# Patient Record
Sex: MALE | Race: WHITE | Employment: OTHER | URBAN - NONMETROPOLITAN AREA
[De-identification: names, ages, dates, MRNs, and addresses within clinical notes are randomized per-mention and may not be internally consistent; named-entity substitution may affect disease eponyms.]

---

## 2017-01-30 DIAGNOSIS — E78.2 MIXED HYPERLIPIDEMIA: ICD-10-CM

## 2017-01-30 RX ORDER — ATORVASTATIN CALCIUM 10 MG/1
10 TABLET, FILM COATED ORAL DAILY
Qty: 90 TABLET | Refills: 3 | Status: SHIPPED | OUTPATIENT
Start: 2017-01-30 | End: 2017-11-27 | Stop reason: SDUPTHER

## 2017-03-20 ENCOUNTER — OFFICE VISIT (OUTPATIENT)
Dept: FAMILY MEDICINE CLINIC | Age: 72
End: 2017-03-20

## 2017-03-20 VITALS
HEART RATE: 84 BPM | BODY MASS INDEX: 36.96 KG/M2 | OXYGEN SATURATION: 96 % | SYSTOLIC BLOOD PRESSURE: 122 MMHG | RESPIRATION RATE: 16 BRPM | DIASTOLIC BLOOD PRESSURE: 74 MMHG | WEIGHT: 236 LBS

## 2017-03-20 DIAGNOSIS — E66.9 OBESITY (BMI 30-39.9): ICD-10-CM

## 2017-03-20 DIAGNOSIS — N52.9 VASCULOGENIC ERECTILE DYSFUNCTION, UNSPECIFIED VASCULOGENIC ERECTILE DYSFUNCTION TYPE: ICD-10-CM

## 2017-03-20 DIAGNOSIS — J30.2 SEASONAL ALLERGIC RHINITIS, UNSPECIFIED ALLERGIC RHINITIS TRIGGER: ICD-10-CM

## 2017-03-20 DIAGNOSIS — J41.0 SIMPLE CHRONIC BRONCHITIS (HCC): ICD-10-CM

## 2017-03-20 DIAGNOSIS — G25.81 RESTLESS LEG SYNDROME: ICD-10-CM

## 2017-03-20 DIAGNOSIS — I70.0 AORTIC ATHEROSCLEROSIS (HCC): ICD-10-CM

## 2017-03-20 DIAGNOSIS — I10 ESSENTIAL HYPERTENSION: Chronic | ICD-10-CM

## 2017-03-20 DIAGNOSIS — F32.5 DEPRESSION, MAJOR, IN REMISSION (HCC): ICD-10-CM

## 2017-03-20 DIAGNOSIS — R21 RASH: ICD-10-CM

## 2017-03-20 DIAGNOSIS — F33.41 RECURRENT MAJOR DEPRESSIVE DISORDER, IN PARTIAL REMISSION (HCC): ICD-10-CM

## 2017-03-20 DIAGNOSIS — R10.13 DYSPEPSIA: ICD-10-CM

## 2017-03-20 PROCEDURE — 99214 OFFICE O/P EST MOD 30 MIN: CPT | Performed by: FAMILY MEDICINE

## 2017-03-20 PROCEDURE — 3288F FALL RISK ASSESSMENT DOCD: CPT | Performed by: FAMILY MEDICINE

## 2017-03-20 RX ORDER — AMLODIPINE BESYLATE AND BENAZEPRIL HYDROCHLORIDE 5; 10 MG/1; MG/1
1 CAPSULE ORAL DAILY
Qty: 90 CAPSULE | Refills: 3 | Status: SHIPPED | OUTPATIENT
Start: 2017-03-20 | End: 2017-11-27 | Stop reason: SDUPTHER

## 2017-03-20 RX ORDER — ESCITALOPRAM OXALATE 20 MG/1
20 TABLET ORAL DAILY
Qty: 90 TABLET | Refills: 3 | Status: SHIPPED | OUTPATIENT
Start: 2017-03-20 | End: 2017-11-27 | Stop reason: SDUPTHER

## 2017-03-20 RX ORDER — METOPROLOL SUCCINATE 25 MG/1
25 TABLET, EXTENDED RELEASE ORAL DAILY
Qty: 90 TABLET | Refills: 3 | Status: SHIPPED | OUTPATIENT
Start: 2017-03-20 | End: 2017-08-14 | Stop reason: SDUPTHER

## 2017-03-20 RX ORDER — MONTELUKAST SODIUM 10 MG/1
10 TABLET ORAL NIGHTLY
Qty: 90 TABLET | Refills: 3 | Status: SHIPPED | OUTPATIENT
Start: 2017-03-20 | End: 2017-11-27 | Stop reason: SDUPTHER

## 2017-03-20 RX ORDER — BUDESONIDE AND FORMOTEROL FUMARATE DIHYDRATE 160; 4.5 UG/1; UG/1
2 AEROSOL RESPIRATORY (INHALATION) 2 TIMES DAILY
Qty: 3 INHALER | Refills: 3 | Status: SHIPPED | OUTPATIENT
Start: 2017-03-20 | End: 2017-11-27 | Stop reason: SDUPTHER

## 2017-03-20 RX ORDER — SILDENAFIL 100 MG/1
100 TABLET, FILM COATED ORAL PRN
Qty: 6 TABLET | Refills: 3 | Status: SHIPPED | OUTPATIENT
Start: 2017-03-20

## 2017-03-20 RX ORDER — ALBUTEROL SULFATE 90 UG/1
2 AEROSOL, METERED RESPIRATORY (INHALATION) EVERY 6 HOURS PRN
Qty: 1 INHALER | Refills: 5 | Status: SHIPPED | OUTPATIENT
Start: 2017-03-20 | End: 2017-11-09 | Stop reason: ALTCHOICE

## 2017-03-20 RX ORDER — CLONAZEPAM 0.5 MG/1
0.5 TABLET ORAL PRN
Qty: 90 TABLET | Refills: 0 | Status: SHIPPED | OUTPATIENT
Start: 2017-03-20 | End: 2017-09-18 | Stop reason: SDUPTHER

## 2017-03-20 RX ORDER — OMEPRAZOLE 40 MG/1
40 CAPSULE, DELAYED RELEASE ORAL DAILY
Qty: 90 CAPSULE | Refills: 3 | Status: SHIPPED | OUTPATIENT
Start: 2017-03-20 | End: 2017-11-27 | Stop reason: SDUPTHER

## 2017-03-20 ASSESSMENT — ENCOUNTER SYMPTOMS
DIARRHEA: 0
BLOOD IN STOOL: 0
SORE THROAT: 0
BACK PAIN: 0
SHORTNESS OF BREATH: 0
ABDOMINAL PAIN: 0
VOMITING: 0
WHEEZING: 0
COUGH: 0
RHINORRHEA: 0
CONSTIPATION: 0
NAUSEA: 0
CHEST TIGHTNESS: 0
SINUS PRESSURE: 0

## 2017-04-07 ENCOUNTER — CARE COORDINATOR VISIT (OUTPATIENT)
Dept: CASE MANAGEMENT | Age: 72
End: 2017-04-07

## 2017-04-10 ENCOUNTER — TELEPHONE (OUTPATIENT)
Dept: PHARMACY | Facility: CLINIC | Age: 72
End: 2017-04-10

## 2017-04-10 ENCOUNTER — CARE COORDINATION (OUTPATIENT)
Dept: CASE MANAGEMENT | Age: 72
End: 2017-04-10

## 2017-04-10 DIAGNOSIS — J44.1 CHRONIC OBSTRUCTIVE PULMONARY DISEASE WITH ACUTE EXACERBATION (HCC): Primary | ICD-10-CM

## 2017-04-11 ENCOUNTER — OFFICE VISIT (OUTPATIENT)
Dept: FAMILY MEDICINE CLINIC | Age: 72
End: 2017-04-11

## 2017-04-11 VITALS
DIASTOLIC BLOOD PRESSURE: 74 MMHG | SYSTOLIC BLOOD PRESSURE: 122 MMHG | HEART RATE: 76 BPM | WEIGHT: 226 LBS | BODY MASS INDEX: 35.4 KG/M2 | RESPIRATION RATE: 15 BRPM | OXYGEN SATURATION: 96 %

## 2017-04-11 DIAGNOSIS — J10.1 INFLUENZA A: ICD-10-CM

## 2017-04-11 DIAGNOSIS — I10 ESSENTIAL HYPERTENSION: Chronic | ICD-10-CM

## 2017-04-11 DIAGNOSIS — J44.1 CHRONIC OBSTRUCTIVE PULMONARY DISEASE WITH ACUTE EXACERBATION (HCC): ICD-10-CM

## 2017-04-21 ENCOUNTER — CARE COORDINATION (OUTPATIENT)
Dept: CASE MANAGEMENT | Age: 72
End: 2017-04-21

## 2017-05-06 PROCEDURE — 99496 TRANSJ CARE MGMT HIGH F2F 7D: CPT | Performed by: FAMILY MEDICINE

## 2017-08-14 DIAGNOSIS — I10 ESSENTIAL HYPERTENSION: Chronic | ICD-10-CM

## 2017-08-14 RX ORDER — METOPROLOL SUCCINATE 25 MG/1
25 TABLET, EXTENDED RELEASE ORAL DAILY
Qty: 90 TABLET | Refills: 3 | Status: SHIPPED | OUTPATIENT
Start: 2017-08-14 | End: 2017-11-27 | Stop reason: SDUPTHER

## 2017-09-18 ENCOUNTER — OFFICE VISIT (OUTPATIENT)
Dept: FAMILY MEDICINE CLINIC | Age: 72
End: 2017-09-18

## 2017-09-18 VITALS
RESPIRATION RATE: 16 BRPM | SYSTOLIC BLOOD PRESSURE: 136 MMHG | DIASTOLIC BLOOD PRESSURE: 84 MMHG | BODY MASS INDEX: 36.81 KG/M2 | HEART RATE: 98 BPM | WEIGHT: 235 LBS

## 2017-09-18 DIAGNOSIS — Z13.6 ENCOUNTER FOR ABDOMINAL AORTIC ANEURYSM (AAA) SCREENING: ICD-10-CM

## 2017-09-18 DIAGNOSIS — Z23 NEED FOR INFLUENZA VACCINATION: ICD-10-CM

## 2017-09-18 DIAGNOSIS — I10 ESSENTIAL HYPERTENSION: Chronic | ICD-10-CM

## 2017-09-18 DIAGNOSIS — E66.9 OBESITY (BMI 30-39.9): ICD-10-CM

## 2017-09-18 DIAGNOSIS — G25.81 RESTLESS LEG SYNDROME: ICD-10-CM

## 2017-09-18 DIAGNOSIS — H55.00: ICD-10-CM

## 2017-09-18 DIAGNOSIS — J44.1 CHRONIC OBSTRUCTIVE PULMONARY DISEASE WITH ACUTE EXACERBATION (HCC): Primary | ICD-10-CM

## 2017-09-18 DIAGNOSIS — F33.41 RECURRENT MAJOR DEPRESSIVE DISORDER, IN PARTIAL REMISSION (HCC): ICD-10-CM

## 2017-09-18 DIAGNOSIS — I70.0 AORTIC ATHEROSCLEROSIS (HCC): ICD-10-CM

## 2017-09-18 DIAGNOSIS — R29.818 NEUROLOGIC ABNORMALITY: ICD-10-CM

## 2017-09-18 DIAGNOSIS — F32.5 DEPRESSION, MAJOR, IN REMISSION (HCC): ICD-10-CM

## 2017-09-18 DIAGNOSIS — Z72.0 TOBACCO USE: ICD-10-CM

## 2017-09-18 PROCEDURE — G0008 ADMIN INFLUENZA VIRUS VAC: HCPCS | Performed by: FAMILY MEDICINE

## 2017-09-18 PROCEDURE — 90688 IIV4 VACCINE SPLT 0.5 ML IM: CPT | Performed by: FAMILY MEDICINE

## 2017-09-18 PROCEDURE — 99214 OFFICE O/P EST MOD 30 MIN: CPT | Performed by: FAMILY MEDICINE

## 2017-09-18 PROCEDURE — G8510 SCR DEP NEG, NO PLAN REQD: HCPCS | Performed by: FAMILY MEDICINE

## 2017-09-18 RX ORDER — CLONAZEPAM 0.5 MG/1
0.5 TABLET ORAL PRN
Qty: 90 TABLET | Refills: 0 | Status: SHIPPED | OUTPATIENT
Start: 2017-09-18 | End: 2018-03-15 | Stop reason: SDUPTHER

## 2017-09-18 ASSESSMENT — ENCOUNTER SYMPTOMS
ABDOMINAL PAIN: 0
CONSTIPATION: 0
CHEST TIGHTNESS: 0
WHEEZING: 0
RHINORRHEA: 0
BACK PAIN: 0
DIARRHEA: 0
VOMITING: 0
SORE THROAT: 0
BLOOD IN STOOL: 0
SINUS PRESSURE: 0
NAUSEA: 0
SHORTNESS OF BREATH: 0
COUGH: 0

## 2017-09-18 ASSESSMENT — PATIENT HEALTH QUESTIONNAIRE - PHQ9
1. LITTLE INTEREST OR PLEASURE IN DOING THINGS: 1
SUM OF ALL RESPONSES TO PHQ QUESTIONS 1-9: 2
SUM OF ALL RESPONSES TO PHQ9 QUESTIONS 1 & 2: 2
2. FEELING DOWN, DEPRESSED OR HOPELESS: 1

## 2017-10-16 ENCOUNTER — HOSPITAL ENCOUNTER (OUTPATIENT)
Dept: PULMONOLOGY | Age: 72
Discharge: HOME | End: 2017-10-16
Attending: FAMILY MEDICINE | Admitting: FAMILY MEDICINE

## 2017-10-16 DIAGNOSIS — H55.00 NYSTAGMUS: ICD-10-CM

## 2017-10-16 LAB
DLCO %PRED: NORMAL
DLCO/VA %PRED: NORMAL
DLCO: NORMAL ML/MMHG SEC
FEF 25-75% PRE PRED: NORMAL
FEF 25-75%-PRE: NORMAL
FEV1 %PRED-PRE: NORMAL
FEV1/FVC PRED: NORMAL
FEV1/FVC: NORMAL %
FEV1: NORMAL LITERS
FEV3 PRE: NORMAL
FVC %PRED-PRE: NORMAL
FVC: NORMAL LITERS
MEP: NORMAL
MIP: NORMAL
PEF %PRED-PRE: NORMAL
PEF-PRE: NORMAL L/SEC
TLC %PRED: NORMAL
TLC: NORMAL LITERS

## 2017-10-18 DIAGNOSIS — J44.1 CHRONIC OBSTRUCTIVE PULMONARY DISEASE WITH ACUTE EXACERBATION (HCC): ICD-10-CM

## 2017-10-19 ENCOUNTER — HOSPITAL ENCOUNTER (OUTPATIENT)
Dept: ULTRASOUND IMAGING | Age: 72
Discharge: HOME | End: 2017-10-19
Attending: FAMILY MEDICINE | Admitting: FAMILY MEDICINE

## 2017-10-19 DIAGNOSIS — H55.00 NYSTAGMUS: ICD-10-CM

## 2017-10-19 DIAGNOSIS — Z13.6 ENCOUNTER FOR ABDOMINAL AORTIC ANEURYSM (AAA) SCREENING: ICD-10-CM

## 2017-10-19 DIAGNOSIS — R29.818 NEUROLOGIC ABNORMALITY: ICD-10-CM

## 2017-10-25 ENCOUNTER — TELEPHONE (OUTPATIENT)
Dept: FAMILY MEDICINE CLINIC | Age: 72
End: 2017-10-25

## 2017-11-09 ENCOUNTER — OFFICE VISIT (OUTPATIENT)
Dept: CARDIOLOGY CLINIC | Age: 72
End: 2017-11-09

## 2017-11-09 VITALS
WEIGHT: 236.4 LBS | SYSTOLIC BLOOD PRESSURE: 110 MMHG | DIASTOLIC BLOOD PRESSURE: 80 MMHG | HEART RATE: 68 BPM | HEIGHT: 67 IN | BODY MASS INDEX: 37.1 KG/M2

## 2017-11-09 DIAGNOSIS — E78.2 MIXED HYPERLIPIDEMIA: Primary | ICD-10-CM

## 2017-11-09 PROCEDURE — 99213 OFFICE O/P EST LOW 20 MIN: CPT | Performed by: INTERNAL MEDICINE

## 2017-11-09 NOTE — PROGRESS NOTES
CARDIOLOGY NOTE      11/9/2017    RE: Mel Bower  ([birth year])                               TO:  Dr. Aster Taylor MD      Thank you for involving me in taking care of your  patient Mel Bower, who is a  81y.o. year old      male with past medical  history of  HTN, hyperlipidimea  is  seen today Patient  during this  visit c/o seasonal SOB recurrent, associated with mild wheezing, no CP , but gets lightheaded. .  ? Had a TIA. Vitals:    11/09/17 0814   BP: 110/80   Pulse: 68       Current Outpatient Prescriptions   Medication Sig Dispense Refill    tiotropium (SPIRIVA RESPIMAT) 2.5 MCG/ACT AERS inhaler Inhale 2 puffs into the lungs daily 4 g 5    clonazePAM (KLONOPIN) 0.5 MG tablet Take 1 tablet by mouth as needed for Anxiety 90 tablet 0    metoprolol succinate (TOPROL XL) 25 MG extended release tablet Take 1 tablet by mouth daily 90 tablet 3    UNABLE TO FIND Compounded betaval cream 15g/velvachol 135g 1 Can 5    sildenafil (VIAGRA) 100 MG tablet Take 1 tablet by mouth as needed for Erectile Dysfunction 6 tablet 3    escitalopram (LEXAPRO) 20 MG tablet Take 1 tablet by mouth daily 90 tablet 3    omeprazole (PRILOSEC) 40 MG delayed release capsule Take 1 capsule by mouth daily 90 capsule 3    amLODIPine-benazepril (LOTREL) 5-10 MG per capsule Take 1 capsule by mouth daily 90 capsule 3    montelukast (SINGULAIR) 10 MG tablet Take 1 tablet by mouth nightly 90 tablet 3    budesonide-formoterol (SYMBICORT) 160-4.5 MCG/ACT AERO Inhale 2 puffs into the lungs 2 times daily 3 Inhaler 3    atorvastatin (LIPITOR) 10 MG tablet Take 1 tablet by mouth daily 90 tablet 3    aspirin 81 MG EC tablet Take 81 mg by mouth daily.  Cyanocobalamin (B-12) 500 MCG TABS Take 1 tablet by mouth daily       B Complex Vitamins (B COMPLEX 1 PO) Take 1 tablet by mouth daily.  folic acid (FOLVITE) 724 MCG tablet Take 400 mcg by mouth daily.          No current facility-administered medications for this visit. Allergies: Review of patient's allergies indicates no known allergies. Past Medical History:   Diagnosis Date    Allergic rhinitis     Asthma     CAD (coronary artery disease)     mild LAD    COPD (chronic obstructive pulmonary disease) (HCC)     Depression     Diverticulosis of colon     Family history of early CAD     Father- MI-  age 46; a grandparent- age 46 of MI    Family history of valvular heart disease     Mother- Mitral valve disease    GERD (gastroesophageal reflux disease)     H/O 24 hour EKG monitoring 2001-Predominant rhythm is sinus rhythm. No signif ectopy noted.  H/O cardiac catheterization 12/3/2008, 2005, 2003, 2001    H/O cardiovascular stress test 2002, 2001-Normal perfusion except for diaphragmatic artifact or possibility of inferior wall MI. EF 59%. Normal LVSF. Dr Deb Rice; 2001-Normal perfusion. EF 58%;    H/O echocardiogram 2002, 2001-EF 60%. Normal LVSF. Mild MR. Mild TR-Dr Deb Rice;    Heart imaging 2003-[facility] demonstrates normal augmentation of EF from 47 to 67%-Dr Aldridge;    History of diverticulitis of colon     HX OTHER MEDICAL 13    14 DAY EVENT: APC's, short runs of sinus tachycardia.      Hyperlipidemia     Hypertension     Mitral valve regurgitation 2002, 2001    mild    Palpitations     Pneumonia     Restless leg syndrome     Tricuspid valve regurgitation 2001    mild     Past Surgical History:   Procedure Laterality Date    BONE RESECTION, RIB      thoracic    COLONOSCOPY  10/14/14    polyp, divertics,    DIAGNOSTIC CARDIAC CATH LAB PROCEDURE  13    EF 55%, Mild LAD Disease    ENDOSCOPY, COLON, DIAGNOSTIC  10/14/14    normal    NERVE SURGERY      ulnar nerver transfer    ROTATOR CUFF REPAIR  , 2006-right rotator cuff; -Left Rotator cuff    ROTATOR CUFF REPAIR Left 2015    Dr. Adal Tate SHOULDER ARTHROSCOPY Right 12/2010     Family History   Problem Relation Age of Onset    Heart Disease Mother      Mitral valve disease    Heart Attack Mother 47    Coronary Art Dis Father      MI    Heart Disease Father     Heart Attack Father 46    Heart Attack Brother 50    Heart Attack Paternal Grandfather 48     Social History   Substance Use Topics    Smoking status: Former Smoker     Years: 20.00     Quit date: 6/28/1993    Smokeless tobacco: Current User      Comment: Reviewed 11/10/16    Alcohol use Yes      Comment: 1 beer daily          Review of systems:  HEENT: Neg  Card: SOBGI;Neg  : Neg  Neuro: Neg  Psych: Neg  Derm: Neg  MS; Neg  All: Documented  Constitutional: Neg    Objective:      Physical Exam:  /80   Pulse 68   Ht 5' 7\" (1.702 m)   Wt 236 lb 6.4 oz (107.2 kg)   BMI 37.03 kg/m²   Wt Readings from Last 3 Encounters:   11/09/17 236 lb 6.4 oz (107.2 kg)   09/18/17 235 lb (106.6 kg)   04/11/17 226 lb (102.5 kg)     Body mass index is 37.03 kg/m². GENERAL - Alert, oriented, pleasant, in no apparent distress. Head unremarkable  Eyes  Not injected conjunctiva  ENT  normal mucosa  Neck - Supple. No jugular venous distention noted. No carotid bruits. Cardiovascular  Normal S1 and S2 without obvious murmur or gallop. Extremities - No cyanosis, clubbing, or significant edema. Pulmonary  No respiratory distress. No wheezes or rales. Pulses: Bilateral radial and pedal pulses normal  Abdomen  no tenderness  Musculoskeletal  normal strength  Neurologic    There are  no gross focal neurologic abnormalities.   Skin-  No rash  Affect; normal mood    DATA:  No results found for: CKTOTAL, CKMB, CKMBINDEX, TROPONINI  BNP:  No results found for: BNP  PT/INR:  No results found for: PTINR  Lab Results   Component Value Date    LABA1C 5.5 09/21/2016     Lab Results   Component Value Date    CHOL 152 03/23/2015    TRIG 88 03/23/2015    HDL 56 03/23/2015    LDLCALC 78 03/23/2015 Lab Results   Component Value Date    ALT 20 04/05/2017    AST 25 04/05/2017     TSH:  No results found for: TSH      QUALITY MEASURES:  CAD:  No   CHOL LOWERING:  No- if No Why  ANTIPLATELET:  No - if No why  BETA BLOCKER    no  IF  NO WHY  SMOKING HISTORY no COUNSELLED no  ATRIAL FIBRILLATIONno ANTICOAG: no    Assessment/ Plan:     Patient seen , interviewed and examined        -  Hypertension: Patients blood pressure is normal. Patient is advised about low sodium diet. Present medical regimen will not be changed. -  LIPID MANAGEMENT:  Available lipid  lab data reviewed  and patient was given dietary advice. NCEP- ATP III guidelines reviewed with patient.     -   Changes  in medicines made: No          - SOB sec to COPD ?        - check lipids

## 2017-11-27 DIAGNOSIS — I10 ESSENTIAL HYPERTENSION: Chronic | ICD-10-CM

## 2017-11-27 DIAGNOSIS — F33.41 RECURRENT MAJOR DEPRESSIVE DISORDER, IN PARTIAL REMISSION (HCC): ICD-10-CM

## 2017-11-27 DIAGNOSIS — J41.0 SIMPLE CHRONIC BRONCHITIS (HCC): ICD-10-CM

## 2017-11-27 DIAGNOSIS — R10.13 DYSPEPSIA: ICD-10-CM

## 2017-11-27 DIAGNOSIS — E78.2 MIXED HYPERLIPIDEMIA: ICD-10-CM

## 2017-11-27 RX ORDER — ATORVASTATIN CALCIUM 10 MG/1
10 TABLET, FILM COATED ORAL DAILY
Qty: 90 TABLET | Refills: 3 | Status: SHIPPED | OUTPATIENT
Start: 2017-11-27 | End: 2018-10-26 | Stop reason: SDUPTHER

## 2017-11-27 RX ORDER — BUDESONIDE AND FORMOTEROL FUMARATE DIHYDRATE 160; 4.5 UG/1; UG/1
2 AEROSOL RESPIRATORY (INHALATION) 2 TIMES DAILY
Qty: 3 INHALER | Refills: 3 | Status: SHIPPED | OUTPATIENT
Start: 2017-11-27 | End: 2018-04-19

## 2017-11-27 RX ORDER — OMEPRAZOLE 40 MG/1
40 CAPSULE, DELAYED RELEASE ORAL DAILY
Qty: 90 CAPSULE | Refills: 3 | Status: SHIPPED | OUTPATIENT
Start: 2017-11-27 | End: 2018-11-29 | Stop reason: SDUPTHER

## 2017-11-27 RX ORDER — AMLODIPINE BESYLATE AND BENAZEPRIL HYDROCHLORIDE 5; 10 MG/1; MG/1
1 CAPSULE ORAL DAILY
Qty: 90 CAPSULE | Refills: 3 | Status: ON HOLD | OUTPATIENT
Start: 2017-11-27 | End: 2018-04-23 | Stop reason: HOSPADM

## 2017-11-27 RX ORDER — METOPROLOL SUCCINATE 25 MG/1
25 TABLET, EXTENDED RELEASE ORAL DAILY
Qty: 90 TABLET | Refills: 3 | Status: ON HOLD | OUTPATIENT
Start: 2017-11-27 | End: 2018-04-23

## 2017-11-27 RX ORDER — ESCITALOPRAM OXALATE 20 MG/1
20 TABLET ORAL DAILY
Qty: 90 TABLET | Refills: 3 | Status: SHIPPED | OUTPATIENT
Start: 2017-11-27 | End: 2018-10-26 | Stop reason: SDUPTHER

## 2017-11-27 RX ORDER — MONTELUKAST SODIUM 10 MG/1
10 TABLET ORAL NIGHTLY
Qty: 90 TABLET | Refills: 3 | Status: SHIPPED | OUTPATIENT
Start: 2017-11-27 | End: 2018-10-26 | Stop reason: SDUPTHER

## 2018-01-22 ENCOUNTER — HOSPITAL ENCOUNTER (OUTPATIENT)
Dept: GENERAL RADIOLOGY | Age: 73
Discharge: HOME | End: 2018-01-22
Attending: FAMILY MEDICINE | Admitting: FAMILY MEDICINE

## 2018-01-22 ENCOUNTER — OFFICE VISIT (OUTPATIENT)
Dept: FAMILY MEDICINE CLINIC | Age: 73
End: 2018-01-22

## 2018-01-22 VITALS
DIASTOLIC BLOOD PRESSURE: 76 MMHG | WEIGHT: 235 LBS | HEART RATE: 76 BPM | RESPIRATION RATE: 15 BRPM | BODY MASS INDEX: 36.81 KG/M2 | SYSTOLIC BLOOD PRESSURE: 116 MMHG | OXYGEN SATURATION: 93 %

## 2018-01-22 DIAGNOSIS — E66.9 OBESITY (BMI 30-39.9): ICD-10-CM

## 2018-01-22 DIAGNOSIS — I10 ESSENTIAL HYPERTENSION: Chronic | ICD-10-CM

## 2018-01-22 DIAGNOSIS — J44.1 CHRONIC OBSTRUCTIVE PULMONARY DISEASE WITH ACUTE EXACERBATION (HCC): ICD-10-CM

## 2018-01-22 DIAGNOSIS — J44.1 COPD EXACERBATION (HCC): Primary | ICD-10-CM

## 2018-01-22 DIAGNOSIS — F32.5 DEPRESSION, MAJOR, IN REMISSION (HCC): ICD-10-CM

## 2018-01-22 DIAGNOSIS — I70.0 AORTIC ATHEROSCLEROSIS (HCC): ICD-10-CM

## 2018-01-22 DIAGNOSIS — J44.1 COPD EXACERBATION (HCC): ICD-10-CM

## 2018-01-22 DIAGNOSIS — Z72.0 TOBACCO USE: ICD-10-CM

## 2018-01-22 PROCEDURE — G8417 CALC BMI ABV UP PARAM F/U: HCPCS | Performed by: FAMILY MEDICINE

## 2018-01-22 PROCEDURE — 4040F PNEUMOC VAC/ADMIN/RCVD: CPT | Performed by: FAMILY MEDICINE

## 2018-01-22 PROCEDURE — 4004F PT TOBACCO SCREEN RCVD TLK: CPT | Performed by: FAMILY MEDICINE

## 2018-01-22 PROCEDURE — G8598 ASA/ANTIPLAT THER USED: HCPCS | Performed by: FAMILY MEDICINE

## 2018-01-22 PROCEDURE — 1123F ACP DISCUSS/DSCN MKR DOCD: CPT | Performed by: FAMILY MEDICINE

## 2018-01-22 PROCEDURE — G8427 DOCREV CUR MEDS BY ELIG CLIN: HCPCS | Performed by: FAMILY MEDICINE

## 2018-01-22 PROCEDURE — 3017F COLORECTAL CA SCREEN DOC REV: CPT | Performed by: FAMILY MEDICINE

## 2018-01-22 PROCEDURE — 99214 OFFICE O/P EST MOD 30 MIN: CPT | Performed by: FAMILY MEDICINE

## 2018-01-22 PROCEDURE — 3023F SPIROM DOC REV: CPT | Performed by: FAMILY MEDICINE

## 2018-01-22 PROCEDURE — G8926 SPIRO NO PERF OR DOC: HCPCS | Performed by: FAMILY MEDICINE

## 2018-01-22 PROCEDURE — G8484 FLU IMMUNIZE NO ADMIN: HCPCS | Performed by: FAMILY MEDICINE

## 2018-01-22 RX ORDER — PREDNISONE 20 MG/1
TABLET ORAL
Qty: 30 TABLET | Refills: 0 | Status: SHIPPED | OUTPATIENT
Start: 2018-01-22 | End: 2018-02-16 | Stop reason: ALTCHOICE

## 2018-01-22 RX ORDER — AZITHROMYCIN 250 MG/1
TABLET, FILM COATED ORAL
Qty: 1 PACKET | Refills: 0 | Status: SHIPPED | OUTPATIENT
Start: 2018-01-22 | End: 2018-02-16 | Stop reason: ALTCHOICE

## 2018-01-28 PROBLEM — J44.1 COPD EXACERBATION (HCC): Status: ACTIVE | Noted: 2018-01-28

## 2018-01-28 ASSESSMENT — ENCOUNTER SYMPTOMS
COUGH: 1
CONSTIPATION: 0
SHORTNESS OF BREATH: 1
DIARRHEA: 0
CHEST TIGHTNESS: 0
ABDOMINAL PAIN: 0
RHINORRHEA: 0
WHEEZING: 0
BACK PAIN: 0
NAUSEA: 0
VOMITING: 0
BLOOD IN STOOL: 0
SORE THROAT: 0
SINUS PRESSURE: 0

## 2018-01-28 NOTE — ASSESSMENT & PLAN NOTE
Repeated failure when stops medications.     Doing well on lexapro, doesn't feel he can decrease meds

## 2018-01-28 NOTE — ASSESSMENT & PLAN NOTE
Has seen Dr Malik Her, thrown out of draft years ago for lungs (1967) Mother asthmatic. Current exacerbation.   Will refer to Dr Malik Her for reevaluation

## 2018-02-16 ENCOUNTER — INITIAL CONSULT (OUTPATIENT)
Dept: PULMONOLOGY | Age: 73
End: 2018-02-16

## 2018-02-16 ENCOUNTER — HOSPITAL ENCOUNTER (OUTPATIENT)
Dept: GENERAL RADIOLOGY | Age: 73
Discharge: HOME | End: 2018-02-16
Attending: INTERNAL MEDICINE | Admitting: INTERNAL MEDICINE

## 2018-02-16 VITALS
DIASTOLIC BLOOD PRESSURE: 72 MMHG | HEART RATE: 66 BPM | OXYGEN SATURATION: 95 % | RESPIRATION RATE: 20 BRPM | SYSTOLIC BLOOD PRESSURE: 110 MMHG | HEIGHT: 67 IN | WEIGHT: 236 LBS | BODY MASS INDEX: 37.04 KG/M2

## 2018-02-16 DIAGNOSIS — J42 CHRONIC BRONCHITIS, UNSPECIFIED CHRONIC BRONCHITIS TYPE (HCC): ICD-10-CM

## 2018-02-16 DIAGNOSIS — R06.02 SHORTNESS OF BREATH: ICD-10-CM

## 2018-02-16 DIAGNOSIS — Z72.0 TOBACCO USE: ICD-10-CM

## 2018-02-16 DIAGNOSIS — I49.9 IRREGULAR HEART RATE: ICD-10-CM

## 2018-02-16 DIAGNOSIS — J42 CHRONIC BRONCHITIS, UNSPECIFIED CHRONIC BRONCHITIS TYPE (HCC): Primary | ICD-10-CM

## 2018-02-16 LAB
DLCO %PRED: NORMAL
DLCO PRE: NORMAL
EKG ATRIAL RATE: 0 BPM
EKG DIAGNOSIS: NORMAL
EKG Q-T INTERVAL: 0 MS
EKG QRS DURATION: 0 MS
EKG QTC CALCULATION (BAZETT): 0 MS
EKG R AXIS: 0 DEGREES
EKG T AXIS: 0 DEGREES
EKG VENTRICULAR RATE: 0 BPM
FEF 25-75%-POST: 0.66
FEF 25-75%-PRE: 0.71
FEV1-POST: 1.65
FEV1-PRE: 1.67
FEV1/FVC-POST: 54.6
FEV1/FVC-PRE: 57.4
FVC-POST: 3.02
FVC-PRE: 2.91
MEP: NORMAL
MIP: NORMAL
TLC %PRED: NORMAL
TLC PRE: NORMAL

## 2018-02-16 PROCEDURE — 4040F PNEUMOC VAC/ADMIN/RCVD: CPT | Performed by: INTERNAL MEDICINE

## 2018-02-16 PROCEDURE — G8484 FLU IMMUNIZE NO ADMIN: HCPCS | Performed by: INTERNAL MEDICINE

## 2018-02-16 PROCEDURE — 3023F SPIROM DOC REV: CPT | Performed by: INTERNAL MEDICINE

## 2018-02-16 PROCEDURE — 1123F ACP DISCUSS/DSCN MKR DOCD: CPT | Performed by: INTERNAL MEDICINE

## 2018-02-16 PROCEDURE — G8417 CALC BMI ABV UP PARAM F/U: HCPCS | Performed by: INTERNAL MEDICINE

## 2018-02-16 PROCEDURE — 99215 OFFICE O/P EST HI 40 MIN: CPT | Performed by: INTERNAL MEDICINE

## 2018-02-16 PROCEDURE — G8926 SPIRO NO PERF OR DOC: HCPCS | Performed by: INTERNAL MEDICINE

## 2018-02-16 PROCEDURE — G8598 ASA/ANTIPLAT THER USED: HCPCS | Performed by: INTERNAL MEDICINE

## 2018-02-16 PROCEDURE — 3017F COLORECTAL CA SCREEN DOC REV: CPT | Performed by: INTERNAL MEDICINE

## 2018-02-16 PROCEDURE — 4004F PT TOBACCO SCREEN RCVD TLK: CPT | Performed by: INTERNAL MEDICINE

## 2018-02-16 PROCEDURE — 93000 ELECTROCARDIOGRAM COMPLETE: CPT | Performed by: INTERNAL MEDICINE

## 2018-02-16 PROCEDURE — G8427 DOCREV CUR MEDS BY ELIG CLIN: HCPCS | Performed by: INTERNAL MEDICINE

## 2018-02-16 ASSESSMENT — PULMONARY FUNCTION TESTS
FEV1/FVC_POST: 54.6
FEV1_POST: 1.65
FEV1/FVC_PRE: 57.4
FEV1_PRE: 1.67
FVC_PRE: 2.91
FVC_POST: 3.02

## 2018-02-16 NOTE — PROGRESS NOTES
Subjective:   CHIEF COMPLAINT / HPI: Vance Pedroza is a 63-year-old male who I had the privilege of seeing almost 35 years ago and diagnosed with COPD/asthma at that time. I recommended he quit smoking as soon as possible and apparently did quit smoking over 30 years ago and is used chewing tobacco ever since. He did smoke up to 2 packs a day and did so for almost 25 years. I do not have records on his pulmonary function tests but recently had a PFT in 2017 which demonstrated a severe obstructive defect. Over the past several months he has been started on Symbicort, Spiriva and has an albuterol rescue inhaler  About 5 weeks ago he states he had a an episode of bronchitis or possibly walking pneumonia. At that time he was treated with oral antibiotics and oral steroids. He infrequently gets episodes of bronchitis. He does complain of shortness of breath with exertion. He denies chest pain or chest discomfort. He has had multiple cardiac catheterizations in the past and has not required stent placement. He does have a very strong family history of heart disease with early cardiac death in multiple family members. He did have an EKG on 17 which showed sinus tachycardia but he does not have a history of atrial fibrillation. He is not aware of a family history of lung cancer. Although his history mentions he be has GERD he is not complaining of any GI symptoms at this time. He also denies a history of heavy snoring, witnessed apnea or excessive daytime sleepiness but does have a history of restless leg syndrome.          Past Medical History:  Past Medical History:   Diagnosis Date    Allergic rhinitis     Asthma     CAD (coronary artery disease)     mild LAD    COPD (chronic obstructive pulmonary disease) (HCC)     Depression     Diverticulosis of colon     Family history of early CAD     Father- MI-  age 46; a grandparent- age 46 of MI    Family history of valvular heart disease Mother- Mitral valve disease    GERD (gastroesophageal reflux disease)     H/O 24 hour EKG monitoring 6/18/2001 6/18/2001-Predominant rhythm is sinus rhythm. No signif ectopy noted.  H/O cardiac catheterization 12/3/2008, 8/30/2005, 5/30/2003, 8/1/2001    H/O cardiovascular stress test 2/8/2002, 6/18/2001 2/8/2002-Normal perfusion except for diaphragmatic artifact or possibility of inferior wall MI. EF 59%. Normal LVSF. Dr Brandy Brewster; 6/18/2001-Normal perfusion. EF 58%;    H/O echocardiogram 1/28/2002, 6/18/2001 1/28/2002-EF 60%. Normal LVSF. Mild MR. Mild TR-Dr Brandy Brewster;    Heart imaging 9/30/2003 9/30/2003-[facility] demonstrates normal augmentation of EF from 47 to 67%-Dr Aldridge;    History of diverticulitis of colon     HX OTHER MEDICAL 7/30/13    14 DAY EVENT: APC's, short runs of sinus tachycardia.  Hyperlipidemia     Hypertension     Irregular heart rate 2/16/2018    Mitral valve regurgitation 1/28/2002, 6/2001    mild    Palpitations     Pneumonia     Restless leg syndrome     Shortness of breath 2/16/2018    Tricuspid valve regurgitation 6/2001    mild       Current Medications:    No current facility-administered medications for this visit.      No Known Allergies    Social History:    Social History     Social History    Marital status:      Spouse name: N/A    Number of children: N/A    Years of education: N/A     Social History Main Topics    Smoking status: Former Smoker     Packs/day: 1.00     Years: 20.00     Types: Cigarettes     Quit date: 6/28/1993    Smokeless tobacco: Current User      Comment: Reviewed 11/10/16    Alcohol use Yes      Comment: 1 beer daily    Drug use: No    Sexual activity: Yes     Partners: Male      Comment:      Other Topics Concern    None     Social History Narrative    None       Family History:    Family History   Problem Relation Age of Onset    Heart Disease Mother      Mitral valve disease    Heart Attack Mother 47    Coronary Art Dis Father      MI    Heart Disease Father     Heart Attack Father 46    Heart Attack Brother 50    Heart Attack Paternal Grandfather 48         REVIEW OF SYSTEMS:    CONSTITUTIONAL:  negative for fevers, chills, diaphoresis,  appetite change, night sweats and unexpected weight change. HEENT:  negative for hearing loss,  sinus pressure, nasal congestion, epistaxis   RESPIRATORY:  See HPI  CARDIOVASCULAR:  Negative for chest pain, palpitations, exertional chest pressure/discomfort, edema, syncope  GASTROINTESTINAL: negative for nausea, vomiting, diarrhea, constipation, blood in stool and abdominal pain  GENITOURINARY:  negative for frequency, dysuria and hematuria  HEMATOLOGIC/LYMPHATIC:  negative for easy bruising, bleeding and lymphadenopathy  ALLERGIC/IMMUNOLOGIC:  negative for recurrent infections, angioedema, anaphylaxis and drug reaction  MUSCULOSKELETAL:  negative for  pain, joint swelling, decreased range of motion and muscle weakness    Objective:   PHYSICAL EXAM:      VITALS:    Vitals:    02/16/18 0853   BP: 110/72   Pulse: 66   Resp: 20   SpO2: 95%   Weight: 236 lb (107 kg)   Height: 5' 7\" (1.702 m)         CONSTITUTIONAL:  awake, alert, cooperative, no apparent distress, and appears stated age, Mildly overweight  NECK:  Supple and nontender,  trachea midline, no adenopathy, thyroid nl, no JVD, no wheezing or stridor over neck, Mild increased neck girth  CHEST: Chest expansion equal and symmetrical, no intercostal retraction, no increased work of breathing  LUNGS: Breath sounds are diminished throughout all areas but I hear no wheezing or rhonchi and there is no pleural rubs   CARDIOVASCULAR: Irregular S1 and S2 , no murmurs or gallops ,no pericardial rubs  ABDOMEN:  normal bowel sounds, non-distended and no masses palpated, and no tenderness to palpation. No hepatospleenomegaly  LYMPHADENOPATHY:  no axillary or supraclavicular adenopathy.  No cervical adnenopathy  RIGHT AND LEFT LOWER slightly irregular I would like to get a baseline EKG. I also obtain a nighttime oxygen saturation study. I will continue to follow him  Return in about 3 months (around 5/16/2018) for Recheck for COPD, Recheck for Shortness of Breath. This dictation was performed with a verbal recognition program and it was checked for errors. It is possible that there are still dictated errors within this office note. Any errors should be brought immediately to my attention for correction. All efforts were made to ensure that this office note is accurate.

## 2018-02-20 ENCOUNTER — HOSPITAL ENCOUNTER (OUTPATIENT)
Dept: GENERAL RADIOLOGY | Age: 73
Discharge: HOME | End: 2018-02-20
Attending: INTERNAL MEDICINE | Admitting: INTERNAL MEDICINE

## 2018-02-20 LAB
ALBUMIN SERPL-MCNC: 3.9 GM/DL (ref 3.4–5)
ALP BLD-CCNC: 86 IU/L (ref 40–129)
ALT SERPL-CCNC: 52 U/L (ref 10–40)
AST SERPL-CCNC: 41 IU/L (ref 15–37)
BILIRUB SERPL-MCNC: 0.6 MG/DL (ref 0–1)
BILIRUBIN DIRECT: 0.2 MG/DL (ref 0–0.3)
BILIRUBIN, INDIRECT: 0.4 MG/DL (ref 0–0.7)
CHOLESTEROL: 132 MG/DL
HDLC SERPL-MCNC: 54 MG/DL
LDL CHOLESTEROL CALCULATED: 68 MG/DL
TOTAL PROTEIN: 6.1 GM/DL (ref 6.4–8.2)
TRIGL SERPL-MCNC: 50 MG/DL

## 2018-02-22 ENCOUNTER — NURSE ONLY (OUTPATIENT)
Dept: CARDIOLOGY CLINIC | Age: 73
End: 2018-02-22

## 2018-02-22 ENCOUNTER — TELEPHONE (OUTPATIENT)
Dept: CARDIOLOGY CLINIC | Age: 73
End: 2018-02-22

## 2018-02-22 ENCOUNTER — OFFICE VISIT (OUTPATIENT)
Dept: PULMONOLOGY | Age: 73
End: 2018-02-22

## 2018-02-22 VITALS
BODY MASS INDEX: 36.95 KG/M2 | RESPIRATION RATE: 20 BRPM | WEIGHT: 235.89 LBS | SYSTOLIC BLOOD PRESSURE: 136 MMHG | DIASTOLIC BLOOD PRESSURE: 78 MMHG | OXYGEN SATURATION: 87 % | HEART RATE: 88 BPM

## 2018-02-22 DIAGNOSIS — R06.02 SHORTNESS OF BREATH: Primary | ICD-10-CM

## 2018-02-22 DIAGNOSIS — R09.02 HYPOXEMIA: ICD-10-CM

## 2018-02-22 DIAGNOSIS — I49.9 IRREGULAR HEART RATE: Primary | ICD-10-CM

## 2018-02-22 DIAGNOSIS — J42 CHRONIC BRONCHITIS, UNSPECIFIED CHRONIC BRONCHITIS TYPE (HCC): ICD-10-CM

## 2018-02-22 PROCEDURE — G8926 SPIRO NO PERF OR DOC: HCPCS | Performed by: INTERNAL MEDICINE

## 2018-02-22 PROCEDURE — 1123F ACP DISCUSS/DSCN MKR DOCD: CPT | Performed by: INTERNAL MEDICINE

## 2018-02-22 PROCEDURE — G8427 DOCREV CUR MEDS BY ELIG CLIN: HCPCS | Performed by: INTERNAL MEDICINE

## 2018-02-22 PROCEDURE — 4040F PNEUMOC VAC/ADMIN/RCVD: CPT | Performed by: INTERNAL MEDICINE

## 2018-02-22 PROCEDURE — G8417 CALC BMI ABV UP PARAM F/U: HCPCS | Performed by: INTERNAL MEDICINE

## 2018-02-22 PROCEDURE — 99213 OFFICE O/P EST LOW 20 MIN: CPT | Performed by: INTERNAL MEDICINE

## 2018-02-22 PROCEDURE — 3017F COLORECTAL CA SCREEN DOC REV: CPT | Performed by: INTERNAL MEDICINE

## 2018-02-22 PROCEDURE — 4004F PT TOBACCO SCREEN RCVD TLK: CPT | Performed by: INTERNAL MEDICINE

## 2018-02-22 PROCEDURE — G8484 FLU IMMUNIZE NO ADMIN: HCPCS | Performed by: INTERNAL MEDICINE

## 2018-02-22 PROCEDURE — G8598 ASA/ANTIPLAT THER USED: HCPCS | Performed by: INTERNAL MEDICINE

## 2018-02-22 PROCEDURE — 3023F SPIROM DOC REV: CPT | Performed by: INTERNAL MEDICINE

## 2018-02-22 RX ORDER — ALBUTEROL SULFATE 90 UG/1
2 AEROSOL, METERED RESPIRATORY (INHALATION) EVERY 6 HOURS PRN
COMMUNITY
End: 2018-05-10 | Stop reason: SDUPTHER

## 2018-03-01 ENCOUNTER — TELEPHONE (OUTPATIENT)
Dept: CARDIOLOGY CLINIC | Age: 73
End: 2018-03-01

## 2018-03-02 ENCOUNTER — NURSE ONLY (OUTPATIENT)
Dept: CARDIOLOGY CLINIC | Age: 73
End: 2018-03-02

## 2018-03-02 DIAGNOSIS — R00.2 PALPITATION: Primary | ICD-10-CM

## 2018-03-02 DIAGNOSIS — I49.9 IRREGULAR HEART RATE: ICD-10-CM

## 2018-03-02 PROCEDURE — 93225 XTRNL ECG REC<48 HRS REC: CPT | Performed by: INTERNAL MEDICINE

## 2018-03-08 ENCOUNTER — OFFICE VISIT (OUTPATIENT)
Dept: FAMILY MEDICINE CLINIC | Age: 73
End: 2018-03-08

## 2018-03-08 VITALS
DIASTOLIC BLOOD PRESSURE: 80 MMHG | OXYGEN SATURATION: 86 % | RESPIRATION RATE: 18 BRPM | SYSTOLIC BLOOD PRESSURE: 122 MMHG | BODY MASS INDEX: 36.65 KG/M2 | HEART RATE: 116 BPM | WEIGHT: 234 LBS

## 2018-03-08 DIAGNOSIS — R04.2 HEMOPTYSIS: ICD-10-CM

## 2018-03-08 DIAGNOSIS — J44.1 COPD EXACERBATION (HCC): ICD-10-CM

## 2018-03-08 DIAGNOSIS — J42 CHRONIC BRONCHITIS, UNSPECIFIED CHRONIC BRONCHITIS TYPE (HCC): ICD-10-CM

## 2018-03-08 DIAGNOSIS — R06.02 SHORTNESS OF BREATH: ICD-10-CM

## 2018-03-08 DIAGNOSIS — R06.02 SHORTNESS OF BREATH: Primary | ICD-10-CM

## 2018-03-08 LAB
A/G RATIO: 1.6 (ref 1.1–2.2)
ALBUMIN SERPL-MCNC: 3.8 G/DL (ref 3.4–5)
ALP BLD-CCNC: 68 U/L (ref 40–129)
ALT SERPL-CCNC: 26 U/L (ref 10–40)
ANION GAP SERPL CALCULATED.3IONS-SCNC: 17 MMOL/L (ref 3–16)
AST SERPL-CCNC: 20 U/L (ref 15–37)
BILIRUB SERPL-MCNC: 0.6 MG/DL (ref 0–1)
BUN BLDV-MCNC: 15 MG/DL (ref 7–20)
C-REACTIVE PROTEIN: 26.5 MG/L (ref 0–5.1)
CALCIUM SERPL-MCNC: 8.3 MG/DL (ref 8.3–10.6)
CHLORIDE BLD-SCNC: 102 MMOL/L (ref 99–110)
CO2: 26 MMOL/L (ref 21–32)
CREAT SERPL-MCNC: 0.8 MG/DL (ref 0.8–1.3)
D DIMER: 759 NG/ML DDU (ref 0–229)
GFR AFRICAN AMERICAN: >60
GFR NON-AFRICAN AMERICAN: >60
GLOBULIN: 2.4 G/DL
GLUCOSE BLD-MCNC: 116 MG/DL (ref 70–99)
POTASSIUM SERPL-SCNC: 4.1 MMOL/L (ref 3.5–5.1)
PRO-BNP: 1679 PG/ML (ref 0–124)
SODIUM BLD-SCNC: 145 MMOL/L (ref 136–145)
TOTAL PROTEIN: 6.2 G/DL (ref 6.4–8.2)

## 2018-03-08 PROCEDURE — 4004F PT TOBACCO SCREEN RCVD TLK: CPT | Performed by: FAMILY MEDICINE

## 2018-03-08 PROCEDURE — 99214 OFFICE O/P EST MOD 30 MIN: CPT | Performed by: FAMILY MEDICINE

## 2018-03-08 PROCEDURE — 3023F SPIROM DOC REV: CPT | Performed by: FAMILY MEDICINE

## 2018-03-08 PROCEDURE — G8482 FLU IMMUNIZE ORDER/ADMIN: HCPCS | Performed by: FAMILY MEDICINE

## 2018-03-08 PROCEDURE — 1123F ACP DISCUSS/DSCN MKR DOCD: CPT | Performed by: FAMILY MEDICINE

## 2018-03-08 PROCEDURE — 4040F PNEUMOC VAC/ADMIN/RCVD: CPT | Performed by: FAMILY MEDICINE

## 2018-03-08 PROCEDURE — G8427 DOCREV CUR MEDS BY ELIG CLIN: HCPCS | Performed by: FAMILY MEDICINE

## 2018-03-08 PROCEDURE — G8417 CALC BMI ABV UP PARAM F/U: HCPCS | Performed by: FAMILY MEDICINE

## 2018-03-08 PROCEDURE — G8598 ASA/ANTIPLAT THER USED: HCPCS | Performed by: FAMILY MEDICINE

## 2018-03-08 PROCEDURE — G8926 SPIRO NO PERF OR DOC: HCPCS | Performed by: FAMILY MEDICINE

## 2018-03-08 PROCEDURE — 3017F COLORECTAL CA SCREEN DOC REV: CPT | Performed by: FAMILY MEDICINE

## 2018-03-08 RX ORDER — LEVOFLOXACIN 500 MG/1
500 TABLET, FILM COATED ORAL DAILY
Qty: 10 TABLET | Refills: 0 | Status: SHIPPED | OUTPATIENT
Start: 2018-03-08 | End: 2018-03-08 | Stop reason: SDUPTHER

## 2018-03-08 RX ORDER — PREDNISONE 20 MG/1
TABLET ORAL
Qty: 30 TABLET | Refills: 0 | Status: SHIPPED | OUTPATIENT
Start: 2018-03-08 | End: 2018-04-19 | Stop reason: ALTCHOICE

## 2018-03-08 RX ORDER — LEVOFLOXACIN 500 MG/1
500 TABLET, FILM COATED ORAL DAILY
Qty: 10 TABLET | Refills: 0 | Status: SHIPPED | OUTPATIENT
Start: 2018-03-08 | End: 2018-03-18

## 2018-03-08 RX ORDER — PREDNISONE 20 MG/1
TABLET ORAL
Qty: 30 TABLET | Refills: 0 | Status: SHIPPED | OUTPATIENT
Start: 2018-03-08 | End: 2018-03-08 | Stop reason: SDUPTHER

## 2018-03-08 ASSESSMENT — ENCOUNTER SYMPTOMS
CHEST TIGHTNESS: 0
BACK PAIN: 0
CONSTIPATION: 0
BLOOD IN STOOL: 0
RHINORRHEA: 0
SORE THROAT: 0
SHORTNESS OF BREATH: 1
ABDOMINAL PAIN: 0
DIARRHEA: 0
SINUS PRESSURE: 0
WHEEZING: 0
VOMITING: 0
NAUSEA: 0
COUGH: 1

## 2018-03-08 NOTE — ASSESSMENT & PLAN NOTE
Patient saw Dr. Evaristo Campoverde who started him on oxygen 1 month ago at that time he had a pneumonia that was improving. Since then his continued to worsen despite current medications. He is now on oxygen and needs just ambulate within the house.   He is confined to the house because he is unable leave the house without oxygen he needs portable oxygen

## 2018-03-08 NOTE — PROGRESS NOTES
SUBJECTIVE:    HPI:   COPD (chronic obstructive pulmonary disease) ([address]  Patient saw Dr. Mena Osorio who started him on oxygen 1 month ago at that time he had a pneumonia that was improving. Since then his continued to worsen despite current medications. He is now on oxygen and needs just ambulate within the house. He is confined to the house because he is unable leave the house without oxygen he needs portable oxygen    COPD exacerbation ([address]  Patient thinks something wrong he is now coughing up blood he denies swollen legs were source of pulmonary emboli. I believe he needs a CT scan with contrast to rule out a PE. His dyspnea is out of proportion to his abnormalities on exam.    Shortness of breath  Severe worsening shortness of breath patient is high risk. He is now coughing up blood he started an antibiotic for possible occult pneumonia he'll be get a CT angiogram to rule out pulmonary emboli and he's been referred to cardiology for Cardiologic evaluation      CHIEF COMPLAINT:    Chief Complaint   Patient presents with    Breathing Problem     pt is on O2 around the clock, pt seeing Dr Susy Lebron not have a follow up until May    Cough     concerns for pna, for 1-2 weeks he's been coughing up red mucus    Other     pt has been issues with his heart and wearing a heart monitor; has been told he has an irregular heart rate; pt is see a specialist       REVIEW OF SYSTEMS:    Review of Systems   Constitutional: Negative for activity change, fatigue and fever. HENT: Negative for congestion, ear pain, rhinorrhea, sinus pressure and sore throat. Eyes: Negative for visual disturbance. Respiratory: Positive for cough and shortness of breath. Negative for chest tightness and wheezing. Cardiovascular: Negative for chest pain, palpitations and leg swelling. Gastrointestinal: Negative for abdominal pain, blood in stool, constipation, diarrhea, nausea and vomiting.    Genitourinary: Negative for dysuria,

## 2018-03-09 LAB
ATYPICAL LYMPHOCYTE RELATIVE PERCENT: 1 % (ref 0–6)
BANDED NEUTROPHILS RELATIVE PERCENT: 8 % (ref 0–7)
BASOPHILS ABSOLUTE: 0.1 K/UL (ref 0–0.2)
BASOPHILS RELATIVE PERCENT: 1 %
EOSINOPHILS ABSOLUTE: 0.2 K/UL (ref 0–0.6)
EOSINOPHILS RELATIVE PERCENT: 2 %
HCT VFR BLD CALC: 36.7 % (ref 40.5–52.5)
HEMOGLOBIN: 12.6 G/DL (ref 13.5–17.5)
LYMPHOCYTES ABSOLUTE: 1.1 K/UL (ref 1–5.1)
LYMPHOCYTES RELATIVE PERCENT: 10 %
MCH RBC QN AUTO: 30.4 PG (ref 26–34)
MCHC RBC AUTO-ENTMCNC: 34.2 G/DL (ref 31–36)
MCV RBC AUTO: 88.7 FL (ref 80–100)
MONOCYTES ABSOLUTE: 1.2 K/UL (ref 0–1.3)
MONOCYTES RELATIVE PERCENT: 12 %
NEUTROPHILS ABSOLUTE: 7.2 K/UL (ref 1.7–7.7)
NEUTROPHILS RELATIVE PERCENT: 66 %
PDW BLD-RTO: 14 % (ref 12.4–15.4)
PLATELET # BLD: 260 K/UL (ref 135–450)
PLATELET SLIDE REVIEW: ABNORMAL
PMV BLD AUTO: 9.5 FL (ref 5–10.5)
RBC # BLD: 4.14 M/UL (ref 4.2–5.9)
RBC # BLD: NORMAL 10*6/UL
WBC # BLD: 9.7 K/UL (ref 4–11)

## 2018-03-12 ENCOUNTER — TELEPHONE (OUTPATIENT)
Dept: CARDIOLOGY CLINIC | Age: 73
End: 2018-03-12

## 2018-03-12 DIAGNOSIS — I10 ESSENTIAL HYPERTENSION: Chronic | ICD-10-CM

## 2018-03-12 DIAGNOSIS — R79.89 ELEVATED BRAIN NATRIURETIC PEPTIDE (BNP) LEVEL: Primary | ICD-10-CM

## 2018-03-12 DIAGNOSIS — J42 CHRONIC BRONCHITIS, UNSPECIFIED CHRONIC BRONCHITIS TYPE (HCC): ICD-10-CM

## 2018-03-12 DIAGNOSIS — J44.1 COPD EXACERBATION (HCC): ICD-10-CM

## 2018-03-12 DIAGNOSIS — I49.9 IRREGULAR HEART RATE: ICD-10-CM

## 2018-03-12 DIAGNOSIS — R06.02 SHORTNESS OF BREATH: ICD-10-CM

## 2018-03-12 PROCEDURE — 93227 XTRNL ECG REC<48 HR R&I: CPT | Performed by: INTERNAL MEDICINE

## 2018-03-14 ENCOUNTER — HOSPITAL ENCOUNTER (OUTPATIENT)
Dept: CT IMAGING | Age: 73
Discharge: HOME | End: 2018-03-14
Attending: FAMILY MEDICINE | Admitting: FAMILY MEDICINE

## 2018-03-14 DIAGNOSIS — R06.02 SHORTNESS OF BREATH: ICD-10-CM

## 2018-03-14 DIAGNOSIS — R04.2 HEMOPTYSIS: ICD-10-CM

## 2018-03-15 ENCOUNTER — TELEPHONE (OUTPATIENT)
Dept: CARDIOLOGY CLINIC | Age: 73
End: 2018-03-15

## 2018-03-15 ENCOUNTER — OFFICE VISIT (OUTPATIENT)
Dept: FAMILY MEDICINE CLINIC | Age: 73
End: 2018-03-15

## 2018-03-15 VITALS
HEART RATE: 79 BPM | DIASTOLIC BLOOD PRESSURE: 74 MMHG | BODY MASS INDEX: 36.9 KG/M2 | OXYGEN SATURATION: 97 % | WEIGHT: 235.6 LBS | SYSTOLIC BLOOD PRESSURE: 116 MMHG | RESPIRATION RATE: 16 BRPM

## 2018-03-15 DIAGNOSIS — J18.1 LOBAR PNEUMONIA (HCC): Primary | ICD-10-CM

## 2018-03-15 DIAGNOSIS — J96.11 CHRONIC RESPIRATORY FAILURE WITH HYPOXIA (HCC): ICD-10-CM

## 2018-03-15 DIAGNOSIS — G25.81 RESTLESS LEG SYNDROME: ICD-10-CM

## 2018-03-15 DIAGNOSIS — F33.41 RECURRENT MAJOR DEPRESSIVE DISORDER, IN PARTIAL REMISSION (HCC): ICD-10-CM

## 2018-03-15 PROCEDURE — G8417 CALC BMI ABV UP PARAM F/U: HCPCS | Performed by: FAMILY MEDICINE

## 2018-03-15 PROCEDURE — 4004F PT TOBACCO SCREEN RCVD TLK: CPT | Performed by: FAMILY MEDICINE

## 2018-03-15 PROCEDURE — 99214 OFFICE O/P EST MOD 30 MIN: CPT | Performed by: FAMILY MEDICINE

## 2018-03-15 PROCEDURE — G8598 ASA/ANTIPLAT THER USED: HCPCS | Performed by: FAMILY MEDICINE

## 2018-03-15 PROCEDURE — G8427 DOCREV CUR MEDS BY ELIG CLIN: HCPCS | Performed by: FAMILY MEDICINE

## 2018-03-15 PROCEDURE — 1123F ACP DISCUSS/DSCN MKR DOCD: CPT | Performed by: FAMILY MEDICINE

## 2018-03-15 PROCEDURE — G8482 FLU IMMUNIZE ORDER/ADMIN: HCPCS | Performed by: FAMILY MEDICINE

## 2018-03-15 PROCEDURE — 4040F PNEUMOC VAC/ADMIN/RCVD: CPT | Performed by: FAMILY MEDICINE

## 2018-03-15 PROCEDURE — 3017F COLORECTAL CA SCREEN DOC REV: CPT | Performed by: FAMILY MEDICINE

## 2018-03-15 RX ORDER — CLONAZEPAM 0.5 MG/1
0.5 TABLET ORAL PRN
Qty: 90 TABLET | Refills: 0 | Status: SHIPPED | OUTPATIENT
Start: 2018-03-15 | End: 2018-10-17 | Stop reason: SDUPTHER

## 2018-03-15 ASSESSMENT — ENCOUNTER SYMPTOMS
ABDOMINAL PAIN: 0
RHINORRHEA: 0
DIARRHEA: 0
VOMITING: 0
COUGH: 1
NAUSEA: 0
BLOOD IN STOOL: 0
SORE THROAT: 0
BACK PAIN: 0
SINUS PRESSURE: 0
CONSTIPATION: 0
SHORTNESS OF BREATH: 1
WHEEZING: 0
CHEST TIGHTNESS: 0

## 2018-03-15 NOTE — ASSESSMENT & PLAN NOTE
Repeated failure when stops medications. Doing well on lexapro, doesn't feel he can decrease meds.   Patient using Klonopin at at bedtime intermittently but less than one daily

## 2018-03-15 NOTE — ASSESSMENT & PLAN NOTE
Using Klonopin for restless leg syndrome when necessary    Controlled Substances Monitoring: The Prescription Monitoring Report for this patient was reviewed today. Shaquille Oneill MD)    No signs of potential drug abuse or diversion identified. Shaquille Oneill MD)              Existing medication contract.  Shaquille Oneill MD)

## 2018-03-15 NOTE — PROGRESS NOTES
SUBJECTIVE:    HPI:   Lobar pneumonia ([address]  Persistent dyspnea. CT scan shows no evidence of PE but does have lobar pneumonia. Patient concerned that this is due to \"the big C \"but this appears to be in more than one location this is more likely infectious as a primary cause. Patient is currently on Levaquin and has follow-up appointment with pulmonology. I will see him again in one week to document further improvement. Recurrent major depressive disorder, in partial remission ([address]  Repeated failure when stops medications. Doing well on lexapro, doesn't feel he can decrease meds. Patient using Klonopin at at bedtime intermittently but less than one daily    Restless leg syndrome  Using Klonopin for restless leg syndrome when necessary    Controlled Substances Monitoring: The Prescription Monitoring Report for this patient was reviewed today. Jen Cedillo MD)    No signs of potential drug abuse or diversion identified. Jen Cedillo MD)              Existing medication contract. Jen Cedillo MD)        Chronic respiratory failure with hypoxia ([address]  o2 sats in 80s when walks needs portable      CHIEF COMPLAINT:    Chief Complaint   Patient presents with    Other     1 week follow up. Pt states not doing much better coughing with red coming up and hurts when he coughs. Pt states not sleeping.  Discuss Medications       REVIEW OF SYSTEMS:    Review of Systems   Constitutional: Negative for activity change, fatigue and fever. HENT: Negative for congestion, ear pain, rhinorrhea, sinus pressure and sore throat. Eyes: Negative for visual disturbance. Respiratory: Positive for cough and shortness of breath. Negative for chest tightness and wheezing. Cardiovascular: Negative for chest pain, palpitations and leg swelling. Gastrointestinal: Negative for abdominal pain, blood in stool, constipation, diarrhea, nausea and vomiting.    Genitourinary: Negative for dysuria, flank pain, hematuria and urgency. Musculoskeletal: Positive for arthralgias. Negative for back pain and joint swelling. Skin: Positive for rash. Neurological: Negative for weakness, numbness and headaches. Psychiatric/Behavioral: Negative. OBJECTIVE  PHYSICAL EXAM:    /74   Pulse 79   Resp 16   Wt 235 lb 9.6 oz (106.9 kg)   SpO2 97%   BMI 36.90 kg/m²       Physical Exam   Constitutional: He is oriented to person, place, and time. He appears well-developed and well-nourished. HENT:   Head: Normocephalic and atraumatic. Right Ear: External ear normal.   Left Ear: External ear normal.   Nose: Nose normal.   Mouth/Throat: Oropharynx is clear and moist.   Eyes: Conjunctivae and EOM are normal. Pupils are equal, round, and reactive to light. Neck: Normal range of motion. Neck supple. No JVD present. No tracheal deviation present. No thyromegaly present. Cardiovascular: Normal rate, regular rhythm, normal heart sounds and intact distal pulses. Pulmonary/Chest: Effort normal. He has no wheezes. He has no rales. No consolidation but diminished bases w   Abdominal: Soft. Bowel sounds are normal. He exhibits no mass. Musculoskeletal: Normal range of motion. He exhibits tenderness. He exhibits no edema. L shoulder pain with ROM weak abduction, improved   Lymphadenopathy:     He has no cervical adenopathy. Neurological: He is alert and oriented to person, place, and time. He has normal reflexes. He displays normal reflexes. No cranial nerve deficit. He exhibits normal muscle tone. Coordination normal.   No nystagmus. Gait is normal motor and sensory symmetrical bilaterally   Skin: Skin is warm and dry. No rash noted. Psychiatric: He has a normal mood and affect. His behavior is normal. Judgment and thought content normal.       ASSESSMENT:    1. Lobar pneumonia ([address]    2. Recurrent major depressive disorder, in partial remission ([address]    3. Restless leg syndrome    4.  Chronic

## 2018-03-15 NOTE — TELEPHONE ENCOUNTER
Electronically submitted completed Eliquis prior auth. form via pt's Epic chart to pt's noted drug coverage insurance, [employer]. See pt's Med-list for completed prior auth.

## 2018-03-16 ENCOUNTER — OFFICE VISIT (OUTPATIENT)
Dept: CARDIOLOGY CLINIC | Age: 73
End: 2018-03-16

## 2018-03-16 ENCOUNTER — TELEPHONE (OUTPATIENT)
Dept: FAMILY MEDICINE CLINIC | Age: 73
End: 2018-03-16

## 2018-03-16 VITALS
HEIGHT: 67 IN | WEIGHT: 233 LBS | BODY MASS INDEX: 36.57 KG/M2 | SYSTOLIC BLOOD PRESSURE: 108 MMHG | HEART RATE: 62 BPM | DIASTOLIC BLOOD PRESSURE: 72 MMHG

## 2018-03-16 DIAGNOSIS — I10 ESSENTIAL HYPERTENSION: ICD-10-CM

## 2018-03-16 DIAGNOSIS — I49.9 IRREGULAR HEART RATE: Primary | ICD-10-CM

## 2018-03-16 DIAGNOSIS — R06.02 SOB (SHORTNESS OF BREATH): ICD-10-CM

## 2018-03-16 PROCEDURE — 4040F PNEUMOC VAC/ADMIN/RCVD: CPT | Performed by: INTERNAL MEDICINE

## 2018-03-16 PROCEDURE — G8598 ASA/ANTIPLAT THER USED: HCPCS | Performed by: INTERNAL MEDICINE

## 2018-03-16 PROCEDURE — G8428 CUR MEDS NOT DOCUMENT: HCPCS | Performed by: INTERNAL MEDICINE

## 2018-03-16 PROCEDURE — 1123F ACP DISCUSS/DSCN MKR DOCD: CPT | Performed by: INTERNAL MEDICINE

## 2018-03-16 PROCEDURE — 3017F COLORECTAL CA SCREEN DOC REV: CPT | Performed by: INTERNAL MEDICINE

## 2018-03-16 PROCEDURE — G8482 FLU IMMUNIZE ORDER/ADMIN: HCPCS | Performed by: INTERNAL MEDICINE

## 2018-03-16 PROCEDURE — G8417 CALC BMI ABV UP PARAM F/U: HCPCS | Performed by: INTERNAL MEDICINE

## 2018-03-16 PROCEDURE — 99214 OFFICE O/P EST MOD 30 MIN: CPT | Performed by: INTERNAL MEDICINE

## 2018-03-16 PROCEDURE — 4004F PT TOBACCO SCREEN RCVD TLK: CPT | Performed by: INTERNAL MEDICINE

## 2018-03-16 NOTE — TELEPHONE ENCOUNTER
Patient called in wanting to let PCP know that he still has not heard from anyone about his portable O2. I called Nimco Pittman at [facility] and spoke with her about the situation. She stated that since patient is already receiving O2 from [facility] she needs him to sign a release from either [facility] or [facility] for her to get his records so they can get everything started; to make sure insurance will cover. Per Nimco Pittman she spoke with patient on 03/14/18 and told him this; patient stated he would stop by their office to sign; patient never showed up. She stated that she needs this so she can transfer everything from [facility] to [facility] in order for insurance to pay for it and so patient can start on his portable O2. I called patient back; explained to him that he just needs to sign the release and then we do the rest of the work. He stated he will go today to sign so they can get the process started. He wanted me to tell Dr Dipti Shultz and all staff thank you again for helping him! Dr Shant Betancourt stated in the chart note on 03/08/18 when we qualified patient for portable O2 there was no dictation stating that once patient put O2 back on after it dropped with walking on room air for 86% that it came back up to 95%; will you please addend that chart note so we can fax back to [facility] to make sure insurance covers everything. Thank you.

## 2018-03-21 ENCOUNTER — HOSPITAL ENCOUNTER (OUTPATIENT)
Dept: CARDIOLOGY | Age: 73
Discharge: HOME | End: 2018-03-21
Attending: FAMILY MEDICINE | Admitting: FAMILY MEDICINE

## 2018-03-21 LAB
LV EF: 33 %
LVEF MODALITY: NORMAL

## 2018-03-22 ENCOUNTER — OFFICE VISIT (OUTPATIENT)
Dept: FAMILY MEDICINE CLINIC | Age: 73
End: 2018-03-22

## 2018-03-22 VITALS
RESPIRATION RATE: 16 BRPM | SYSTOLIC BLOOD PRESSURE: 110 MMHG | HEART RATE: 62 BPM | WEIGHT: 236.2 LBS | BODY MASS INDEX: 36.99 KG/M2 | DIASTOLIC BLOOD PRESSURE: 76 MMHG

## 2018-03-22 DIAGNOSIS — F33.41 RECURRENT MAJOR DEPRESSIVE DISORDER, IN PARTIAL REMISSION (HCC): ICD-10-CM

## 2018-03-22 DIAGNOSIS — J18.1 LOBAR PNEUMONIA (HCC): Primary | ICD-10-CM

## 2018-03-22 DIAGNOSIS — J44.1 COPD EXACERBATION (HCC): ICD-10-CM

## 2018-03-22 DIAGNOSIS — I10 ESSENTIAL HYPERTENSION: Chronic | ICD-10-CM

## 2018-03-22 DIAGNOSIS — J96.11 CHRONIC RESPIRATORY FAILURE WITH HYPOXIA (HCC): ICD-10-CM

## 2018-03-22 DIAGNOSIS — I48.20 CHRONIC ATRIAL FIBRILLATION (HCC): ICD-10-CM

## 2018-03-22 PROCEDURE — G8417 CALC BMI ABV UP PARAM F/U: HCPCS | Performed by: FAMILY MEDICINE

## 2018-03-22 PROCEDURE — 3017F COLORECTAL CA SCREEN DOC REV: CPT | Performed by: FAMILY MEDICINE

## 2018-03-22 PROCEDURE — 4004F PT TOBACCO SCREEN RCVD TLK: CPT | Performed by: FAMILY MEDICINE

## 2018-03-22 PROCEDURE — G8598 ASA/ANTIPLAT THER USED: HCPCS | Performed by: FAMILY MEDICINE

## 2018-03-22 PROCEDURE — 4040F PNEUMOC VAC/ADMIN/RCVD: CPT | Performed by: FAMILY MEDICINE

## 2018-03-22 PROCEDURE — G8926 SPIRO NO PERF OR DOC: HCPCS | Performed by: FAMILY MEDICINE

## 2018-03-22 PROCEDURE — G8427 DOCREV CUR MEDS BY ELIG CLIN: HCPCS | Performed by: FAMILY MEDICINE

## 2018-03-22 PROCEDURE — 99214 OFFICE O/P EST MOD 30 MIN: CPT | Performed by: FAMILY MEDICINE

## 2018-03-22 PROCEDURE — 1123F ACP DISCUSS/DSCN MKR DOCD: CPT | Performed by: FAMILY MEDICINE

## 2018-03-22 PROCEDURE — 3023F SPIROM DOC REV: CPT | Performed by: FAMILY MEDICINE

## 2018-03-22 PROCEDURE — G8482 FLU IMMUNIZE ORDER/ADMIN: HCPCS | Performed by: FAMILY MEDICINE

## 2018-03-22 ASSESSMENT — ENCOUNTER SYMPTOMS
SHORTNESS OF BREATH: 1
SORE THROAT: 0
WHEEZING: 0
CHEST TIGHTNESS: 0
COUGH: 0
DIARRHEA: 0
RHINORRHEA: 0
ABDOMINAL PAIN: 0
SINUS PRESSURE: 0
NAUSEA: 0
BACK PAIN: 0
VOMITING: 0
CONSTIPATION: 0
BLOOD IN STOOL: 0

## 2018-03-22 NOTE — ASSESSMENT & PLAN NOTE
/76 (Site: Left Arm, Position: Sitting, Cuff Size: Large Adult)   Pulse 62   Resp 16   Wt 236 lb 3.2 oz (107.1 kg)   BMI 36.99 kg/m²   Blood pressure is well-controlled on metoprolol XL 25,  Lotrel 510 and also on Lipitor 10 mg daily

## 2018-03-22 NOTE — PROGRESS NOTES
for visual disturbance. Respiratory: Positive for shortness of breath. Negative for cough, chest tightness and wheezing. Cardiovascular: Negative for chest pain, palpitations and leg swelling. Gastrointestinal: Negative for abdominal pain, blood in stool, constipation, diarrhea, nausea and vomiting. Genitourinary: Negative for dysuria, flank pain, hematuria and urgency. Musculoskeletal: Positive for arthralgias. Negative for back pain and joint swelling. Skin: Positive for rash. Neurological: Negative for weakness, numbness and headaches. Psychiatric/Behavioral: Negative. OBJECTIVE  PHYSICAL EXAM:    /76 (Site: Left Arm, Position: Sitting, Cuff Size: Large Adult)   Pulse 62   Resp 16   Wt 236 lb 3.2 oz (107.1 kg)   BMI 36.99 kg/m²       Physical Exam   Constitutional: He is oriented to person, place, and time. He appears well-developed and well-nourished. HENT:   Head: Normocephalic and atraumatic. Right Ear: External ear normal.   Left Ear: External ear normal.   Nose: Nose normal.   Mouth/Throat: Oropharynx is clear and moist.   Eyes: Conjunctivae and EOM are normal. Pupils are equal, round, and reactive to light. Neck: Normal range of motion. Neck supple. No JVD present. No tracheal deviation present. No thyromegaly present. Cardiovascular: Normal rate, normal heart sounds and intact distal pulses. Rate controlled   Pulmonary/Chest: Effort normal. He has no wheezes. He has no rales. No consolidation but diminished bases w   Abdominal: Soft. Bowel sounds are normal. He exhibits no mass. Musculoskeletal: Normal range of motion. He exhibits tenderness. He exhibits no edema. L shoulder pain with ROM weak abduction, improved   Lymphadenopathy:     He has no cervical adenopathy. Neurological: He is alert and oriented to person, place, and time. He has normal reflexes. He displays normal reflexes. No cranial nerve deficit. He exhibits normal muscle tone.  Coordination on CT scan patient not currently coughing up sputum patient not currently febrile this is likely a resolving problem. He will need repeat imaging the demonstrate improvement and stability.     Recurrent major depressive disorder, in partial remission ([facility] [address]  Patient's depression is doing well on Lexapro 20 and with his improving dyspnea overall his mood has improved considerably over the last several weeks

## 2018-03-22 NOTE — ASSESSMENT & PLAN NOTE
Chronic atrial fibrillation aggravating patient's dyspnea and new onset heat for oxygen. He is scheduled for [facility] and cardioversion.   He does have x-ray evidence of resolving pneumonia but no evidence of active pneumonia at this time

## 2018-03-28 ENCOUNTER — PROCEDURE VISIT (OUTPATIENT)
Dept: CARDIOLOGY CLINIC | Age: 73
End: 2018-03-28

## 2018-03-28 DIAGNOSIS — I49.9 IRREGULAR HEART RATE: ICD-10-CM

## 2018-03-28 DIAGNOSIS — I10 ESSENTIAL HYPERTENSION: ICD-10-CM

## 2018-03-28 DIAGNOSIS — R06.02 SOB (SHORTNESS OF BREATH): ICD-10-CM

## 2018-03-28 LAB
LV EF: 33 %
LVEF MODALITY: NORMAL

## 2018-03-28 PROCEDURE — 78452 HT MUSCLE IMAGE SPECT MULT: CPT | Performed by: INTERNAL MEDICINE

## 2018-03-28 PROCEDURE — 93015 CV STRESS TEST SUPVJ I&R: CPT | Performed by: INTERNAL MEDICINE

## 2018-03-28 PROCEDURE — A9500 TC99M SESTAMIBI: HCPCS | Performed by: INTERNAL MEDICINE

## 2018-03-29 ENCOUNTER — TELEPHONE (OUTPATIENT)
Dept: CARDIOLOGY CLINIC | Age: 73
End: 2018-03-29

## 2018-03-29 ENCOUNTER — OFFICE VISIT (OUTPATIENT)
Dept: FAMILY MEDICINE CLINIC | Age: 73
End: 2018-03-29

## 2018-03-29 VITALS
RESPIRATION RATE: 20 BRPM | DIASTOLIC BLOOD PRESSURE: 70 MMHG | HEART RATE: 58 BPM | BODY MASS INDEX: 37.74 KG/M2 | SYSTOLIC BLOOD PRESSURE: 108 MMHG | WEIGHT: 241 LBS | OXYGEN SATURATION: 94 %

## 2018-03-29 DIAGNOSIS — J96.11 CHRONIC RESPIRATORY FAILURE WITH HYPOXIA (HCC): ICD-10-CM

## 2018-03-29 DIAGNOSIS — R09.02 HYPOXEMIA: ICD-10-CM

## 2018-03-29 DIAGNOSIS — R60.1 GENERALIZED EDEMA: ICD-10-CM

## 2018-03-29 DIAGNOSIS — J18.1 LOBAR PNEUMONIA (HCC): ICD-10-CM

## 2018-03-29 DIAGNOSIS — I48.20 CHRONIC ATRIAL FIBRILLATION (HCC): ICD-10-CM

## 2018-03-29 DIAGNOSIS — I50.22 CHRONIC SYSTOLIC CONGESTIVE HEART FAILURE (HCC): ICD-10-CM

## 2018-03-29 DIAGNOSIS — I27.20 PULMONARY HTN (HCC): ICD-10-CM

## 2018-03-29 DIAGNOSIS — J42 CHRONIC BRONCHITIS, UNSPECIFIED CHRONIC BRONCHITIS TYPE (HCC): Primary | ICD-10-CM

## 2018-03-29 PROBLEM — J44.1 COPD EXACERBATION (HCC): Status: RESOLVED | Noted: 2018-01-28 | Resolved: 2018-03-29

## 2018-03-29 PROBLEM — R06.02 SOB (SHORTNESS OF BREATH): Status: RESOLVED | Noted: 2018-02-16 | Resolved: 2018-03-29

## 2018-03-29 PROCEDURE — G8417 CALC BMI ABV UP PARAM F/U: HCPCS | Performed by: FAMILY MEDICINE

## 2018-03-29 PROCEDURE — G8926 SPIRO NO PERF OR DOC: HCPCS | Performed by: FAMILY MEDICINE

## 2018-03-29 PROCEDURE — G8482 FLU IMMUNIZE ORDER/ADMIN: HCPCS | Performed by: FAMILY MEDICINE

## 2018-03-29 PROCEDURE — G8598 ASA/ANTIPLAT THER USED: HCPCS | Performed by: FAMILY MEDICINE

## 2018-03-29 PROCEDURE — 4004F PT TOBACCO SCREEN RCVD TLK: CPT | Performed by: FAMILY MEDICINE

## 2018-03-29 PROCEDURE — G8427 DOCREV CUR MEDS BY ELIG CLIN: HCPCS | Performed by: FAMILY MEDICINE

## 2018-03-29 PROCEDURE — 99214 OFFICE O/P EST MOD 30 MIN: CPT | Performed by: FAMILY MEDICINE

## 2018-03-29 PROCEDURE — 1123F ACP DISCUSS/DSCN MKR DOCD: CPT | Performed by: FAMILY MEDICINE

## 2018-03-29 PROCEDURE — 3023F SPIROM DOC REV: CPT | Performed by: FAMILY MEDICINE

## 2018-03-29 PROCEDURE — 3017F COLORECTAL CA SCREEN DOC REV: CPT | Performed by: FAMILY MEDICINE

## 2018-03-29 PROCEDURE — 4040F PNEUMOC VAC/ADMIN/RCVD: CPT | Performed by: FAMILY MEDICINE

## 2018-03-29 RX ORDER — POTASSIUM CHLORIDE 750 MG/1
10 CAPSULE, EXTENDED RELEASE ORAL 2 TIMES DAILY
Qty: 60 CAPSULE | Refills: 3 | Status: ON HOLD | OUTPATIENT
Start: 2018-03-29 | End: 2018-04-23 | Stop reason: HOSPADM

## 2018-03-29 RX ORDER — FUROSEMIDE 40 MG/1
40 TABLET ORAL DAILY
Qty: 30 TABLET | Refills: 5 | Status: ON HOLD | OUTPATIENT
Start: 2018-03-29 | End: 2018-04-23 | Stop reason: HOSPADM

## 2018-03-29 ASSESSMENT — ENCOUNTER SYMPTOMS
WHEEZING: 0
BACK PAIN: 0
SHORTNESS OF BREATH: 1
CHEST TIGHTNESS: 0
NAUSEA: 0
ABDOMINAL PAIN: 0
SINUS PRESSURE: 0
VOMITING: 0
COUGH: 0
SORE THROAT: 0
CONSTIPATION: 0
BLOOD IN STOOL: 0
DIARRHEA: 0
RHINORRHEA: 0

## 2018-03-29 NOTE — ASSESSMENT & PLAN NOTE
Noted pulmonary hypertension. Patient being treated with oxygen with some improvement in his symptomatology but is expected still not ideal I'm hoping that cardioversion will improve cardiac function and improve patient's pulmonary hypertension.

## 2018-03-29 NOTE — PROGRESS NOTES
Lymphadenopathy:     He has no cervical adenopathy. Neurological: He is alert and oriented to person, place, and time. He has normal reflexes. He displays normal reflexes. No cranial nerve deficit. He exhibits normal muscle tone. Coordination normal.   No nystagmus. Gait is normal motor and sensory symmetrical bilaterally   Skin: Skin is warm and dry. No rash noted. Psychiatric: He has a normal mood and affect. His behavior is normal. Judgment and thought content normal.       ASSESSMENT:    1. Chronic bronchitis, unspecified chronic bronchitis type ([address]    2. Pulmonary HTN    3. Chronic respiratory failure with hypoxia (HCC)    4. Lobar pneumonia ([address]    5. Hypoxemia    6. Chronic systolic congestive heart failure ([address]    7. Chronic atrial fibrillation (HCC)    8. Generalized edema        PLAN:    Yue Nuñez was seen today for leg swelling. Diagnoses and all orders for this visit:    Chronic bronchitis, unspecified chronic bronchitis type ([address]    Pulmonary HTN    Chronic respiratory failure with hypoxia (HCC)    Lobar pneumonia (HCC)    Hypoxemia    Chronic systolic congestive heart failure (HCC)  -     furosemide (LASIX) 40 MG tablet; Take 1 tablet by mouth daily  -     potassium chloride (MICRO-K) 10 MEQ extended release capsule; Take 1 capsule by mouth 2 times daily    Chronic atrial fibrillation (HCC)    Generalized edema  -     furosemide (LASIX) 40 MG tablet; Take 1 tablet by mouth daily  -     potassium chloride (MICRO-K) 10 MEQ extended release capsule; Take 1 capsule by mouth 2 times daily         Chronic atrial fibrillation [facility])  Patient atrial fibrillation on Xarelto. Continued significant dyspnea and planned cardioversion soon. The patient and I discussed this at length. If cardioversion is successful I expect him to have improvement in his symptomatology    COPD (chronic obstructive pulmonary disease) (HCC)  COPD complicated by CHF and a recent pneumonia.   Patient will be continued on his

## 2018-04-04 ENCOUNTER — OFFICE VISIT (OUTPATIENT)
Dept: CARDIOLOGY CLINIC | Age: 73
End: 2018-04-04

## 2018-04-04 VITALS
BODY MASS INDEX: 36.22 KG/M2 | SYSTOLIC BLOOD PRESSURE: 138 MMHG | HEART RATE: 88 BPM | WEIGHT: 230.8 LBS | DIASTOLIC BLOOD PRESSURE: 86 MMHG | HEIGHT: 67 IN

## 2018-04-04 DIAGNOSIS — I49.9 IRREGULAR HEART RATE: ICD-10-CM

## 2018-04-04 DIAGNOSIS — I10 ESSENTIAL HYPERTENSION: Primary | ICD-10-CM

## 2018-04-04 PROCEDURE — G8428 CUR MEDS NOT DOCUMENT: HCPCS | Performed by: INTERNAL MEDICINE

## 2018-04-04 PROCEDURE — 4040F PNEUMOC VAC/ADMIN/RCVD: CPT | Performed by: INTERNAL MEDICINE

## 2018-04-04 PROCEDURE — 4004F PT TOBACCO SCREEN RCVD TLK: CPT | Performed by: INTERNAL MEDICINE

## 2018-04-04 PROCEDURE — G8417 CALC BMI ABV UP PARAM F/U: HCPCS | Performed by: INTERNAL MEDICINE

## 2018-04-04 PROCEDURE — 3017F COLORECTAL CA SCREEN DOC REV: CPT | Performed by: INTERNAL MEDICINE

## 2018-04-04 PROCEDURE — G8598 ASA/ANTIPLAT THER USED: HCPCS | Performed by: INTERNAL MEDICINE

## 2018-04-04 PROCEDURE — 1123F ACP DISCUSS/DSCN MKR DOCD: CPT | Performed by: INTERNAL MEDICINE

## 2018-04-04 PROCEDURE — 99214 OFFICE O/P EST MOD 30 MIN: CPT | Performed by: INTERNAL MEDICINE

## 2018-04-05 ENCOUNTER — OFFICE VISIT (OUTPATIENT)
Dept: FAMILY MEDICINE CLINIC | Age: 73
End: 2018-04-05

## 2018-04-05 VITALS
BODY MASS INDEX: 36.34 KG/M2 | RESPIRATION RATE: 20 BRPM | OXYGEN SATURATION: 95 % | SYSTOLIC BLOOD PRESSURE: 106 MMHG | HEART RATE: 58 BPM | WEIGHT: 232 LBS | DIASTOLIC BLOOD PRESSURE: 72 MMHG

## 2018-04-05 DIAGNOSIS — I48.20 CHRONIC ATRIAL FIBRILLATION (HCC): Primary | ICD-10-CM

## 2018-04-05 DIAGNOSIS — J42 CHRONIC BRONCHITIS, UNSPECIFIED CHRONIC BRONCHITIS TYPE (HCC): ICD-10-CM

## 2018-04-05 DIAGNOSIS — J96.11 CHRONIC RESPIRATORY FAILURE WITH HYPOXIA (HCC): ICD-10-CM

## 2018-04-05 DIAGNOSIS — I27.20 PULMONARY HTN (HCC): ICD-10-CM

## 2018-04-05 DIAGNOSIS — I10 ESSENTIAL HYPERTENSION: Chronic | ICD-10-CM

## 2018-04-05 PROCEDURE — 1123F ACP DISCUSS/DSCN MKR DOCD: CPT | Performed by: FAMILY MEDICINE

## 2018-04-05 PROCEDURE — 4004F PT TOBACCO SCREEN RCVD TLK: CPT | Performed by: FAMILY MEDICINE

## 2018-04-05 PROCEDURE — G8926 SPIRO NO PERF OR DOC: HCPCS | Performed by: FAMILY MEDICINE

## 2018-04-05 PROCEDURE — 3023F SPIROM DOC REV: CPT | Performed by: FAMILY MEDICINE

## 2018-04-05 PROCEDURE — 3017F COLORECTAL CA SCREEN DOC REV: CPT | Performed by: FAMILY MEDICINE

## 2018-04-05 PROCEDURE — G8427 DOCREV CUR MEDS BY ELIG CLIN: HCPCS | Performed by: FAMILY MEDICINE

## 2018-04-05 PROCEDURE — G8598 ASA/ANTIPLAT THER USED: HCPCS | Performed by: FAMILY MEDICINE

## 2018-04-05 PROCEDURE — G8417 CALC BMI ABV UP PARAM F/U: HCPCS | Performed by: FAMILY MEDICINE

## 2018-04-05 PROCEDURE — 4040F PNEUMOC VAC/ADMIN/RCVD: CPT | Performed by: FAMILY MEDICINE

## 2018-04-05 PROCEDURE — 99213 OFFICE O/P EST LOW 20 MIN: CPT | Performed by: FAMILY MEDICINE

## 2018-04-05 ASSESSMENT — ENCOUNTER SYMPTOMS
ABDOMINAL PAIN: 0
CONSTIPATION: 0
SHORTNESS OF BREATH: 1
BLOOD IN STOOL: 0
SINUS PRESSURE: 0
VOMITING: 0
CHEST TIGHTNESS: 0
COUGH: 0
DIARRHEA: 0
NAUSEA: 0
WHEEZING: 0
RHINORRHEA: 0
BACK PAIN: 0
SORE THROAT: 0

## 2018-04-06 ENCOUNTER — HOSPITAL ENCOUNTER (OUTPATIENT)
Dept: CARDIOLOGY | Age: 73
Discharge: HOME | End: 2018-04-06
Attending: INTERNAL MEDICINE | Admitting: INTERNAL MEDICINE

## 2018-04-06 VITALS
OXYGEN SATURATION: 96 % | SYSTOLIC BLOOD PRESSURE: 107 MMHG | HEART RATE: 92 BPM | RESPIRATION RATE: 22 BRPM | DIASTOLIC BLOOD PRESSURE: 82 MMHG

## 2018-04-06 LAB
ANION GAP SERPL CALCULATED.3IONS-SCNC: 10 MMOL/L (ref 4–16)
BUN BLDV-MCNC: 16 MG/DL (ref 6–23)
CALCIUM SERPL-MCNC: 8.3 MG/DL (ref 8.3–10.6)
CHLORIDE BLD-SCNC: 97 MMOL/L (ref 99–110)
CO2: 30 MMOL/L (ref 21–32)
CREAT SERPL-MCNC: 1 MG/DL (ref 0.9–1.3)
EKG ATRIAL RATE: 174 BPM
EKG ATRIAL RATE: 99 BPM
EKG DIAGNOSIS: NORMAL
EKG DIAGNOSIS: NORMAL
EKG P AXIS: 37 DEGREES
EKG P-R INTERVAL: 170 MS
EKG Q-T INTERVAL: 242 MS
EKG Q-T INTERVAL: 370 MS
EKG QRS DURATION: 76 MS
EKG QRS DURATION: 82 MS
EKG QTC CALCULATION (BAZETT): 393 MS
EKG QTC CALCULATION (BAZETT): 474 MS
EKG R AXIS: 15 DEGREES
EKG R AXIS: 27 DEGREES
EKG T AXIS: 16 DEGREES
EKG T AXIS: 9 DEGREES
EKG VENTRICULAR RATE: 159 BPM
EKG VENTRICULAR RATE: 99 BPM
GFR AFRICAN AMERICAN: >60 ML/MIN/1.73M2
GFR NON-AFRICAN AMERICAN: >60 ML/MIN/1.73M2
GLUCOSE BLD-MCNC: 120 MG/DL (ref 70–99)
INR BLD: 1.72 INDEX
LV EF: 40 %
LVEF MODALITY: NORMAL
POTASSIUM SERPL-SCNC: 3.2 MMOL/L (ref 3.5–5.1)
PROTHROMBIN TIME: 19.5 SECONDS (ref 9.12–12.5)
SODIUM BLD-SCNC: 137 MMOL/L (ref 135–145)

## 2018-04-06 PROCEDURE — 92960 CARDIOVERSION ELECTRIC EXT: CPT | Performed by: INTERNAL MEDICINE

## 2018-04-06 PROCEDURE — 93325 DOPPLER ECHO COLOR FLOW MAPG: CPT | Performed by: INTERNAL MEDICINE

## 2018-04-06 PROCEDURE — 93312 ECHO TRANSESOPHAGEAL: CPT | Performed by: INTERNAL MEDICINE

## 2018-04-06 RX ORDER — POTASSIUM CHLORIDE 20 MEQ/1
40 TABLET, EXTENDED RELEASE ORAL ONCE
Status: COMPLETED | OUTPATIENT
Start: 2018-04-06 | End: 2018-04-06

## 2018-04-06 RX ADMIN — POTASSIUM CHLORIDE 40 MEQ: 20 TABLET, EXTENDED RELEASE ORAL at 09:05

## 2018-04-06 ASSESSMENT — ENCOUNTER SYMPTOMS: SHORTNESS OF BREATH: 1

## 2018-04-06 ASSESSMENT — LIFESTYLE VARIABLES: SMOKING_STATUS: 1

## 2018-04-12 ENCOUNTER — NURSE ONLY (OUTPATIENT)
Dept: CARDIOLOGY CLINIC | Age: 73
End: 2018-04-12

## 2018-04-12 VITALS
SYSTOLIC BLOOD PRESSURE: 130 MMHG | HEIGHT: 67 IN | WEIGHT: 227 LBS | BODY MASS INDEX: 35.63 KG/M2 | DIASTOLIC BLOOD PRESSURE: 80 MMHG | HEART RATE: 84 BPM

## 2018-04-12 DIAGNOSIS — I48.91 ATRIAL FIBRILLATION WITH RAPID VENTRICULAR RESPONSE (HCC): Primary | ICD-10-CM

## 2018-04-12 PROCEDURE — 93225 XTRNL ECG REC<48 HRS REC: CPT | Performed by: INTERNAL MEDICINE

## 2018-04-12 PROCEDURE — 93000 ELECTROCARDIOGRAM COMPLETE: CPT | Performed by: INTERNAL MEDICINE

## 2018-04-19 ENCOUNTER — OFFICE VISIT (OUTPATIENT)
Dept: FAMILY MEDICINE CLINIC | Age: 73
End: 2018-04-19

## 2018-04-19 VITALS
SYSTOLIC BLOOD PRESSURE: 108 MMHG | DIASTOLIC BLOOD PRESSURE: 70 MMHG | OXYGEN SATURATION: 97 % | TEMPERATURE: 99.1 F | HEART RATE: 68 BPM | WEIGHT: 225 LBS | RESPIRATION RATE: 22 BRPM | BODY MASS INDEX: 35.24 KG/M2

## 2018-04-19 DIAGNOSIS — J96.11 CHRONIC RESPIRATORY FAILURE WITH HYPOXIA (HCC): ICD-10-CM

## 2018-04-19 DIAGNOSIS — I48.91 ATRIAL FIBRILLATION WITH RAPID VENTRICULAR RESPONSE (HCC): Primary | ICD-10-CM

## 2018-04-19 DIAGNOSIS — I48.20 CHRONIC ATRIAL FIBRILLATION (HCC): ICD-10-CM

## 2018-04-19 DIAGNOSIS — R06.02 SHORTNESS OF BREATH: ICD-10-CM

## 2018-04-19 DIAGNOSIS — J44.1 COPD EXACERBATION (HCC): ICD-10-CM

## 2018-04-19 PROBLEM — J18.1 LOBAR PNEUMONIA (HCC): Status: RESOLVED | Noted: 2018-03-15 | Resolved: 2018-04-19

## 2018-04-19 PROCEDURE — G8926 SPIRO NO PERF OR DOC: HCPCS | Performed by: FAMILY MEDICINE

## 2018-04-19 PROCEDURE — 99214 OFFICE O/P EST MOD 30 MIN: CPT | Performed by: FAMILY MEDICINE

## 2018-04-19 PROCEDURE — 3017F COLORECTAL CA SCREEN DOC REV: CPT | Performed by: FAMILY MEDICINE

## 2018-04-19 PROCEDURE — 4040F PNEUMOC VAC/ADMIN/RCVD: CPT | Performed by: FAMILY MEDICINE

## 2018-04-19 PROCEDURE — 93000 ELECTROCARDIOGRAM COMPLETE: CPT | Performed by: FAMILY MEDICINE

## 2018-04-19 PROCEDURE — 1123F ACP DISCUSS/DSCN MKR DOCD: CPT | Performed by: FAMILY MEDICINE

## 2018-04-19 PROCEDURE — G8427 DOCREV CUR MEDS BY ELIG CLIN: HCPCS | Performed by: FAMILY MEDICINE

## 2018-04-19 PROCEDURE — G8598 ASA/ANTIPLAT THER USED: HCPCS | Performed by: FAMILY MEDICINE

## 2018-04-19 PROCEDURE — 4004F PT TOBACCO SCREEN RCVD TLK: CPT | Performed by: FAMILY MEDICINE

## 2018-04-19 PROCEDURE — 3023F SPIROM DOC REV: CPT | Performed by: FAMILY MEDICINE

## 2018-04-19 PROCEDURE — G8417 CALC BMI ABV UP PARAM F/U: HCPCS | Performed by: FAMILY MEDICINE

## 2018-04-19 RX ORDER — PREDNISONE 10 MG/1
TABLET ORAL
Qty: 30 TABLET | Refills: 0 | Status: ON HOLD | OUTPATIENT
Start: 2018-04-19 | End: 2018-04-23

## 2018-04-19 RX ORDER — DOXYCYCLINE HYCLATE 100 MG
100 TABLET ORAL 2 TIMES DAILY
Qty: 20 TABLET | Refills: 0 | Status: ON HOLD | OUTPATIENT
Start: 2018-04-19 | End: 2018-04-23

## 2018-04-19 ASSESSMENT — ENCOUNTER SYMPTOMS
ABDOMINAL PAIN: 0
DIARRHEA: 0
SORE THROAT: 0
RHINORRHEA: 0
NAUSEA: 0
SINUS PRESSURE: 0
BACK PAIN: 0
BLOOD IN STOOL: 0
WHEEZING: 0
CONSTIPATION: 0
SHORTNESS OF BREATH: 1
VOMITING: 0
COUGH: 0
CHEST TIGHTNESS: 0

## 2018-04-23 ENCOUNTER — TELEPHONE (OUTPATIENT)
Dept: CARDIOLOGY CLINIC | Age: 73
End: 2018-04-23

## 2018-04-23 PROCEDURE — 93227 XTRNL ECG REC<48 HR R&I: CPT | Performed by: INTERNAL MEDICINE

## 2018-04-28 ENCOUNTER — OFFICE VISIT (OUTPATIENT)
Dept: CARDIOLOGY CLINIC | Age: 73
End: 2018-04-28

## 2018-04-28 VITALS — HEART RATE: 52 BPM | DIASTOLIC BLOOD PRESSURE: 70 MMHG | HEIGHT: 68 IN | SYSTOLIC BLOOD PRESSURE: 126 MMHG

## 2018-04-28 DIAGNOSIS — E66.09 CLASS 1 OBESITY DUE TO EXCESS CALORIES WITH SERIOUS COMORBIDITY AND BODY MASS INDEX (BMI) OF 34.0 TO 34.9 IN ADULT: ICD-10-CM

## 2018-04-28 DIAGNOSIS — I51.9 SEVERE LEFT VENTRICULAR SYSTOLIC DYSFUNCTION: ICD-10-CM

## 2018-04-28 DIAGNOSIS — I48.91 ATRIAL FIBRILLATION WITH RAPID VENTRICULAR RESPONSE (HCC): ICD-10-CM

## 2018-04-28 DIAGNOSIS — I48.0 PAF (PAROXYSMAL ATRIAL FIBRILLATION) (HCC): Primary | ICD-10-CM

## 2018-04-28 PROCEDURE — 1111F DSCHRG MED/CURRENT MED MERGE: CPT | Performed by: INTERNAL MEDICINE

## 2018-04-28 PROCEDURE — G8417 CALC BMI ABV UP PARAM F/U: HCPCS | Performed by: INTERNAL MEDICINE

## 2018-04-28 PROCEDURE — G8427 DOCREV CUR MEDS BY ELIG CLIN: HCPCS | Performed by: INTERNAL MEDICINE

## 2018-04-28 PROCEDURE — 1123F ACP DISCUSS/DSCN MKR DOCD: CPT | Performed by: INTERNAL MEDICINE

## 2018-04-28 PROCEDURE — 99354 PR PROLONGED SVC OUTPATIENT SETTING 1ST HOUR: CPT | Performed by: INTERNAL MEDICINE

## 2018-04-28 PROCEDURE — 3017F COLORECTAL CA SCREEN DOC REV: CPT | Performed by: INTERNAL MEDICINE

## 2018-04-28 PROCEDURE — 4004F PT TOBACCO SCREEN RCVD TLK: CPT | Performed by: INTERNAL MEDICINE

## 2018-04-28 PROCEDURE — G8598 ASA/ANTIPLAT THER USED: HCPCS | Performed by: INTERNAL MEDICINE

## 2018-04-28 PROCEDURE — 93000 ELECTROCARDIOGRAM COMPLETE: CPT | Performed by: INTERNAL MEDICINE

## 2018-04-28 PROCEDURE — 99213 OFFICE O/P EST LOW 20 MIN: CPT | Performed by: INTERNAL MEDICINE

## 2018-04-28 PROCEDURE — 4040F PNEUMOC VAC/ADMIN/RCVD: CPT | Performed by: INTERNAL MEDICINE

## 2018-04-28 RX ORDER — LOSARTAN POTASSIUM 25 MG/1
25 TABLET ORAL DAILY
Qty: 30 TABLET | Refills: 3 | Status: SHIPPED | OUTPATIENT
Start: 2018-04-28 | End: 2018-05-03 | Stop reason: ALTCHOICE

## 2018-05-03 ENCOUNTER — OFFICE VISIT (OUTPATIENT)
Dept: FAMILY MEDICINE CLINIC | Age: 73
End: 2018-05-03

## 2018-05-03 VITALS
SYSTOLIC BLOOD PRESSURE: 100 MMHG | OXYGEN SATURATION: 95 % | HEART RATE: 91 BPM | DIASTOLIC BLOOD PRESSURE: 66 MMHG | RESPIRATION RATE: 20 BRPM | BODY MASS INDEX: 34.26 KG/M2 | WEIGHT: 222 LBS

## 2018-05-03 DIAGNOSIS — I10 ESSENTIAL HYPERTENSION: Chronic | ICD-10-CM

## 2018-05-03 DIAGNOSIS — R06.02 SHORTNESS OF BREATH: ICD-10-CM

## 2018-05-03 DIAGNOSIS — F32.5 DEPRESSION, MAJOR, IN REMISSION (HCC): ICD-10-CM

## 2018-05-03 DIAGNOSIS — I95.1 ORTHOSTATIC HYPOTENSION: ICD-10-CM

## 2018-05-03 DIAGNOSIS — I48.20 CHRONIC ATRIAL FIBRILLATION (HCC): ICD-10-CM

## 2018-05-03 DIAGNOSIS — J42 CHRONIC BRONCHITIS, UNSPECIFIED CHRONIC BRONCHITIS TYPE (HCC): ICD-10-CM

## 2018-05-03 DIAGNOSIS — I50.22 CHRONIC SYSTOLIC CONGESTIVE HEART FAILURE (HCC): ICD-10-CM

## 2018-05-03 DIAGNOSIS — R79.89 ELEVATED BRAIN NATRIURETIC PEPTIDE (BNP) LEVEL: ICD-10-CM

## 2018-05-03 DIAGNOSIS — I48.0 PAROXYSMAL ATRIAL FIBRILLATION (HCC): Primary | ICD-10-CM

## 2018-05-03 DIAGNOSIS — J96.11 CHRONIC RESPIRATORY FAILURE WITH HYPOXIA (HCC): ICD-10-CM

## 2018-05-03 DIAGNOSIS — I27.20 PULMONARY HTN (HCC): ICD-10-CM

## 2018-05-03 PROBLEM — I48.91 A-FIB (HCC): Status: RESOLVED | Noted: 2018-04-19 | Resolved: 2018-05-03

## 2018-05-03 PROBLEM — J44.1 COPD EXACERBATION (HCC): Status: RESOLVED | Noted: 2018-01-28 | Resolved: 2018-05-03

## 2018-05-03 PROBLEM — I49.9 IRREGULAR HEART RATE: Status: RESOLVED | Noted: 2018-02-16 | Resolved: 2018-05-03

## 2018-05-03 PROCEDURE — G8427 DOCREV CUR MEDS BY ELIG CLIN: HCPCS | Performed by: FAMILY MEDICINE

## 2018-05-03 PROCEDURE — 3023F SPIROM DOC REV: CPT | Performed by: FAMILY MEDICINE

## 2018-05-03 PROCEDURE — 4040F PNEUMOC VAC/ADMIN/RCVD: CPT | Performed by: FAMILY MEDICINE

## 2018-05-03 PROCEDURE — 4004F PT TOBACCO SCREEN RCVD TLK: CPT | Performed by: FAMILY MEDICINE

## 2018-05-03 PROCEDURE — 99214 OFFICE O/P EST MOD 30 MIN: CPT | Performed by: FAMILY MEDICINE

## 2018-05-03 PROCEDURE — 1123F ACP DISCUSS/DSCN MKR DOCD: CPT | Performed by: FAMILY MEDICINE

## 2018-05-03 PROCEDURE — G8598 ASA/ANTIPLAT THER USED: HCPCS | Performed by: FAMILY MEDICINE

## 2018-05-03 PROCEDURE — G8417 CALC BMI ABV UP PARAM F/U: HCPCS | Performed by: FAMILY MEDICINE

## 2018-05-03 PROCEDURE — G8926 SPIRO NO PERF OR DOC: HCPCS | Performed by: FAMILY MEDICINE

## 2018-05-03 PROCEDURE — 3017F COLORECTAL CA SCREEN DOC REV: CPT | Performed by: FAMILY MEDICINE

## 2018-05-03 PROCEDURE — 93000 ELECTROCARDIOGRAM COMPLETE: CPT | Performed by: FAMILY MEDICINE

## 2018-05-03 PROCEDURE — 1111F DSCHRG MED/CURRENT MED MERGE: CPT | Performed by: FAMILY MEDICINE

## 2018-05-03 ASSESSMENT — ENCOUNTER SYMPTOMS
WHEEZING: 0
SPUTUM PRODUCTION: 0
COUGH: 0
HEMOPTYSIS: 0
SHORTNESS OF BREATH: 1
ORTHOPNEA: 0
GASTROINTESTINAL NEGATIVE: 1
EYES NEGATIVE: 1

## 2018-05-07 ASSESSMENT — ENCOUNTER SYMPTOMS
DIARRHEA: 0
CONSTIPATION: 0
COUGH: 0
WHEEZING: 0
EYE PAIN: 0
BACK PAIN: 0
NAUSEA: 0
ABDOMINAL PAIN: 0
BLOOD IN STOOL: 0
PHOTOPHOBIA: 0
COLOR CHANGE: 0
CHEST TIGHTNESS: 0
VOMITING: 0
SHORTNESS OF BREATH: 1

## 2018-05-10 ENCOUNTER — OFFICE VISIT (OUTPATIENT)
Dept: FAMILY MEDICINE CLINIC | Age: 73
End: 2018-05-10

## 2018-05-10 VITALS
RESPIRATION RATE: 18 BRPM | WEIGHT: 221.8 LBS | HEART RATE: 86 BPM | DIASTOLIC BLOOD PRESSURE: 88 MMHG | BODY MASS INDEX: 34.23 KG/M2 | OXYGEN SATURATION: 94 % | SYSTOLIC BLOOD PRESSURE: 120 MMHG

## 2018-05-10 DIAGNOSIS — I50.22 CHRONIC SYSTOLIC CONGESTIVE HEART FAILURE (HCC): ICD-10-CM

## 2018-05-10 DIAGNOSIS — J42 CHRONIC BRONCHITIS, UNSPECIFIED CHRONIC BRONCHITIS TYPE (HCC): ICD-10-CM

## 2018-05-10 DIAGNOSIS — I10 ESSENTIAL HYPERTENSION: Chronic | ICD-10-CM

## 2018-05-10 DIAGNOSIS — I48.20 CHRONIC ATRIAL FIBRILLATION (HCC): ICD-10-CM

## 2018-05-10 DIAGNOSIS — R42 DIZZY SPELLS: ICD-10-CM

## 2018-05-10 DIAGNOSIS — G47.09 OTHER INSOMNIA: ICD-10-CM

## 2018-05-10 DIAGNOSIS — G44.89 OTHER HEADACHE SYNDROME: ICD-10-CM

## 2018-05-10 DIAGNOSIS — J96.11 CHRONIC RESPIRATORY FAILURE WITH HYPOXIA (HCC): ICD-10-CM

## 2018-05-10 PROCEDURE — 3017F COLORECTAL CA SCREEN DOC REV: CPT | Performed by: FAMILY MEDICINE

## 2018-05-10 PROCEDURE — 99214 OFFICE O/P EST MOD 30 MIN: CPT | Performed by: FAMILY MEDICINE

## 2018-05-10 PROCEDURE — 4040F PNEUMOC VAC/ADMIN/RCVD: CPT | Performed by: FAMILY MEDICINE

## 2018-05-10 PROCEDURE — 1111F DSCHRG MED/CURRENT MED MERGE: CPT | Performed by: FAMILY MEDICINE

## 2018-05-10 PROCEDURE — 4004F PT TOBACCO SCREEN RCVD TLK: CPT | Performed by: FAMILY MEDICINE

## 2018-05-10 PROCEDURE — 3023F SPIROM DOC REV: CPT | Performed by: FAMILY MEDICINE

## 2018-05-10 PROCEDURE — G8926 SPIRO NO PERF OR DOC: HCPCS | Performed by: FAMILY MEDICINE

## 2018-05-10 PROCEDURE — 1123F ACP DISCUSS/DSCN MKR DOCD: CPT | Performed by: FAMILY MEDICINE

## 2018-05-10 PROCEDURE — G8598 ASA/ANTIPLAT THER USED: HCPCS | Performed by: FAMILY MEDICINE

## 2018-05-10 PROCEDURE — G8417 CALC BMI ABV UP PARAM F/U: HCPCS | Performed by: FAMILY MEDICINE

## 2018-05-10 PROCEDURE — G8427 DOCREV CUR MEDS BY ELIG CLIN: HCPCS | Performed by: FAMILY MEDICINE

## 2018-05-10 RX ORDER — ALBUTEROL SULFATE 90 UG/1
2 AEROSOL, METERED RESPIRATORY (INHALATION) EVERY 6 HOURS PRN
Qty: 1 INHALER | Refills: 5 | Status: SHIPPED | OUTPATIENT
Start: 2018-05-10 | End: 2020-06-05 | Stop reason: ALTCHOICE

## 2018-05-10 ASSESSMENT — ENCOUNTER SYMPTOMS
GASTROINTESTINAL NEGATIVE: 1
EYES NEGATIVE: 1
HEMOPTYSIS: 0
ORTHOPNEA: 0
SHORTNESS OF BREATH: 1
SPUTUM PRODUCTION: 0
WHEEZING: 0
COUGH: 0

## 2018-05-11 ENCOUNTER — NURSE ONLY (OUTPATIENT)
Dept: CARDIOLOGY CLINIC | Age: 73
End: 2018-05-11

## 2018-05-11 VITALS
HEIGHT: 68 IN | WEIGHT: 220.8 LBS | SYSTOLIC BLOOD PRESSURE: 138 MMHG | DIASTOLIC BLOOD PRESSURE: 94 MMHG | BODY MASS INDEX: 33.46 KG/M2 | HEART RATE: 88 BPM

## 2018-05-11 DIAGNOSIS — I48.0 PAF (PAROXYSMAL ATRIAL FIBRILLATION) (HCC): ICD-10-CM

## 2018-05-11 DIAGNOSIS — R55 NEAR SYNCOPE: Primary | ICD-10-CM

## 2018-05-11 PROCEDURE — 99214 OFFICE O/P EST MOD 30 MIN: CPT | Performed by: INTERNAL MEDICINE

## 2018-05-11 PROCEDURE — 93000 ELECTROCARDIOGRAM COMPLETE: CPT | Performed by: INTERNAL MEDICINE

## 2018-05-15 ENCOUNTER — TELEPHONE (OUTPATIENT)
Dept: CARDIOLOGY CLINIC | Age: 73
End: 2018-05-15

## 2018-05-16 ENCOUNTER — HOSPITAL ENCOUNTER (OUTPATIENT)
Dept: GENERAL RADIOLOGY | Age: 73
Discharge: HOME | End: 2018-05-16
Attending: INTERNAL MEDICINE | Admitting: INTERNAL MEDICINE

## 2018-05-16 LAB
ANION GAP SERPL CALCULATED.3IONS-SCNC: 12 MMOL/L (ref 4–16)
APTT: 37 SECONDS (ref 21.2–33)
BASOPHILS ABSOLUTE: 0 K/CU MM
BASOPHILS RELATIVE PERCENT: 0.6 % (ref 0–1)
BUN BLDV-MCNC: 14 MG/DL (ref 6–23)
CALCIUM SERPL-MCNC: 8.7 MG/DL (ref 8.3–10.6)
CHLORIDE BLD-SCNC: 105 MMOL/L (ref 99–110)
CO2: 27 MMOL/L (ref 21–32)
CREAT SERPL-MCNC: 1 MG/DL (ref 0.9–1.3)
DIFFERENTIAL TYPE: ABNORMAL
EOSINOPHILS ABSOLUTE: 0.4 K/CU MM
EOSINOPHILS RELATIVE PERCENT: 6.6 % (ref 0–3)
GFR AFRICAN AMERICAN: >60 ML/MIN/1.73M2
GFR NON-AFRICAN AMERICAN: >60 ML/MIN/1.73M2
GLUCOSE BLD-MCNC: 122 MG/DL (ref 70–99)
HCT VFR BLD CALC: 37.6 % (ref 42–52)
HEMOGLOBIN: 12.3 GM/DL (ref 13.5–18)
IMMATURE NEUTROPHIL %: 0.5 % (ref 0–0.43)
INR BLD: 1.09 INDEX
LYMPHOCYTES ABSOLUTE: 1.2 K/CU MM
LYMPHOCYTES RELATIVE PERCENT: 17.7 % (ref 24–44)
MAGNESIUM: 2.1 MG/DL (ref 1.8–2.4)
MCH RBC QN AUTO: 29.4 PG (ref 27–31)
MCHC RBC AUTO-ENTMCNC: 32.7 % (ref 32–36)
MCV RBC AUTO: 90 FL (ref 78–100)
MONOCYTES ABSOLUTE: 0.6 K/CU MM
MONOCYTES RELATIVE PERCENT: 9.5 % (ref 0–4)
NUCLEATED RBC %: 0 %
PDW BLD-RTO: 14.6 % (ref 11.7–14.9)
PHOSPHORUS: 3.2 MG/DL (ref 2.5–4.9)
PLATELET # BLD: 229 K/CU MM (ref 140–440)
PMV BLD AUTO: 9.5 FL (ref 7.5–11.1)
POTASSIUM SERPL-SCNC: 3.5 MMOL/L (ref 3.5–5.1)
PROTHROMBIN TIME: 12.4 SECONDS (ref 9.12–12.5)
RBC # BLD: 4.18 M/CU MM (ref 4.6–6.2)
SEGMENTED NEUTROPHILS ABSOLUTE COUNT: 4.2 K/CU MM
SEGMENTED NEUTROPHILS RELATIVE PERCENT: 65.1 % (ref 36–66)
SODIUM BLD-SCNC: 144 MMOL/L (ref 135–145)
TOTAL IMMATURE NEUTOROPHIL: 0.03 K/CU MM
TOTAL NUCLEATED RBC: 0 K/CU MM
WBC # BLD: 6.5 K/CU MM (ref 4–10.5)

## 2018-05-17 PROBLEM — I48.0 PAF (PAROXYSMAL ATRIAL FIBRILLATION) (HCC): Status: ACTIVE | Noted: 2018-04-19

## 2018-05-25 ENCOUNTER — NURSE ONLY (OUTPATIENT)
Dept: CARDIOLOGY CLINIC | Age: 73
End: 2018-05-25

## 2018-05-25 ENCOUNTER — TELEPHONE (OUTPATIENT)
Dept: CARDIOLOGY CLINIC | Age: 73
End: 2018-05-25

## 2018-05-25 VITALS — TEMPERATURE: 99.2 F

## 2018-05-25 DIAGNOSIS — Z95.818 STATUS POST PLACEMENT OF IMPLANTABLE LOOP RECORDER: Primary | ICD-10-CM

## 2018-05-25 PROCEDURE — 99024 POSTOP FOLLOW-UP VISIT: CPT | Performed by: INTERNAL MEDICINE

## 2018-06-20 ENCOUNTER — OFFICE VISIT (OUTPATIENT)
Dept: CARDIOLOGY CLINIC | Age: 73
End: 2018-06-20

## 2018-06-20 VITALS
HEIGHT: 68 IN | HEART RATE: 72 BPM | BODY MASS INDEX: 34.4 KG/M2 | DIASTOLIC BLOOD PRESSURE: 76 MMHG | SYSTOLIC BLOOD PRESSURE: 120 MMHG | WEIGHT: 227 LBS

## 2018-06-20 DIAGNOSIS — I48.0 PAF (PAROXYSMAL ATRIAL FIBRILLATION) (HCC): Primary | ICD-10-CM

## 2018-06-20 PROCEDURE — 99212 OFFICE O/P EST SF 10 MIN: CPT | Performed by: INTERNAL MEDICINE

## 2018-06-20 PROCEDURE — 3017F COLORECTAL CA SCREEN DOC REV: CPT | Performed by: INTERNAL MEDICINE

## 2018-06-20 PROCEDURE — 1123F ACP DISCUSS/DSCN MKR DOCD: CPT | Performed by: INTERNAL MEDICINE

## 2018-06-20 PROCEDURE — G8417 CALC BMI ABV UP PARAM F/U: HCPCS | Performed by: INTERNAL MEDICINE

## 2018-06-20 PROCEDURE — 4040F PNEUMOC VAC/ADMIN/RCVD: CPT | Performed by: INTERNAL MEDICINE

## 2018-06-20 PROCEDURE — G8598 ASA/ANTIPLAT THER USED: HCPCS | Performed by: INTERNAL MEDICINE

## 2018-06-20 PROCEDURE — 93000 ELECTROCARDIOGRAM COMPLETE: CPT | Performed by: INTERNAL MEDICINE

## 2018-06-20 PROCEDURE — 4004F PT TOBACCO SCREEN RCVD TLK: CPT | Performed by: INTERNAL MEDICINE

## 2018-06-20 PROCEDURE — G8427 DOCREV CUR MEDS BY ELIG CLIN: HCPCS | Performed by: INTERNAL MEDICINE

## 2018-06-20 RX ORDER — DOFETILIDE 0.12 MG/1
125 CAPSULE ORAL EVERY 12 HOURS SCHEDULED
Qty: 180 CAPSULE | Refills: 1 | Status: SHIPPED | OUTPATIENT
Start: 2018-06-20 | End: 2018-09-21 | Stop reason: SDUPTHER

## 2018-06-20 RX ORDER — CLARITHROMYCIN 500 MG/1
500 TABLET, COATED ORAL 2 TIMES DAILY
COMMUNITY
End: 2018-06-20 | Stop reason: ALTCHOICE

## 2018-06-20 RX ORDER — DOXYCYCLINE HYCLATE 100 MG/1
100 CAPSULE ORAL 2 TIMES DAILY
COMMUNITY
End: 2018-10-17 | Stop reason: SDUPTHER

## 2018-06-20 NOTE — PROGRESS NOTES
MG TABS tablet Take 1 tablet by mouth 2 times daily 180 tablet 3    albuterol sulfate HFA (VENTOLIN HFA) 108 (90 Base) MCG/ACT inhaler Inhale 2 puffs into the lungs every 6 hours as needed for Wheezing 1 Inhaler 5    metoprolol succinate (TOPROL XL) 100 MG extended release tablet Take 1 tablet by mouth daily 30 tablet 3    escitalopram (LEXAPRO) 20 MG tablet Take 1 tablet by mouth daily 90 tablet 3    omeprazole (PRILOSEC) 40 MG delayed release capsule Take 1 capsule by mouth daily 90 capsule 3    montelukast (SINGULAIR) 10 MG tablet Take 1 tablet by mouth nightly 90 tablet 3    atorvastatin (LIPITOR) 10 MG tablet Take 1 tablet by mouth daily 90 tablet 3    tiotropium (SPIRIVA RESPIMAT) 2.5 MCG/ACT AERS inhaler Inhale 2 puffs into the lungs daily 4 g 5    sildenafil (VIAGRA) 100 MG tablet Take 1 tablet by mouth as needed for Erectile Dysfunction 6 tablet 3    aspirin 81 MG EC tablet Take 81 mg by mouth daily.  Cyanocobalamin (B-12) 500 MCG TABS Take 1 tablet by mouth daily       B Complex Vitamins (B COMPLEX 1 PO) Take 1 tablet by mouth daily.  folic acid (FOLVITE) 410 MCG tablet Take 400 mcg by mouth daily.  clonazePAM (KLONOPIN) 0.5 MG tablet Take 1 tablet by mouth as needed for Anxiety for up to 90 days. 90 tablet 0     No current facility-administered medications on file prior to visit. Review of Systems:   Review of Systems   Constitutional: Positive for fatigue. Negative for activity change, chills and fever. HENT: Negative for congestion, ear pain and tinnitus. Eyes: Negative for photophobia, pain and visual disturbance. Respiratory: Positive for shortness of breath. Negative for cough, chest tightness and wheezing. Cardiovascular: Negative for chest pain, palpitations and leg swelling. Gastrointestinal: Negative for abdominal pain, blood in stool, constipation, diarrhea, nausea and vomiting. Endocrine: Negative for cold intolerance and heat intolerance. TRIG 50 02/20/2018    HDL 54 02/20/2018    LDLCALC 68 02/20/2018     PT/INR:   Lab Results   Component Value Date    INR 1.09 05/16/2018        BMP:    Lab Results   Component Value Date     05/16/2018    K 3.5 05/16/2018     05/16/2018    CO2 27 05/16/2018    BUN 14 05/16/2018     CMP:   Lab Results   Component Value Date    AST 18 04/19/2018    PROT 5.7 (L) 04/19/2018    BILITOT 0.7 04/19/2018    ALKPHOS 55 04/19/2018     TSH:  No results found for: TSH          IMPRESSION / RECOMMENDATIONS:       tikosyn therapy monitoring    1. Persistent atrial fibrillation sp cardioversion and now PAF  2. Moderate to severe left ventricular systolic dysfunction  3. HTN  4. HLD  5. oBESOITY  6. Sleep apnea  7. Recent pneumonia    Tikosyn - no PAF episodes noted  Patient was started on clarithromycin and doxycycline - after blood work  Patient has no infection  Discussed about interaction with clarithromycin and tikosyn  Patient will call the pulmonologist    Patient does report sleep issues at night and unable to sleep  Change metoprolol to AM and see if that helps  If does not then he needs to talk to Dr.Bowers MARTINEZ in 6 months or early with symptoms  Will follow on loop  No arrhythmia on loop    EKG - normal sinus rhythm and QTc is with in normal limits    Repeat echo may be needed if shortness of breath worsens. Patient LVEF appeared improved from the one in march. Thanks again for allowing me to participate in care of this patient. Please call me if you have any questions. With best regards.       Marjan Knox MD

## 2018-07-02 PROCEDURE — 93298 REM INTERROG DEV EVAL SCRMS: CPT | Performed by: INTERNAL MEDICINE

## 2018-07-02 PROCEDURE — 93299 PR REM INTERROG ICPMS/SCRMS <30 D TECH REVIEW: CPT | Performed by: INTERNAL MEDICINE

## 2018-07-12 ENCOUNTER — PROCEDURE VISIT (OUTPATIENT)
Dept: CARDIOLOGY CLINIC | Age: 73
End: 2018-07-12

## 2018-07-12 DIAGNOSIS — Z45.09 ENCOUNTER FOR ELECTRONIC ANALYSIS OF REVEAL EVENT RECORDER: ICD-10-CM

## 2018-07-12 DIAGNOSIS — I48.91 ATRIAL FIBRILLATION, UNSPECIFIED TYPE (HCC): Primary | ICD-10-CM

## 2018-08-17 ENCOUNTER — PROCEDURE VISIT (OUTPATIENT)
Dept: CARDIOLOGY CLINIC | Age: 73
End: 2018-08-17

## 2018-08-17 DIAGNOSIS — I48.91 ATRIAL FIBRILLATION, UNSPECIFIED TYPE (HCC): Primary | ICD-10-CM

## 2018-08-17 DIAGNOSIS — Z45.09 ENCOUNTER FOR ELECTRONIC ANALYSIS OF REVEAL EVENT RECORDER: ICD-10-CM

## 2018-08-17 PROCEDURE — 93298 REM INTERROG DEV EVAL SCRMS: CPT | Performed by: INTERNAL MEDICINE

## 2018-08-17 PROCEDURE — 93299 PR REM INTERROG ICPMS/SCRMS <30 D TECH REVIEW: CPT | Performed by: INTERNAL MEDICINE

## 2018-08-28 ENCOUNTER — OFFICE VISIT (OUTPATIENT)
Dept: FAMILY MEDICINE CLINIC | Age: 73
End: 2018-08-28

## 2018-08-28 VITALS
RESPIRATION RATE: 16 BRPM | DIASTOLIC BLOOD PRESSURE: 70 MMHG | HEART RATE: 72 BPM | SYSTOLIC BLOOD PRESSURE: 110 MMHG | WEIGHT: 233.38 LBS | BODY MASS INDEX: 36.01 KG/M2 | OXYGEN SATURATION: 95 %

## 2018-08-28 DIAGNOSIS — J44.1 COPD EXACERBATION (HCC): Primary | ICD-10-CM

## 2018-08-28 PROCEDURE — 1123F ACP DISCUSS/DSCN MKR DOCD: CPT | Performed by: FAMILY MEDICINE

## 2018-08-28 PROCEDURE — G8417 CALC BMI ABV UP PARAM F/U: HCPCS | Performed by: FAMILY MEDICINE

## 2018-08-28 PROCEDURE — 3023F SPIROM DOC REV: CPT | Performed by: FAMILY MEDICINE

## 2018-08-28 PROCEDURE — G8598 ASA/ANTIPLAT THER USED: HCPCS | Performed by: FAMILY MEDICINE

## 2018-08-28 PROCEDURE — 4004F PT TOBACCO SCREEN RCVD TLK: CPT | Performed by: FAMILY MEDICINE

## 2018-08-28 PROCEDURE — G8926 SPIRO NO PERF OR DOC: HCPCS | Performed by: FAMILY MEDICINE

## 2018-08-28 PROCEDURE — G8427 DOCREV CUR MEDS BY ELIG CLIN: HCPCS | Performed by: FAMILY MEDICINE

## 2018-08-28 PROCEDURE — 99213 OFFICE O/P EST LOW 20 MIN: CPT | Performed by: FAMILY MEDICINE

## 2018-08-28 PROCEDURE — 1101F PT FALLS ASSESS-DOCD LE1/YR: CPT | Performed by: FAMILY MEDICINE

## 2018-08-28 PROCEDURE — 3017F COLORECTAL CA SCREEN DOC REV: CPT | Performed by: FAMILY MEDICINE

## 2018-08-28 PROCEDURE — 4040F PNEUMOC VAC/ADMIN/RCVD: CPT | Performed by: FAMILY MEDICINE

## 2018-08-28 RX ORDER — METHYLPREDNISOLONE 4 MG/1
TABLET ORAL
Qty: 1 KIT | Refills: 0 | Status: SHIPPED | OUTPATIENT
Start: 2018-08-28 | End: 2018-09-03

## 2018-08-28 RX ORDER — CEPHALEXIN 500 MG/1
500 CAPSULE ORAL 4 TIMES DAILY
Qty: 28 CAPSULE | Refills: 0 | Status: SHIPPED | OUTPATIENT
Start: 2018-08-28 | End: 2018-10-17 | Stop reason: SDUPTHER

## 2018-08-28 NOTE — PROGRESS NOTES
needed. Review of Systems   Constitutional: Positive for malaise/fatigue. Negative for chills, diaphoresis, fever and weight loss. Eyes: Negative. Respiratory: Positive for cough, sputum production and shortness of breath. Negative for hemoptysis and wheezing. Cardiovascular: Negative. Negative for chest pain, palpitations, orthopnea, claudication, leg swelling and PND. Recent atrial fibrillation   Gastrointestinal: Negative. Genitourinary: Negative. Musculoskeletal:        Knee ankle and joint pain but no significant problems at this time   Neurological: Positive for dizziness and weakness. Negative for tingling, tremors, sensory change, speech change, focal weakness, seizures, loss of consciousness and headaches. Endo/Heme/Allergies: Negative for environmental allergies and polydipsia. Does not bruise/bleed easily. Psychiatric/Behavioral: Positive for depression. Negative for hallucinations, memory loss, substance abuse and suicidal ideas. The patient is not nervous/anxious and does not have insomnia. Depression much better     OBJECTIVE:  /70 (Site: Left Arm, Position: Sitting, Cuff Size: Large Adult)   Pulse 72   Resp 16   Wt 233 lb 6 oz (105.9 kg)   SpO2 95%   BMI 36.01 kg/m²     Physical Exam   Constitutional: He is oriented to person, place, and time. He appears well-developed and well-nourished. HENT:   Head: Normocephalic and atraumatic. Right Ear: External ear normal.   Left Ear: External ear normal.   Nose: Nose normal.   Mouth/Throat: Oropharynx is clear and moist.   Eyes: Pupils are equal, round, and reactive to light. Conjunctivae and EOM are normal.   Neck: Normal range of motion. Neck supple. Cardiovascular: Normal rate, regular rhythm, normal heart sounds and intact distal pulses. Pulmonary/Chest: Effort normal. He has wheezes. Diminished bilat base   Abdominal: Soft. Bowel sounds are normal.   Musculoskeletal: Normal range of motion.

## 2018-09-04 ASSESSMENT — ENCOUNTER SYMPTOMS
SPUTUM PRODUCTION: 1
HEMOPTYSIS: 0
COUGH: 1
WHEEZING: 0
EYES NEGATIVE: 1
GASTROINTESTINAL NEGATIVE: 1
SHORTNESS OF BREATH: 1
ORTHOPNEA: 0

## 2018-09-26 PROBLEM — J10.1 INFLUENZA A: Status: RESOLVED | Noted: 2017-04-11 | Resolved: 2018-09-26

## 2018-09-28 RX ORDER — DOFETILIDE 0.12 MG/1
125 CAPSULE ORAL EVERY 12 HOURS SCHEDULED
Qty: 180 CAPSULE | Refills: 1 | Status: SHIPPED | OUTPATIENT
Start: 2018-09-28 | End: 2019-01-11 | Stop reason: SDUPTHER

## 2018-10-03 ENCOUNTER — PROCEDURE VISIT (OUTPATIENT)
Dept: CARDIOLOGY CLINIC | Age: 73
End: 2018-10-03
Payer: MEDICARE

## 2018-10-03 DIAGNOSIS — Z45.09 ENCOUNTER FOR ELECTRONIC ANALYSIS OF REVEAL EVENT RECORDER: ICD-10-CM

## 2018-10-03 DIAGNOSIS — I48.91 ATRIAL FIBRILLATION, UNSPECIFIED TYPE (HCC): Primary | ICD-10-CM

## 2018-10-10 PROCEDURE — 93299 PR REM INTERROG ICPMS/SCRMS <30 D TECH REVIEW: CPT | Performed by: INTERNAL MEDICINE

## 2018-10-10 PROCEDURE — 93298 REM INTERROG DEV EVAL SCRMS: CPT | Performed by: INTERNAL MEDICINE

## 2018-10-17 DIAGNOSIS — R21 RASH: ICD-10-CM

## 2018-10-17 DIAGNOSIS — G25.81 RESTLESS LEG SYNDROME: ICD-10-CM

## 2018-10-17 DIAGNOSIS — F33.41 RECURRENT MAJOR DEPRESSIVE DISORDER, IN PARTIAL REMISSION (HCC): ICD-10-CM

## 2018-10-17 DIAGNOSIS — J44.1 COPD EXACERBATION (HCC): ICD-10-CM

## 2018-10-17 RX ORDER — CLONAZEPAM 0.5 MG/1
0.5 TABLET ORAL PRN
Qty: 90 TABLET | Refills: 0 | Status: SHIPPED | OUTPATIENT
Start: 2018-10-17 | End: 2019-06-28 | Stop reason: SDUPTHER

## 2018-10-17 RX ORDER — DOXYCYCLINE HYCLATE 100 MG/1
100 CAPSULE ORAL 2 TIMES DAILY
Qty: 20 CAPSULE | Refills: 0 | Status: SHIPPED | OUTPATIENT
Start: 2018-10-17 | End: 2020-01-14 | Stop reason: ALTCHOICE

## 2018-10-17 RX ORDER — CEPHALEXIN 500 MG/1
500 CAPSULE ORAL 4 TIMES DAILY
Qty: 28 CAPSULE | Refills: 0 | Status: SHIPPED | OUTPATIENT
Start: 2018-10-17 | End: 2019-07-12

## 2018-10-26 DIAGNOSIS — G25.81 RESTLESS LEG SYNDROME: ICD-10-CM

## 2018-10-26 DIAGNOSIS — R21 RASH: ICD-10-CM

## 2018-10-26 DIAGNOSIS — E78.2 MIXED HYPERLIPIDEMIA: ICD-10-CM

## 2018-10-26 DIAGNOSIS — I10 ESSENTIAL HYPERTENSION: Chronic | ICD-10-CM

## 2018-10-26 DIAGNOSIS — F33.41 RECURRENT MAJOR DEPRESSIVE DISORDER, IN PARTIAL REMISSION (HCC): ICD-10-CM

## 2018-10-26 RX ORDER — MONTELUKAST SODIUM 10 MG/1
10 TABLET ORAL NIGHTLY
Qty: 90 TABLET | Refills: 3 | Status: SHIPPED | OUTPATIENT
Start: 2018-10-26 | End: 2019-12-16 | Stop reason: SDUPTHER

## 2018-10-26 RX ORDER — ATORVASTATIN CALCIUM 10 MG/1
10 TABLET, FILM COATED ORAL DAILY
Qty: 90 TABLET | Refills: 3 | Status: SHIPPED | OUTPATIENT
Start: 2018-10-26 | End: 2019-11-29 | Stop reason: SDUPTHER

## 2018-10-26 RX ORDER — ESCITALOPRAM OXALATE 20 MG/1
20 TABLET ORAL DAILY
Qty: 90 TABLET | Refills: 3 | Status: SHIPPED | OUTPATIENT
Start: 2018-10-26 | End: 2018-11-29 | Stop reason: SDUPTHER

## 2018-10-26 RX ORDER — METOPROLOL SUCCINATE 100 MG/1
100 TABLET, EXTENDED RELEASE ORAL DAILY
Qty: 30 TABLET | Refills: 3 | Status: SHIPPED | OUTPATIENT
Start: 2018-10-26 | End: 2019-06-04 | Stop reason: SDUPTHER

## 2018-10-29 ENCOUNTER — TELEPHONE (OUTPATIENT)
Dept: FAMILY MEDICINE CLINIC | Age: 73
End: 2018-10-29

## 2018-10-29 NOTE — TELEPHONE ENCOUNTER
Pt had left vm on ma line stating he has a clinical question. I cld him back and left him a vm to return my call.

## 2018-11-09 ENCOUNTER — TELEPHONE (OUTPATIENT)
Dept: CARDIOLOGY CLINIC | Age: 73
End: 2018-11-09

## 2018-11-09 ENCOUNTER — OFFICE VISIT (OUTPATIENT)
Dept: CARDIOLOGY CLINIC | Age: 73
End: 2018-11-09
Payer: MEDICARE

## 2018-11-09 VITALS
SYSTOLIC BLOOD PRESSURE: 130 MMHG | HEART RATE: 63 BPM | BODY MASS INDEX: 37.48 KG/M2 | DIASTOLIC BLOOD PRESSURE: 86 MMHG | WEIGHT: 238.8 LBS | HEIGHT: 67 IN

## 2018-11-09 DIAGNOSIS — I48.0 PAF (PAROXYSMAL ATRIAL FIBRILLATION) (HCC): Primary | ICD-10-CM

## 2018-11-09 PROCEDURE — 4040F PNEUMOC VAC/ADMIN/RCVD: CPT | Performed by: INTERNAL MEDICINE

## 2018-11-09 PROCEDURE — 99214 OFFICE O/P EST MOD 30 MIN: CPT | Performed by: INTERNAL MEDICINE

## 2018-11-09 PROCEDURE — G8427 DOCREV CUR MEDS BY ELIG CLIN: HCPCS | Performed by: INTERNAL MEDICINE

## 2018-11-09 PROCEDURE — 93000 ELECTROCARDIOGRAM COMPLETE: CPT | Performed by: INTERNAL MEDICINE

## 2018-11-09 PROCEDURE — G8484 FLU IMMUNIZE NO ADMIN: HCPCS | Performed by: INTERNAL MEDICINE

## 2018-11-09 PROCEDURE — G8417 CALC BMI ABV UP PARAM F/U: HCPCS | Performed by: INTERNAL MEDICINE

## 2018-11-09 PROCEDURE — G8598 ASA/ANTIPLAT THER USED: HCPCS | Performed by: INTERNAL MEDICINE

## 2018-11-09 PROCEDURE — 1123F ACP DISCUSS/DSCN MKR DOCD: CPT | Performed by: INTERNAL MEDICINE

## 2018-11-09 PROCEDURE — 4004F PT TOBACCO SCREEN RCVD TLK: CPT | Performed by: INTERNAL MEDICINE

## 2018-11-09 PROCEDURE — 1101F PT FALLS ASSESS-DOCD LE1/YR: CPT | Performed by: INTERNAL MEDICINE

## 2018-11-09 PROCEDURE — 3017F COLORECTAL CA SCREEN DOC REV: CPT | Performed by: INTERNAL MEDICINE

## 2018-11-09 NOTE — PROGRESS NOTES
CARDIOLOGY NOTE      11/9/2018    RE: Mane Encarnacion  ([id number])                               TO:  Dr. Ann Matos MD            Brian Magallanes is a 72 y.o. male who was seen today for management of  hth                                    HPI:   Patient is here for    - Hypertension,is  well controlled, pt is  compliant with medicines  - Hyperlipidimea, lipids are in acceptable range. Pt  is  compliant with medicines  - Atrial fibrillation, pt is  compliant with meds.  Patient does not have symptoms from atrial fibrillation                The patient does not have cardiac complaints    Mane Encarnacion has the following history recorded in care path:  Patient Active Problem List    Diagnosis Date Noted    PAF (paroxysmal atrial fibrillation) ([address] 04/19/2018     Priority: Low    Pulmonary HTN ([address] 03/29/2018     Priority: Low    Chronic systolic congestive heart failure ([address] 03/29/2018     Priority: Low    Chronic atrial fibrillation ([address] 03/22/2018     Priority: Low    Recurrent major depressive disorder, in partial remission ([address] 03/15/2018     Priority: Low    Chronic respiratory failure with hypoxia ([address] 03/15/2018     Priority: Low    Chronic bronchitis ([address] 03/12/2018     Priority: Low    Elevated brain natriuretic peptide (BNP) level 03/12/2018     Priority: Low    Hypoxemia 02/22/2018     Priority: Low    Shortness of breath 02/16/2018     Priority: Low    Nystagmus 09/18/2017     Priority: Low    Aortic atherosclerosis ([address] 11/30/2015     Priority: Low    Encounter for medication review 11/30/2015     Priority: Low    History of repair of left rotator cuff 08/17/2015     Priority: Low    Diverticulosis 07/21/2014     Priority: Low    COPD (chronic obstructive pulmonary disease) ([address] 05/05/2014     Priority: Low    Tobacco use 11/20/2013     Priority: Low    Eczema 08/20/2013     Priority: Low    Rash 12/04/2012     Priority: Low    Depression, major, in

## 2018-11-09 NOTE — PATIENT INSTRUCTIONS
Please remember to bring all medication bottles or a medication list with you to your appointment. If you have any questions, please call our office at [phone].

## 2018-11-29 DIAGNOSIS — F33.41 RECURRENT MAJOR DEPRESSIVE DISORDER, IN PARTIAL REMISSION (HCC): ICD-10-CM

## 2018-11-29 DIAGNOSIS — R10.13 DYSPEPSIA: ICD-10-CM

## 2018-11-30 ENCOUNTER — PROCEDURE VISIT (OUTPATIENT)
Dept: CARDIOLOGY CLINIC | Age: 73
End: 2018-11-30
Payer: MEDICARE

## 2018-11-30 DIAGNOSIS — I48.91 ATRIAL FIBRILLATION, UNSPECIFIED TYPE (HCC): Primary | ICD-10-CM

## 2018-11-30 DIAGNOSIS — Z45.09 ENCOUNTER FOR ELECTRONIC ANALYSIS OF REVEAL EVENT RECORDER: ICD-10-CM

## 2018-11-30 PROCEDURE — 93299 PR REM INTERROG ICPMS/SCRMS <30 D TECH REVIEW: CPT | Performed by: INTERNAL MEDICINE

## 2018-11-30 PROCEDURE — 93298 REM INTERROG DEV EVAL SCRMS: CPT | Performed by: INTERNAL MEDICINE

## 2018-11-30 RX ORDER — OMEPRAZOLE 40 MG/1
40 CAPSULE, DELAYED RELEASE ORAL DAILY
Qty: 90 CAPSULE | Refills: 0 | Status: SHIPPED | OUTPATIENT
Start: 2018-11-30 | End: 2019-02-18 | Stop reason: SDUPTHER

## 2018-11-30 RX ORDER — ESCITALOPRAM OXALATE 20 MG/1
20 TABLET ORAL DAILY
Qty: 90 TABLET | Refills: 0 | Status: SHIPPED | OUTPATIENT
Start: 2018-11-30 | End: 2019-06-04 | Stop reason: SDUPTHER

## 2019-01-08 DIAGNOSIS — I10 ESSENTIAL HYPERTENSION: Chronic | ICD-10-CM

## 2019-01-08 RX ORDER — AMLODIPINE BESYLATE AND BENAZEPRIL HYDROCHLORIDE 5; 10 MG/1; MG/1
1 CAPSULE ORAL DAILY
Qty: 90 CAPSULE | Refills: 3 | Status: SHIPPED | OUTPATIENT
Start: 2019-01-08 | End: 2020-01-13

## 2019-01-11 ENCOUNTER — OFFICE VISIT (OUTPATIENT)
Dept: CARDIOLOGY CLINIC | Age: 74
End: 2019-01-11
Payer: MEDICARE

## 2019-01-11 VITALS
DIASTOLIC BLOOD PRESSURE: 70 MMHG | OXYGEN SATURATION: 96 % | WEIGHT: 238.2 LBS | BODY MASS INDEX: 37.39 KG/M2 | HEIGHT: 67 IN | SYSTOLIC BLOOD PRESSURE: 110 MMHG | HEART RATE: 72 BPM

## 2019-01-11 DIAGNOSIS — I48.0 PAF (PAROXYSMAL ATRIAL FIBRILLATION) (HCC): Primary | ICD-10-CM

## 2019-01-11 PROCEDURE — 99213 OFFICE O/P EST LOW 20 MIN: CPT | Performed by: INTERNAL MEDICINE

## 2019-01-11 PROCEDURE — 1101F PT FALLS ASSESS-DOCD LE1/YR: CPT | Performed by: INTERNAL MEDICINE

## 2019-01-11 PROCEDURE — 4040F PNEUMOC VAC/ADMIN/RCVD: CPT | Performed by: INTERNAL MEDICINE

## 2019-01-11 PROCEDURE — G8598 ASA/ANTIPLAT THER USED: HCPCS | Performed by: INTERNAL MEDICINE

## 2019-01-11 PROCEDURE — 3017F COLORECTAL CA SCREEN DOC REV: CPT | Performed by: INTERNAL MEDICINE

## 2019-01-11 PROCEDURE — 1123F ACP DISCUSS/DSCN MKR DOCD: CPT | Performed by: INTERNAL MEDICINE

## 2019-01-11 PROCEDURE — 93000 ELECTROCARDIOGRAM COMPLETE: CPT | Performed by: INTERNAL MEDICINE

## 2019-01-11 PROCEDURE — 4004F PT TOBACCO SCREEN RCVD TLK: CPT | Performed by: INTERNAL MEDICINE

## 2019-01-11 PROCEDURE — G8427 DOCREV CUR MEDS BY ELIG CLIN: HCPCS | Performed by: INTERNAL MEDICINE

## 2019-01-11 PROCEDURE — G8417 CALC BMI ABV UP PARAM F/U: HCPCS | Performed by: INTERNAL MEDICINE

## 2019-01-11 PROCEDURE — G8484 FLU IMMUNIZE NO ADMIN: HCPCS | Performed by: INTERNAL MEDICINE

## 2019-01-11 RX ORDER — DOFETILIDE 0.12 MG/1
125 CAPSULE ORAL EVERY 12 HOURS SCHEDULED
Qty: 180 CAPSULE | Refills: 1 | Status: SHIPPED | OUTPATIENT
Start: 2019-01-11 | End: 2019-06-25 | Stop reason: SDUPTHER

## 2019-01-25 ENCOUNTER — PROCEDURE VISIT (OUTPATIENT)
Dept: CARDIOLOGY CLINIC | Age: 74
End: 2019-01-25
Payer: MEDICARE

## 2019-01-25 DIAGNOSIS — Z45.09 ENCOUNTER FOR ELECTRONIC ANALYSIS OF REVEAL EVENT RECORDER: ICD-10-CM

## 2019-01-25 DIAGNOSIS — I48.91 ATRIAL FIBRILLATION, UNSPECIFIED TYPE (HCC): Primary | ICD-10-CM

## 2019-01-25 PROCEDURE — 93299 PR REM INTERROG ICPMS/SCRMS <30 D TECH REVIEW: CPT | Performed by: INTERNAL MEDICINE

## 2019-01-25 PROCEDURE — 93298 REM INTERROG DEV EVAL SCRMS: CPT | Performed by: INTERNAL MEDICINE

## 2019-02-14 ENCOUNTER — PROCEDURE VISIT (OUTPATIENT)
Dept: CARDIOLOGY CLINIC | Age: 74
End: 2019-02-14

## 2019-02-14 DIAGNOSIS — I48.91 ATRIAL FIBRILLATION, UNSPECIFIED TYPE (HCC): Primary | ICD-10-CM

## 2019-02-14 DIAGNOSIS — Z45.09 ENCOUNTER FOR ELECTRONIC ANALYSIS OF REVEAL EVENT RECORDER: ICD-10-CM

## 2019-02-18 DIAGNOSIS — R10.13 DYSPEPSIA: ICD-10-CM

## 2019-02-18 RX ORDER — OMEPRAZOLE 40 MG/1
40 CAPSULE, DELAYED RELEASE ORAL DAILY
Qty: 90 CAPSULE | Refills: 0 | Status: SHIPPED | OUTPATIENT
Start: 2019-02-18 | End: 2019-06-04 | Stop reason: SDUPTHER

## 2019-04-05 ENCOUNTER — PROCEDURE VISIT (OUTPATIENT)
Dept: CARDIOLOGY CLINIC | Age: 74
End: 2019-04-05
Payer: MEDICARE

## 2019-04-05 DIAGNOSIS — Z45.09 ENCOUNTER FOR ELECTRONIC ANALYSIS OF REVEAL EVENT RECORDER: ICD-10-CM

## 2019-04-05 DIAGNOSIS — I48.91 ATRIAL FIBRILLATION, UNSPECIFIED TYPE (HCC): Primary | ICD-10-CM

## 2019-04-05 PROCEDURE — 93298 REM INTERROG DEV EVAL SCRMS: CPT | Performed by: INTERNAL MEDICINE

## 2019-04-05 PROCEDURE — 93299 PR REM INTERROG ICPMS/SCRMS <30 D TECH REVIEW: CPT | Performed by: INTERNAL MEDICINE

## 2019-05-17 ENCOUNTER — PROCEDURE VISIT (OUTPATIENT)
Dept: CARDIOLOGY CLINIC | Age: 74
End: 2019-05-17
Payer: MEDICARE

## 2019-05-17 DIAGNOSIS — I48.91 ATRIAL FIBRILLATION, UNSPECIFIED TYPE (HCC): Primary | ICD-10-CM

## 2019-05-17 DIAGNOSIS — Z45.09 ENCOUNTER FOR ELECTRONIC ANALYSIS OF REVEAL EVENT RECORDER: ICD-10-CM

## 2019-05-17 PROCEDURE — 93299 PR REM INTERROG ICPMS/SCRMS <30 D TECH REVIEW: CPT | Performed by: INTERNAL MEDICINE

## 2019-05-17 PROCEDURE — 93298 REM INTERROG DEV EVAL SCRMS: CPT | Performed by: INTERNAL MEDICINE

## 2019-06-03 DIAGNOSIS — I10 ESSENTIAL HYPERTENSION: Chronic | ICD-10-CM

## 2019-06-03 DIAGNOSIS — R10.13 DYSPEPSIA: ICD-10-CM

## 2019-06-03 DIAGNOSIS — F33.41 RECURRENT MAJOR DEPRESSIVE DISORDER, IN PARTIAL REMISSION (HCC): ICD-10-CM

## 2019-06-04 RX ORDER — OMEPRAZOLE 40 MG/1
40 CAPSULE, DELAYED RELEASE ORAL DAILY
Qty: 90 CAPSULE | Refills: 0 | Status: SHIPPED | OUTPATIENT
Start: 2019-06-04 | End: 2019-08-19 | Stop reason: SDUPTHER

## 2019-06-04 RX ORDER — METOPROLOL SUCCINATE 100 MG/1
100 TABLET, EXTENDED RELEASE ORAL DAILY
Qty: 30 TABLET | Refills: 0 | Status: SHIPPED | OUTPATIENT
Start: 2019-06-04 | End: 2019-06-12 | Stop reason: SDUPTHER

## 2019-06-04 RX ORDER — ESCITALOPRAM OXALATE 20 MG/1
20 TABLET ORAL DAILY
Qty: 90 TABLET | Refills: 0 | Status: SHIPPED | OUTPATIENT
Start: 2019-06-04 | End: 2019-06-12 | Stop reason: SDUPTHER

## 2019-06-10 DIAGNOSIS — F33.41 RECURRENT MAJOR DEPRESSIVE DISORDER, IN PARTIAL REMISSION (HCC): ICD-10-CM

## 2019-06-10 DIAGNOSIS — I10 ESSENTIAL HYPERTENSION: Chronic | ICD-10-CM

## 2019-06-12 RX ORDER — METOPROLOL SUCCINATE 100 MG/1
100 TABLET, EXTENDED RELEASE ORAL DAILY
Qty: 90 TABLET | Refills: 3 | Status: SHIPPED | OUTPATIENT
Start: 2019-06-12 | End: 2020-01-14 | Stop reason: SDUPTHER

## 2019-06-12 RX ORDER — ESCITALOPRAM OXALATE 20 MG/1
20 TABLET ORAL DAILY
Qty: 90 TABLET | Refills: 3 | Status: SHIPPED | OUTPATIENT
Start: 2019-06-12 | End: 2019-08-19 | Stop reason: SDUPTHER

## 2019-06-25 DIAGNOSIS — I48.91 ATRIAL FIBRILLATION, UNSPECIFIED TYPE (HCC): Primary | ICD-10-CM

## 2019-06-25 RX ORDER — DOFETILIDE 0.12 MG/1
CAPSULE ORAL
Qty: 180 CAPSULE | Refills: 1 | Status: SHIPPED | OUTPATIENT
Start: 2019-06-25 | End: 2020-01-14 | Stop reason: SDUPTHER

## 2019-06-28 ENCOUNTER — PROCEDURE VISIT (OUTPATIENT)
Dept: CARDIOLOGY CLINIC | Age: 74
End: 2019-06-28
Payer: MEDICARE

## 2019-06-28 ENCOUNTER — OFFICE VISIT (OUTPATIENT)
Dept: FAMILY MEDICINE CLINIC | Age: 74
End: 2019-06-28
Payer: MEDICARE

## 2019-06-28 VITALS
DIASTOLIC BLOOD PRESSURE: 60 MMHG | BODY MASS INDEX: 31.41 KG/M2 | HEIGHT: 67 IN | RESPIRATION RATE: 16 BRPM | HEART RATE: 52 BPM | SYSTOLIC BLOOD PRESSURE: 104 MMHG | WEIGHT: 200.13 LBS

## 2019-06-28 DIAGNOSIS — F33.41 RECURRENT MAJOR DEPRESSIVE DISORDER, IN PARTIAL REMISSION (HCC): ICD-10-CM

## 2019-06-28 DIAGNOSIS — R19.7 DIARRHEA, UNSPECIFIED TYPE: Primary | ICD-10-CM

## 2019-06-28 DIAGNOSIS — I48.91 ATRIAL FIBRILLATION, UNSPECIFIED TYPE (HCC): Primary | ICD-10-CM

## 2019-06-28 DIAGNOSIS — G25.81 RESTLESS LEG SYNDROME: ICD-10-CM

## 2019-06-28 DIAGNOSIS — Z45.09 ENCOUNTER FOR ELECTRONIC ANALYSIS OF REVEAL EVENT RECORDER: ICD-10-CM

## 2019-06-28 PROCEDURE — 93299 PR REM INTERROG ICPMS/SCRMS <30 D TECH REVIEW: CPT | Performed by: INTERNAL MEDICINE

## 2019-06-28 PROCEDURE — G8417 CALC BMI ABV UP PARAM F/U: HCPCS | Performed by: FAMILY MEDICINE

## 2019-06-28 PROCEDURE — G8598 ASA/ANTIPLAT THER USED: HCPCS | Performed by: FAMILY MEDICINE

## 2019-06-28 PROCEDURE — 99214 OFFICE O/P EST MOD 30 MIN: CPT | Performed by: FAMILY MEDICINE

## 2019-06-28 PROCEDURE — 1123F ACP DISCUSS/DSCN MKR DOCD: CPT | Performed by: FAMILY MEDICINE

## 2019-06-28 PROCEDURE — 93298 REM INTERROG DEV EVAL SCRMS: CPT | Performed by: INTERNAL MEDICINE

## 2019-06-28 PROCEDURE — G8427 DOCREV CUR MEDS BY ELIG CLIN: HCPCS | Performed by: FAMILY MEDICINE

## 2019-06-28 PROCEDURE — 4040F PNEUMOC VAC/ADMIN/RCVD: CPT | Performed by: FAMILY MEDICINE

## 2019-06-28 PROCEDURE — 4004F PT TOBACCO SCREEN RCVD TLK: CPT | Performed by: FAMILY MEDICINE

## 2019-06-28 PROCEDURE — 3017F COLORECTAL CA SCREEN DOC REV: CPT | Performed by: FAMILY MEDICINE

## 2019-06-28 RX ORDER — DIPHENOXYLATE HYDROCHLORIDE AND ATROPINE SULFATE 2.5; .025 MG/1; MG/1
1 TABLET ORAL 4 TIMES DAILY PRN
Qty: 30 TABLET | Refills: 0 | Status: SHIPPED | OUTPATIENT
Start: 2019-06-28 | End: 2019-07-08

## 2019-06-28 RX ORDER — CLONAZEPAM 0.5 MG/1
0.5 TABLET ORAL PRN
Qty: 90 TABLET | Refills: 0 | Status: SHIPPED | OUTPATIENT
Start: 2019-06-28 | End: 2020-01-20 | Stop reason: SDUPTHER

## 2019-06-28 ASSESSMENT — PATIENT HEALTH QUESTIONNAIRE - PHQ9
2. FEELING DOWN, DEPRESSED OR HOPELESS: 0
SUM OF ALL RESPONSES TO PHQ9 QUESTIONS 1 & 2: 1
SUM OF ALL RESPONSES TO PHQ QUESTIONS 1-9: 1
1. LITTLE INTEREST OR PLEASURE IN DOING THINGS: 1
SUM OF ALL RESPONSES TO PHQ QUESTIONS 1-9: 1

## 2019-06-28 NOTE — PROGRESS NOTES
Erika Meyer MD   apixaban (ELIQUIS) 5 MG TABS tablet Take 1 tablet by mouth 2 times daily Yes Jerry Silveira MD   dofetilide (TIKOSYN) 125 MCG capsule TAKE 1 CAPSULE BY MOUTH TWICE DAILY Yes Miguel Greene MD   metoprolol succinate (TOPROL XL) 100 MG extended release tablet Take 1 tablet by mouth daily Yes Ankush Mosquera MD   escitalopram (LEXAPRO) 20 MG tablet Take 1 tablet by mouth daily Yes Ankush Mosquera MD   omeprazole (PRILOSEC) 40 MG delayed release capsule Take 1 capsule by mouth daily Yes Camilla Isbell APRN - CNP   amLODIPine-benazepril (LOTREL) 5-10 MG per capsule Take 1 capsule by mouth daily Yes Ankush Mosquera MD   montelukast (SINGULAIR) 10 MG tablet Take 1 tablet by mouth nightly Yes Ankush Mosquera MD   atorvastatin (LIPITOR) 10 MG tablet Take 1 tablet by mouth daily Yes Ankush Mosquera MD   UNABLE TO FIND Compounded betaval cream 15g/velvachol 135g Yes Ankush Mosquera MD   cephALEXin (KEFLEX) 500 MG capsule Take 1 capsule by mouth 4 times daily Yes Ankush Mosquera MD   doxycycline hyclate (VIBRAMYCIN) 100 MG capsule Take 1 capsule by mouth 2 times daily Yes Ankush Mosquera MD   albuterol sulfate HFA (VENTOLIN HFA) 108 (90 Base) MCG/ACT inhaler Inhale 2 puffs into the lungs every 6 hours as needed for Wheezing Yes Ankush Mosqurea MD   tiotropium (SPIRIVA RESPIMAT) 2.5 MCG/ACT AERS inhaler Inhale 2 puffs into the lungs daily Yes Ankush Mosquera MD   sildenafil (VIAGRA) 100 MG tablet Take 1 tablet by mouth as needed for Erectile Dysfunction Yes Ankush Mosquera MD   aspirin 81 MG EC tablet Take 81 mg by mouth daily. Yes Historical Provider, MD   Cyanocobalamin (B-12) 500 MCG TABS Take 1 tablet by mouth daily  Yes Historical Provider, MD   B Complex Vitamins (B COMPLEX 1 PO) Take 1 tablet by mouth daily. Yes Historical Provider, MD   folic acid (FOLVITE) 418 MCG tablet Take 400 mcg by mouth daily.    Yes Historical Provider, MD        Social History     Tobacco Use    Smoking status: Former Smoker     Packs/day: 1.00     Years: 20.00     Pack years: 20.00     Types: Cigarettes     Last attempt to quit: 1993     Years since quittin.0    Smokeless tobacco: Current User     Types: Chew    Tobacco comment: Reviewed 11/10/16   Substance Use Topics    Alcohol use: No     Comment: drinking \"1 cup decaff coffee\", also drinks decaff tea \"drink it all day long\"        Vitals:    19 1558   BP: 104/60   Site: Left Upper Arm   Position: Sitting   Pulse: 52   Resp: 16   Weight: 200 lb 2 oz (90.8 kg)   Height: 5' 7\" (1.702 m)     Estimated body mass index is 31.34 kg/m² as calculated from the following:    Height as of this encounter: 5' 7\" (1.702 m). Weight as of this encounter: 200 lb 2 oz (90.8 kg). Physical Exam   Constitutional: He is oriented to person, place, and time. He appears well-developed and well-nourished. HENT:   Head: Normocephalic and atraumatic. Right Ear: External ear normal.   Left Ear: External ear normal.   Nose: Nose normal.   Mouth/Throat: Oropharynx is clear and moist.   Eyes: Pupils are equal, round, and reactive to light. Conjunctivae and EOM are normal.   Neck: Normal range of motion. Neck supple. Cardiovascular: Normal rate, regular rhythm, normal heart sounds and intact distal pulses. Pulmonary/Chest: Effort normal. He has wheezes. Diminished bilat base   Abdominal: Soft. Bowel sounds are normal. He exhibits no distension and no mass. There is no tenderness. There is no rebound and no guarding. No hernia. Musculoskeletal: Normal range of motion. Neurological: He is alert and oriented to person, place, and time. Skin: Skin is warm and dry. Psychiatric: He has a normal mood and affect. His behavior is normal. Judgment and thought content normal.   smiles       ASSESSMENT/PLAN:  1. Diarrhea, unspecified type  Likely infectious diarrhea of self-limited nature. No evidence of infectious component.   Patient having difficulty working due to the severe diarrhea and will be placed on Lomotil. He was advised that if he begins to develop fever abdominal pain he is to stop the Lomotil and be reevaluated right away. Advised of risk of infection with slowed motility versus the benefit of fewer and delayed urgency and bowel movements  - diphenoxylate-atropine (DIPHENATOL) 2.5-0.025 MG per tablet; Take 1 tablet by mouth 4 times daily as needed for Diarrhea for up to 10 days. Dispense: 30 tablet; Refill: 0    2. Recurrent major depressive disorder, in partial remission (HCC)    - clonazePAM (KLONOPIN) 0.5 MG tablet; Take 1 tablet by mouth as needed for Anxiety for up to 90 days. Dispense: 90 tablet; Refill: 0    3. Restless leg syndrome  Restless leg much better on Klonopin patient overall doing much better  - clonazePAM (KLONOPIN) 0.5 MG tablet; Take 1 tablet by mouth as needed for Anxiety for up to 90 days. Dispense: 90 tablet; Refill: 0      Return if symptoms worsen or fail to improve. An electronic signature was used to authenticate this note.     --Marisela Curtis MD on 6/30/2019 at 8:37 PM

## 2019-06-30 ASSESSMENT — ENCOUNTER SYMPTOMS
COUGH: 0
WHEEZING: 0
BACK PAIN: 0
RECTAL PAIN: 0
ABDOMINAL DISTENTION: 0
BLOOD IN STOOL: 0
ANAL BLEEDING: 0
VOMITING: 0
ABDOMINAL PAIN: 0
CONSTIPATION: 0
NAUSEA: 0
RHINORRHEA: 0
SINUS PRESSURE: 0
CHEST TIGHTNESS: 0
SORE THROAT: 0
DIARRHEA: 1
SHORTNESS OF BREATH: 1

## 2019-07-12 ENCOUNTER — OFFICE VISIT (OUTPATIENT)
Dept: CARDIOLOGY CLINIC | Age: 74
End: 2019-07-12
Payer: MEDICARE

## 2019-07-12 VITALS
HEART RATE: 53 BPM | HEIGHT: 67 IN | RESPIRATION RATE: 20 BRPM | BODY MASS INDEX: 31.86 KG/M2 | SYSTOLIC BLOOD PRESSURE: 110 MMHG | DIASTOLIC BLOOD PRESSURE: 60 MMHG | WEIGHT: 203 LBS | TEMPERATURE: 98.1 F | OXYGEN SATURATION: 94 %

## 2019-07-12 DIAGNOSIS — I48.0 PAF (PAROXYSMAL ATRIAL FIBRILLATION) (HCC): Primary | ICD-10-CM

## 2019-07-12 PROCEDURE — G8598 ASA/ANTIPLAT THER USED: HCPCS | Performed by: INTERNAL MEDICINE

## 2019-07-12 PROCEDURE — G8417 CALC BMI ABV UP PARAM F/U: HCPCS | Performed by: INTERNAL MEDICINE

## 2019-07-12 PROCEDURE — 4004F PT TOBACCO SCREEN RCVD TLK: CPT | Performed by: INTERNAL MEDICINE

## 2019-07-12 PROCEDURE — 99212 OFFICE O/P EST SF 10 MIN: CPT | Performed by: INTERNAL MEDICINE

## 2019-07-12 PROCEDURE — 1123F ACP DISCUSS/DSCN MKR DOCD: CPT | Performed by: INTERNAL MEDICINE

## 2019-07-12 PROCEDURE — 4040F PNEUMOC VAC/ADMIN/RCVD: CPT | Performed by: INTERNAL MEDICINE

## 2019-07-12 PROCEDURE — G8427 DOCREV CUR MEDS BY ELIG CLIN: HCPCS | Performed by: INTERNAL MEDICINE

## 2019-07-12 PROCEDURE — 3017F COLORECTAL CA SCREEN DOC REV: CPT | Performed by: INTERNAL MEDICINE

## 2019-07-12 PROCEDURE — 93000 ELECTROCARDIOGRAM COMPLETE: CPT | Performed by: INTERNAL MEDICINE

## 2019-07-12 NOTE — PROGRESS NOTES
and he is only collecting money from customers. Patient did not bring med list but states no changes. He has seen a new pulmonoligist in [place] and was recently started on Clarithromycin and doxycycline, not sure why but did have pneumonia in Feb. Patient denies any fever. He said his blood work was done and then it was decided to be placed on antibiotics           Previous visit:    Patient is a L[age] Eriksson old male with hx COPD, Afib with recent admission in hospital. Patient here for education of atrial fibrillation and management  He was placed on tikosyn in the hospital. Patient denies chest pain, shortness of breath. Denies fever or chills. Feeling better since discharge from hospital        Past medical history:   Past Medical History:   Diagnosis Date    Allergic rhinitis     Asthma     Atrial fibrillation ([facility] [address]     CAD (coronary artery disease)     mild LAD    COPD (chronic obstructive pulmonary disease) ([facility] [address]     Depression     Diverticulosis of colon     Family history of early CAD     Father- MI-  age 46; a grandparent- age 46 of MI    Family history of valvular heart disease     Mother- Mitral valve disease    GERD (gastroesophageal reflux disease)     H/O 24 hour EKG monitoring 2001-Predominant rhythm is sinus rhythm. No signif ectopy noted.  H/O echocardiogram 2002, 2001-EF 60%. Normal LVSF. Mild MR. Mild TR-Dr Karen Cook;    History of diverticulitis of colon     History of Holter monitoring 2018    48 hr holter - SR with PAF    Hx of cardiac cath 2013    Mild LAD disease noted.  Hx of cardiovascular stress test 2018    EF 33% Pt in a fib with RVR. No infarct or ischemia. Decreased uptake inferiorly due to diaphragmatic artifact.  HX OTHER MEDICAL 13    14 DAY EVENT: APC's, short runs of sinus tachycardia.      Hyperlipidemia     Hypertension     Hypoxemia 2018    Irregular heart rate 2018    Component Value Date     05/16/2018    K 3.5 05/16/2018     05/16/2018    CO2 27 05/16/2018    BUN 14 05/16/2018     CMP:   Lab Results   Component Value Date    AST 18 04/19/2018    PROT 5.7 (L) 04/19/2018    BILITOT 0.7 04/19/2018    ALKPHOS 55 04/19/2018     TSH:  No results found for: TSH      EKG -sinus  Bradycardia, qtc with in limits    IMPRESSION / RECOMMENDATIONS:       tikosyn therapy monitoring    1. Persistent atrial fibrillation sp cardioversion and now PAF  2. Moderate to severe left ventricular systolic dysfunction  3. HTN  4. HLD  5. oBESOITY  6. Sleep apnea  7. Recent pneumonia    Patient reports he is doing fine  He went to Lung doctor in [place] and he made him upset because he tried to send him to other doctor for ablation  He told him that tikosyn was not good  Patient reports he has no problems with medication and had no atrial fibrillation  Patient did not like his approach    I discussed with him about tikosyn again and discussed the interaction with medication and QTc issues   Side effect proile is much lower compated to other antiarrhythmic  Patient tolerating well and no complaints from it  No PAF noted    So no need for ablation at this time unless there is recurrence of atrial fibrillation on tikosyn      Thanks again for allowing me to participate in care of this patient. Please call me if you have any questions. With best regards.       Jessie Ramon MD

## 2019-08-14 ENCOUNTER — PROCEDURE VISIT (OUTPATIENT)
Dept: CARDIOLOGY CLINIC | Age: 74
End: 2019-08-14
Payer: MEDICARE

## 2019-08-14 DIAGNOSIS — Z45.09 ENCOUNTER FOR ELECTRONIC ANALYSIS OF REVEAL EVENT RECORDER: ICD-10-CM

## 2019-08-14 DIAGNOSIS — I48.91 ATRIAL FIBRILLATION, UNSPECIFIED TYPE (HCC): Primary | ICD-10-CM

## 2019-08-14 PROCEDURE — 93299 PR REM INTERROG ICPMS/SCRMS <30 D TECH REVIEW: CPT | Performed by: INTERNAL MEDICINE

## 2019-08-14 PROCEDURE — 93298 REM INTERROG DEV EVAL SCRMS: CPT | Performed by: INTERNAL MEDICINE

## 2019-08-19 DIAGNOSIS — R10.13 DYSPEPSIA: ICD-10-CM

## 2019-08-19 DIAGNOSIS — F33.41 RECURRENT MAJOR DEPRESSIVE DISORDER, IN PARTIAL REMISSION (HCC): ICD-10-CM

## 2019-08-19 RX ORDER — ESCITALOPRAM OXALATE 20 MG/1
20 TABLET ORAL DAILY
Qty: 90 TABLET | Refills: 3 | Status: SHIPPED | OUTPATIENT
Start: 2019-08-19 | End: 2020-01-14 | Stop reason: SDUPTHER

## 2019-08-19 RX ORDER — OMEPRAZOLE 40 MG/1
40 CAPSULE, DELAYED RELEASE ORAL DAILY
Qty: 90 CAPSULE | Refills: 0 | Status: SHIPPED | OUTPATIENT
Start: 2019-08-19 | End: 2019-09-10 | Stop reason: SDUPTHER

## 2019-09-10 DIAGNOSIS — R10.13 DYSPEPSIA: ICD-10-CM

## 2019-09-10 RX ORDER — OMEPRAZOLE 40 MG/1
40 CAPSULE, DELAYED RELEASE ORAL DAILY
Qty: 90 CAPSULE | Refills: 1 | Status: SHIPPED | OUTPATIENT
Start: 2019-09-10 | End: 2020-01-14 | Stop reason: SDUPTHER

## 2019-09-27 ENCOUNTER — PROCEDURE VISIT (OUTPATIENT)
Dept: CARDIOLOGY CLINIC | Age: 74
End: 2019-09-27
Payer: MEDICARE

## 2019-09-27 DIAGNOSIS — I48.91 ATRIAL FIBRILLATION, UNSPECIFIED TYPE (HCC): Primary | ICD-10-CM

## 2019-09-27 DIAGNOSIS — Z45.09 ENCOUNTER FOR ELECTRONIC ANALYSIS OF REVEAL EVENT RECORDER: ICD-10-CM

## 2019-09-27 PROCEDURE — 93298 REM INTERROG DEV EVAL SCRMS: CPT | Performed by: INTERNAL MEDICINE

## 2019-09-27 PROCEDURE — 93299 PR REM INTERROG ICPMS/SCRMS <30 D TECH REVIEW: CPT | Performed by: INTERNAL MEDICINE

## 2019-11-08 ENCOUNTER — OFFICE VISIT (OUTPATIENT)
Dept: CARDIOLOGY CLINIC | Age: 74
End: 2019-11-08
Payer: MEDICARE

## 2019-11-08 VITALS
HEIGHT: 67 IN | SYSTOLIC BLOOD PRESSURE: 130 MMHG | DIASTOLIC BLOOD PRESSURE: 82 MMHG | WEIGHT: 206 LBS | HEART RATE: 72 BPM | BODY MASS INDEX: 32.33 KG/M2

## 2019-11-08 DIAGNOSIS — I48.91 ATRIAL FIBRILLATION, UNSPECIFIED TYPE (HCC): Primary | ICD-10-CM

## 2019-11-08 DIAGNOSIS — R06.02 SOB (SHORTNESS OF BREATH): ICD-10-CM

## 2019-11-08 PROCEDURE — G8484 FLU IMMUNIZE NO ADMIN: HCPCS | Performed by: INTERNAL MEDICINE

## 2019-11-08 PROCEDURE — 4004F PT TOBACCO SCREEN RCVD TLK: CPT | Performed by: INTERNAL MEDICINE

## 2019-11-08 PROCEDURE — G8598 ASA/ANTIPLAT THER USED: HCPCS | Performed by: INTERNAL MEDICINE

## 2019-11-08 PROCEDURE — 99214 OFFICE O/P EST MOD 30 MIN: CPT | Performed by: INTERNAL MEDICINE

## 2019-11-08 PROCEDURE — 3017F COLORECTAL CA SCREEN DOC REV: CPT | Performed by: INTERNAL MEDICINE

## 2019-11-08 PROCEDURE — G8427 DOCREV CUR MEDS BY ELIG CLIN: HCPCS | Performed by: INTERNAL MEDICINE

## 2019-11-08 PROCEDURE — 4040F PNEUMOC VAC/ADMIN/RCVD: CPT | Performed by: INTERNAL MEDICINE

## 2019-11-08 PROCEDURE — 1123F ACP DISCUSS/DSCN MKR DOCD: CPT | Performed by: INTERNAL MEDICINE

## 2019-11-08 PROCEDURE — G8417 CALC BMI ABV UP PARAM F/U: HCPCS | Performed by: INTERNAL MEDICINE

## 2019-11-15 ENCOUNTER — PROCEDURE VISIT (OUTPATIENT)
Dept: CARDIOLOGY CLINIC | Age: 74
End: 2019-11-15
Payer: MEDICARE

## 2019-11-15 DIAGNOSIS — I48.91 ATRIAL FIBRILLATION, UNSPECIFIED TYPE (HCC): Primary | ICD-10-CM

## 2019-11-15 DIAGNOSIS — Z45.09 ENCOUNTER FOR ELECTRONIC ANALYSIS OF REVEAL EVENT RECORDER: ICD-10-CM

## 2019-11-15 PROCEDURE — 93299 PR REM INTERROG ICPMS/SCRMS <30 D TECH REVIEW: CPT | Performed by: INTERNAL MEDICINE

## 2019-11-15 PROCEDURE — 93298 REM INTERROG DEV EVAL SCRMS: CPT | Performed by: INTERNAL MEDICINE

## 2019-11-29 DIAGNOSIS — E78.2 MIXED HYPERLIPIDEMIA: ICD-10-CM

## 2019-12-02 RX ORDER — ATORVASTATIN CALCIUM 10 MG/1
TABLET, FILM COATED ORAL
Qty: 90 TABLET | Refills: 3 | Status: SHIPPED | OUTPATIENT
Start: 2019-12-02 | End: 2020-01-14 | Stop reason: SDUPTHER

## 2019-12-05 ENCOUNTER — PROCEDURE VISIT (OUTPATIENT)
Dept: CARDIOLOGY CLINIC | Age: 74
End: 2019-12-05
Payer: MEDICARE

## 2019-12-05 ENCOUNTER — TELEPHONE (OUTPATIENT)
Dept: CARDIOLOGY CLINIC | Age: 74
End: 2019-12-05

## 2019-12-05 DIAGNOSIS — R06.02 SOB (SHORTNESS OF BREATH): ICD-10-CM

## 2019-12-05 DIAGNOSIS — I48.91 ATRIAL FIBRILLATION, UNSPECIFIED TYPE (HCC): ICD-10-CM

## 2019-12-05 LAB
LV EF: 58 %
LVEF MODALITY: NORMAL

## 2019-12-05 PROCEDURE — 93306 TTE W/DOPPLER COMPLETE: CPT | Performed by: INTERNAL MEDICINE

## 2019-12-10 DIAGNOSIS — J44.1 COPD EXACERBATION (HCC): ICD-10-CM

## 2019-12-10 RX ORDER — CEPHALEXIN 500 MG/1
500 CAPSULE ORAL 4 TIMES DAILY
Qty: 28 CAPSULE | Refills: 0 | Status: CANCELLED | OUTPATIENT
Start: 2019-12-10

## 2019-12-11 RX ORDER — CEPHALEXIN 500 MG/1
500 CAPSULE ORAL 4 TIMES DAILY
Qty: 28 CAPSULE | Refills: 0 | Status: SHIPPED | OUTPATIENT
Start: 2019-12-11 | End: 2020-01-14 | Stop reason: ALTCHOICE

## 2019-12-16 RX ORDER — MONTELUKAST SODIUM 10 MG/1
10 TABLET ORAL NIGHTLY
Qty: 90 TABLET | Refills: 3 | Status: SHIPPED | OUTPATIENT
Start: 2019-12-16 | End: 2020-01-14 | Stop reason: SDUPTHER

## 2020-01-13 RX ORDER — AMLODIPINE BESYLATE AND BENAZEPRIL HYDROCHLORIDE 5; 10 MG/1; MG/1
CAPSULE ORAL
Qty: 90 CAPSULE | Refills: 3 | Status: SHIPPED | OUTPATIENT
Start: 2020-01-13 | End: 2020-01-14 | Stop reason: SDUPTHER

## 2020-01-14 RX ORDER — ATORVASTATIN CALCIUM 10 MG/1
10 TABLET, FILM COATED ORAL DAILY
Qty: 90 TABLET | Refills: 3 | Status: SHIPPED | OUTPATIENT
Start: 2020-01-14 | End: 2021-04-22 | Stop reason: SDUPTHER

## 2020-01-14 RX ORDER — OMEPRAZOLE 40 MG/1
40 CAPSULE, DELAYED RELEASE ORAL DAILY
Qty: 90 CAPSULE | Refills: 1 | Status: SHIPPED | OUTPATIENT
Start: 2020-01-14 | End: 2020-02-12

## 2020-01-14 RX ORDER — AMLODIPINE BESYLATE AND BENAZEPRIL HYDROCHLORIDE 5; 10 MG/1; MG/1
1 CAPSULE ORAL DAILY
Qty: 90 CAPSULE | Refills: 3 | Status: SHIPPED | OUTPATIENT
Start: 2020-01-14 | End: 2021-01-14 | Stop reason: SDUPTHER

## 2020-01-14 RX ORDER — ESCITALOPRAM OXALATE 20 MG/1
20 TABLET ORAL DAILY
Qty: 90 TABLET | Refills: 3 | Status: SHIPPED | OUTPATIENT
Start: 2020-01-14 | End: 2020-05-28 | Stop reason: SDUPTHER

## 2020-01-14 RX ORDER — MONTELUKAST SODIUM 10 MG/1
10 TABLET ORAL NIGHTLY
Qty: 90 TABLET | Refills: 3 | Status: SHIPPED | OUTPATIENT
Start: 2020-01-14 | End: 2020-12-14 | Stop reason: SDUPTHER

## 2020-01-14 RX ORDER — METOPROLOL SUCCINATE 100 MG/1
100 TABLET, EXTENDED RELEASE ORAL DAILY
Qty: 90 TABLET | Refills: 3 | Status: SHIPPED | OUTPATIENT
Start: 2020-01-14 | End: 2020-05-28 | Stop reason: SDUPTHER

## 2020-01-14 RX ORDER — DOFETILIDE 0.12 MG/1
125 CAPSULE ORAL 2 TIMES DAILY
Qty: 180 CAPSULE | Refills: 3 | Status: SHIPPED | OUTPATIENT
Start: 2020-01-14 | End: 2020-01-17 | Stop reason: SDUPTHER

## 2020-01-14 NOTE — PROGRESS NOTES
Smoker     Packs/day: 1.00     Years: 20.00     Pack years: 20.00     Types: Cigarettes     Last attempt to quit: 1993     Years since quittin.5    Smokeless tobacco: Current User     Types: Chew    Tobacco comment: Reviewed 11/10/16   Substance Use Topics    Alcohol use: No     Comment: drinking \"1 cup decaff coffee\", also drinks decaff tea \"drink it all day long\"        There were no vitals filed for this visit. Estimated body mass index is 32.26 kg/m² as calculated from the following:    Height as of 19: 5' 7\" (1.702 m). Weight as of 19: 206 lb (93.4 kg). Physical Exam    ASSESSMENT/PLAN:  Erroneous note    Visit for refill only    No follow-ups on file. An electronic signature was used to authenticate this note.     --Bud Velazquez MD on 2020 at 12:02 PM

## 2020-01-17 ENCOUNTER — OFFICE VISIT (OUTPATIENT)
Dept: CARDIOLOGY CLINIC | Age: 75
End: 2020-01-17
Payer: MEDICARE

## 2020-01-17 VITALS
WEIGHT: 212.6 LBS | OXYGEN SATURATION: 98 % | SYSTOLIC BLOOD PRESSURE: 122 MMHG | BODY MASS INDEX: 33.37 KG/M2 | DIASTOLIC BLOOD PRESSURE: 84 MMHG | HEIGHT: 67 IN | HEART RATE: 56 BPM

## 2020-01-17 PROCEDURE — 93000 ELECTROCARDIOGRAM COMPLETE: CPT | Performed by: INTERNAL MEDICINE

## 2020-01-17 PROCEDURE — G8417 CALC BMI ABV UP PARAM F/U: HCPCS | Performed by: INTERNAL MEDICINE

## 2020-01-17 PROCEDURE — G8427 DOCREV CUR MEDS BY ELIG CLIN: HCPCS | Performed by: INTERNAL MEDICINE

## 2020-01-17 PROCEDURE — 3017F COLORECTAL CA SCREEN DOC REV: CPT | Performed by: INTERNAL MEDICINE

## 2020-01-17 PROCEDURE — 1123F ACP DISCUSS/DSCN MKR DOCD: CPT | Performed by: INTERNAL MEDICINE

## 2020-01-17 PROCEDURE — G8484 FLU IMMUNIZE NO ADMIN: HCPCS | Performed by: INTERNAL MEDICINE

## 2020-01-17 PROCEDURE — 4040F PNEUMOC VAC/ADMIN/RCVD: CPT | Performed by: INTERNAL MEDICINE

## 2020-01-17 PROCEDURE — 4004F PT TOBACCO SCREEN RCVD TLK: CPT | Performed by: INTERNAL MEDICINE

## 2020-01-17 PROCEDURE — 99212 OFFICE O/P EST SF 10 MIN: CPT | Performed by: INTERNAL MEDICINE

## 2020-01-17 RX ORDER — DOFETILIDE 0.12 MG/1
125 CAPSULE ORAL 2 TIMES DAILY
Qty: 180 CAPSULE | Refills: 3 | Status: SHIPPED | OUTPATIENT
Start: 2020-01-17 | End: 2020-04-01 | Stop reason: SDUPTHER
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
## 2020-01-20 RX ORDER — CLONAZEPAM 0.5 MG/1
0.5 TABLET ORAL PRN
Qty: 90 TABLET | Refills: 0 | Status: SHIPPED | OUTPATIENT
Start: 2020-01-20 | End: 2020-06-05 | Stop reason: SDUPTHER

## 2020-02-07 ENCOUNTER — TELEPHONE (OUTPATIENT)
Dept: CARDIOLOGY CLINIC | Age: 75
End: 2020-02-07

## 2020-02-07 RX ORDER — DOFETILIDE 0.12 MG/1
125 CAPSULE ORAL 2 TIMES DAILY
Qty: 28 CAPSULE | Refills: 0 | Status: SHIPPED
Start: 2020-02-07 | End: 2020-04-02 | Stop reason: SDUPTHER

## 2020-02-07 NOTE — TELEPHONE ENCOUNTER
Pt needs Tikosyn called into [facility] pharmacy until he gets his mail order prescription.

## 2020-02-13 ENCOUNTER — PROCEDURE VISIT (OUTPATIENT)
Dept: CARDIOLOGY CLINIC | Age: 75
End: 2020-02-13
Payer: MEDICARE

## 2020-02-13 PROCEDURE — 93298 REM INTERROG DEV EVAL SCRMS: CPT | Performed by: INTERNAL MEDICINE

## 2020-02-15 ENCOUNTER — HOSPITAL ENCOUNTER (EMERGENCY)
Age: 75
Discharge: HOME | End: 2020-02-15
Payer: MEDICARE

## 2020-02-15 ENCOUNTER — APPOINTMENT (OUTPATIENT)
Dept: GENERAL RADIOLOGY | Age: 75
End: 2020-02-15
Payer: MEDICARE

## 2020-02-15 VITALS
HEART RATE: 62 BPM | DIASTOLIC BLOOD PRESSURE: 79 MMHG | OXYGEN SATURATION: 92 % | RESPIRATION RATE: 16 BRPM | SYSTOLIC BLOOD PRESSURE: 112 MMHG | TEMPERATURE: 97.8 F

## 2020-02-15 PROCEDURE — 99283 EMERGENCY DEPT VISIT LOW MDM: CPT

## 2020-02-15 PROCEDURE — 73560 X-RAY EXAM OF KNEE 1 OR 2: CPT

## 2020-02-15 PROCEDURE — 6370000000 HC RX 637 (ALT 250 FOR IP): Performed by: PHYSICIAN ASSISTANT

## 2020-02-15 RX ORDER — NAPROXEN 375 MG/1
375 TABLET ORAL 2 TIMES DAILY PRN
Qty: 20 TABLET | Refills: 0 | Status: ON HOLD | OUTPATIENT
Start: 2020-02-15 | End: 2022-01-01 | Stop reason: HOSPADM

## 2020-02-15 RX ORDER — HYDROCODONE BITARTRATE AND ACETAMINOPHEN 5; 325 MG/1; MG/1
1 TABLET ORAL EVERY 6 HOURS PRN
Qty: 6 TABLET | Refills: 0 | Status: SHIPPED | OUTPATIENT
Start: 2020-02-15 | End: 2020-02-18

## 2020-02-15 RX ORDER — HYDROCODONE BITARTRATE AND ACETAMINOPHEN 5; 325 MG/1; MG/1
1 TABLET ORAL ONCE
Status: COMPLETED | OUTPATIENT
Start: 2020-02-15 | End: 2020-02-15

## 2020-02-15 RX ADMIN — HYDROCODONE BITARTRATE AND ACETAMINOPHEN 1 TABLET: 5; 325 TABLET ORAL at 11:43

## 2020-02-15 ASSESSMENT — PAIN SCALES - GENERAL: PAINLEVEL_OUTOF10: 9

## 2020-02-15 NOTE — ED NOTES
Bed: ED-03  Expected date:   Expected time:   Means of arrival:   Comments:  Knee pain     Haresh Singh RN  02/15/20 [id number]

## 2020-02-15 NOTE — ED PROVIDER NOTES
Emergency Department immediately if new or worsening symptoms occur. We have discussed the symptoms which are most concerning (e.g., changing or worsening pain, numbness, weakness, fever, new rash, discoloration, new or worsening swelling) that necessitate immediate return. I have independently evaluated this patient. Final Impression  1. Acute pain of right knee        Blood pressure 112/79, pulse 62, temperature 97.8 °F (36.6 °C), resp. rate 16, SpO2 92 %. Disposition:  Discharge to home in stable condition. Patient was given scripts for the following medications. I counseled patient how to take these medications. New Prescriptions    HYDROCODONE-ACETAMINOPHEN (NORCO) 5-325 MG PER TABLET    Take 1 tablet by mouth every 6 hours as needed for Pain for up to 3 days. MISC. DEVICES ([facility]) MISC    1 Device by Does not apply route as needed (for ambulation)    NAPROXEN (NAPROSYN) 375 MG TABLET    Take 1 tablet by mouth 2 times daily as needed for Pain       [unfilled]    This chart was generated using the [address] [place] [address] [address] [employer] system. I created this record but it may contain dictation errors given the limitations of this technology.        Avtar Cloud PA-C  02/15/20 5650

## 2020-03-18 NOTE — TELEPHONE ENCOUNTER
Pt left  asking for refills of Omeprazole & Escitalopram sent to [facility].      Pt ph# [phone]
(0) swallows foods and liquids w/o difficulty

## 2020-04-02 RX ORDER — DOFETILIDE 0.12 MG/1
125 CAPSULE ORAL 2 TIMES DAILY
Qty: 180 CAPSULE | Refills: 3 | Status: SHIPPED | OUTPATIENT
Start: 2020-04-02 | End: 2020-07-24 | Stop reason: SDUPTHER

## 2020-05-27 DIAGNOSIS — F33.41 RECURRENT MAJOR DEPRESSIVE DISORDER, IN PARTIAL REMISSION (HCC): ICD-10-CM

## 2020-05-27 DIAGNOSIS — I10 ESSENTIAL HYPERTENSION: Chronic | ICD-10-CM

## 2020-05-27 DIAGNOSIS — J44.1 COPD EXACERBATION (HCC): ICD-10-CM

## 2020-05-28 RX ORDER — METOPROLOL SUCCINATE 100 MG/1
100 TABLET, EXTENDED RELEASE ORAL DAILY
Qty: 90 TABLET | Refills: 0 | Status: SHIPPED | OUTPATIENT
Start: 2020-05-28 | End: 2020-10-26 | Stop reason: SDUPTHER

## 2020-05-28 RX ORDER — CEPHALEXIN 500 MG/1
500 CAPSULE ORAL 4 TIMES DAILY
Qty: 28 CAPSULE | Refills: 0 | OUTPATIENT
Start: 2020-05-28

## 2020-05-28 RX ORDER — ESCITALOPRAM OXALATE 20 MG/1
20 TABLET ORAL DAILY
Qty: 90 TABLET | Refills: 0 | Status: SHIPPED | OUTPATIENT
Start: 2020-05-28 | End: 2020-09-04 | Stop reason: SDUPTHER

## 2020-05-28 NOTE — TELEPHONE ENCOUNTER
Left detailed message on secure vm stating Rx's refilled but would need appt to establish care with new provider.

## 2020-06-05 ENCOUNTER — OFFICE VISIT (OUTPATIENT)
Dept: FAMILY MEDICINE CLINIC | Age: 75
End: 2020-06-05
Payer: MEDICARE

## 2020-06-05 VITALS
OXYGEN SATURATION: 95 % | HEART RATE: 52 BPM | TEMPERATURE: 97 F | DIASTOLIC BLOOD PRESSURE: 80 MMHG | BODY MASS INDEX: 33.2 KG/M2 | SYSTOLIC BLOOD PRESSURE: 112 MMHG | WEIGHT: 212 LBS | RESPIRATION RATE: 14 BRPM

## 2020-06-05 PROCEDURE — G8417 CALC BMI ABV UP PARAM F/U: HCPCS | Performed by: NURSE PRACTITIONER

## 2020-06-05 PROCEDURE — 4004F PT TOBACCO SCREEN RCVD TLK: CPT | Performed by: NURSE PRACTITIONER

## 2020-06-05 PROCEDURE — 99213 OFFICE O/P EST LOW 20 MIN: CPT | Performed by: NURSE PRACTITIONER

## 2020-06-05 PROCEDURE — 3017F COLORECTAL CA SCREEN DOC REV: CPT | Performed by: NURSE PRACTITIONER

## 2020-06-05 PROCEDURE — G8427 DOCREV CUR MEDS BY ELIG CLIN: HCPCS | Performed by: NURSE PRACTITIONER

## 2020-06-05 PROCEDURE — 4040F PNEUMOC VAC/ADMIN/RCVD: CPT | Performed by: NURSE PRACTITIONER

## 2020-06-05 PROCEDURE — 1123F ACP DISCUSS/DSCN MKR DOCD: CPT | Performed by: NURSE PRACTITIONER

## 2020-06-05 RX ORDER — BUDESONIDE AND FORMOTEROL FUMARATE DIHYDRATE 160; 4.5 UG/1; UG/1
2 AEROSOL RESPIRATORY (INHALATION) 2 TIMES DAILY
COMMUNITY
End: 2020-09-04 | Stop reason: SDUPTHER

## 2020-06-05 RX ORDER — CLONAZEPAM 0.5 MG/1
0.5 TABLET ORAL PRN
Qty: 90 TABLET | Refills: 0 | Status: SHIPPED | OUTPATIENT
Start: 2020-06-05 | End: 2021-01-28 | Stop reason: SDUPTHER

## 2020-06-05 ASSESSMENT — PATIENT HEALTH QUESTIONNAIRE - PHQ9
2. FEELING DOWN, DEPRESSED OR HOPELESS: 0
SUM OF ALL RESPONSES TO PHQ9 QUESTIONS 1 & 2: 0
SUM OF ALL RESPONSES TO PHQ QUESTIONS 1-9: 0
SUM OF ALL RESPONSES TO PHQ QUESTIONS 1-9: 0
1. LITTLE INTEREST OR PLEASURE IN DOING THINGS: 0

## 2020-06-05 ASSESSMENT — ENCOUNTER SYMPTOMS
EYES NEGATIVE: 1
RESPIRATORY NEGATIVE: 1
GASTROINTESTINAL NEGATIVE: 1

## 2020-06-23 ENCOUNTER — PROCEDURE VISIT (OUTPATIENT)
Dept: CARDIOLOGY CLINIC | Age: 75
End: 2020-06-23
Payer: MEDICARE

## 2020-06-23 PROCEDURE — 93297 REM INTERROG DEV EVAL ICPMS: CPT | Performed by: INTERNAL MEDICINE

## 2020-06-23 PROCEDURE — G2066 INTER DEVC REMOTE 30D: HCPCS | Performed by: INTERNAL MEDICINE

## 2020-07-24 ENCOUNTER — VIRTUAL VISIT (OUTPATIENT)
Dept: CARDIOLOGY CLINIC | Age: 75
End: 2020-07-24
Payer: MEDICARE

## 2020-07-24 PROCEDURE — 99212 OFFICE O/P EST SF 10 MIN: CPT | Performed by: INTERNAL MEDICINE

## 2020-07-24 RX ORDER — DOFETILIDE 0.12 MG/1
125 CAPSULE ORAL 2 TIMES DAILY
Qty: 180 CAPSULE | Refills: 3 | Status: SHIPPED | OUTPATIENT
Start: 2020-07-24 | End: 2020-07-24 | Stop reason: SDUPTHER

## 2020-07-24 RX ORDER — DOFETILIDE 0.12 MG/1
125 CAPSULE ORAL 2 TIMES DAILY
Qty: 180 CAPSULE | Refills: 3 | Status: SHIPPED | OUTPATIENT
Start: 2020-07-24 | End: 2021-04-29 | Stop reason: SDUPTHER

## 2020-07-24 NOTE — PROGRESS NOTES
Electrophysiology FU Note        Due to the current efforts to prevent transmission of COVID-19 and also the need to preserve PPE for other caregivers, a face-to-face encounter with the patient was not performed. We performed telephone visit which was initiated by the patient. Physical examination was not performed as this is telephone encounter      Nidia Newman is a 70 y.o. male evaluated via telephone on 7/24/2020. Consent:  He and/or health care decision maker is aware that that he may receive a bill for this telephone service, depending on his insurance coverage, and has provided verbal consent to proceed: Yes      Chief complaint : 6 month follow up      Primary care physician: Jason Woods MD      History of Present Illness: This visit (7/24/2020)      Chief Complaint   Patient presents with    6 Month Follow-Up     Patient being seen for 6 month, he denies any chest pain,sob,dizziness, palpitations, and edema. Patient reports that he overall feels better and has not have an episode of dizziness or past syncope on July 2 that he remembers but he was off Tikosyn for couple of days in between as he ran out. Overall he is doing good and is taking precautions in this COVID time      Previous visit:(1/17/2019)      Chief Complaint   Patient presents with    Atrial Fibrillation     Dr Olga Benavides patient here for 6 month follow up. Patient states Thony Gonzales has felt so good - it is scary that some thing will go wrong \" . Denies any symptoms. Patient denies alcohol. Patient reports drinking about 4 cups coffee through out daily. Will also drink decaff iced tea. Patient reports that he is sending back the oxygen also as he does not need it any more per his lung doctor. No shortness of breath.  Fatigue but much better than before           Previous visit: (7/12/2019)      Chief Complaint   Patient presents with    Follow-up     Patient here today for 6 months it was decided to be placed on antibiotics           Previous visit:    Patient is a Karthikeyan Roers old male with hx COPD, Afib with recent admission in hospital. Patient here for education of atrial fibrillation and management  He was placed on tikosyn in the hospital. Patient denies chest pain, shortness of breath. Denies fever or chills. Feeling better since discharge from hospital        Past medical history:   Past Medical History:   Diagnosis Date    Allergic rhinitis     Asthma     [facility]     CAD (coronary artery disease)     mild LAD    COPD [facility]chronic obstructive [facility] disease) [facility]     Depression     Diverticulosis of colon     Family history of early CAD     Father- MI-  age 46; a grandparent- age 46 of MI    Family history of valvular heart disease     Mother- Mitral valve disease    GERD (gastroesophageal reflux disease)     H/O 24 hour EKG monitoring 2001-Predominant rhythm is sinus rhythm. No signif ectopy noted.  H/O echocardiogram 2002, 2001-EF 60%. Normal LVSF. Mild MR. Mild TR-Dr Trevin Ferguson;    H/O echocardiogram 2019    EF 71-92%, Grade I diastolic dysfunction, sclerotic but non stenotic aortic valve, Mild AR, Mild-Mod TR, Normal pulmonary artery pressure, no pericardial effusion     History of diverticulitis of colon     History of Holter monitoring 2018    48 hr holter - SR with PAF    Hx of cardiac cath 2013    Mild LAD disease noted.  Hx of cardiovascular stress test 2018    EF 33% Pt in a fib with RVR. No infarct or ischemia. Decreased uptake inferiorly due to diaphragmatic artifact.  HX OTHER MEDICAL 13    14 DAY EVENT: APC's, short runs of sinus tachycardia.      Hyperlipidemia     Hypertension     Hypoxemia 2018    Irregular heart rate 2018    Palpitations     Pneumonia     Restless leg syndrome     Shortness of breath 2018    Tricuspid valve regurgitation 6/2001    mild       Surgical history :   Past Surgical History:   Procedure Laterality Date    BONE RESECTION, RIB      thoracic    COLONOSCOPY  10/14/14    polyp, divertics,    DIAGNOSTIC CARDIAC CATH LAB PROCEDURE  7/8/13    EF 55%, Mild LAD Disease    ENDOSCOPY, COLON, DIAGNOSTIC  10/14/14    normal    NERVE SURGERY      ulnar nerver transfer   [id number]  2007, 2006 2006-right rotator cuff; 2007-Left Rotator cuff    ROTATOR CUFF REPAIR Left 11/18/2015    Dr. Fonseca  ARTHROSCOPY Right 12/2010       Family history:   Family History   Problem Relation Age of Onset    Heart Disease Mother         Mitral valve disease    Heart Attack Mother 47    Coronary Art Dis Father         MI    Heart Disease Father     Heart Attack Father 46    Heart Attack Brother 50    Heart Attack Paternal Grandfather 48       Social history :  reports that he quit smoking about 27 years ago. His smoking use included cigarettes. He has a 20.00 pack-year smoking history. His smokeless tobacco use includes chew. He reports that he does not drink alcohol or use drugs. No Known Allergies    Current Outpatient Medications on File Prior to Visit   Medication Sig Dispense Refill    budesonide-formoterol (SYMBICORT) 160-4.5 MCG/ACT AERO Inhale 2 puffs into the lungs 2 times daily      clonazePAM (KLONOPIN) 0.5 MG tablet Take 1 tablet by mouth as needed for Anxiety for up to 90 days. 90 tablet 0    escitalopram (LEXAPRO) 20 MG tablet Take 1 tablet by mouth daily 90 tablet 0    metoprolol succinate (TOPROL XL) 100 MG extended release tablet Take 1 tablet by mouth daily 90 tablet 0    apixaban (ELIQUIS) 5 MG TABS tablet Take 1 tablet by mouth 2 times daily 180 tablet 3    Misc. Devices ([facility]) MISC 1 Device by Does not apply route as needed (for ambulation) 1 each 0    omeprazole (PRILOSEC) 40 MG delayed release capsule Take 1 capsule by mouth daily Do not crush or break.  90 capsule 3    Hematological: Does not bruise/bleed easily. Psychiatric/Behavioral: Negative for agitation, behavioral problems and confusion. Physical Examination:      Patient-Reported Vitals 7/24/2020   Patient-Reported Systolic 319   Patient-Reported Diastolic 80   Patient-Reported Pulse 50      Physical examination was not performed as this is patient initiated telephone visit. Previous clinic examination as mentioned below      There were no vitals taken for this visit. Wt Readings from Last 3 Encounters:   06/05/20 212 lb (96.2 kg)   01/17/20 212 lb 9.6 oz (96.4 kg)   11/08/19 206 lb (93.4 kg)     There is no height or weight on file to calculate BMI. Physical Exam   Constitutional: He is oriented to person, place, and time and well-developed, well-nourished, and in no distress. HENT:   Head: Normocephalic and atraumatic. Eyes: Conjunctivae and EOM are normal. Pupils are equal, round, and reactive to light. Right eye exhibits no discharge. Neck: Normal range of motion. No JVD present. No thyromegaly present. Cardiovascular: Normal rate, regular rhythm and normal heart sounds. Exam reveals no friction rub. No murmur heard. Pulmonary/Chest: Effort normal and breath sounds normal. No stridor. No respiratory distress. He has no wheezes. Abdominal: Soft. Bowel sounds are normal. He exhibits no distension. There is no tenderness. Musculoskeletal: Normal range of motion. He exhibits no edema or tenderness. Neurological: He is alert and oriented to person, place, and time. No cranial nerve deficit. Skin: Skin is warm and dry. No rash noted. No erythema.    Psychiatric: Mood and affect normal.       CBC:   Lab Results   Component Value Date    WBC 6.5 05/16/2018    HGB 12.3 05/16/2018    HCT 37.6 05/16/2018     05/16/2018     Lipids:   Lab Results   Component Value Date    CHOL 132 02/20/2018    TRIG 50 02/20/2018    HDL 54 02/20/2018    LDLCALC 68 02/20/2018     PT/INR:   Lab Results   Component Value Date    INR 1.09 05/16/2018        BMP:    Lab Results   Component Value Date     05/16/2018    K 3.5 05/16/2018     05/16/2018    CO2 27 05/16/2018    BUN 14 05/16/2018     CMP:   Lab Results   Component Value Date    AST 18 04/19/2018    PROT 5.7 (L) 04/19/2018    BILITOT 0.7 04/19/2018    ALKPHOS 55 04/19/2018     TSH:  No results found for: TSH      EKG -sinus  Bradycardia, qtc with in limits    IMPRESSION / RECOMMENDATIONS:       tikosyn therapy monitoring  Sinus pause on loop    1. Persistent atrial fibrillation sp cardioversion and now PAF  2. Moderate to severe left ventricular systolic dysfunction IMPROVED WITH atrial fib control  3. HTN  4. HLD  5. OBESITY BMI 33  6. Sleep apnea  7. COPD      There is one episode of pause of up to 3 seconds -noted on July 2 at 8:27 PM but patient denies any symptoms. Overall he feels good and denies much symptoms  Continue Tikosyn  Continue to monitor on loop recorder  Discussed with the patient's warning symptoms and signs of pauses and significant bradycardia and patient voiced understanding    Follow-up in 6 months or earlier with symptoms or any significant arrhythmia issues on the loop recorder      Documentation:  I communicated with the patient and/or health care decision maker about atrial fibrillation and pause. Details of this discussion including any medical advice provided: Atrial fibrillation and pause      I affirm this is a Patient Initiated Episode with a Patient who has not had a related appointment within my department in the past 7 days or scheduled within the next 24 hours. Patient identification was verified at the start of the visit: Yes    Total Time: minutes: 5-10 minutes    Note: not billable if this call serves to triage the patient into an appointment for the relevant concern      Thanks again for allowing me to participate in care of this patient. Please call me if you have any questions.     With best regards.       Neo Chen MD

## 2020-08-05 PROCEDURE — 93298 REM INTERROG DEV EVAL SCRMS: CPT | Performed by: INTERNAL MEDICINE

## 2020-08-05 PROCEDURE — 93297 REM INTERROG DEV EVAL ICPMS: CPT | Performed by: INTERNAL MEDICINE

## 2020-08-20 ENCOUNTER — PROCEDURE VISIT (OUTPATIENT)
Dept: CARDIOLOGY CLINIC | Age: 75
End: 2020-08-20
Payer: MEDICARE

## 2020-09-04 ENCOUNTER — OFFICE VISIT (OUTPATIENT)
Dept: FAMILY MEDICINE CLINIC | Age: 75
End: 2020-09-04
Payer: MEDICARE

## 2020-09-04 VITALS
BODY MASS INDEX: 33.05 KG/M2 | SYSTOLIC BLOOD PRESSURE: 120 MMHG | TEMPERATURE: 97.5 F | DIASTOLIC BLOOD PRESSURE: 74 MMHG | OXYGEN SATURATION: 97 % | RESPIRATION RATE: 16 BRPM | HEART RATE: 59 BPM | WEIGHT: 211 LBS

## 2020-09-04 PROCEDURE — 1123F ACP DISCUSS/DSCN MKR DOCD: CPT | Performed by: NURSE PRACTITIONER

## 2020-09-04 PROCEDURE — 4040F PNEUMOC VAC/ADMIN/RCVD: CPT | Performed by: NURSE PRACTITIONER

## 2020-09-04 PROCEDURE — 3023F SPIROM DOC REV: CPT | Performed by: NURSE PRACTITIONER

## 2020-09-04 PROCEDURE — G8417 CALC BMI ABV UP PARAM F/U: HCPCS | Performed by: NURSE PRACTITIONER

## 2020-09-04 PROCEDURE — G8926 SPIRO NO PERF OR DOC: HCPCS | Performed by: NURSE PRACTITIONER

## 2020-09-04 PROCEDURE — 99213 OFFICE O/P EST LOW 20 MIN: CPT | Performed by: NURSE PRACTITIONER

## 2020-09-04 PROCEDURE — G8427 DOCREV CUR MEDS BY ELIG CLIN: HCPCS | Performed by: NURSE PRACTITIONER

## 2020-09-04 PROCEDURE — 3017F COLORECTAL CA SCREEN DOC REV: CPT | Performed by: NURSE PRACTITIONER

## 2020-09-04 PROCEDURE — 4004F PT TOBACCO SCREEN RCVD TLK: CPT | Performed by: NURSE PRACTITIONER

## 2020-09-04 RX ORDER — ALBUTEROL SULFATE 90 UG/1
2 AEROSOL, METERED RESPIRATORY (INHALATION) 4 TIMES DAILY PRN
Qty: 3 INHALER | Refills: 1 | Status: SHIPPED | OUTPATIENT
Start: 2020-09-04 | End: 2022-01-14 | Stop reason: SDUPTHER

## 2020-09-04 RX ORDER — BUDESONIDE AND FORMOTEROL FUMARATE DIHYDRATE 160; 4.5 UG/1; UG/1
2 AEROSOL RESPIRATORY (INHALATION) 2 TIMES DAILY
Qty: 1 INHALER | Refills: 5 | Status: SHIPPED | OUTPATIENT
Start: 2020-09-04 | End: 2022-01-14 | Stop reason: SDUPTHER

## 2020-09-04 RX ORDER — ESCITALOPRAM OXALATE 20 MG/1
20 TABLET ORAL DAILY
Qty: 90 TABLET | Refills: 1 | Status: SHIPPED | OUTPATIENT
Start: 2020-09-04 | End: 2021-01-18

## 2020-09-04 ASSESSMENT — ENCOUNTER SYMPTOMS
RESPIRATORY NEGATIVE: 1
GASTROINTESTINAL NEGATIVE: 1
EYES NEGATIVE: 1

## 2020-09-04 ASSESSMENT — PATIENT HEALTH QUESTIONNAIRE - PHQ9
SUM OF ALL RESPONSES TO PHQ QUESTIONS 1-9: 0
SUM OF ALL RESPONSES TO PHQ QUESTIONS 1-9: 0
2. FEELING DOWN, DEPRESSED OR HOPELESS: 0
1. LITTLE INTEREST OR PLEASURE IN DOING THINGS: 0
SUM OF ALL RESPONSES TO PHQ9 QUESTIONS 1 & 2: 0

## 2020-09-04 NOTE — PROGRESS NOTES
Subjective:      Chief Complaint   Patient presents with    3 Month Follow-Up     patient asked about flu vaccine-instructions given to the patient-patient having no issues at this time       HPI:  Jovany Rodriguez is a 70 y.o. male who presents today for 3 month follow up. He needs refills of Lexapro and Symbicort. Depression  Has MDD and symptoms are well controlled with Lexapro. Denies AVH/SIB/SI/HI. COPD  Current treatment includes short-acting beta agonist inhaler, combined beta agonist/steroid inhaler. Residual symptoms: none. He denies increased dyspnea, decreased exercise tolerance, cough, wheezing, chest tightness, chest pain, weight loss. He does not have a rescue inhaler. Past Medical History:   Diagnosis Date    Allergic rhinitis     Asthma     Atrial fibrillation (HCC)     CAD (coronary artery disease)     mild LAD    [facility]) [facility]     Depression     Diverticulosis of colon     Family history of early CAD     Father- MI-  age 46; a grandparent- age 46 of MI    Family history of valvular heart disease     Mother- Mitral valve disease    GERD (gastroesophageal reflux disease)     H/O 24 hour EKG monitoring 2001-Predominant rhythm is sinus rhythm. No signif ectopy noted.  H/O echocardiogram 2002, 2001-EF 60%. Normal LVSF. Mild MR. Mild TR-Dr Pancho Dobbins;    H/O echocardiogram 2019    EF 88-94%, Grade I diastolic dysfunction, sclerotic but non stenotic aortic valve, Mild AR, Mild-Mod TR, Normal pulmonary artery pressure, no pericardial effusion     History of diverticulitis of colon     History of Holter monitoring 2018    48 hr holter - SR with PAF    Hx of cardiac cath 2013    Mild LAD disease noted.  Hx of cardiovascular stress test 2018    EF 33% Pt in a fib with RVR. No infarct or ischemia. Decreased uptake inferiorly due to diaphragmatic artifact.      HX OTHER MEDICAL 13    14 DAY EVENT: APC's, short runs of sinus tachycardia.  Hyperlipidemia     Hypertension     Hypoxemia 2018    Irregular heart rate 2018    Palpitations     Pneumonia     Restless leg syndrome     Shortness of breath 2018    Tricuspid valve regurgitation 2001    mild        Social History     Tobacco Use    Smoking status: Former Smoker     Packs/day: 1.00     Years: 20.00     Pack years: 20.00     Types: Cigarettes     Last attempt to quit: 1993     Years since quittin.2    Smokeless tobacco: Current User     Types: Chew    Tobacco comment: Reviewed    Substance Use Topics    Alcohol use: No     Comment: drinking \"1 cup decaff coffee\", also drinks decaff tea \"drink it all day long\"        Review of Systems   Constitutional: Negative. Eyes: Negative. Respiratory: Negative. Cardiovascular: Negative. Gastrointestinal: Negative. Endocrine: Negative. Musculoskeletal: Negative. Skin: Negative. Neurological: Negative. Hx of restless leg     Psychiatric/Behavioral: Negative. Objective:      /74 (Site: Left Upper Arm, Position: Sitting, Cuff Size: Large Adult)   Pulse 59   Temp 97.5 °F (36.4 °C)   Resp 16   Wt 211 lb (95.7 kg)   SpO2 97%   BMI 33.05 kg/m²      Physical Exam  Vitals signs reviewed. Constitutional:       General: He is not in acute distress. Appearance: Normal appearance. He is obese. HENT:      Head: Normocephalic and atraumatic. Right Ear: External ear normal.      Left Ear: External ear normal.      Mouth/Throat:      Mouth: Mucous membranes are moist.      Pharynx: Oropharynx is clear. Eyes:      Extraocular Movements: Extraocular movements intact. Conjunctiva/sclera: Conjunctivae normal.      Pupils: Pupils are equal, round, and reactive to light. Neck:      Musculoskeletal: Normal range of motion and neck supple. Vascular: No carotid bruit.    Cardiovascular: Rate and Rhythm: Regular rhythm. Bradycardia present. Heart sounds: Normal heart sounds. Pulmonary:      Effort: Pulmonary effort is normal.      Breath sounds: Normal breath sounds. Abdominal:      General: Bowel sounds are normal. There is no distension. Palpations: Abdomen is soft. Tenderness: There is no abdominal tenderness. Musculoskeletal: Normal range of motion. Right lower leg: No edema. Left lower leg: No edema. Skin:     General: Skin is warm and dry. Capillary Refill: Capillary refill takes less than 2 seconds. Neurological:      General: No focal deficit present. Mental Status: He is alert and oriented to person, place, and time. Psychiatric:         Mood and Affect: Mood normal.         Behavior: Behavior normal.            Assessment / Plan:      1. Recurrent major depressive disorder, in partial remission (HCC)  Stable  - escitalopram (LEXAPRO) 20 MG tablet; Take 1 tablet by mouth daily  Dispense: 90 tablet; Refill: 1    2. Chronic obstructive pulmonary disease, unspecified COPD type ([address]  Will refill Symicort. - albuterol sulfate HFA (VENTOLIN HFA) 108 (90 Base) MCG/ACT inhaler; Inhale 2 puffs into the lungs 4 times daily as needed for Wheezing or Shortness of Breath  Dispense: 3 Inhaler;  Refill: 1          JANI Gilman - CNP

## 2020-09-19 PROCEDURE — 93298 REM INTERROG DEV EVAL SCRMS: CPT | Performed by: INTERNAL MEDICINE

## 2020-09-25 ENCOUNTER — PROCEDURE VISIT (OUTPATIENT)
Dept: CARDIOLOGY CLINIC | Age: 75
End: 2020-09-25
Payer: MEDICARE

## 2020-10-12 RX ORDER — OMEPRAZOLE 40 MG/1
40 CAPSULE, DELAYED RELEASE ORAL DAILY
Qty: 90 CAPSULE | Refills: 3 | Status: SHIPPED | OUTPATIENT
Start: 2020-10-12 | End: 2021-03-01 | Stop reason: SDUPTHER

## 2020-10-17 ENCOUNTER — APPOINTMENT (OUTPATIENT)
Dept: CT IMAGING | Age: 75
End: 2020-10-17
Payer: MEDICARE

## 2020-10-17 ENCOUNTER — HOSPITAL ENCOUNTER (EMERGENCY)
Age: 75
Discharge: HOME | End: 2020-10-17
Attending: EMERGENCY MEDICINE
Payer: MEDICARE

## 2020-10-17 VITALS
SYSTOLIC BLOOD PRESSURE: 120 MMHG | RESPIRATION RATE: 17 BRPM | TEMPERATURE: 98.6 F | HEART RATE: 64 BPM | DIASTOLIC BLOOD PRESSURE: 80 MMHG | OXYGEN SATURATION: 99 %

## 2020-10-17 LAB
ALBUMIN SERPL-MCNC: 3.6 GM/DL (ref 3.4–5)
ALP BLD-CCNC: 86 IU/L (ref 40–129)
ALT SERPL-CCNC: 11 U/L (ref 10–40)
ANION GAP SERPL CALCULATED.3IONS-SCNC: 10 MMOL/L (ref 4–16)
APTT: 31.9 SECONDS (ref 25.1–37.1)
AST SERPL-CCNC: 18 IU/L (ref 15–37)
BACTERIA: NEGATIVE /HPF
BASOPHILS ABSOLUTE: 0 K/CU MM
BASOPHILS RELATIVE PERCENT: 0.3 % (ref 0–1)
BILIRUB SERPL-MCNC: 0.4 MG/DL (ref 0–1)
BILIRUBIN URINE: NEGATIVE MG/DL
BLOOD, URINE: NEGATIVE
BUN BLDV-MCNC: 15 MG/DL (ref 6–23)
CALCIUM SERPL-MCNC: 8.4 MG/DL (ref 8.3–10.6)
CHLORIDE BLD-SCNC: 102 MMOL/L (ref 99–110)
CLARITY: CLEAR
CO2: 24 MMOL/L (ref 21–32)
COLOR: YELLOW
CREAT SERPL-MCNC: 1.1 MG/DL (ref 0.9–1.3)
DIFFERENTIAL TYPE: ABNORMAL
EOSINOPHILS ABSOLUTE: 0.2 K/CU MM
EOSINOPHILS RELATIVE PERCENT: 2.4 % (ref 0–3)
GFR AFRICAN AMERICAN: >60 ML/MIN/1.73M2
GFR NON-AFRICAN AMERICAN: >60 ML/MIN/1.73M2
GLUCOSE BLD-MCNC: 112 MG/DL (ref 70–99)
GLUCOSE BLD-MCNC: 125 MG/DL (ref 70–99)
GLUCOSE, URINE: NEGATIVE MG/DL
HCT VFR BLD CALC: 42.5 % (ref 42–52)
HEMOGLOBIN: 13.7 GM/DL (ref 13.5–18)
IMMATURE NEUTROPHIL %: 0.3 % (ref 0–0.43)
INR BLD: 1.34 INDEX
KETONES, URINE: NEGATIVE MG/DL
LACTATE: 1 MMOL/L (ref 0.4–2)
LEUKOCYTE ESTERASE, URINE: NEGATIVE
LYMPHOCYTES ABSOLUTE: 1.2 K/CU MM
LYMPHOCYTES RELATIVE PERCENT: 17.7 % (ref 24–44)
MCH RBC QN AUTO: 30.4 PG (ref 27–31)
MCHC RBC AUTO-ENTMCNC: 32.2 % (ref 32–36)
MCV RBC AUTO: 94.4 FL (ref 78–100)
MONOCYTES ABSOLUTE: 0.7 K/CU MM
MONOCYTES RELATIVE PERCENT: 10.6 % (ref 0–4)
NITRITE URINE, QUANTITATIVE: NEGATIVE
NUCLEATED RBC %: 0 %
PDW BLD-RTO: 12.5 % (ref 11.7–14.9)
PH, URINE: 6 (ref 5–8)
PLATELET # BLD: 167 K/CU MM (ref 140–440)
PMV BLD AUTO: 10.3 FL (ref 7.5–11.1)
POTASSIUM SERPL-SCNC: 3.9 MMOL/L (ref 3.5–5.1)
PRO-BNP: 134.7 PG/ML
PROTEIN UA: NEGATIVE MG/DL
PROTHROMBIN TIME: 16.3 SECONDS (ref 11.7–14.5)
RBC # BLD: 4.5 M/CU MM (ref 4.6–6.2)
RBC URINE: NORMAL /HPF (ref 0–3)
SEGMENTED NEUTROPHILS ABSOLUTE COUNT: 4.7 K/CU MM
SEGMENTED NEUTROPHILS RELATIVE PERCENT: 68.7 % (ref 36–66)
SODIUM BLD-SCNC: 136 MMOL/L (ref 135–145)
SPECIFIC GRAVITY UA: 1.02 (ref 1–1.03)
TOTAL IMMATURE NEUTOROPHIL: 0.02 K/CU MM
TOTAL NUCLEATED RBC: 0 K/CU MM
TOTAL PROTEIN: 6.3 GM/DL (ref 6.4–8.2)
TRICHOMONAS: NORMAL /HPF
TROPONIN T: <0.01 NG/ML
UROBILINOGEN, URINE: NORMAL MG/DL (ref 0.2–1)
WBC # BLD: 6.8 K/CU MM (ref 4–10.5)
WBC UA: <1 /HPF (ref 0–2)

## 2020-10-17 PROCEDURE — 83605 ASSAY OF LACTIC ACID: CPT

## 2020-10-17 PROCEDURE — 93010 ELECTROCARDIOGRAM REPORT: CPT | Performed by: INTERNAL MEDICINE

## 2020-10-17 PROCEDURE — 85610 PROTHROMBIN TIME: CPT

## 2020-10-17 PROCEDURE — 80053 COMPREHEN METABOLIC PANEL: CPT

## 2020-10-17 PROCEDURE — 6370000000 HC RX 637 (ALT 250 FOR IP): Performed by: EMERGENCY MEDICINE

## 2020-10-17 PROCEDURE — 74177 CT ABD & PELVIS W/CONTRAST: CPT

## 2020-10-17 PROCEDURE — 82962 GLUCOSE BLOOD TEST: CPT

## 2020-10-17 PROCEDURE — 93005 ELECTROCARDIOGRAM TRACING: CPT | Performed by: EMERGENCY MEDICINE

## 2020-10-17 PROCEDURE — 81001 URINALYSIS AUTO W/SCOPE: CPT

## 2020-10-17 PROCEDURE — 84484 ASSAY OF TROPONIN QUANT: CPT

## 2020-10-17 PROCEDURE — 85730 THROMBOPLASTIN TIME PARTIAL: CPT

## 2020-10-17 PROCEDURE — 71275 CT ANGIOGRAPHY CHEST: CPT

## 2020-10-17 PROCEDURE — 6360000004 HC RX CONTRAST MEDICATION: Performed by: EMERGENCY MEDICINE

## 2020-10-17 PROCEDURE — 83880 ASSAY OF NATRIURETIC PEPTIDE: CPT

## 2020-10-17 PROCEDURE — 85025 COMPLETE CBC W/AUTO DIFF WBC: CPT

## 2020-10-17 PROCEDURE — 99285 EMERGENCY DEPT VISIT HI MDM: CPT

## 2020-10-17 PROCEDURE — 2580000003 HC RX 258: Performed by: EMERGENCY MEDICINE

## 2020-10-17 RX ORDER — ONDANSETRON 4 MG/1
4 TABLET, FILM COATED ORAL EVERY 8 HOURS PRN
Qty: 8 TABLET | Refills: 0 | Status: ON HOLD | OUTPATIENT
Start: 2020-10-17 | End: 2022-01-01 | Stop reason: HOSPADM

## 2020-10-17 RX ORDER — 0.9 % SODIUM CHLORIDE 0.9 %
1000 INTRAVENOUS SOLUTION INTRAVENOUS ONCE
Status: COMPLETED | OUTPATIENT
Start: 2020-10-17 | End: 2020-10-17

## 2020-10-17 RX ORDER — AMOXICILLIN AND CLAVULANATE POTASSIUM 875; 125 MG/1; MG/1
1 TABLET, FILM COATED ORAL 2 TIMES DAILY
Qty: 20 TABLET | Refills: 0 | Status: SHIPPED | OUTPATIENT
Start: 2020-10-17 | End: 2020-10-27

## 2020-10-17 RX ORDER — AMOXICILLIN AND CLAVULANATE POTASSIUM 875; 125 MG/1; MG/1
1 TABLET, FILM COATED ORAL ONCE
Status: COMPLETED | OUTPATIENT
Start: 2020-10-17 | End: 2020-10-17

## 2020-10-17 RX ADMIN — SODIUM CHLORIDE 1000 ML: 9 INJECTION, SOLUTION INTRAVENOUS at 14:45

## 2020-10-17 RX ADMIN — IOPAMIDOL 90 ML: 755 INJECTION, SOLUTION INTRAVENOUS at 15:40

## 2020-10-17 RX ADMIN — AMOXICILLIN AND CLAVULANATE POTASSIUM 1 TABLET: 875; 125 TABLET, FILM COATED ORAL at 18:51
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
## 2020-10-17 NOTE — ED PROVIDER NOTES
EMERGENCY DEPARTMENT ENCOUNTER    Patient: Luann Butler  MRN: [id number]  : 1945  Date of Evaluation: 10/17/2020  ED Provider:  Myranda Walker    CHIEF COMPLAINT  Chief Complaint   Patient presents with    Hypotension    Dizziness    Extremity Weakness     pt states he back \"weak all over\" and then became \"all sweaty\"    Shortness of Breath       HPI  Luann Butler is a 70 y.o. male who presents with lightheadedness, generalized weakness, shortness of breath, diaphoresis and left lower abdominal pain of onset about an hour prior to arrival to the emergency department. Patient states that he suddenly began to feel short of breath and sweaty. He states he was seated when this began was not exerting himself. He also began to feel lightheaded at this time as if he was going to pass out. Since this all began he is also had 2 out of 10 crampy left lower abdominal pain which does not radiate. He was also incontinent of stool. Denies urinary incontinence. Patient symptoms were moderate in severity and the shortness of breath and lightheadedness and sweating have improved and are now mild. The abdominal pain is still 2 out of 10 and crampy and intermittent. He is unaware of any triggering or modifying factors for the symptoms. Denies any other associated symptoms or complaints or concerns.     REVIEW OF SYSTEMS    I have reviewed the nursing notes    Constitutional: negative for fever, chills, weight change  Neurological: negative for HA, numbness, focal weakness  Ophthalmic: negative for vision change  ENT: negative for congestion, runny nose, sore throat  Cardiovascular: negative for chest pain, palpitations  Respiratory: negative for cough, wheezing  GI: negative for nausea, vomiting, constipation, hematemesis, hematochezia, melena   : negative for dysuria, hematuria, testicular pain or swelling  Musculoskeletal: negative for myalgias, decreased ROM, joint swelling  Dermatological: negative for rash, pruritis  Endocrine: negative for temperature intolerance, diaphoresis  Hematological: negative for anemia, bleeding      PAST MEDICAL HISTORY  Past Medical History:   Diagnosis Date    Allergic rhinitis     Asthma     Atrial fibrillation ([facility] [address]     CAD (coronary artery disease)     mild LAD    COPD (chronic obstructive pulmonary disease) ([facility] [address]     Depression     Diverticulosis of colon     Family history of early CAD     Father- MI-  age [facility]; a grandparent- age 46 of MI    Family history of valvular heart disease     Mother- Mitral valve disease    GERD (gastroesophageal reflux disease)     H/O 24 hour EKG monitoring 2001-Predominant rhythm is sinus rhythm. No signif ectopy noted.  H/O echocardiogram 2002, 2001-EF 60%. Normal LVSF. Mild MR. Mild TR-Dr Reji Celeste;    H/O echocardiogram 2019    EF 18-60%, Grade I diastolic dysfunction, sclerotic but non stenotic aortic valve, Mild AR, Mild-Mod TR, Normal pulmonary artery pressure, no pericardial effusion     History of diverticulitis of colon     History of Holter monitoring 2018    48 hr holter - SR with PAF    Hx of cardiac cath 2013    Mild LAD disease noted.  Hx of cardiovascular stress test 2018    EF 33% Pt in a fib with RVR. No infarct or ischemia. Decreased uptake inferiorly due to diaphragmatic artifact.  HX OTHER MEDICAL 13    14 DAY EVENT: APC's, short runs of sinus tachycardia.      Hyperlipidemia     Hypertension     Hypoxemia 2018    Irregular heart rate 2018    Palpitations     Pneumonia     Restless leg syndrome     Shortness of breath 2018    Tricuspid valve regurgitation 2001    mild       CURRENT MEDICATIONS  [unfilled]    ALLERGIES  No Known Allergies    SURGICAL HISTORY  Past Surgical History:   Procedure Laterality Date    BONE RESECTION, RIB      thoracic    COLONOSCOPY  10/14/14    polyp, divertics,    DIAGNOSTIC partner violence     Fear of current or ex partner: None     Emotionally abused: None     Physically abused: None     Forced sexual activity: None   Other Topics Concern    None   Social History Narrative    None         **Past medical, family and social histories reviewed and verified by me**      PHYSICAL EXAM  VITAL SIGNS:   ED Triage Vitals [10/17/20 1418]   Enc Vitals Group      BP 90/66      Pulse 52      Resp 17      Temp       Temp Source Oral      SpO2 97 %      Weight       Height       Head Circumference       Peak Flow       Pain Score       Pain Loc       Pain Edu? Excl. in [address]? Vitals during ED course were reviewed and are as charted. Constitutional: Minimal distress, Non-toxic appearance    Eyes:  Conjunctiva normal, No discharge    HENT: Normocephalic, Atraumatic, Bilateral external ears normal, posterior oropharynx is nonerythematous and without exudate, uvula is midline, oropharynx moist    Neck: Supple, no stridor, no grossly visible or palpable masses    Cardiovascular: Regular rate and rhythm, No murmurs, No rubs, No gallops    Pulmonary/Chest:  Normal breath sounds, No respiratory distress or accessory muscle use, No wheezing, crackles or rhonchi. Abdomen: Mild left lower quadrant abdominal tenderness to palpation without peritoneal signs, otherwise abdomen is soft, nondistended and nonrigid, No tenderness or peritoneal signs, No masses, normal bowel sounds    Back:  No midline point tenderness, No paraspinous muscle tenderness.   No CVA tenderness    Extremities:  No gross deformities, no edema, no tenderness    Neurologic:  Normal motor function, Normal sensory function, No focal deficits    Skin:  Warm, Dry, No erythema, No rash, No cyanosis, No mottling    Lymphatic:  No inguinal lymphadenopathy      EKG Interpretation    Interpreted by emergency department physician    Rhythm: sinus bradycardia  Rate: 50-60  Axis: normal  Ectopy: premature ventricular contractions (infrequent)  Conduction: normal  ST Segments: normal  T Waves: non specific changes  Q Waves: none    Clinical Impression: Sinus bradycardia with low voltage QRS and nonspecific T wave abnormalities with PVC      RADIOLOGY/PROCEDURES/LABS/MEDICATIONS ADMINISTERED:    I have reviewed and interpreted all of the currently available lab results from this visit (if applicable):  Results for orders placed or performed during the hospital encounter of 10/17/20   CBC Auto Differential   Result Value Ref Range    WBC 6.8 4.0 - 10.5 K/CU MM    RBC 4.50 (L) 4.6 - 6.2 M/CU MM    Hemoglobin 13.7 13.5 - 18.0 GM/DL    Hematocrit 42.5 42 - 52 %    MCV 94.4 78 - 100 FL    MCH 30.4 27 - 31 PG    MCHC 32.2 32.0 - 36.0 %    RDW 12.5 11.7 - 14.9 %    Platelets 647 760 - 819 K/CU MM    MPV 10.3 7.5 - 11.1 FL    Differential Type AUTOMATED DIFFERENTIAL     Segs Relative 68.7 (H) 36 - 66 %    Lymphocytes % 17.7 (L) 24 - 44 %    Monocytes % 10.6 (H) 0 - 4 %    Eosinophils % 2.4 0 - 3 %    Basophils % 0.3 0 - 1 %    Segs Absolute 4.7 K/CU MM    Lymphocytes Absolute 1.2 K/CU MM    Monocytes Absolute 0.7 K/CU MM    Eosinophils Absolute 0.2 K/CU MM    Basophils Absolute 0.0 K/CU MM    Nucleated RBC % 0.0 %    Total Nucleated RBC 0.0 K/CU MM    Total Immature Neutrophil 0.02 K/CU MM    Immature Neutrophil % 0.3 0 - 0.43 %   Comprehensive Metabolic Panel w/ Reflex to MG   Result Value Ref Range    Sodium 136 135 - 145 MMOL/L    Potassium 3.9 3.5 - 5.1 MMOL/L    Chloride 102 99 - 110 mMol/L    CO2 24 21 - 32 MMOL/L    BUN 15 6 - 23 MG/DL    CREATININE 1.1 0.9 - 1.3 MG/DL    Glucose 112 (H) 70 - 99 MG/DL    Calcium 8.4 8.3 - 10.6 MG/DL    Alb 3.6 3.4 - 5.0 GM/DL    Total Protein 6.3 (L) 6.4 - 8.2 GM/DL    Total Bilirubin 0.4 0.0 - 1.0 MG/DL    ALT 11 10 - 40 U/L    AST 18 15 - 37 IU/L    Alkaline Phosphatase 86 40 - 129 IU/L    GFR Non-African American >60 >60 mL/min/1.73m2    GFR African American >60 >60 mL/min/1.73m2    Anion Gap 10 4 - 16 Troponin   Result Value Ref Range    Troponin T <0.010 <0.01 NG/ML   Protime-INR   Result Value Ref Range    Protime 16.3 (H) 11.7 - 14.5 SECONDS    INR 1.34 INDEX   APTT   Result Value Ref Range    aPTT 31.9 25.1 - 37.1 SECONDS   Lactic Acid, Plasma   Result Value Ref Range    Lactate 1.0 0.4 - 2.0 mMOL/L   Brain Natriuretic Peptide   Result Value Ref Range    Pro-.7 <300 PG/ML   POCT Glucose   Result Value Ref Range    POC Glucose 125 (H) 70 - 99 MG/DL   EKG 12 Lead   Result Value Ref Range    Ventricular Rate 52 BPM    Atrial Rate 52 BPM    P-R Interval 174 ms    QRS Duration 80 ms    Q-T Interval 490 ms    QTc Calculation (Bazett) 455 ms    P Axis -9 degrees    R Axis -7 degrees    T Axis -35 degrees    Diagnosis       Sinus bradycardia  Low voltage QRS  T wave abnormality, consider inferior ischemia  Abnormal ECG  When compared with ECG of 23-APR-2018 12:18,  Vent.  rate has decreased BY  26 BPM  T wave inversion more evident in Inferior leads  Nonspecific T wave abnormality, improved in Anterolateral leads            ABNORMAL LABS:  Labs Reviewed   CBC WITH AUTO DIFFERENTIAL - Abnormal; Notable for the following components:       Result Value    RBC 4.50 (*)     Segs Relative 68.7 (*)     Lymphocytes % 17.7 (*)     Monocytes % 10.6 (*)     All other components within normal limits   COMPREHENSIVE METABOLIC PANEL W/ REFLEX TO MG FOR LOW K - Abnormal; Notable for the following components:    Glucose 112 (*)     Total Protein 6.3 (*)     All other components within normal limits   PROTIME-INR - Abnormal; Notable for the following components:    Protime 16.3 (*)     All other components within normal limits   POCT GLUCOSE - Abnormal; Notable for the following components:    POC Glucose 125 (*)     All other components within normal limits   TROPONIN   APTT   LACTIC ACID, PLASMA   BRAIN NATRIURETIC PEPTIDE   URINE RT REFLEX TO CULTURE         IMAGING STUDIES ORDERED:  CTA PULMONARY W CONTRAST  CT ABDOMEN PELVIS W IV CONTRAST    I have personally viewed the imaging studies. The radiologist interpretation is:   CT ABDOMEN PELVIS W IV CONTRAST Additional Contrast? None   Final Result   1. Rectosigmoid colon wall thickening, inflammation with dependent luminal   fluid, as discussed above, most suggestive of acute colitis. Differential   diagnostic considerations include infectious etiology versus inflammatory   bowel disease. Recommend clinical correlation. Cannot exclude neoplastic   etiology. 2. Descending and sigmoid colon diverticulosis without evidence of   diverticulitis. 3. Otherwise, unremarkable contrast enhanced CT abdomen and pelvis   examination. CTA PULMONARY W CONTRAST   Final Result   1. Note: Study limited by patient breathing motion related artifact. 2. No definitive evidence of acute pulmonary embolism. 3. Moderate centrilobular emphysema. MEDICATIONS ADMINISTERED:  Medications   amoxicillin-clavulanate (AUGMENTIN) 875-125 MG per tablet 1 tablet (has no administration in time range)   0.9 % sodium chloride bolus (1,000 mLs Intravenous New Bag 10/17/20 1445)   0.9 % sodium chloride bolus (0 mLs Intravenous Stopped 10/17/20 1545)   iopamidol (ISOVUE-370) 76 % injection 75 mL (90 mLs Intravenous Given 10/17/20 1540)         COURSE & MEDICAL DECISION MAKING  Last vitals: /73   Pulse 55   Temp 98.6 °F (37 °C) (Oral)   Resp 14   SpO2 99%     Patient presented with left lower quadrant abdominal pain with lightheadedness and hypotension shortness of breath. He is found to have evidence of rectosigmoid colitis. No evidence for abscess. No clinical evidence for sepsis.     Regards to the abdominal pain, differential diagnoses considered included, but were not limited to, acute appendicitis, acute cholecystitis/cholangitis, acute hepatitis, acute pancreatitis, acute coronary syndrome, acute intra-abdominal catastrophe, acute vascular emergency, bowel obstruction, perforated bowel, incarcerated or strangulated hernia, colitis, diverticulitis, ischemic bowel, cystitis/UTI/pyelonephritis, kidney stone and acute testicular torsion/pathology. Patient was given the above medications with improvement in symptoms. On repeat examination the abdomen was non-surgical without peritoneal signs. Patient states \"I feel better and I am ready to go home. \"  The patient was able to tolerate oral intake. Patient was advised of the changing nature of intra-abdominal pathology and specific signs and symptoms to watch which may signal serious infectious, metabolic or surgical conditions for which immediate return to the ED would be necessary for further diagnosis and treatment. He was noted to have some hypertension upon presentation which I suspect was actually secondary to vasovagal response as he was also noted to have a heart rate in the 50s. Heart rate did later improve into the 60s and blood pressure improved as well into the 805C systolic. Suspect vasovagal response was also the cause of his sweating and shortness of breath and lightheadedness. The symptoms did resolve. He did not have any loss consciousness over. There is no clinical evidence for an acute cardiac process and no evidence for acute MI. Additional workup and treatment in the ED as documented above. Patient reassured and will be discharged home. I have explained to the patient in appropriate terminology our work-up in the ED and their diagnosis. I have also given anticipatory guidance and expectant management of their condition as an outpatient as per my custom. The patient was given clear discharge and follow-up instructions including return to the ER immediately for worsening concerns. The patient has been advised to follow-up with their primary care physician and/or referred physician in the next two to three days or sooner if worsening and to return to the ER immediately as above with any concerns.  I provided the patient counseling with regard to my customary list of strict return precautions as well as return precautions specific to the cause for today's emergency department visit. The patient will return under these provided conditions, but should also return for new concerns or further worsening. Pt and/or family understand and agree with plan. Clinical Impression:  1. Colitis    2. Hypotension, unspecified hypotension type    3. Dyspnea, unspecified type        Disposition referral (if applicable):  Gloria Bosworth, JANI - CNP  [address]  [phone]    Schedule an appointment as soon as possible for a visit       [facility] Emergency Department  [place] [facility] [phone]  [phone]    If symptoms worsen      Disposition medications (if applicable):  New Prescriptions    AMOXICILLIN-CLAVULANATE (AUGMENTIN) 875-125 MG PER TABLET    Take 1 tablet by mouth 2 times daily for 10 days    ONDANSETRON (ZOFRAN) 4 MG TABLET    Take 1 tablet by mouth every 8 hours as needed for Nausea or Vomiting       ED Provider Disposition Time  DISPOSITION          Electronically signed by: Chai Heaton M.D., 10/17/2020  6:00 PM    This dictation was created with voice recognition software. While attempts have been made to review the dictation as it is transcribed, on occasion the spoken word can be misinterpreted by the technology leading to omissions or inappropriate words, phrases or sentences.         Reg Holly MD  10/17/20 1800       Reg Holly MD  10/17/20 1800

## 2020-10-17 NOTE — ED NOTES
Discharge instructions reviewed with pt. All questions answered at this time. Pt verbalized understanding.       Stevenson Lou RN  10/17/20 8602

## 2020-10-20 LAB
EKG ATRIAL RATE: 52 BPM
EKG DIAGNOSIS: NORMAL
EKG P AXIS: -9 DEGREES
EKG P-R INTERVAL: 174 MS
EKG Q-T INTERVAL: 490 MS
EKG QRS DURATION: 80 MS
EKG QTC CALCULATION (BAZETT): 455 MS
EKG R AXIS: -7 DEGREES
EKG T AXIS: -35 DEGREES
EKG VENTRICULAR RATE: 52 BPM

## 2020-10-26 RX ORDER — METOPROLOL SUCCINATE 100 MG/1
100 TABLET, EXTENDED RELEASE ORAL DAILY
Qty: 90 TABLET | Refills: 1 | Status: SHIPPED | OUTPATIENT
Start: 2020-10-26 | End: 2021-01-08 | Stop reason: SDUPTHER

## 2020-11-03 PROCEDURE — 93298 REM INTERROG DEV EVAL SCRMS: CPT | Performed by: INTERNAL MEDICINE

## 2020-11-06 NOTE — PROGRESS NOTES
Testing obtained by client as ordered by PCP.
Testing obtained by client as ordered by PCP.
Mouth: Mucous membranes are moist.      Pharynx: Oropharynx is clear. Eyes:      Extraocular Movements: Extraocular movements intact. Conjunctiva/sclera: Conjunctivae normal.      Pupils: Pupils are equal, round, and reactive to light. Neck:      Musculoskeletal: Normal range of motion and neck supple. Vascular: No carotid bruit. Cardiovascular:      Rate and Rhythm: Regular rhythm. Bradycardia present. Heart sounds: Normal heart sounds. Pulmonary:      Effort: Pulmonary effort is normal.      Breath sounds: Normal breath sounds. Abdominal:      General: Bowel sounds are normal. There is no distension. Palpations: Abdomen is soft. Tenderness: There is no abdominal tenderness. Musculoskeletal: Normal range of motion. Right lower leg: No edema. Left lower leg: No edema. Skin:     General: Skin is warm and dry. Capillary Refill: Capillary refill takes less than 2 seconds. Neurological:      General: No focal deficit present. Mental Status: He is alert and oriented to person, place, and time. Psychiatric:         Mood and Affect: Mood normal.         Behavior: Behavior normal.            Assessment / Plan:      1. Restless leg syndrome  A discussion regarding medication was held with the patient. Discussed the dangerous nature of narcotic/benzo medicines, including the risk of respiratory depression, death and addiction. OARRS reviewed,  - clonazePAM (KLONOPIN) 0.5 MG tablet; Take 1 tablet by mouth as needed for Anxiety for up to 90 days. Dispense: 90 tablet; Refill: 0    2. Recurrent major depressive disorder, in partial remission (HCC)  Stable. Continue Lexapro. Call if any concerns before your follow up appointment. If you have suicidal thoughts call our office. If you have suicidal thoughts with a plan, go to emergency room immediately. 3. Essential hypertension  Monitor blood pressures. Goal is 130/80 or less.   Bring your blood pressure

## 2020-11-13 ENCOUNTER — OFFICE VISIT (OUTPATIENT)
Dept: CARDIOLOGY CLINIC | Age: 75
End: 2020-11-13
Payer: MEDICARE

## 2020-11-13 VITALS
HEIGHT: 67 IN | DIASTOLIC BLOOD PRESSURE: 72 MMHG | HEART RATE: 72 BPM | BODY MASS INDEX: 34.21 KG/M2 | WEIGHT: 218 LBS | SYSTOLIC BLOOD PRESSURE: 130 MMHG

## 2020-11-13 PROCEDURE — G8428 CUR MEDS NOT DOCUMENT: HCPCS | Performed by: NURSE PRACTITIONER

## 2020-11-13 PROCEDURE — 99214 OFFICE O/P EST MOD 30 MIN: CPT | Performed by: NURSE PRACTITIONER

## 2020-11-13 PROCEDURE — G8484 FLU IMMUNIZE NO ADMIN: HCPCS | Performed by: NURSE PRACTITIONER

## 2020-11-13 PROCEDURE — 3017F COLORECTAL CA SCREEN DOC REV: CPT | Performed by: NURSE PRACTITIONER

## 2020-11-13 PROCEDURE — 4040F PNEUMOC VAC/ADMIN/RCVD: CPT | Performed by: NURSE PRACTITIONER

## 2020-11-13 PROCEDURE — G8417 CALC BMI ABV UP PARAM F/U: HCPCS | Performed by: NURSE PRACTITIONER

## 2020-11-13 PROCEDURE — 1123F ACP DISCUSS/DSCN MKR DOCD: CPT | Performed by: NURSE PRACTITIONER

## 2020-11-13 PROCEDURE — 4004F PT TOBACCO SCREEN RCVD TLK: CPT | Performed by: NURSE PRACTITIONER

## 2020-11-13 ASSESSMENT — ENCOUNTER SYMPTOMS
VOMITING: 0
SHORTNESS OF BREATH: 0
CONSTIPATION: 0
EYE ITCHING: 0
CHEST TIGHTNESS: 0
SINUS PRESSURE: 0
DIARRHEA: 0
BLOOD IN STOOL: 0
SINUS PAIN: 0
BACK PAIN: 0
EYE REDNESS: 0
COLOR CHANGE: 0
ABDOMINAL DISTENTION: 0
ABDOMINAL PAIN: 0
NAUSEA: 0

## 2020-11-13 NOTE — PROGRESS NOTES
CARDIOLOGY  NOTE      11/13/2020    RE: Janina Moran  ([id number])                               TO:  Dr. Sravan Miller, APRN - CNP  The primary cardiologist is Dr Moris Diamond    CC:  Denies the following  Chest Pain  Palpitations  Shortness of Breath  Edema  Dizziness  Syncope        HPI: Thank you for involving me in taking care of your patient Janina Moran, who is a  70y.o. year old male with a history of hypertension, hyperlipidemia, atrial fibrillation. He is seen today for a follow up on hyperlipidemia and hypertension. He during this visit  denies chest pain, palpitations, shortness of breath, edema, dizziness or syncope. He recently reports that he was seen in the emergency room for abdominal pain. He was diagnosed with colitis. He received antibiotics and is feeling well. Vitals:    11/13/20 0927   BP: 130/72   Pulse: 72       Current Outpatient Medications   Medication Sig Dispense Refill    metoprolol succinate (TOPROL XL) 100 MG extended release tablet Take 1 tablet by mouth daily 90 tablet 1    ondansetron (ZOFRAN) 4 MG tablet Take 1 tablet by mouth every 8 hours as needed for Nausea or Vomiting 8 tablet 0    omeprazole (PRILOSEC) 40 MG delayed release capsule Take 1 capsule by mouth daily Do not crush or break. 90 capsule 3    budesonide-formoterol (SYMBICORT) 160-4.5 MCG/ACT AERO Inhale 2 puffs into the lungs 2 times daily 1 Inhaler 5    escitalopram (LEXAPRO) 20 MG tablet Take 1 tablet by mouth daily 90 tablet 1    albuterol sulfate HFA (VENTOLIN HFA) 108 (90 Base) MCG/ACT inhaler Inhale 2 puffs into the lungs 4 times daily as needed for Wheezing or Shortness of Breath 3 Inhaler 1    dofetilide (TIKOSYN) 125 MCG capsule Take 1 capsule by mouth 2 times daily 180 capsule 3    clonazePAM (KLONOPIN) 0.5 MG tablet Take 1 tablet by mouth as needed for Anxiety for up to 90 days.  90 tablet 0    apixaban (ELIQUIS) 5 MG TABS tablet Take 1 tablet by mouth 2 times daily 180 tablet 3    naproxen (NAPROSYN) 375 MG tablet Take 1 tablet by mouth 2 times daily as needed for Pain 20 tablet 0    Misc. Devices ([employer] CANE) MISC 1 Device by Does not apply route as needed (for ambulation) 1 each 0    amLODIPine-benazepril (LOTREL) 5-10 MG per capsule Take 1 capsule by mouth daily 90 capsule 3    montelukast (SINGULAIR) 10 MG tablet Take 1 tablet by mouth nightly 90 tablet 3    atorvastatin (LIPITOR) 10 MG tablet Take 1 tablet by mouth daily 90 tablet 3    UNABLE TO FIND Compounded betaval cream 15g/velvachol 135g 1 Can 5    sildenafil (VIAGRA) 100 MG tablet Take 1 tablet by mouth as needed for Erectile Dysfunction 6 tablet 3    aspirin 81 MG EC tablet Take 81 mg by mouth daily.  Cyanocobalamin (B-12) 500 MCG TABS Take 1 tablet by mouth daily       B Complex Vitamins (B COMPLEX 1 PO) Take 1 tablet by mouth daily.  folic acid (FOLVITE) 487 MCG tablet Take 400 mcg by mouth daily. No current facility-administered medications for this visit. Allergies: Patient has no known allergies. Past Medical History:   Diagnosis Date    Allergic rhinitis     Asthma     Atrial fibrillation (HCC)     CAD (coronary artery disease)     mild LAD    COPD (chronic obstructive pulmonary disease) ([facility] [address]     Depression     Diverticulosis of colon     Family history of early CAD     Father- MI-  age 46; a grandparent- age 46 of MI    Family history of valvular heart disease     Mother- Mitral valve disease    GERD (gastroesophageal reflux disease)     H/O 24 hour EKG monitoring 2001-Predominant rhythm is sinus rhythm. No signif ectopy noted.  H/O echocardiogram 2002, 2001-EF 60%. Normal LVSF. Mild MR.  Mild TR-Dr Lavelle Duran;    H/O echocardiogram 2019    EF 17-87%, Grade I diastolic dysfunction, sclerotic but non stenotic aortic valve, Mild AR, Mild-Mod TR, Normal pulmonary artery pressure, no pericardial effusion     History of diverticulitis of colon     History of Holter monitoring 2018    48 hr holter - SR with PAF    Hx of cardiac cath 2013    Mild LAD disease noted.  Hx of cardiovascular stress test 2018    EF 33% Pt in a fib with RVR. No infarct or ischemia. Decreased uptake inferiorly due to diaphragmatic artifact.  HX OTHER MEDICAL 13    14 DAY EVENT: APC's, short runs of sinus tachycardia.      Hyperlipidemia     Hypertension     Hypoxemia 2018    Irregular heart rate 2018    Palpitations     Pneumonia     Restless leg syndrome     Shortness of breath 2018    Tricuspid valve regurgitation 2001    mild     Past Surgical History:   Procedure Laterality Date    BONE RESECTION, RIB      thoracic    COLONOSCOPY  10/14/14    polyp, divertics,    DIAGNOSTIC CARDIAC CATH LAB PROCEDURE  13    EF 55%, Mild LAD Disease    ENDOSCOPY, COLON, DIAGNOSTIC  10/14/14    normal    NERVE SURGERY      ulnar nerver transfer   Colleen 2004  , 2006-right rotator cuff; -Left Rotator cuff    ROTATOR CUFF REPAIR Left 2015    Dr. Galindo Limb ARTHROSCOPY Right 2010     Family History   Problem Relation Age of Onset    Heart Disease Mother         Mitral valve disease    Heart Attack Mother 47    Coronary Art Dis Father         MI    Heart Disease Father     Heart Attack Father 46    Heart Attack Brother 50    Heart Attack Paternal Grandfather 48     Social History     Tobacco Use    Smoking status: Former Smoker     Packs/day: 1.00     Years: 20.00     Pack years: 20.00     Types: Cigarettes     Last attempt to quit: 1993     Years since quittin.3    Smokeless tobacco: Current User     Types: Chew    Tobacco comment: Reviewed    Substance Use Topics    Alcohol use: No     Comment: drinking \"1 cup decaff coffee\", also drinks decaff tea \"drink it all day long\"        Review of Systems Constitutional: Negative for activity change, appetite change and fatigue. HENT: Negative for congestion, sinus pressure and sinus pain. Eyes: Negative for redness and itching. Respiratory: Negative for chest tightness and shortness of breath. Cardiovascular: Negative for chest pain, palpitations and leg swelling. Gastrointestinal: Negative for abdominal distention, abdominal pain, blood in stool, constipation, diarrhea, nausea and vomiting. Endocrine: Negative for cold intolerance and heat intolerance. Genitourinary: Negative for difficulty urinating and dysuria. Musculoskeletal: Negative for arthralgias, back pain and gait problem. Skin: Negative for color change, pallor, rash and wound. Neurological: Negative for dizziness, syncope and light-headedness. Psychiatric/Behavioral: Negative for agitation and confusion. The patient is not nervous/anxious. Objective:      Physical Exam:  /72   Pulse 72   Ht 5' 7\" (1.702 m)   Wt 218 lb (98.9 kg)   BMI 34.14 kg/m²   Wt Readings from Last 3 Encounters:   11/13/20 218 lb (98.9 kg)   09/04/20 211 lb (95.7 kg)   06/05/20 212 lb (96.2 kg)     Body mass index is 34.14 kg/m². Physical Exam  Constitutional:       Appearance: Normal appearance. He is not ill-appearing or diaphoretic. HENT:      Head: Normocephalic and atraumatic. Right Ear: External ear normal.      Left Ear: External ear normal.      Nose: No congestion or rhinorrhea. Mouth/Throat:      Mouth: Mucous membranes are moist.      Pharynx: Oropharynx is clear. No oropharyngeal exudate or posterior oropharyngeal erythema. Eyes:      General:         Right eye: No discharge. Left eye: No discharge. Conjunctiva/sclera: Conjunctivae normal.      Pupils: Pupils are equal, round, and reactive to light. Neck:      Musculoskeletal: Neck supple. No muscular tenderness. Vascular: No carotid bruit.    Cardiovascular:      Rate and Rhythm: Normal rate and regular rhythm. Pulses: Normal pulses. Heart sounds: Normal heart sounds. No murmur. No friction rub. No gallop. Pulmonary:      Effort: Pulmonary effort is normal.      Breath sounds: Normal breath sounds. No stridor. No wheezing or rhonchi. Abdominal:      General: Abdomen is flat. Bowel sounds are normal.      Palpations: Abdomen is soft. Tenderness: There is no abdominal tenderness. There is no guarding. Musculoskeletal:      Right lower leg: No edema. Left lower leg: No edema. Skin:     General: Skin is warm and dry. Capillary Refill: Capillary refill takes less than 2 seconds. Neurological:      Mental Status: He is alert and oriented to person, place, and time. Psychiatric:         Mood and Affect: Mood normal.         Thought Content: Thought content normal.         Judgment: Judgment normal.         DATA:      Lab Results   Component Value Date    LABA1C 5.5 09/21/2016     Lab Results   Component Value Date    CHOL 132 02/20/2018    TRIG 50 02/20/2018    HDL 54 02/20/2018    LDLCALC 68 02/20/2018     Lab Results   Component Value Date    ALT 11 10/17/2020    AST 18 10/17/2020     TSH:  No results found for: TSH      Assessment/ Plan:    Patient seen, interviewed and examined. Testing was reviewed. HTN    Vitals:    11/13/20 0927   BP: 130/72   Pulse: 72     Controlled  To cont same medications: Toprol- mg daily Lipitor 10 mg daily  advised low salt diet   Last echo 12/5/2019 showed EF of 55 to 60% % with grade 1 diastolic dysfunction mild aortic regurgitation mild to moderate tricuspid regurgitation.     Hyperlipidemia  Patient to have follow-up with PCP  Would like to have lipid panel at that time  Patient is on statin    PAF  On Tikosyn and Eliquis  Managed per Dr. Doneta Boas    Obesity BMI 34.14  Recommend regular exercise and dietary modifications     To follow-up with Dr. Gracie Plata in 1 year  Patient is encouraged to exercise even a brisk walk for 30 minutes at least 3 to 4 times a week. Lifestyle and risk factor modificatons discussed. Various goals are discussed and questions answered. Continue current medications. Appropriate prescriptions are addressed. Questions answered and patient verbalizes understanding. Call for any problems, questions, or concerns.

## 2020-11-25 ENCOUNTER — PROCEDURE VISIT (OUTPATIENT)
Dept: CARDIOLOGY CLINIC | Age: 75
End: 2020-11-25
Payer: MEDICARE

## 2020-12-04 ENCOUNTER — TELEPHONE (OUTPATIENT)
Dept: FAMILY MEDICINE CLINIC | Age: 75
End: 2020-12-04

## 2020-12-04 NOTE — TELEPHONE ENCOUNTER
Called client to schedule his AWV with message left on secure voicemail asking to call the [facility] office to schedule.

## 2020-12-14 RX ORDER — MONTELUKAST SODIUM 10 MG/1
10 TABLET ORAL NIGHTLY
Qty: 90 TABLET | Refills: 3 | Status: SHIPPED | OUTPATIENT
Start: 2020-12-14 | End: 2020-12-18 | Stop reason: SDUPTHER

## 2020-12-18 ENCOUNTER — OFFICE VISIT (OUTPATIENT)
Dept: FAMILY MEDICINE CLINIC | Age: 75
End: 2020-12-18
Payer: MEDICARE

## 2020-12-18 VITALS
HEART RATE: 59 BPM | RESPIRATION RATE: 16 BRPM | OXYGEN SATURATION: 98 % | DIASTOLIC BLOOD PRESSURE: 66 MMHG | WEIGHT: 219 LBS | SYSTOLIC BLOOD PRESSURE: 110 MMHG | HEIGHT: 67 IN | TEMPERATURE: 97.9 F | BODY MASS INDEX: 34.37 KG/M2

## 2020-12-18 PROCEDURE — 4040F PNEUMOC VAC/ADMIN/RCVD: CPT | Performed by: NURSE PRACTITIONER

## 2020-12-18 PROCEDURE — G2066 INTER DEVC REMOTE 30D: HCPCS | Performed by: INTERNAL MEDICINE

## 2020-12-18 PROCEDURE — 3017F COLORECTAL CA SCREEN DOC REV: CPT | Performed by: NURSE PRACTITIONER

## 2020-12-18 PROCEDURE — G8484 FLU IMMUNIZE NO ADMIN: HCPCS | Performed by: NURSE PRACTITIONER

## 2020-12-18 PROCEDURE — 1123F ACP DISCUSS/DSCN MKR DOCD: CPT | Performed by: NURSE PRACTITIONER

## 2020-12-18 PROCEDURE — G0438 PPPS, INITIAL VISIT: HCPCS | Performed by: NURSE PRACTITIONER

## 2020-12-18 PROCEDURE — 93298 REM INTERROG DEV EVAL SCRMS: CPT | Performed by: INTERNAL MEDICINE

## 2020-12-18 RX ORDER — DOXEPIN HYDROCHLORIDE 10 MG/1
10 CAPSULE ORAL NIGHTLY
Qty: 30 CAPSULE | Refills: 0 | Status: SHIPPED | OUTPATIENT
Start: 2020-12-18 | End: 2021-01-08

## 2020-12-18 RX ORDER — MONTELUKAST SODIUM 10 MG/1
10 TABLET ORAL NIGHTLY
Qty: 90 TABLET | Refills: 3 | Status: SHIPPED | OUTPATIENT
Start: 2020-12-18 | End: 2022-01-14 | Stop reason: SDUPTHER

## 2020-12-18 ASSESSMENT — PATIENT HEALTH QUESTIONNAIRE - PHQ9
1. LITTLE INTEREST OR PLEASURE IN DOING THINGS: 1
SUM OF ALL RESPONSES TO PHQ QUESTIONS 1-9: 1
SUM OF ALL RESPONSES TO PHQ9 QUESTIONS 1 & 2: 1
SUM OF ALL RESPONSES TO PHQ QUESTIONS 1-9: 1
2. FEELING DOWN, DEPRESSED OR HOPELESS: 0
SUM OF ALL RESPONSES TO PHQ QUESTIONS 1-9: 1

## 2020-12-18 ASSESSMENT — LIFESTYLE VARIABLES: HOW OFTEN DO YOU HAVE A DRINK CONTAINING ALCOHOL: 0

## 2020-12-18 NOTE — PROGRESS NOTES
budesonide-formoterol (SYMBICORT) 160-4.5 MCG/ACT AERO Inhale 2 puffs into the lungs 2 times daily  JANI Bright CNP   escitalopram (LEXAPRO) 20 MG tablet Take 1 tablet by mouth daily  JANI Bright CNP   albuterol sulfate HFA (VENTOLIN HFA) 108 (90 Base) MCG/ACT inhaler Inhale 2 puffs into the lungs 4 times daily as needed for Wheezing or Shortness of Breath  JANI Bright CNP   dofetilide (TIKOSYN) 125 MCG capsule Take 1 capsule by mouth 2 times daily  Kari Downs MD   clonazePAM (KLONOPIN) 0.5 MG tablet Take 1 tablet by mouth as needed for Anxiety for up to 90 days. JANI Alvarez CNP   naproxen (NAPROSYN) 375 MG tablet Take 1 tablet by mouth 2 times daily as needed for Pain  Bari Trujillo PA-C   Misc. Devices ([employer] CAN[employer]) MISC 1 Device by Does not apply route as needed (for ambulation)  Bari Trujillo PA-C   amLODIPine-benazepril (LOTREL) 5-10 MG per capsule Take 1 capsule by mouth daily  Aditya Boss MD   atorvastatin (LIPITOR) 10 MG tablet Take 1 tablet by mouth daily  Aditya Boss MD   UNABLE TO FIND Compounded betaval cream 15g/velvachol 135g  Aditya Boss MD   sildenafil (VIAGRA) 100 MG tablet Take 1 tablet by mouth as needed for Erectile Dysfunction  Aditya Boss MD   aspirin 81 MG EC tablet Take 81 mg by mouth daily. Historical Provider, MD   Cyanocobalamin (B-12) 500 MCG TABS Take 1 tablet by mouth daily   Historical Provider, MD   B Complex Vitamins (B COMPLEX 1 PO) Take 1 tablet by mouth daily. Historical Provider, MD   folic acid (FOLVITE) 561 MCG tablet Take 400 mcg by mouth daily.     Historical Provider, MD         Past Medical History:   Diagnosis Date    Allergic rhinitis     Asthma     Atrial fibrillation ([facility] [address]     CAD (coronary artery disease)     mild LAD    COPD (chronic obstructive pulmonary disease) (HCC)     Depression     Diverticulosis of colon     Family history of early CAD Father- MI-  age 46; a grandparent- age 46 of MI    Family history of valvular heart disease     Mother- Mitral valve disease    GERD (gastroesophageal reflux disease)     H/O 24 hour EKG monitoring 2001-Predominant rhythm is sinus rhythm. No signif ectopy noted.  H/O echocardiogram 2002, 2001-EF 60%. Normal LVSF. Mild MR. Mild TR-Dr Selena Hodgson;    H/O echocardiogram 2019    EF 82-37%, Grade I diastolic dysfunction, sclerotic but non stenotic aortic valve, Mild AR, Mild-Mod TR, Normal pulmonary artery pressure, no pericardial effusion     History of diverticulitis of colon     History of Holter monitoring 2018    48 hr holter - SR with PAF    Hx of cardiac cath 2013    Mild LAD disease noted.  Hx of cardiovascular stress test 2018    EF 33% Pt in a fib with RVR. No infarct or ischemia. Decreased uptake inferiorly due to diaphragmatic artifact.  HX OTHER MEDICAL 13    14 DAY EVENT: APC's, short runs of sinus tachycardia.      Hyperlipidemia     Hypertension     Hypoxemia 2018    Irregular heart rate 2018    Palpitations     Pneumonia     Restless leg syndrome     Shortness of breath 2018    Tricuspid valve regurgitation 2001    mild       Past Surgical History:   Procedure Laterality Date    BONE RESECTION, RIB      thoracic    COLONOSCOPY  10/14/14    polyp, divertics,    DIAGNOSTIC CARDIAC CATH LAB PROCEDURE  13    EF 55%, Mild LAD Disease    ENDOSCOPY, COLON, DIAGNOSTIC  10/14/14    normal    NERVE SURGERY      [facility] nerver [facility]   [facility]  , 2006-right rotator cuff; -Left Rotator cuff    ROTATOR CUFF REPAIR Left 2015    Dr. Isbell Prophet ARTHROSCOPY Right 2010         Family History   Problem Relation Age of Onset    Heart Disease Mother         Mitral valve disease    Heart Attack Mother 47    Coronary Art Dis Father         MI  Heart Disease Father     Heart Attack Father 46    Heart Attack Brother 50    Heart Attack Paternal Grandfather 48       CareTeam (Including outside providers/suppliers regularly involved in providing care):   Patient Care Team:  JANI Alvarez CNP as PCP - General (Nurse Practitioner)  JANI Alvarez CNP as PCP - [facility] Barber[facility] Provider  Gumaro Verma MD as Consulting Physician (Cardiology)    Wt Readings from Last 3 Encounters:   12/18/20 219 lb (99.3 kg)   11/13/20 218 lb (98.9 kg)   09/04/20 211 lb (95.7 kg)     Vitals:    12/18/20 1503   BP: 110/66   Site: Right Upper Arm   Position: Sitting   Cuff Size: Medium Adult   Pulse: 59   Resp: 16   Temp: 97.9 °F (36.6 °C)   TempSrc: Infrared   SpO2: 98%   Weight: 219 lb (99.3 kg)   Height: 5' 7\" (1.702 m)     Body mass index is 34.3 kg/m². Based upon direct observation of the patient, evaluation of cognition reveals recent and remote memory intact.     General Appearance: alert and oriented to person, place and time, well developed and well- nourished, in no acute distress  Skin: warm and dry, no rash or erythema  Head: normocephalic and atraumatic  Eyes: pupils equal, round, and reactive to light, extraocular eye movements intact, conjunctivae normal  ENT: tympanic membrane, external ear and ear canal normal bilaterally, nose without deformity, nasal mucosa and turbinates normal without polyps  Neck: supple and non-tender without mass, no thyromegaly or thyroid nodules, no cervical lymphadenopathy  Pulmonary/Chest: clear to auscultation bilaterally- no wheezes, rales or rhonchi, normal air movement, no respiratory distress  Cardiovascular: normal rate, regular rhythm, normal S1 and S2, no murmurs, rubs, clicks, or gallops, distal pulses intact, no carotid bruits  Abdomen: soft, non-tender, non-distended, normal bowel sounds, no masses or organomegaly  Extremities: no cyanosis, clubbing or edema Musculoskeletal: normal range of motion, no joint swelling, deformity or tenderness  Neurologic: reflexes normal and symmetric, no cranial nerve deficit, gait, coordination and speech normal    Patient's complete Health Risk Assessment and screening values have been reviewed and are found in Flowsheets. The following problems were reviewed today and where indicated follow up appointments were made and/or referrals ordered. Positive Risk Factor Screenings with Interventions:         Substance History:  Social History     Tobacco History     Smoking Status  Former Smoker Quit date  6/28/1993 Smoking Frequency  1 pack/day for 20 years (20 pk yrs) Smoking Tobacco Type  Cigarettes    Smokeless Tobacco Use  Current User Smokeless Tobacco Type  Chew    Tobacco Comment  Reviewed           Alcohol History     Alcohol Use Status  No Comment  drinking \"1 cup decaff coffee\", also drinks decaff tea \"drink it all day long\"          Drug Use     Drug Use Status  No          Sexual Activity     Sexually Active  Yes Partners  Male Comment                 Alcohol Screening:       A score of 8 or more is associated with harmful or hazardous drinking. A score of 13 or more in women, and 15 or more in men, is likely to indicate alcohol dependence. Substance Abuse Interventions:  · Alcohol misuse/dependence:  patient is not ready to change his/her alcohol consumption behavior at this time    General Health and ACP:  General  In general, how would you say your health is?: Good  In the past 7 days, have you experienced any of the following?  New or Increased Pain, New or Increased Fatigue, Loneliness, Social Isolation, Stress or Anger?: (!) New or Increased Fatigue  Do you get the social and emotional support that you need?: Yes  Do you have a Living Will?: Yes  Advance Directives     Power of  Living Will ACP-Advance Directive ACP-Power of     Not on File Not on File Not on File Not on File General Health Risk Interventions:  · Fatigue: regular exercise recommended- 3-5 times per week, 30-45 minutes per session, medication prescribed- doxepin 10 mg    Health Habits/Nutrition:  Health Habits/Nutrition  Do you exercise for at least 20 minutes 2-3 times per week?: (!) No  Have you lost any weight without trying in the past 3 months?: No  Do you eat fewer than 2 meals per day?: (!) Yes  Have you seen a dentist within the past year?: (!) No  Body mass index: (!) 34.3  Health Habits/Nutrition Interventions:  · Inadequate physical activity:  patient is not ready to increase his/her physical activity level at this time  · Nutritional issues:  educational materials for healthy, well-balanced diet provided  · Dental exam overdue:  patient encouraged to make appointment with his/her dentist    Hearing/Vision:  No exam data present  Hearing/Vision  Do you or your family notice any trouble with your hearing?: (!) Yes  Do you have difficulty driving, watching TV, or doing any of your daily activities because of your eyesight?: (!) Yes  Have you had an eye exam within the past year?: Yes  Hearing/Vision Interventions:  · Hearing concerns:  patient declines any further evaluation/treatment for hearing issues      Personalized Preventive Plan   Current Health Maintenance Status  Immunization History   Administered Date(s) Administered    Influenza 12/04/2012    Influenza Virus Vaccine 09/18/2014, 11/30/2015, 09/18/2017    Influenza Whole 09/18/2014    Influenza, High Dose (Fluzone 65 yrs and older) 11/21/2019    Influenza, [place], IM, (6 mo and older Fluzone, Flulaval, Fluarix and 3 yrs and older Afluria) 11/14/2016, 09/18/2017    Pneumococcal Conjugate 13-valent ([id number]) 11/30/2015    Pneumococcal Polysaccharide ([id number]) 08/20/2013    Tdap (Boostrix, Adacel) 01/19/2017        Health Maintenance   Topic Date Due    Shingles Vaccine (1 of 2) 02/14/1997    Lipid screen  02/20/2019  Annual Wellness Visit (AWV)  06/23/2019    Potassium monitoring  10/17/2021    Creatinine monitoring  10/17/2021    Colon cancer screen colonoscopy  10/15/2024    DTaP/Tdap/Td vaccine (2 - Td) 01/19/2027    Flu vaccine  Completed    Pneumococcal 65+ years Vaccine  Completed    AAA screen  Completed    Hepatitis C screen  Completed    Hepatitis A vaccine  Aged Out    Hepatitis B vaccine  Aged Out    Hib vaccine  Aged Out    Meningococcal (ACWY) vaccine  Aged Out     Recommendations for [employer] Due: see orders and patient instructions/AVS.  . Recommended screening schedule for the next 5-10 years is provided to the patient in written form: see Patient Walker Harrison was seen today for medicare awv. Diagnoses and all orders for this visit:    Routine general medical examination at a health care facility  Continue efforts at regular exercise, healthy diet and adequate sleep. Insomnia, unspecified type   Discussed sleep hygiene measures including regular sleep schedule, optimal sleep environment, and relaxing presleep rituals. Avoid daytime naps. Avoid caffeine after noon. Avoid excess alcohol. Avoid tobacco.  Recommended daily exercise. Doxepin 10 mg HS   -     doxepin (SINEQUAN) 10 MG capsule; Take 1 capsule by mouth nightly    Chronic obstructive pulmonary disease, unspecified COPD type (HCC)  -     montelukast (SINGULAIR) 10 MG tablet;  Take 1 tablet by mouth nightly

## 2021-01-08 ENCOUNTER — OFFICE VISIT (OUTPATIENT)
Dept: FAMILY MEDICINE CLINIC | Age: 76
End: 2021-01-08
Payer: MEDICARE

## 2021-01-08 VITALS
HEART RATE: 50 BPM | TEMPERATURE: 97.9 F | SYSTOLIC BLOOD PRESSURE: 116 MMHG | OXYGEN SATURATION: 96 % | BODY MASS INDEX: 34.21 KG/M2 | RESPIRATION RATE: 16 BRPM | WEIGHT: 218.4 LBS | DIASTOLIC BLOOD PRESSURE: 72 MMHG

## 2021-01-08 DIAGNOSIS — G47.00 INSOMNIA, UNSPECIFIED TYPE: Primary | ICD-10-CM

## 2021-01-08 DIAGNOSIS — I10 ESSENTIAL HYPERTENSION: Chronic | ICD-10-CM

## 2021-01-08 DIAGNOSIS — L30.9 DERMATITIS: ICD-10-CM

## 2021-01-08 PROCEDURE — 4004F PT TOBACCO SCREEN RCVD TLK: CPT | Performed by: NURSE PRACTITIONER

## 2021-01-08 PROCEDURE — G8484 FLU IMMUNIZE NO ADMIN: HCPCS | Performed by: NURSE PRACTITIONER

## 2021-01-08 PROCEDURE — 99213 OFFICE O/P EST LOW 20 MIN: CPT | Performed by: NURSE PRACTITIONER

## 2021-01-08 PROCEDURE — G8417 CALC BMI ABV UP PARAM F/U: HCPCS | Performed by: NURSE PRACTITIONER

## 2021-01-08 PROCEDURE — G8427 DOCREV CUR MEDS BY ELIG CLIN: HCPCS | Performed by: NURSE PRACTITIONER

## 2021-01-08 PROCEDURE — 4040F PNEUMOC VAC/ADMIN/RCVD: CPT | Performed by: NURSE PRACTITIONER

## 2021-01-08 PROCEDURE — 3017F COLORECTAL CA SCREEN DOC REV: CPT | Performed by: NURSE PRACTITIONER

## 2021-01-08 PROCEDURE — 1123F ACP DISCUSS/DSCN MKR DOCD: CPT | Performed by: NURSE PRACTITIONER

## 2021-01-08 RX ORDER — METOPROLOL SUCCINATE 100 MG/1
100 TABLET, EXTENDED RELEASE ORAL DAILY
Qty: 90 TABLET | Refills: 1 | Status: SHIPPED | OUTPATIENT
Start: 2021-01-08 | End: 2021-04-22 | Stop reason: SDUPTHER

## 2021-01-08 RX ORDER — TRAZODONE HYDROCHLORIDE 50 MG/1
50 TABLET ORAL NIGHTLY
Qty: 30 TABLET | Refills: 0 | Status: SHIPPED | OUTPATIENT
Start: 2021-01-08 | End: 2021-01-28 | Stop reason: SDUPTHER

## 2021-01-08 ASSESSMENT — ENCOUNTER SYMPTOMS
ABDOMINAL PAIN: 0
SORE THROAT: 0
CONSTIPATION: 0
CHEST TIGHTNESS: 0
VOMITING: 0
DIARRHEA: 0
SHORTNESS OF BREATH: 0
STRIDOR: 0
COUGH: 0
NAUSEA: 0
WHEEZING: 0

## 2021-01-08 NOTE — PROGRESS NOTES
tSubjective:      Chief Complaint   Patient presents with    Other     Pt here for 3 week follow up. Pt states doxepin is not helping him sleep    Rash     Pt has a couple spots on his hands does not itch. HPI:  Terra Mclaughlin is a 70 y.o. male who presents today for 3 week follow up for insomnia. Insomnia  Onset was years ago, \"I have always had trouble sleeping. \"  Patient describes symptoms as difficulty falling asleep. Patient has found no relief with avoiding long naps during the day, avoiding use of electronic devices before bedtime, avoiding vigorous physical exercise prior to bed, decreasing caffeine consumption, going to sleep at the same time each night, limiting alcohol consumption and prescription sleep aid- Ambien. Associated symptoms include: occassional restless legs. Patient denies anxiety, daytime somnolence, depression, frequent nighttime urination, irritability, snoring and stress. Rash  Patient reports 1 week hx or \"red spots\" on both hands. Rash is red, macular and is asymptomatic. He denies fever, cough, congestion, sore throat, headache, arthralgia, abdominal pain, nausea, vomiting, diarrhea, myalgia, decrease in appetite, decrease in energy level. Patient has tried nothing. New exposures: none. Patient has not had contacts with similar symptoms. Prior history of similar symptoms: no.    Past Medical History:   Diagnosis Date    Allergic rhinitis     Asthma     Atrial fibrillation (HCC)     CAD (coronary artery disease)     mild LAD    COPD (chronic obstructive pulmonary disease) (HCC)     Depression     Diverticulosis of colon     Family history of early CAD     Father- MI-  age 46; a grandparent- age 46 of MI    Family history of valvular heart disease     Mother- Mitral valve disease    GERD (gastroesophageal reflux disease)     H/O 24 hour EKG monitoring 2001-Predominant rhythm is sinus rhythm. No signif ectopy noted.     H/O echocardiogram 2002, 2001-EF 60%. Normal LVSF. Mild MR. Mild TR-Dr Erna Jimenez;    H/O echocardiogram 2019    EF 43-79%, Grade I diastolic dysfunction, sclerotic but non stenotic aortic valve, Mild AR, Mild-Mod TR, Normal pulmonary artery pressure, no pericardial effusion     History of diverticulitis of colon     History of Holter monitoring 2018    48 hr holter - SR with PAF    Hx of cardiac cath 2013    Mild LAD disease noted.  Hx of cardiovascular stress test 2018    EF 33% Pt in a fib with RVR. No infarct or ischemia. Decreased uptake inferiorly due to diaphragmatic artifact.  HX OTHER MEDICAL 13    14 DAY EVENT: APC's, short runs of sinus tachycardia.  Hyperlipidemia     Hypertension     Hypoxemia 2018    Irregular heart rate 2018    Palpitations     Pneumonia     Restless leg syndrome     Shortness of breath 2018    Tricuspid valve regurgitation 2001    mild        Social History     Tobacco Use    Smoking status: Former Smoker     Packs/day: 1.00     Years: 20.00     Pack years: 20.00     Types: Cigarettes     Quit date: 1993     Years since quittin.5    Smokeless tobacco: Current User     Types: Chew    Tobacco comment: Reviewed    Substance Use Topics    Alcohol use: No     Comment: drinking \"1 cup decaff coffee\", also drinks decaff tea \"drink it all day long\"        Review of Systems   Constitutional: Negative for activity change, appetite change, chills, fatigue, fever and unexpected weight change. HENT: Negative for congestion, ear pain, hearing loss, sore throat and tinnitus. Eyes: Negative for visual disturbance. Respiratory: Negative for cough, chest tightness, shortness of breath, wheezing and stridor. Cardiovascular: Negative for chest pain, palpitations and leg swelling. Gastrointestinal: Negative for abdominal pain, constipation, diarrhea, nausea and vomiting.    Musculoskeletal: Negative for arthralgias and gait problem. Skin: Positive for rash (bilateral hands). Neurological: Negative for dizziness, tremors, seizures, syncope, speech difficulty, weakness and headaches. Hematological: Negative for adenopathy. Psychiatric/Behavioral: Positive for sleep disturbance. Negative for behavioral problems, confusion and suicidal ideas. The patient is not nervous/anxious. Objective:      /72 (Site: Right Upper Arm, Position: Sitting, Cuff Size: Medium Adult)   Pulse 50   Temp 97.9 °F (36.6 °C) (Infrared)   Resp 16   Wt 218 lb 6.4 oz (99.1 kg)   SpO2 96%   BMI 34.21 kg/m²      Physical Exam  Vitals signs reviewed. Constitutional:       General: He is not in acute distress. Appearance: Normal appearance. He is obese. HENT:      Head: Normocephalic and atraumatic. Right Ear: External ear normal.      Left Ear: External ear normal.      Mouth/Throat:      Mouth: Mucous membranes are moist.      Pharynx: Oropharynx is clear. Eyes:      Extraocular Movements: Extraocular movements intact. Conjunctiva/sclera: Conjunctivae normal.      Pupils: Pupils are equal, round, and reactive to light. Neck:      Musculoskeletal: Normal range of motion and neck supple. Vascular: No carotid bruit. Cardiovascular:      Rate and Rhythm: Regular rhythm. Bradycardia present. Heart sounds: Normal heart sounds. Pulmonary:      Effort: Pulmonary effort is normal.      Breath sounds: Normal breath sounds. Abdominal:      General: Bowel sounds are normal. There is no distension. Palpations: Abdomen is soft. Tenderness: There is no abdominal tenderness. Musculoskeletal: Normal range of motion. Right lower leg: No edema. Left lower leg: No edema. Skin:     General: Skin is warm and dry. Capillary Refill: Capillary refill takes less than 2 seconds. Findings: Erythema and rash present. Rash is macular.       Comments: Erythematous macules on both hands   Neurological:      General: No focal deficit present. Mental Status: He is alert and oriented to person, place, and time. Deep Tendon Reflexes: Reflexes normal.   Psychiatric:         Mood and Affect: Mood normal.         Behavior: Behavior normal.            Assessment / Plan:      1. Insomnia, unspecified type  Sleep hygiene discussed. Will start Trazodone prn for sleep. - traZODone (DESYREL) 50 MG tablet; Take 1 tablet by mouth nightly  Dispense: 30 tablet; Refill: 0    2. Dermatitis  Will treat with topical steroids. Return for new or worsening symptoms. - hydrocortisone 2.5 % cream; Apply topically 2 times daily for 7 days Apply topically 2 times daily. Dispense: 1 Tube; Refill: 0    3. Essential hypertension  Well controlled, refills sent to patient's pharmacy  - metoprolol succinate (TOPROL XL) 100 MG extended release tablet; Take 1 tablet by mouth daily  Dispense: 90 tablet;  Refill: 1          JANI Rowan - CNP

## 2021-01-14 DIAGNOSIS — I10 ESSENTIAL HYPERTENSION: Chronic | ICD-10-CM

## 2021-01-14 DIAGNOSIS — F33.41 RECURRENT MAJOR DEPRESSIVE DISORDER, IN PARTIAL REMISSION (HCC): ICD-10-CM

## 2021-01-14 RX ORDER — AMLODIPINE BESYLATE AND BENAZEPRIL HYDROCHLORIDE 5; 10 MG/1; MG/1
1 CAPSULE ORAL DAILY
Qty: 90 CAPSULE | Refills: 3 | Status: SHIPPED | OUTPATIENT
Start: 2021-01-14 | End: 2021-01-21 | Stop reason: SDUPTHER

## 2021-01-18 RX ORDER — ESCITALOPRAM OXALATE 20 MG/1
TABLET ORAL
Qty: 90 TABLET | Refills: 1 | Status: SHIPPED | OUTPATIENT
Start: 2021-01-18 | End: 2021-05-27

## 2021-01-20 DIAGNOSIS — I10 ESSENTIAL HYPERTENSION: Chronic | ICD-10-CM

## 2021-01-21 RX ORDER — AMLODIPINE BESYLATE AND BENAZEPRIL HYDROCHLORIDE 5; 10 MG/1; MG/1
1 CAPSULE ORAL DAILY
Qty: 90 CAPSULE | Refills: 3 | Status: SHIPPED | OUTPATIENT
Start: 2021-01-21 | End: 2022-01-14 | Stop reason: SDUPTHER

## 2021-01-28 ENCOUNTER — OFFICE VISIT (OUTPATIENT)
Dept: FAMILY MEDICINE CLINIC | Age: 76
End: 2021-01-28
Payer: MEDICARE

## 2021-01-28 VITALS
RESPIRATION RATE: 16 BRPM | WEIGHT: 221.4 LBS | BODY MASS INDEX: 34.68 KG/M2 | OXYGEN SATURATION: 96 % | DIASTOLIC BLOOD PRESSURE: 86 MMHG | SYSTOLIC BLOOD PRESSURE: 134 MMHG | HEART RATE: 54 BPM | TEMPERATURE: 98 F

## 2021-01-28 DIAGNOSIS — G47.00 INSOMNIA, UNSPECIFIED TYPE: Primary | ICD-10-CM

## 2021-01-28 DIAGNOSIS — G25.81 RESTLESS LEG SYNDROME: ICD-10-CM

## 2021-01-28 PROCEDURE — 4040F PNEUMOC VAC/ADMIN/RCVD: CPT | Performed by: NURSE PRACTITIONER

## 2021-01-28 PROCEDURE — G8484 FLU IMMUNIZE NO ADMIN: HCPCS | Performed by: NURSE PRACTITIONER

## 2021-01-28 PROCEDURE — G8417 CALC BMI ABV UP PARAM F/U: HCPCS | Performed by: NURSE PRACTITIONER

## 2021-01-28 PROCEDURE — 4004F PT TOBACCO SCREEN RCVD TLK: CPT | Performed by: NURSE PRACTITIONER

## 2021-01-28 PROCEDURE — G8427 DOCREV CUR MEDS BY ELIG CLIN: HCPCS | Performed by: NURSE PRACTITIONER

## 2021-01-28 PROCEDURE — 1123F ACP DISCUSS/DSCN MKR DOCD: CPT | Performed by: NURSE PRACTITIONER

## 2021-01-28 PROCEDURE — 3017F COLORECTAL CA SCREEN DOC REV: CPT | Performed by: NURSE PRACTITIONER

## 2021-01-28 PROCEDURE — 99213 OFFICE O/P EST LOW 20 MIN: CPT | Performed by: NURSE PRACTITIONER

## 2021-01-28 RX ORDER — CLONAZEPAM 0.5 MG/1
0.5 TABLET ORAL PRN
Qty: 90 TABLET | Refills: 0 | Status: SHIPPED | OUTPATIENT
Start: 2021-01-28 | End: 2021-04-29 | Stop reason: SDUPTHER

## 2021-01-28 RX ORDER — TRAZODONE HYDROCHLORIDE 50 MG/1
50 TABLET ORAL NIGHTLY
Qty: 30 TABLET | Refills: 2 | Status: SHIPPED | OUTPATIENT
Start: 2021-01-28 | End: 2021-04-29

## 2021-01-28 ASSESSMENT — ENCOUNTER SYMPTOMS
CONSTIPATION: 0
ABDOMINAL PAIN: 0
WHEEZING: 0
COUGH: 0
DIARRHEA: 0
SORE THROAT: 0
CHEST TIGHTNESS: 0
STRIDOR: 0
VOMITING: 0
SHORTNESS OF BREATH: 0
NAUSEA: 0

## 2021-01-28 NOTE — PROGRESS NOTES
Subjective:      Chief Complaint   Patient presents with    Insomnia     Pt here for 3 week follow up and states he is sleeping better. HPI:  Katia Murillo is a 70 y.o. male who presents today for 3 week follow up for insomnia. He is currently taking Trazodone 50 mg nightly and is sleeping well. He is able to fall asleep within 30 minutes of laying down. He denies SE of the medication. Historically prescribed Klonopin 0.5mg nightly for symptoms associated with restless leg syndrome. He does not take it every night - only when symptoms are severe. He is here requesting a refill today. Past Medical History:   Diagnosis Date    Allergic rhinitis     Asthma     Atrial fibrillation (HCC)     CAD (coronary artery disease)     mild LAD    COPD [facility]chronic obstructive pulmonary disease) ([facility] [address]     Depression     Diverticulosis of colon     Family history of early CAD     Father- MI-  age 46; a grandparent- age 46 of MI    Family history of valvular heart disease     Mother- Mitral valve disease    GERD (gastroesophageal reflux disease)     H/O 24 hour EKG monitoring 2001-Predominant rhythm is sinus rhythm. No signif ectopy noted.  H/O echocardiogram 2002, 2001-EF 60%. Normal LVSF. Mild MR. Mild TR-Dr Sen Hall;    H/O echocardiogram 2019    EF 91-87%, Grade I diastolic dysfunction, sclerotic but non stenotic aortic valve, Mild AR, Mild-Mod TR, Normal pulmonary artery pressure, no pericardial effusion     History of diverticulitis of colon     History of Holter monitoring 2018    48 hr holter - SR with PAF    Hx of cardiac cath 2013    Mild LAD disease noted.  Hx of cardiovascular stress test 2018    EF 33% Pt in a fib with RVR. No infarct or ischemia. Decreased uptake inferiorly due to diaphragmatic artifact.  HX OTHER MEDICAL 13    14 DAY EVENT: APC's, short runs of sinus tachycardia.      Hyperlipidemia     Hypertension     Hypoxemia 2018    Irregular heart rate 2018    Palpitations     Pneumonia     Restless leg syndrome     Shortness of breath 2018    Tricuspid valve regurgitation 2001    mild        Social History     Tobacco Use    Smoking status: Former Smoker     Packs/day: 1.00     Years: 20.00     Pack years: 20.00     Types: Cigarettes     Quit date: 1993     Years since quittin.6    Smokeless tobacco: Current User     Types: Chew    Tobacco comment: Reviewed    Substance Use Topics    Alcohol use: No     Comment: drinking \"1 cup decaff coffee\", also drinks decaff tea \"drink it all day long\"        Review of Systems   Constitutional: Negative for activity change, appetite change, chills, fatigue, fever and unexpected weight change. HENT: Negative for congestion, ear pain, hearing loss, sore throat and tinnitus. Eyes: Negative for visual disturbance. Respiratory: Negative for cough, chest tightness, shortness of breath, wheezing and stridor. Cardiovascular: Negative for chest pain, palpitations and leg swelling. Gastrointestinal: Negative for abdominal pain, constipation, diarrhea, nausea and vomiting. Musculoskeletal: Negative for arthralgias and gait problem. Skin: Negative for rash. Neurological: Negative for dizziness, tremors, seizures, syncope, speech difficulty, weakness and headaches. Hematological: Negative for adenopathy. Psychiatric/Behavioral: Positive for sleep disturbance. Negative for behavioral problems, confusion and suicidal ideas. The patient is not nervous/anxious. Objective:      /86 (Site: Right Upper Arm, Position: Sitting, Cuff Size: Medium Adult)   Pulse 54   Temp 98 °F (36.7 °C) (Infrared)   Resp 16   Wt 221 lb 6.4 oz (100.4 kg)   SpO2 96%   BMI 34.68 kg/m²      Physical Exam  Vitals signs reviewed. Constitutional:       General: He is not in acute distress. Appearance: Normal appearance.  He is obese.   HENT:      Head: Normocephalic and atraumatic. Right Ear: External ear normal.      Left Ear: External ear normal.      Mouth/Throat:      Mouth: Mucous membranes are moist.      Pharynx: Oropharynx is clear. Eyes:      Extraocular Movements: Extraocular movements intact. Conjunctiva/sclera: Conjunctivae normal.      Pupils: Pupils are equal, round, and reactive to light. Neck:      Musculoskeletal: Normal range of motion and neck supple. Vascular: No carotid bruit. Cardiovascular:      Rate and Rhythm: Regular rhythm. Bradycardia present. Heart sounds: Normal heart sounds. Pulmonary:      Effort: Pulmonary effort is normal.      Breath sounds: Normal breath sounds. Abdominal:      General: Bowel sounds are normal. There is no distension. Palpations: Abdomen is soft. Tenderness: There is no abdominal tenderness. Musculoskeletal: Normal range of motion. Right lower leg: No edema. Left lower leg: No edema. Skin:     General: Skin is warm and dry. Capillary Refill: Capillary refill takes less than 2 seconds. Findings: No erythema or rash. Neurological:      General: No focal deficit present. Mental Status: He is alert and oriented to person, place, and time. Deep Tendon Reflexes: Reflexes normal.   Psychiatric:         Mood and Affect: Mood normal.         Behavior: Behavior normal.            Assessment / Plan:      1. Insomnia, unspecified type  Will continue Trazodone 50 mg at HS for sleep. Patient to call for new or worsening symptoms. - traZODone (DESYREL) 50 MG tablet; Take 1 tablet by mouth nightly  Dispense: 30 tablet; Refill: 2    2. Restless leg syndrome  - clonazePAM (KLONOPIN) 0.5 MG tablet; Take 1 tablet by mouth as needed for Anxiety for up to 90 days. Dispense: 90 tablet;  Refill: 0    Controlled Substance Monitoring:    Acute and Chronic Pain Monitoring:   RX Monitoring 1/28/2021   Attestation -   Periodic

## 2021-01-29 ENCOUNTER — OFFICE VISIT (OUTPATIENT)
Dept: CARDIOLOGY CLINIC | Age: 76
End: 2021-01-29
Payer: MEDICARE

## 2021-01-29 ENCOUNTER — PROCEDURE VISIT (OUTPATIENT)
Dept: CARDIOLOGY CLINIC | Age: 76
End: 2021-01-29
Payer: MEDICARE

## 2021-01-29 VITALS
HEIGHT: 67 IN | RESPIRATION RATE: 12 BRPM | DIASTOLIC BLOOD PRESSURE: 86 MMHG | BODY MASS INDEX: 34.5 KG/M2 | WEIGHT: 219.8 LBS | HEART RATE: 52 BPM | SYSTOLIC BLOOD PRESSURE: 138 MMHG | TEMPERATURE: 99.1 F | OXYGEN SATURATION: 98 %

## 2021-01-29 DIAGNOSIS — I48.20 CHRONIC ATRIAL FIBRILLATION (HCC): ICD-10-CM

## 2021-01-29 DIAGNOSIS — I10 ESSENTIAL HYPERTENSION: Chronic | ICD-10-CM

## 2021-01-29 DIAGNOSIS — Z45.09 ENCOUNTER FOR ELECTRONIC ANALYSIS OF REVEAL EVENT RECORDER: ICD-10-CM

## 2021-01-29 DIAGNOSIS — I48.91 ATRIAL FIBRILLATION, UNSPECIFIED TYPE (HCC): Primary | ICD-10-CM

## 2021-01-29 DIAGNOSIS — I48.0 PAF (PAROXYSMAL ATRIAL FIBRILLATION) (HCC): Primary | ICD-10-CM

## 2021-01-29 PROCEDURE — 3017F COLORECTAL CA SCREEN DOC REV: CPT | Performed by: INTERNAL MEDICINE

## 2021-01-29 PROCEDURE — G8427 DOCREV CUR MEDS BY ELIG CLIN: HCPCS | Performed by: INTERNAL MEDICINE

## 2021-01-29 PROCEDURE — 4004F PT TOBACCO SCREEN RCVD TLK: CPT | Performed by: INTERNAL MEDICINE

## 2021-01-29 PROCEDURE — 93000 ELECTROCARDIOGRAM COMPLETE: CPT | Performed by: INTERNAL MEDICINE

## 2021-01-29 PROCEDURE — 1123F ACP DISCUSS/DSCN MKR DOCD: CPT | Performed by: INTERNAL MEDICINE

## 2021-01-29 PROCEDURE — G8484 FLU IMMUNIZE NO ADMIN: HCPCS | Performed by: INTERNAL MEDICINE

## 2021-01-29 PROCEDURE — G8417 CALC BMI ABV UP PARAM F/U: HCPCS | Performed by: INTERNAL MEDICINE

## 2021-01-29 PROCEDURE — 4040F PNEUMOC VAC/ADMIN/RCVD: CPT | Performed by: INTERNAL MEDICINE

## 2021-01-29 PROCEDURE — 99213 OFFICE O/P EST LOW 20 MIN: CPT | Performed by: INTERNAL MEDICINE

## 2021-01-29 NOTE — PROGRESS NOTES
Wears home oxygen at night but not needing it as much now. Patient reports he is doing fine  He went to Lung doctor in [place] and he made him upset because he tried to send him to other doctor for ablation  He told him that tikosyn was not good  Patient reports he has no problems with medication and had no atrial fibrillation  Patient did not like his approach            Previous visit:(1/11/2019)      Chief Complaint   Patient presents with    Atrial Fibrillation     Dr Radha Akhtar patient here for 6 month follow up. Patient states he has felt real good since last office visit. Deneis CP, palpitations, dizziness. Does report mild swelling in both lower legs, but not as bad as has been in the past. Also reports having to use his oxygen this past week due to SOB. He states he uses 2L O2 as needed. Also reports problems sleeping recently. Patient did not bring medications or list but states no changes. Denies alcohol, does drink decaff tea. Previous visit:(6/20/2018)      Chief Complaint   Patient presents with    Atrial Fibrillation     Ak-s/p loop 5/17. Patient denies CP, palpitations, or edema. Does report 2 episodes of dizziness and SOB since having the loop inserted. He does report a history of COPD. Patient states most recent episode was about a week ago. 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 Patient did not bring med list but states no changes. He has seen a new pulmonoligist in [place] and was recently started on Clarithromycin and doxycycline, not sure why but did have pneumonia in Feb. Patient denies any fever.  He said his blood work was done and then it was decided to be placed on antibiotics           Previous visit:    Patient is a Mollie [place] old male with hx COPD, Afib with recent admission in hospital. Patient here for education of atrial fibrillation and management  He was placed on tikosyn in the hospital. Patient denies chest pain, shortness of breath. Denies fever or chills. Feeling better since discharge from hospital        Past medical history:   Past Medical History:   Diagnosis Date    Allergic rhinitis     Asthma     [facility] fibrillation ([facility] [address]     CAD (coronary artery disease)     mild LAD    COPD (chronic obstructive pulmonary disease) ([facility] [address]     Depression     Diverticulosis of colon     Family history of early CAD     Father- MI-  age 46; a grandparent- age 46 of MI    Family history of valvular heart disease     Mother- Mitral valve disease    GERD (gastroesophageal reflux disease)     H/O 24 hour EKG monitoring 2001-Predominant rhythm is sinus rhythm. No signif ectopy noted.  H/O echocardiogram 2002, 2001-EF 60%. Normal LVSF. Mild MR. Mild TR-Dr Kate David;    H/O echocardiogram 2019    EF 01-43%, Grade I diastolic dysfunction, sclerotic but non stenotic aortic valve, Mild AR, Mild-Mod TR, Normal pulmonary artery pressure, no pericardial effusion     History of diverticulitis of colon     History of Holter monitoring 2018    48 hr holter - SR with PAF    Hx of cardiac cath 2013    Mild LAD disease noted.  Hx of cardiovascular stress test 2018    EF 33% Pt in a fib with RVR. No infarct or ischemia. Decreased uptake inferiorly due to diaphragmatic artifact.  HX OTHER MEDICAL 13    14 DAY EVENT: APC's, short runs of sinus tachycardia.      Hyperlipidemia     Hypertension     Hypoxemia 2018    Irregular heart rate 2018    Palpitations     Pneumonia     Restless leg syndrome     Shortness of breath 2018    Tricuspid valve regurgitation 2001    mild       Surgical history :   Past Surgical History:   Procedure Laterality Date    BONE RESECTION, RIB      thoracic    COLONOSCOPY  10/14/14    polyp, divertics,    DIAGNOSTIC CARDIAC CATH LAB PROCEDURE  13    EF 55%, Mild LAD Disease    ENDOSCOPY, COLON, DIAGNOSTIC  10/14/14    normal    NERVE SURGERY      ulnar nerver transfer    ROTATOR CUFF REPAIR  2007, 2006    2006-right rotator cuff; 2007-Left Rotator cuff    ROTATOR CUFF REPAIR Left 11/18/2015    Dr. Wells Sites ARTHROSCOPY Right 12/2010       Family history:   Family History   Problem Relation Age of Onset    Heart Disease Mother         Mitral valve disease    Heart Attack Mother 47    Coronary Art Dis Father         MI    Heart Disease Father     Heart Attack Father 46    Heart Attack Brother 50    Heart Attack Paternal Grandfather 48       Social history :  reports that he quit smoking about 27 years ago. His smoking use included cigarettes. He has a 20.00 pack-year smoking history. His smokeless tobacco use includes chew. He reports that he does not drink alcohol or use drugs. No Known Allergies    Current Outpatient Medications on File Prior to Visit   Medication Sig Dispense Refill    clonazePAM (KLONOPIN) 0.5 MG tablet Take 1 tablet by mouth as needed for Anxiety for up to 90 days. 90 tablet 0    traZODone (DESYREL) 50 MG tablet Take 1 tablet by mouth nightly 30 tablet 2    amLODIPine-benazepril (LOTREL) 5-10 MG per capsule Take 1 capsule by mouth daily 90 capsule 3    escitalopram (LEXAPRO) 20 MG tablet TAKE 1 TABLET DAILY 90 tablet 1    metoprolol succinate (TOPROL XL) 100 MG extended release tablet Take 1 tablet by mouth daily 90 tablet 1    montelukast (SINGULAIR) 10 MG tablet Take 1 tablet by mouth nightly 90 tablet 3    apixaban (ELIQUIS) 5 MG TABS tablet Take 1 tablet by mouth 2 times daily 180 tablet 3    ondansetron (ZOFRAN) 4 MG tablet Take 1 tablet by mouth every 8 hours as needed for Nausea or Vomiting 8 tablet 0    omeprazole (PRILOSEC) 40 MG delayed release capsule Take 1 capsule by mouth daily Do not crush or break.  90 capsule 3    budesonide-formoterol (SYMBICORT) 160-4.5 MCG/ACT AERO Inhale 2 puffs into the lungs 2 times daily 1 Inhaler 5    albuterol sulfate HFA (VENTOLIN HFA) 108 (90 Base) MCG/ACT inhaler Inhale 2 puffs into the lungs 4 times daily as needed for Wheezing or Shortness of Breath 3 Inhaler 1    dofetilide (TIKOSYN) 125 MCG capsule Take 1 capsule by mouth 2 times daily 180 capsule 3    naproxen (NAPROSYN) 375 MG tablet Take 1 tablet by mouth 2 times daily as needed for Pain 20 tablet 0    Misc. Devices ([employer] CANE) MISC 1 Device by Does not apply route as needed (for ambulation) 1 each 0    atorvastatin (LIPITOR) 10 MG tablet Take 1 tablet by mouth daily 90 tablet 3    UNABLE TO FIND Compounded betaval cream 15g/velvachol 135g 1 Can 5    sildenafil (VIAGRA) 100 MG tablet Take 1 tablet by mouth as needed for Erectile Dysfunction 6 tablet 3    aspirin 81 MG EC tablet Take 81 mg by mouth daily.  Cyanocobalamin (B-12) 500 MCG TABS Take 1 tablet by mouth daily       B Complex Vitamins (B COMPLEX 1 PO) Take 1 tablet by mouth daily.  folic acid (FOLVITE) 197 MCG tablet Take 400 mcg by mouth daily. No current facility-administered medications on file prior to visit. Review of Systems:   Review of Systems   Constitutional: . Negative for activity change, chills and fever. HENT: Negative for congestion, ear pain and tinnitus. Eyes: Negative for photophobia, pain and visual disturbance. Respiratory: shortness of breath with exertion only. He is sending back oxygen also as he does not need any more per his lung doctor. Negative for cough, chest tightness and wheezing. Cardiovascular: Negative for chest pain, palpitations and leg swelling. Gastrointestinal: Negative for abdominal pain, blood in stool, constipation, diarrhea, nausea and vomiting. Endocrine: Negative for cold intolerance and heat intolerance. Genitourinary: Negative for dysuria, flank pain and hematuria. Musculoskeletal: Positive for arthralgias. Negative for back pain, myalgias and neck stiffness.    Skin: Negative for color change and rash. Allergic/Immunologic: Negative for food allergies. Neurological: Negative for dizziness, light-headedness, numbness and headaches. Hematological: Does not bruise/bleed easily. Psychiatric/Behavioral: Negative for agitation, behavioral problems and confusion. Physical Examination:        /86   Pulse 52   Temp 99.1 °F (37.3 °C)   Resp 12   Ht 5' 7\" (1.702 m)   Wt 219 lb 12.8 oz (99.7 kg)   SpO2 98%   BMI 34.43 kg/m²    Wt Readings from Last 3 Encounters:   01/29/21 219 lb 12.8 oz (99.7 kg)   01/28/21 221 lb 6.4 oz (100.4 kg)   01/08/21 218 lb 6.4 oz (99.1 kg)     Body mass index is 34.43 kg/m². Physical Exam   Constitutional: He is oriented to person, place, and time and well-developed, well-nourished, and in no distress. HENT:   Head: Normocephalic and atraumatic. Eyes: Conjunctivae and EOM are normal. Pupils are equal, round, and reactive to light. Right eye exhibits no discharge. Neck: Normal range of motion. No JVD present. No thyromegaly present. Cardiovascular: BRADYCARDIA, regular rhythm and normal heart sounds. Exam reveals no friction rub. No murmur heard. Pulmonary/Chest: Effort normal and breath sounds normal. No stridor. No respiratory distress. He has no wheezes. Abdominal: Soft. Bowel sounds are normal. He exhibits no distension. There is no tenderness. Musculoskeletal: Normal range of motion. He exhibits no edema or tenderness. Neurological: He is alert and oriented to person, place, and time. No cranial nerve deficit. Skin: Skin is warm and dry. No rash noted. No erythema.    Psychiatric: Mood and affect normal.       CBC:   Lab Results   Component Value Date    WBC 6.8 10/17/2020    HGB 13.7 10/17/2020    HCT 42.5 10/17/2020     10/17/2020     Lipids:   Lab Results   Component Value Date    CHOL 132 02/20/2018    TRIG 50 02/20/2018    HDL 54 02/20/2018    LDLCALC 68 02/20/2018     PT/INR:   Lab Results   Component Value Date if you have any questions. With best regards.       Marcy Gutierrez MD

## 2021-01-29 NOTE — PROGRESS NOTES
[id number] Score for Atrial Fibrillation Stroke Risk   Risk   Factors  Component Value   C CHF Yes 1   H HTN Yes 1   A2 Age >= 75 No,  (80 y.o.) 0   D DM No 0   S2 Prior Stroke/TIA No 0   V Vascular Disease No 0   A Age 74-69 Yes,  (80 y.o.) 1   Sc Sex male 0    [id number]  Score  3   Score last updated 1/29/21 8:09 AM EST

## 2021-02-01 PROCEDURE — 93298 REM INTERROG DEV EVAL SCRMS: CPT | Performed by: INTERNAL MEDICINE

## 2021-02-01 PROCEDURE — G2066 INTER DEVC REMOTE 30D: HCPCS | Performed by: INTERNAL MEDICINE

## 2021-02-03 ENCOUNTER — PROCEDURE VISIT (OUTPATIENT)
Dept: CARDIOLOGY CLINIC | Age: 76
End: 2021-02-03
Payer: MEDICARE

## 2021-02-03 DIAGNOSIS — I48.91 ATRIAL FIBRILLATION, UNSPECIFIED TYPE (HCC): Primary | ICD-10-CM

## 2021-02-03 DIAGNOSIS — Z45.09 ENCOUNTER FOR ELECTRONIC ANALYSIS OF REVEAL EVENT RECORDER: ICD-10-CM

## 2021-03-01 DIAGNOSIS — R10.13 DYSPEPSIA: ICD-10-CM

## 2021-03-01 RX ORDER — OMEPRAZOLE 40 MG/1
40 CAPSULE, DELAYED RELEASE ORAL DAILY
Qty: 90 CAPSULE | Refills: 3 | Status: SHIPPED | OUTPATIENT
Start: 2021-03-01 | End: 2021-11-16

## 2021-03-16 ENCOUNTER — TELEPHONE (OUTPATIENT)
Dept: CARDIOLOGY CLINIC | Age: 76
End: 2021-03-16

## 2021-03-17 NOTE — TELEPHONE ENCOUNTER
Spoke with patient. Per Kushal Avalos note from 1/29/21, patient has history of pauses seen on loop. However, he usually does not have sx. Patient states he has been feeling run down and tired a lot for the last few weeks. He can not recall a specific time of passing out of anything like that. He does not recall what he was doing at [phone] on 2/28. Scheduled patient in for OV with LENA Rodarte on a day Kushal Avalos is in the office also. Patient voiced understanding.

## 2021-03-18 PROCEDURE — G2066 INTER DEVC REMOTE 30D: HCPCS | Performed by: INTERNAL MEDICINE

## 2021-03-18 PROCEDURE — 93298 REM INTERROG DEV EVAL SCRMS: CPT | Performed by: INTERNAL MEDICINE

## 2021-03-19 ENCOUNTER — OFFICE VISIT (OUTPATIENT)
Dept: CARDIOLOGY CLINIC | Age: 76
End: 2021-03-19
Payer: MEDICARE

## 2021-03-19 ENCOUNTER — PROCEDURE VISIT (OUTPATIENT)
Dept: CARDIOLOGY CLINIC | Age: 76
End: 2021-03-19
Payer: MEDICARE

## 2021-03-19 VITALS
HEART RATE: 53 BPM | BODY MASS INDEX: 34.84 KG/M2 | TEMPERATURE: 97 F | SYSTOLIC BLOOD PRESSURE: 142 MMHG | WEIGHT: 222 LBS | HEIGHT: 67 IN | DIASTOLIC BLOOD PRESSURE: 82 MMHG

## 2021-03-19 DIAGNOSIS — Z45.09 ENCOUNTER FOR ELECTRONIC ANALYSIS OF REVEAL EVENT RECORDER: ICD-10-CM

## 2021-03-19 DIAGNOSIS — I49.5 SSS (SICK SINUS SYNDROME) (HCC): Primary | ICD-10-CM

## 2021-03-19 DIAGNOSIS — I48.19 PERSISTENT ATRIAL FIBRILLATION (HCC): ICD-10-CM

## 2021-03-19 DIAGNOSIS — Z79.899 VISIT FOR MONITORING TIKOSYN THERAPY: ICD-10-CM

## 2021-03-19 DIAGNOSIS — R00.1 SYMPTOMATIC BRADYCARDIA: ICD-10-CM

## 2021-03-19 DIAGNOSIS — I48.91 ATRIAL FIBRILLATION, UNSPECIFIED TYPE (HCC): Primary | ICD-10-CM

## 2021-03-19 DIAGNOSIS — Z51.81 VISIT FOR MONITORING TIKOSYN THERAPY: ICD-10-CM

## 2021-03-19 DIAGNOSIS — I45.5 SINUS PAUSE: ICD-10-CM

## 2021-03-19 PROCEDURE — G8417 CALC BMI ABV UP PARAM F/U: HCPCS | Performed by: NURSE PRACTITIONER

## 2021-03-19 PROCEDURE — 4004F PT TOBACCO SCREEN RCVD TLK: CPT | Performed by: NURSE PRACTITIONER

## 2021-03-19 PROCEDURE — 1123F ACP DISCUSS/DSCN MKR DOCD: CPT | Performed by: NURSE PRACTITIONER

## 2021-03-19 PROCEDURE — 99214 OFFICE O/P EST MOD 30 MIN: CPT | Performed by: NURSE PRACTITIONER

## 2021-03-19 PROCEDURE — 93000 ELECTROCARDIOGRAM COMPLETE: CPT | Performed by: NURSE PRACTITIONER

## 2021-03-19 PROCEDURE — G8484 FLU IMMUNIZE NO ADMIN: HCPCS | Performed by: NURSE PRACTITIONER

## 2021-03-19 PROCEDURE — 4040F PNEUMOC VAC/ADMIN/RCVD: CPT | Performed by: NURSE PRACTITIONER

## 2021-03-19 PROCEDURE — 3017F COLORECTAL CA SCREEN DOC REV: CPT | Performed by: NURSE PRACTITIONER

## 2021-03-19 PROCEDURE — G8427 DOCREV CUR MEDS BY ELIG CLIN: HCPCS | Performed by: NURSE PRACTITIONER

## 2021-03-19 RX ORDER — DOFETILIDE 0.12 MG/1
125 CAPSULE ORAL 2 TIMES DAILY
Qty: 180 CAPSULE | Refills: 3 | Status: CANCELLED | OUTPATIENT
Start: 2021-03-19

## 2021-03-19 NOTE — LETTER
Kristen Ashley     PROCEDURE: Dual Chamber Pacemaker Implant    Date of Procedure: 21 Time: 6658 Arrival Time: 9130    Patient Name: Salome Martel  : 1945  MRN# [id number]      HOSPITAL: [facility])    Call to [place] at [phone]  2 days before your procedure      X Please have blood work and chest-x-ray done 3/29/21 at [facility], [address] [place] [address] or [facility]  X Please have COVID-19 Test done on 3/29/21 at 0830 at the [place] [address]. You will need to Quarantine yourself until procedure. X Please do not have anything by mouth after midnight prior to or 8 hours before the procedure. X You may take your medications with a sip of water in the morning before your procedure or take them with you unless listed below. X  ANTICOAGULATION::Hold Eliquis evening dose the night before the procedure and the morning dose of the procedure. IMPORTANT NOTICE TO PATIENTS: Prior Authorization  We will contact your insurance for prior authorization for the CPT code related to the procedure it is the patient's responsibility to contact their insurance to ensure they are following their medical plans provisions or requirements! Patient Signature: _________________________________    Staff: Allyson Cheng   Staff Given Instructions:___________________________                          Kristen Clearyaria: Dual Chamber Pacemaker Implant    Date of Procedure: 21 Time: 6418 SYPASJD Time: 84    Patient Name: Salome Martel  : 1945  MRN# [id number]    Day of Procedure Cath Lab Holding area Preop Orders:    X  ANTICOAGULATION:: ANTICOAGULATION::Hold Eliquis evening dose the night before the procedure and the morning dose of the procedure. ? YOU HAVE MY PERMISSION TO TALK TO THE PATIENT-PLEASE  ? IV peripheral saline lock (preferred left arm.  if right sided implant, right arm). ? Second IV site Right arm (saline lock)  ? Type & Screen STAT on arrival.  ? If taking Coumadin, PT INR  STAT on arrival day of procedure. ? Female patients <=48years of age >> urine HCG test.   ? Diabetic patients >> Accu check Blood sugar check. ? Document home medications in EPIC and include date and time of last dose.   ? NPO.  ? If allergy to contrast >> pre treat for contrast allergy with Benadryl 25 mg IV, Solucortef 100 mg IV and Pepcid 20 mg IV 30 mins pre procedure. ? Notify Dr. Dejah Veras of abnormal lab results. ? Chest Prep> Clip hair anterior chest.      Physician Signature:__________________Date:__________Time:_________                                         [facility] ([facility]) Informed Consent for Anesthesia/Sedation, Surgery, Invasive Procedures, and other High-risk Interventions and Medication use      *This consent is applicable for 30 days following patient signature*    Procedure(s)   IHerlinda authorize, Dr. Jagdish Patel    and the associate(s) or assistant(s) of his/her choice, to perform the following procedure(s): Dual Chamber Pacemaker Implant    I know that unexpected conditions may require additional or different procedures than those above. I authorize the above named practitioner(s) perform these as necessary and desirable. This is based on the practitioners professional judgment. The above named practitioner has discussed the above procedure(s) with me, including:   Potential benefits, including likelihood of success of the procedure(s) goals   Risks   Side effects, risk of death, and risk of infection   Any potential problems that might occur during recuperation or healing post-procedure   Reasonable alternatives   Risks of NOT performing the procedure(s)    I acknowledge that no warranty or guarantee has been made to the results the procedure(s).      I consent to the above named practitioner(s) providing additional services to me as deemed reasonable and necessary, including but not limited to:     Use of medications for anesthesia or sedation.  All anesthesia and sedation carry risks. My practitioner has discussed my anticipated anesthesia and/or sedation and the risks of using, risk of not using, benefits, side effects, and alternatives.  Use of pathology  - I authorize [facility]) to dispose of tissues, specimens or organs when pathology is complete.  Use of radiology  - A contrast agent may be required for radiology procedures. My practitioner has advised me of the risks of using, risks of not using, benefits, side effects, and alternatives.  Observers or use of photography, video/audio recording, or televising of the procedure(s). This is for medical, scientific, or educational purposes. This includes appropriate portions of my body. My identity will not be revealed.  I consent to release of my social security number and other identifying information to [employer] (FDA), and the supplier/, if I receive tissue, a device, or implant. This is to track the tissue, device, or implant for defect, recall, infection, etc.      Use of blood and/or blood products, if needed, through my hospital stay. My practitioner has advised me of the risks of using, risks of not using, benefits, side effects, and alternatives. ___ I do NOT want Blood or Blood products given. (Complete separate  refusal form)    Code Status (jeevan one):  ___ I do NOT HAVE a DNR order. I am a Full-code.   I will receive CPR, intubation,  chest compressions, medications, and/or other life saving measures if I have a  cardiac or respiratory arrest.    ___ I have a Do Not Resuscitate (DNR)order.   (jeevan one below)  ___  I rescind my DNR for surgery and immediate post-operative period through Phase 2 recovery.    This means, for that time period, I will be a Full-code and receive CPR, intubation, chest compressions, medications, and/or other life saving measures, if I have a cardiac or respiratory arrest.    ___ I WANT to keep my DNR in effect during my procedure(s) and immediate post-operative recovery period through Phase 2 recovery. (Complete separate refusal form)     This form has been fully explained to me. I understand its contents. Patients Signature: ___________________________Date: ________  Time: ________    If patient unable to sign, has engaged the [address] [place] [address], is a minor, or has a court-appointed Guardian:  [facility] Representative Name (Print):  ____________________________________      Relationship ([place] one):    Guardian   Parent    Spouse    HCPOA   Child   Sibling  Next-of-Kin Friend    Patients Representative Signature: _______________________________________              Date: ______________  Time: __________    An  was used.  name/ID: _________________________________      [facility] ([facility] Witness________________________  Date: ________   Time: _________    Physician/Practitioner _______________________  Date: ________   Time: _________           Revision 2017    [facility]     Dr. Olivia Mckee     PATIENT NAME:    Geo Gauthier                               :   1945  PROCEDURE: Dual Chamber Pacemaker Implant    DATE OF PROCEDURE: 21    DIAGNOSIS:   Z01.810, Z79.0    X   MAG    X   PHOS       X   BMP             X   CBC     X   PT          X   PTT X   Chest x-Ray PA & Lateral View                  ? PLEASE CALL ABNORMAL RESULTS TO THE REQUESTING PHYSICIAN? ATTENTION PATIENTS:  You do not have to fast for the lab work.  You must go to the [facility], [address] [place] [address] or [facility]      Physician Signature:__________________Date:__________Time:__________                                        [facility]     Dr. Olivia Mckee       PROCEDURE TO SCHEDULE:    PROCEDURE: Dual Chamber Pacemaker Implant    Patient Name: Rolando Reece   : 1945  MRN# [id number]    Home Phone Number: [phone]   Weight:    Wt Readings from Last 3 Encounters:   21 222 lb (100.7 kg)   21 219 lb 12.8 oz (99.7 kg)   21 221 lb 6.4 oz (100.4 kg)        Insurance: Payor: Tamia Lloyd / Plan: [facility] [employer] / Product Type: *No Product type* /     Date of Procedure: 21 Time: 6260 Arrival Time: 1030    Diagnosis:  SSS I49.5  Allergies: No Known Allergies     o Call [facility] scheduling ([phone]) or Instant Message  o CONFIRMED WITH:__________________________PHONE      OR INSTANT MESSAGE    o PREAUTHORIZATION NUMBER:_________________ Spoke to:____________________  o From date:_________________ expiration date:____________________                    Savi Abdi                                    PATIENT INFORMATION ON PACEMAKER, ICD OR REVEAL LINQ    You will receive a monitor in the mail or at implant to do home checks on your device. You will have a site check 10 days after implant and then a 1 month office visit with device check. You will then receive a schedule in the mail to do your home checks with your monitor. These checks are scheduled every 3 months. The checks are scheduled on a  and can be done at any time during the day. Your insurance is billed for these checks. You will have 2 charges. One will be for the remote check and the other is for the doctor reading the report. If you have a Reveal Linq Recorder, your insurance will be billed every 45 days for the report. This also has 2 charges, one for the remote check and   one for the doctor reading the report. This office has no idea what each patients insurance will pay for these charges as everyone has different insurance companies and different deductibles to meet. Please feel free to check with your insurance company concerning your out of pocket expense.      If you have these checks done at the office, you will still have the charges for the check and one for the doctor reading the report. If any other questions concerning the devices or how the checks will work. Please call [phone] ask for Milagro Rebolledo.

## 2021-03-19 NOTE — LETTER
Lewis Rome  1945  [id number]    Have you had any Chest Pain that is not new? - Yes  If Yes DO EKG - How does it feel - Dull Ache   How long does the pain last - 2 minutes      Have you had any Shortness of Breath - Yes  If Yes - When on exertion    Have you had any dizziness - No    Have you had any palpitations that are not new?  - No    Do you have any edema - swelling in No      Do you have a surgery or procedure scheduled in the near future - No

## 2021-03-22 NOTE — PROGRESS NOTES
Electrophysiology FU Note        Chief complaint: follow up on pause noted on loop      Primary care physician: JANI Turner CNP      History of Present Illness: This visit 3/22/2021  Patient is here today for follow up appointment pause noted on loop recorder. Patient reports that he has been fatigued. He states that the fatigue is worsening. He finds himself napping during the day and this is unusual for him. He denies chest pain, shortness of breath, palpitations, lightheadedness, dizziness, or syncope. Previous visit (1/29/2021)      Chief Complaint   Patient presents with    6 Month Follow-Up     Pt here for 6 Month F/U. Pt denies Chest Pain, Palpitations, Dizziness, Edema. Pt does chew tobacco and does not drink alcohol.  Atrial Fibrillation     Pt states he gets SOB when doing something exerting himself. Previous visit:(7/24/2020)      Chief Complaint   Patient presents with    6 Month Follow-Up     Patient being seen for 6 month, he denies any chest pain,sob,dizziness, palpitations, and edema. Patient reports that he overall feels better and has not have an episode of dizziness or past syncope on July 2 that he remembers but he was off Tikosyn for couple of days in between as he ran out. Overall he is doing good and is taking precautions in this COVID time      Previous visit:(1/17/2019)      Chief Complaint   Patient presents with    Atrial Fibrillation     Dr Ritu Pack patient here for 6 month follow up. Patient states Raul Armendariz has felt so good - it is scary that some thing will go wrong \" . Denies any symptoms. Patient denies alcohol. Patient reports drinking about 4 cups coffee through out daily. Will also drink decaff iced tea. Patient reports that he is sending back the oxygen also as he does not need it any more per his lung doctor. No shortness of breath.  Fatigue but much better than before           Previous visit: (7/12/2019)      Chief Complaint   Patient presents with    Follow-up     Patient here today for 6 months follow up visit. States feels good and has lost 40 lbs since his last visit. Patient denies palpitations, chest pain, shortness of breath or edema. Reports some \"slight\" dizziness. Denies syncope.  Other     Patient sees Pulmonologist, Dr. Eric Fraga in [place]. Wears home oxygen at night but not needing it as much now. Patient reports he is doing fine  He went to Lung doctor in [place] and he made him upset because he tried to send him to other doctor for ablation  He told him that tikosyn was not good  Patient reports he has no problems with medication and had no atrial fibrillation  Patient did not like his approach            Previous visit:(1/11/2019)      Chief Complaint   Patient presents with    Atrial Fibrillation     Dr Perfecto Pedroza patient here for 6 month follow up. Patient states he has felt real good since last office visit. Deneis CP, palpitations, dizziness. Does report mild swelling in both lower legs, but not as bad as has been in the past. Also reports having to use his oxygen this past week due to SOB. He states he uses 2L O2 as needed. Also reports problems sleeping recently. Patient did not bring medications or list but states no changes. Denies alcohol, does drink decaff tea. Previous visit:(6/20/2018)      Chief Complaint   Patient presents with    Atrial Fibrillation     Ak-s/p loop 5/17. Patient denies CP, palpitations, or edema. Does report 2 episodes of dizziness and SOB since having the loop inserted. He does report a history of COPD. Patient states most recent episode was about a week ago. 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 Patient did not bring med list but states no changes.  He has seen a new pulmonoligist in [place] and was recently started on Clarithromycin and doxycycline, not Pneumonia     Restless leg syndrome     Shortness of breath 2/16/2018    Tricuspid valve regurgitation 6/2001    mild       Surgical history :   Past Surgical History:   Procedure Laterality Date    BONE RESECTION, RIB      thoracic    COLONOSCOPY  10/14/14    polyp, divertics,    DIAGNOSTIC CARDIAC CATH LAB PROCEDURE  7/8/13    EF 55%, Mild LAD Disease    ENDOSCOPY, COLON, DIAGNOSTIC  10/14/14    normal    NERVE SURGERY      ulnar nerver transfer   [address]  2007, 2006 2006-right rotator cuff; 2007-Left Rotator cuff    ROTATOR CUFF REPAIR Left 11/18/2015    Dr. Lara Villanueva ARTHROSCOPY Right 12/2010       Family history:   Family History   Problem Relation Age of Onset    Heart Disease Mother         Mitral valve disease    Heart Attack Mother 47    Coronary Art Dis Father         MI    Heart Disease Father     Heart Attack Father 46    Heart Attack Brother 50    Heart Attack Paternal Grandfather 48       Social history :  reports that he quit smoking about 27 years ago. His smoking use included cigarettes. He has a 20.00 pack-year smoking history. His smokeless tobacco use includes chew. He reports that he does not drink alcohol or use drugs. No Known Allergies    Current Outpatient Medications on File Prior to Visit   Medication Sig Dispense Refill    omeprazole (PRILOSEC) 40 MG delayed release capsule Take 1 capsule by mouth daily Do not crush or break. 90 capsule 3    clonazePAM (KLONOPIN) 0.5 MG tablet Take 1 tablet by mouth as needed for Anxiety for up to 90 days.  90 tablet 0    traZODone (DESYREL) 50 MG tablet Take 1 tablet by mouth nightly 30 tablet 2    amLODIPine-benazepril (LOTREL) 5-10 MG per capsule Take 1 capsule by mouth daily 90 capsule 3    escitalopram (LEXAPRO) 20 MG tablet TAKE 1 TABLET DAILY 90 tablet 1    metoprolol succinate (TOPROL XL) 100 MG extended release tablet Take 1 tablet by mouth daily 90 tablet 1    montelukast (SINGULAIR) 10 MG tablet Take 1 tablet by mouth nightly 90 tablet 3    apixaban (ELIQUIS) 5 MG TABS tablet Take 1 tablet by mouth 2 times daily 180 tablet 3    ondansetron (ZOFRAN) 4 MG tablet Take 1 tablet by mouth every 8 hours as needed for Nausea or Vomiting 8 tablet 0    budesonide-formoterol (SYMBICORT) 160-4.5 MCG/ACT AERO Inhale 2 puffs into the lungs 2 times daily 1 Inhaler 5    albuterol sulfate HFA (VENTOLIN HFA) 108 (90 Base) MCG/ACT inhaler Inhale 2 puffs into the lungs 4 times daily as needed for Wheezing or Shortness of Breath 3 Inhaler 1    dofetilide (TIKOSYN) 125 MCG capsule Take 1 capsule by mouth 2 times daily 180 capsule 3    Misc. Devices ([employer] CANE) MISC 1 Device by Does not apply route as needed (for ambulation) 1 each 0    atorvastatin (LIPITOR) 10 MG tablet Take 1 tablet by mouth daily 90 tablet 3    UNABLE TO FIND Compounded betaval cream 15g/velvachol 135g 1 Can 5    sildenafil (VIAGRA) 100 MG tablet Take 1 tablet by mouth as needed for Erectile Dysfunction 6 tablet 3    aspirin 81 MG EC tablet Take 81 mg by mouth daily.  Cyanocobalamin (B-12) 500 MCG TABS Take 1 tablet by mouth daily       B Complex Vitamins (B COMPLEX 1 PO) Take 1 tablet by mouth daily.  folic acid (FOLVITE) 894 MCG tablet Take 400 mcg by mouth daily.  naproxen (NAPROSYN) 375 MG tablet Take 1 tablet by mouth 2 times daily as needed for Pain 20 tablet 0     No current facility-administered medications on file prior to visit. Review of Systems:   Review of Systems   Constitutional: . Positive for activity change, Positive for fatigue. No chills and fever. HENT: Negative for congestion, ear pain and tinnitus. Eyes: Negative for photophobia, pain and visual disturbance. Respiratory: Negative for shortness of breath. Negative for cough, chest tightness and wheezing. Cardiovascular: Negative for chest pain, palpitations and leg swelling.    Gastrointestinal: Negative for abdominal pain, blood in stool, constipation, diarrhea, nausea and vomiting. Endocrine: Negative for cold intolerance and heat intolerance. Genitourinary: Negative for dysuria, flank pain and hematuria. Musculoskeletal: Positive for arthralgias. Negative for back pain, myalgias and neck stiffness. Skin: Negative for color change and rash. Allergic/Immunologic: Negative for food allergies. Neurological: Negative for dizziness, light-headedness, numbness and headaches. Hematological: Does not bruise/bleed easily. Psychiatric/Behavioral: Negative for agitation, behavioral problems and confusion. Physical Examination:    BP (!) 142/82 (Site: Left Upper Arm, Position: Sitting, Cuff Size: Large Adult)   Pulse 53   Temp 97 °F (36.1 °C) (Temporal)   Ht 5' 7\" (1.702 m)   Wt 222 lb (100.7 kg)   BMI 34.77 kg/m²    Wt Readings from Last 3 Encounters:   03/19/21 222 lb (100.7 kg)   01/29/21 219 lb 12.8 oz (99.7 kg)   01/28/21 221 lb 6.4 oz (100.4 kg)     Body mass index is 34.77 kg/m². Physical Exam   Constitutional: He is oriented to person, place, and time and well-developed, well-nourished, and in no distress. HENT:   Head: Normocephalic and atraumatic. Eyes: Conjunctivaeare normal. Pupils are equal, round, and reactive to light. Right and left eye exhibits no discharge. Neck: Normal range of motion. No JVD present. No thyromegaly present. Cardiovascular: Bradycardia. regular rhythm and normal heart sounds. Exam reveals no friction rub. No murmur heard. Pulmonary/Chest: Effort normal and breath sounds normal. No stridor. No respiratory distress. He has no wheezes. Abdominal: Soft. Bowel sounds are normal. He exhibits no distension. There is no tenderness. Musculoskeletal: Normal range of motion. He exhibits no edema or tenderness. Neurological: He is alert and oriented to person, place, and time. No cranial nerve deficit. Skin: Skin is warm and dry. No rash noted. No erythema.    Psychiatric: Mood and affect normal.       CBC:   Lab Results   Component Value Date    WBC 6.8 10/17/2020    HGB 13.7 10/17/2020    HCT 42.5 10/17/2020     10/17/2020     Lipids:   Lab Results   Component Value Date    CHOL 132 02/20/2018    TRIG 50 02/20/2018    HDL 54 02/20/2018    LDLCALC 68 02/20/2018     PT/INR:   Lab Results   Component Value Date    INR 1.34 10/17/2020        BMP:    Lab Results   Component Value Date     10/17/2020    K 3.9 10/17/2020     10/17/2020    CO2 24 10/17/2020    BUN 15 10/17/2020     CMP:   Lab Results   Component Value Date    AST 18 10/17/2020    PROT 6.3 (L) 10/17/2020    BILITOT 0.4 10/17/2020    ALKPHOS 86 10/17/2020     TSH:  No results found for: TSH      EKG -Sinus Bradycardia    IMPRESSION / RECOMMENDATIONS:     SSS  Sinus Pause on loop recorder  Symptomatic Bradycardia  tikosyn monitoring therapy  Persistent atrial fibrillation s/p cardioversion   HTN    Patient complaining of fatigue  EKG reviewed- Sinus bradycardia heart rate 52  Loop recorder interrogated  Noted to have 3 second sinus pause in the setting of Sinus rhythm    QTc on   Patient to continue Tikosyn 125 mcg BID  Also continue eliquis 5 mg BID    Discussed with Dr Scar Weaver  Patient now having symptomatic bradycardia and noted sinus pause of 3 seconds on loop  Recommend dual chamber pacemaker  The risks including, but not limited to, the risks of vascular injury, bleeding, infection, device malfunction, lead dislodgement, radiation exposure, injury to cardiac and surrounding structures (including pneumothorax), stroke, myocardial infarction and death were discussed in detail. The patient opted to proceed with the device implantation. Thanks again for allowing me to participate in care of this patient. Please call me if you have any questions. With best regards.       Susie Duron, APRN - CNP

## 2021-03-29 ENCOUNTER — HOSPITAL ENCOUNTER (OUTPATIENT)
Age: 76
Discharge: HOME | End: 2021-03-29
Payer: MEDICARE

## 2021-03-29 ENCOUNTER — NURSE ONLY (OUTPATIENT)
Dept: CARDIOLOGY CLINIC | Age: 76
End: 2021-03-29
Payer: MEDICARE

## 2021-03-29 ENCOUNTER — HOSPITAL ENCOUNTER (OUTPATIENT)
Age: 76
Setting detail: SPECIMEN
Discharge: HOME | End: 2021-03-29
Payer: MEDICARE

## 2021-03-29 ENCOUNTER — HOSPITAL ENCOUNTER (OUTPATIENT)
Dept: GENERAL RADIOLOGY | Age: 76
Discharge: HOME | End: 2021-03-29
Payer: MEDICARE

## 2021-03-29 VITALS — SYSTOLIC BLOOD PRESSURE: 140 MMHG | DIASTOLIC BLOOD PRESSURE: 80 MMHG | TEMPERATURE: 97.2 F

## 2021-03-29 DIAGNOSIS — I48.20 CHRONIC ATRIAL FIBRILLATION (HCC): ICD-10-CM

## 2021-03-29 DIAGNOSIS — Z01.818 PRE-PROCEDURAL EXAMINATION: ICD-10-CM

## 2021-03-29 LAB
ANION GAP SERPL CALCULATED.3IONS-SCNC: 7 MMOL/L (ref 4–16)
APTT: 32.4 SECONDS (ref 25.1–37.1)
BASOPHILS ABSOLUTE: 0 K/CU MM
BASOPHILS RELATIVE PERCENT: 0.6 % (ref 0–1)
BUN BLDV-MCNC: 14 MG/DL (ref 6–23)
CALCIUM SERPL-MCNC: 8.6 MG/DL (ref 8.3–10.6)
CHLORIDE BLD-SCNC: 102 MMOL/L (ref 99–110)
CO2: 29 MMOL/L (ref 21–32)
CREAT SERPL-MCNC: 0.9 MG/DL (ref 0.9–1.3)
DIFFERENTIAL TYPE: ABNORMAL
EOSINOPHILS ABSOLUTE: 0.2 K/CU MM
EOSINOPHILS RELATIVE PERCENT: 2.7 % (ref 0–3)
GFR AFRICAN AMERICAN: >60 ML/MIN/1.73M2
GFR NON-AFRICAN AMERICAN: >60 ML/MIN/1.73M2
GLUCOSE BLD-MCNC: 98 MG/DL (ref 70–99)
HCT VFR BLD CALC: 41.8 % (ref 42–52)
HEMOGLOBIN: 13.8 GM/DL (ref 13.5–18)
IMMATURE NEUTROPHIL %: 0.5 % (ref 0–0.43)
INR BLD: 1.14 INDEX
LYMPHOCYTES ABSOLUTE: 1 K/CU MM
LYMPHOCYTES RELATIVE PERCENT: 14.4 % (ref 24–44)
MAGNESIUM: 2.1 MG/DL (ref 1.8–2.4)
MCH RBC QN AUTO: 30.7 PG (ref 27–31)
MCHC RBC AUTO-ENTMCNC: 33 % (ref 32–36)
MCV RBC AUTO: 93.1 FL (ref 78–100)
MONOCYTES ABSOLUTE: 0.8 K/CU MM
MONOCYTES RELATIVE PERCENT: 11.3 % (ref 0–4)
NUCLEATED RBC %: 0 %
PDW BLD-RTO: 13.3 % (ref 11.7–14.9)
PHOSPHORUS: 3.2 MG/DL (ref 2.5–4.9)
PLATELET # BLD: 163 K/CU MM (ref 140–440)
PMV BLD AUTO: 10.5 FL (ref 7.5–11.1)
POTASSIUM SERPL-SCNC: 4 MMOL/L (ref 3.5–5.1)
PROTHROMBIN TIME: 13.8 SECONDS (ref 11.7–14.5)
RBC # BLD: 4.49 M/CU MM (ref 4.6–6.2)
SEGMENTED NEUTROPHILS ABSOLUTE COUNT: 4.7 K/CU MM
SEGMENTED NEUTROPHILS RELATIVE PERCENT: 70.5 % (ref 36–66)
SODIUM BLD-SCNC: 138 MMOL/L (ref 135–145)
TOTAL IMMATURE NEUTOROPHIL: 0.03 K/CU MM
TOTAL NUCLEATED RBC: 0 K/CU MM
WBC # BLD: 6.7 K/CU MM (ref 4–10.5)

## 2021-03-29 PROCEDURE — U0003 INFECTIOUS AGENT DETECTION BY NUCLEIC ACID (DNA OR RNA); SEVERE ACUTE RESPIRATORY SYNDROME CORONAVIRUS 2 (SARS-COV-2) (CORONAVIRUS DISEASE [COVID-19]), AMPLIFIED PROBE TECHNIQUE, MAKING USE OF HIGH THROUGHPUT TECHNOLOGIES AS DESCRIBED BY CMS-2020-01-R: HCPCS

## 2021-03-29 PROCEDURE — U0005 INFEC AGEN DETEC AMPLI PROBE: HCPCS

## 2021-03-29 PROCEDURE — 99211 OFF/OP EST MAY X REQ PHY/QHP: CPT | Performed by: INTERNAL MEDICINE

## 2021-03-29 PROCEDURE — 36415 COLL VENOUS BLD VENIPUNCTURE: CPT

## 2021-03-29 PROCEDURE — 84100 ASSAY OF PHOSPHORUS: CPT

## 2021-03-29 PROCEDURE — 85730 THROMBOPLASTIN TIME PARTIAL: CPT

## 2021-03-29 PROCEDURE — 71046 X-RAY EXAM CHEST 2 VIEWS: CPT

## 2021-03-29 PROCEDURE — 85610 PROTHROMBIN TIME: CPT

## 2021-03-29 PROCEDURE — 83735 ASSAY OF MAGNESIUM: CPT

## 2021-03-29 PROCEDURE — 85025 COMPLETE CBC W/AUTO DIFF WBC: CPT

## 2021-03-29 PROCEDURE — 80048 BASIC METABOLIC PNL TOTAL CA: CPT

## 2021-03-30 ENCOUNTER — TELEPHONE (OUTPATIENT)
Dept: CARDIOLOGY CLINIC | Age: 76
End: 2021-03-30

## 2021-03-30 LAB
SARS-COV-2: NOT DETECTED
SOURCE: NORMAL

## 2021-03-30 NOTE — TELEPHONE ENCOUNTER
[facility] faxed denial letter scanned into chart. Requesting for clinical information that this request is medically necessary. Please call [phone], select option #4, and enter in Reference Number [id number] to speak with a clinician about this request.      Procedure scheduled for 4/1/2021 with Dr. Jason Senior for CPT 63637 (Dual PPM) for SSS (I49.5)  Office notes and EKG was sent to [facility] previously. Messaged forward to [facility].

## 2021-03-30 NOTE — TELEPHONE ENCOUNTER
Again called the [facility] clinical request line & after the automated system transferred this call to the clinician line, the same fast busy signal was heard. Will try again before this office closes today.  Called same # with same fast busy signal.

## 2021-03-30 NOTE — TELEPHONE ENCOUNTER
Spoke with patient. Advised that there is an opening in [facility]. procedure scheduled for 4/41 at 1030. Patient agreed to move procedure time from 1230 to 1030.

## 2021-03-30 NOTE — TELEPHONE ENCOUNTER
Called [facility] Clinical Request Line 5 times attempting to discuss pt's PPM insertion procedure denial w/a clinician. However after entering pt's case ref# into their automated phone system & then transfers to that department, a fast busy signal is received. Informed Maximo Dial above & she is attempted to call [facility] to get a hold of a , then will transfer call to this nurse. She was also unsuccessful. This nurse also tried that telephone# [phone]. Spoke to [facility] & gave her the CPT code for pt's procedure dual PPM [id number] for SSS(I49.5). She transferred this call to [facility], which is back to the original # this nurse has been calling w/a fast busy signal. This time the automated system stated that the call did not go through. Informed Nacho Ortega & stated that this nurse will call again later to try to get through.

## 2021-03-31 NOTE — TELEPHONE ENCOUNTER
Spoke w/'s nurse Alonzo Hinojosa this am, updating her that this nurse will call pt's insurance [facility] around noon today for [id number]. Informed her would call her back after speaking to [facility]. Also she stated that Blair Mosley submitted to [facility] a 3-second pause recording that pt had had recently. This nurse called [facility] Clinical Request Line @ [phone] & spoke to Ronda Heaton, intake representative. She transferred this call to the peer reviewer department. Spoke to [facility].concerning pt's Pacemaker insertion that is scheduled for tomorrow, needing a [id number] discussion. She asked when she could schedule w/a NP, PA, or in this office. Informed Alonzo Hinojosa of this & she spoke to Herbert Wheeler. He asked for his NP Gibran Tejada to do. Scheduled her to speak to their peer reviewer Lenny Funk @ 3pm. Printed pt's last OV note & gave to [facility]. Informed her that she was the last one to see this pt in office. She voiced understanding. Also informed James Hatchet above as well. @ 151-Received notification from Génesis DURANthat this pt's pacemaker insertion has been approved coverage per his insurance [facility].

## 2021-04-01 ENCOUNTER — HOSPITAL ENCOUNTER (OUTPATIENT)
Dept: CARDIAC CATH/INVASIVE PROCEDURES | Age: 76
Discharge: HOME | End: 2021-04-02
Attending: INTERNAL MEDICINE | Admitting: INTERNAL MEDICINE
Payer: MEDICARE

## 2021-04-01 ENCOUNTER — APPOINTMENT (OUTPATIENT)
Dept: GENERAL RADIOLOGY | Age: 76
End: 2021-04-01
Attending: INTERNAL MEDICINE
Payer: MEDICARE

## 2021-04-01 DIAGNOSIS — Z95.0 S/P PLACEMENT OF CARDIAC PACEMAKER: Primary | ICD-10-CM

## 2021-04-01 LAB
ABO/RH: NORMAL
ANTIBODY SCREEN: NEGATIVE

## 2021-04-01 PROCEDURE — 2709999900 HC NON-CHARGEABLE SUPPLY

## 2021-04-01 PROCEDURE — 2580000003 HC RX 258

## 2021-04-01 PROCEDURE — C1785 PMKR, DUAL, RATE-RESP: HCPCS

## 2021-04-01 PROCEDURE — 6360000004 HC RX CONTRAST MEDICATION

## 2021-04-01 PROCEDURE — 2580000003 HC RX 258: Performed by: INTERNAL MEDICINE

## 2021-04-01 PROCEDURE — C1898 LEAD, PMKR, OTHER THAN TRANS: HCPCS

## 2021-04-01 PROCEDURE — 86900 BLOOD TYPING SEROLOGIC ABO: CPT

## 2021-04-01 PROCEDURE — 6360000002 HC RX W HCPCS

## 2021-04-01 PROCEDURE — 33208 INSRT HEART PM ATRIAL & VENT: CPT | Performed by: INTERNAL MEDICINE

## 2021-04-01 PROCEDURE — 86850 RBC ANTIBODY SCREEN: CPT

## 2021-04-01 PROCEDURE — 86901 BLOOD TYPING SEROLOGIC RH(D): CPT

## 2021-04-01 PROCEDURE — 2500000003 HC RX 250 WO HCPCS

## 2021-04-01 PROCEDURE — 94640 AIRWAY INHALATION TREATMENT: CPT

## 2021-04-01 PROCEDURE — G0378 HOSPITAL OBSERVATION PER HR: HCPCS

## 2021-04-01 PROCEDURE — 33286 RMVL SUBQ CAR RHYTHM MNTR: CPT

## 2021-04-01 PROCEDURE — 33208 INSRT HEART PM ATRIAL & VENT: CPT

## 2021-04-01 PROCEDURE — 6370000000 HC RX 637 (ALT 250 FOR IP): Performed by: INTERNAL MEDICINE

## 2021-04-01 PROCEDURE — 33286 RMVL SUBQ CAR RHYTHM MNTR: CPT | Performed by: INTERNAL MEDICINE

## 2021-04-01 PROCEDURE — 71045 X-RAY EXAM CHEST 1 VIEW: CPT

## 2021-04-01 PROCEDURE — C1892 INTRO/SHEATH,FIXED,PEEL-AWAY: HCPCS

## 2021-04-01 PROCEDURE — 6360000002 HC RX W HCPCS: Performed by: INTERNAL MEDICINE

## 2021-04-01 RX ORDER — LISINOPRIL 10 MG/1
10 TABLET ORAL DAILY
Status: DISCONTINUED | OUTPATIENT
Start: 2021-04-01 | End: 2021-04-02 | Stop reason: HOSPADM

## 2021-04-01 RX ORDER — AMLODIPINE BESYLATE AND BENAZEPRIL HYDROCHLORIDE 5; 10 MG/1; MG/1
1 CAPSULE ORAL DAILY
Status: DISCONTINUED | OUTPATIENT
Start: 2021-04-01 | End: 2021-04-01 | Stop reason: CLARIF

## 2021-04-01 RX ORDER — FOLIC ACID 1 MG/1
500 TABLET ORAL DAILY
Status: DISCONTINUED | OUTPATIENT
Start: 2021-04-01 | End: 2021-04-02 | Stop reason: HOSPADM

## 2021-04-01 RX ORDER — HYDROCODONE BITARTRATE AND ACETAMINOPHEN 5; 325 MG/1; MG/1
1 TABLET ORAL EVERY 6 HOURS PRN
Status: DISCONTINUED | OUTPATIENT
Start: 2021-04-01 | End: 2021-04-02 | Stop reason: HOSPADM

## 2021-04-01 RX ORDER — SODIUM CHLORIDE 0.9 % (FLUSH) 0.9 %
10 SYRINGE (ML) INJECTION EVERY 12 HOURS SCHEDULED
Status: DISCONTINUED | OUTPATIENT
Start: 2021-04-01 | End: 2021-04-02 | Stop reason: HOSPADM

## 2021-04-01 RX ORDER — ALBUTEROL SULFATE 90 UG/1
2 AEROSOL, METERED RESPIRATORY (INHALATION) 4 TIMES DAILY PRN
Status: DISCONTINUED | OUTPATIENT
Start: 2021-04-01 | End: 2021-04-02 | Stop reason: HOSPADM

## 2021-04-01 RX ORDER — SODIUM CHLORIDE 0.9 % (FLUSH) 0.9 %
10 SYRINGE (ML) INJECTION PRN
Status: DISCONTINUED | OUTPATIENT
Start: 2021-04-01 | End: 2021-04-02 | Stop reason: HOSPADM

## 2021-04-01 RX ORDER — ONDANSETRON 4 MG/1
4 TABLET, ORALLY DISINTEGRATING ORAL EVERY 8 HOURS PRN
Status: DISCONTINUED | OUTPATIENT
Start: 2021-04-01 | End: 2021-04-02 | Stop reason: HOSPADM

## 2021-04-01 RX ORDER — ASPIRIN 81 MG/1
81 TABLET ORAL DAILY
Status: DISCONTINUED | OUTPATIENT
Start: 2021-04-01 | End: 2021-04-02 | Stop reason: HOSPADM

## 2021-04-01 RX ORDER — CLONAZEPAM 0.5 MG/1
0.5 TABLET ORAL DAILY PRN
Status: DISCONTINUED | OUTPATIENT
Start: 2021-04-01 | End: 2021-04-02 | Stop reason: HOSPADM

## 2021-04-01 RX ORDER — TRAZODONE HYDROCHLORIDE 50 MG/1
50 TABLET ORAL NIGHTLY
Status: DISCONTINUED | OUTPATIENT
Start: 2021-04-01 | End: 2021-04-02 | Stop reason: HOSPADM

## 2021-04-01 RX ORDER — AMLODIPINE BESYLATE 5 MG/1
5 TABLET ORAL DAILY
Status: DISCONTINUED | OUTPATIENT
Start: 2021-04-01 | End: 2021-04-02 | Stop reason: HOSPADM

## 2021-04-01 RX ORDER — BUDESONIDE AND FORMOTEROL FUMARATE DIHYDRATE 160; 4.5 UG/1; UG/1
2 AEROSOL RESPIRATORY (INHALATION) 2 TIMES DAILY
Status: DISCONTINUED | OUTPATIENT
Start: 2021-04-01 | End: 2021-04-02 | Stop reason: HOSPADM

## 2021-04-01 RX ORDER — ATORVASTATIN CALCIUM 10 MG/1
10 TABLET, FILM COATED ORAL DAILY
Status: DISCONTINUED | OUTPATIENT
Start: 2021-04-01 | End: 2021-04-02 | Stop reason: HOSPADM

## 2021-04-01 RX ORDER — PANTOPRAZOLE SODIUM 40 MG/1
40 TABLET, DELAYED RELEASE ORAL
Status: DISCONTINUED | OUTPATIENT
Start: 2021-04-02 | End: 2021-04-02 | Stop reason: HOSPADM

## 2021-04-01 RX ORDER — METOPROLOL SUCCINATE 100 MG/1
100 TABLET, EXTENDED RELEASE ORAL DAILY
Status: DISCONTINUED | OUTPATIENT
Start: 2021-04-01 | End: 2021-04-02 | Stop reason: HOSPADM

## 2021-04-01 RX ORDER — DOFETILIDE 0.12 MG/1
125 CAPSULE ORAL 2 TIMES DAILY
Status: DISCONTINUED | OUTPATIENT
Start: 2021-04-01 | End: 2021-04-02 | Stop reason: HOSPADM

## 2021-04-01 RX ORDER — ESCITALOPRAM OXALATE 10 MG/1
20 TABLET ORAL DAILY
Status: DISCONTINUED | OUTPATIENT
Start: 2021-04-01 | End: 2021-04-02 | Stop reason: HOSPADM

## 2021-04-01 RX ORDER — LANOLIN ALCOHOL/MO/W.PET/CERES
500 CREAM (GRAM) TOPICAL DAILY
Status: DISCONTINUED | OUTPATIENT
Start: 2021-04-01 | End: 2021-04-02 | Stop reason: HOSPADM

## 2021-04-01 RX ORDER — MONTELUKAST SODIUM 10 MG/1
10 TABLET ORAL NIGHTLY
Status: DISCONTINUED | OUTPATIENT
Start: 2021-04-01 | End: 2021-04-02 | Stop reason: HOSPADM

## 2021-04-01 RX ADMIN — CYANOCOBALAMIN TAB 1000 MCG 500 MCG: 1000 TAB at 17:27

## 2021-04-01 RX ADMIN — SODIUM CHLORIDE, PRESERVATIVE FREE 10 ML: 5 INJECTION INTRAVENOUS at 21:11

## 2021-04-01 RX ADMIN — LISINOPRIL 10 MG: 10 TABLET ORAL at 17:27

## 2021-04-01 RX ADMIN — FOLIC ACID 500 MCG: 1 TABLET ORAL at 17:27

## 2021-04-01 RX ADMIN — ATORVASTATIN CALCIUM 10 MG: 10 TABLET, FILM COATED ORAL at 17:27

## 2021-04-01 RX ADMIN — AMLODIPINE BESYLATE 5 MG: 5 TABLET ORAL at 17:27

## 2021-04-01 RX ADMIN — ASPIRIN 81 MG: 81 TABLET, COATED ORAL at 17:27

## 2021-04-01 RX ADMIN — DOFETILIDE 125 MCG: 0.12 CAPSULE ORAL at 21:13

## 2021-04-01 RX ADMIN — MONTELUKAST 10 MG: 10 TABLET, FILM COATED ORAL at 21:11

## 2021-04-01 RX ADMIN — CEFAZOLIN SODIUM 1000 MG: 1 INJECTION, POWDER, FOR SOLUTION INTRAMUSCULAR; INTRAVENOUS at 19:30

## 2021-04-01 RX ADMIN — BUDESONIDE AND FORMOTEROL FUMARATE DIHYDRATE 2 PUFF: 160; 4.5 AEROSOL RESPIRATORY (INHALATION) at 22:51

## 2021-04-01 RX ADMIN — METOPROLOL SUCCINATE 100 MG: 100 TABLET, EXTENDED RELEASE ORAL at 17:27

## 2021-04-01 RX ADMIN — TRAZODONE HYDROCHLORIDE 50 MG: 50 TABLET ORAL at 21:11

## 2021-04-01 RX ADMIN — SODIUM CHLORIDE, PRESERVATIVE FREE 10 ML: 5 INJECTION INTRAVENOUS at 17:28

## 2021-04-01 RX ADMIN — ESCITALOPRAM OXALATE 20 MG: 10 TABLET ORAL at 17:27

## 2021-04-01 ASSESSMENT — PAIN DESCRIPTION - LOCATION: LOCATION: HEAD

## 2021-04-01 NOTE — H&P
Electrophysiology H&P Note        Chief complaint: follow up on pause noted on loop      Primary care physician: JANI Umaña - CNP      History of Present Illness: Today visit (04/01/21)    Patient here for Pacemaker implantation. No change in H&P noted from previous clinic visit. This visit 3/22/2021  Patient is here today for follow up appointment pause noted on loop recorder. Patient reports that he has been fatigued. He states that the fatigue is worsening. He finds himself napping during the day and this is unusual for him. Has off and on dizziness but no syncope He denies chest pain, shortness of breath, palpitations,  syncope. Previous visit (1/29/2021)      Chief Complaint   Patient presents with    6 Month Follow-Up     Pt here for 6 Month F/U. Pt denies Chest Pain, Palpitations, Dizziness, Edema. Pt does chew tobacco and does not drink alcohol.  Atrial Fibrillation     Pt states he gets SOB when doing something exerting himself. Previous visit:(7/24/2020)      Chief Complaint   Patient presents with    6 Month Follow-Up     Patient being seen for 6 month, he denies any chest pain,sob,dizziness, palpitations, and edema. Patient reports that he overall feels better and has not have an episode of dizziness or past syncope on July 2 that he remembers but he was off Tikosyn for couple of days in between as he ran out. Overall he is doing good and is taking precautions in this COVID time      Previous visit:(1/17/2019)      Chief Complaint   Patient presents with    Atrial Fibrillation     Dr Luis Adair patient here for 6 month follow up. Patient states Dante Gray has felt so good - it is scary that some thing will go wrong \" . Denies any symptoms. Patient denies alcohol. Patient reports drinking about 4 cups coffee through out daily. Will also drink decaff iced tea.  Patient reports that he is sending back the oxygen also as he does not need it any more per his lung doctor. No shortness of breath. Fatigue but much better than before           Previous visit: (7/12/2019)      Chief Complaint   Patient presents with    Follow-up     Patient here today for 6 months follow up visit. States feels good and has lost 40 lbs since his last visit. Patient denies palpitations, chest pain, shortness of breath or edema. Reports some \"slight\" dizziness. Denies syncope.  Other     Patient sees Pulmonologist, Dr. Candy Jean-Baptiste in [place]. Wears home oxygen at night but not needing it as much now. Patient reports he is doing fine  He went to Lung doctor in [place] and he made him upset because he tried to send him to other doctor for ablation  He told him that tikosyn was not good  Patient reports he has no problems with medication and had no atrial fibrillation  Patient did not like his approach            Previous visit:(1/11/2019)      Chief Complaint   Patient presents with    Atrial Fibrillation     Dr Yu Price patient here for 6 month follow up. Patient states he has felt real good since last office visit. Deneis CP, palpitations, dizziness. Does report mild swelling in both lower legs, but not as bad as has been in the past. Also reports having to use his oxygen this past week due to SOB. He states he uses 2L O2 as needed. Also reports problems sleeping recently. Patient did not bring medications or list but states no changes. Denies alcohol, does drink decaff tea. Previous visit:(6/20/2018)      Chief Complaint   Patient presents with    Atrial Fibrillation     Ak-s/p loop 5/17. Patient denies CP, palpitations, or edema. Does report 2 episodes of dizziness and SOB since having the loop inserted. He does report a history of COPD. Patient states most recent episode was about a week ago. 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   Patient did not bring med list but states no changes. He has seen a new pulmonoligist in [place] and was recently started on Clarithromycin and doxycycline, not sure why but did have pneumonia in Feb. Patient denies any fever. He said his blood work was done and then it was decided to be placed on antibiotics           Previous visit:    Patient is a Mignon Balint old male with hx COPD, Afib with recent admission in hospital. Patient here for education of atrial fibrillation and management  He was placed on tikosyn in the hospital. Patient denies chest pain, shortness of breath. Denies fever or chills. Feeling better since discharge from hospital        Past medical history:   Past Medical History:   Diagnosis Date    Allergic rhinitis     Asthma     Atrial fibrillation [facility] [address]     CAD (coronary artery disease)     mild LAD    COPD (chronic obstructive pulmonary disease) ([facility] [address]     Depression     Diverticulosis of colon     Family history of early CAD     Father- MI-  age 46; a grandparent- age 46 of MI    Family history of valvular heart disease     Mother- Mitral valve disease    GERD (gastroesophageal reflux disease)     H/O 24 hour EKG monitoring 2001-Predominant rhythm is sinus rhythm. No signif ectopy noted.  H/O echocardiogram 2002, 2001-EF 60%. Normal LVSF. Mild MR. Mild TR-Dr Shipley Smoker;    H/O echocardiogram 2019    EF 49-73%, Grade I diastolic dysfunction, sclerotic but non stenotic aortic valve, Mild AR, Mild-Mod TR, Normal pulmonary artery pressure, no pericardial effusion     History of diverticulitis of colon     History of Holter monitoring 2018    48 hr holter - SR with PAF    Hx of cardiac cath 2013    Mild LAD disease noted.  Hx of cardiovascular stress test 2018    EF 33% Pt in a fib with RVR. No infarct or ischemia. Decreased uptake inferiorly due to diaphragmatic artifact.      HX OTHER MEDICAL 13    14 DAY EVENT: APC's, short runs of sinus tachycardia.  Hyperlipidemia     Hypertension     Hypoxemia 2/22/2018    Irregular heart rate 2/16/2018    Palpitations     Pneumonia     Restless leg syndrome     Shortness of breath 2/16/2018    Tricuspid valve regurgitation 6/2001    mild       Surgical history :   Past Surgical History:   Procedure Laterality Date    BONE RESECTION, RIB      thoracic    COLONOSCOPY  10/14/14    polyp, divertics,    DIAGNOSTIC CARDIAC CATH LAB PROCEDURE  7/8/13    EF 55%, Mild LAD Disease    ENDOSCOPY, COLON, DIAGNOSTIC  10/14/14    normal    NERVE SURGERY      ulnar nerver transfer   [address]  2007, 2006 2006-right rotator cuff; 2007-Left Rotator cuff    ROTATOR CUFF REPAIR Left 11/18/2015    Dr. Socorro Patel ARTHROSCOPY Right 12/2010       Family history:   Family History   Problem Relation Age of Onset    Heart Disease Mother         Mitral valve disease    Heart Attack Mother 47    Coronary Art Dis Father         MI    Heart Disease Father     Heart Attack Father 46    Heart Attack Brother 50    Heart Attack Paternal Grandfather 48       Social history :  reports that he quit smoking about 27 years ago. His smoking use included cigarettes. He has a 20.00 pack-year smoking history. His smokeless tobacco use includes chew. He reports that he does not drink alcohol or use drugs. No Known Allergies    No current facility-administered medications on file prior to encounter. Current Outpatient Medications on File Prior to Encounter   Medication Sig Dispense Refill    omeprazole (PRILOSEC) 40 MG delayed release capsule Take 1 capsule by mouth daily Do not crush or break. 90 capsule 3    clonazePAM (KLONOPIN) 0.5 MG tablet Take 1 tablet by mouth as needed for Anxiety for up to 90 days.  90 tablet 0    traZODone (DESYREL) 50 MG tablet Take 1 tablet by mouth nightly 30 tablet 2    amLODIPine-benazepril (LOTREL) 5-10 MG per capsule Take 1 capsule by mouth daily 90 capsule 3  escitalopram (LEXAPRO) 20 MG tablet TAKE 1 TABLET DAILY 90 tablet 1    metoprolol succinate (TOPROL XL) 100 MG extended release tablet Take 1 tablet by mouth daily 90 tablet 1    montelukast (SINGULAIR) 10 MG tablet Take 1 tablet by mouth nightly 90 tablet 3    apixaban (ELIQUIS) 5 MG TABS tablet Take 1 tablet by mouth 2 times daily 180 tablet 3    budesonide-formoterol (SYMBICORT) 160-4.5 MCG/ACT AERO Inhale 2 puffs into the lungs 2 times daily 1 Inhaler 5    albuterol sulfate HFA (VENTOLIN HFA) 108 (90 Base) MCG/ACT inhaler Inhale 2 puffs into the lungs 4 times daily as needed for Wheezing or Shortness of Breath 3 Inhaler 1    dofetilide (TIKOSYN) 125 MCG capsule Take 1 capsule by mouth 2 times daily 180 capsule 3    naproxen (NAPROSYN) 375 MG tablet Take 1 tablet by mouth 2 times daily as needed for Pain 20 tablet 0    Misc. Devices ([employer] CANE) MISC 1 Device by Does not apply route as needed (for ambulation) 1 each 0    atorvastatin (LIPITOR) 10 MG tablet Take 1 tablet by mouth daily 90 tablet 3    aspirin 81 MG EC tablet Take 81 mg by mouth daily.  Cyanocobalamin (B-12) 500 MCG TABS Take 1 tablet by mouth daily       B Complex Vitamins (B COMPLEX 1 PO) Take 1 tablet by mouth daily.  folic acid (FOLVITE) 503 MCG tablet Take 400 mcg by mouth daily.  ondansetron (ZOFRAN) 4 MG tablet Take 1 tablet by mouth every 8 hours as needed for Nausea or Vomiting 8 tablet 0    UNABLE TO FIND Compounded betaval cream 15g/velvachol 135g 1 Can 5    sildenafil (VIAGRA) 100 MG tablet Take 1 tablet by mouth as needed for Erectile Dysfunction 6 tablet 3       Review of Systems:   Review of Systems   Constitutional: . Positive for activity change, Positive for fatigue. No chills and fever. HENT: Negative for congestion, ear pain and tinnitus. Eyes: Negative for photophobia, pain and visual disturbance. Respiratory: Negative for shortness of breath.  Negative for cough, chest tightness and wheezing. Cardiovascular: Negative for chest pain, palpitations and leg swelling. Gastrointestinal: Negative for abdominal pain, blood in stool, constipation, diarrhea, nausea and vomiting. Endocrine: Negative for cold intolerance and heat intolerance. Genitourinary: Negative for dysuria, flank pain and hematuria. Musculoskeletal: Positive for arthralgias. Negative for back pain, myalgias and neck stiffness. Skin: Negative for color change and rash. Allergic/Immunologic: Negative for food allergies. Neurological: Negative for dizziness, light-headedness, numbness and headaches. Hematological: Does not bruise/bleed easily. Psychiatric/Behavioral: Negative for agitation, behavioral problems and confusion. Physical Examination:    BP (!) 117/96   Pulse 56   Temp 95.4 °F (35.2 °C) (Temporal)   Resp 18   Ht 5' 7\" (1.702 m)   Wt 211 lb (95.7 kg)   SpO2 97%   BMI 33.05 kg/m²    Wt Readings from Last 3 Encounters:   04/01/21 211 lb (95.7 kg)   03/19/21 222 lb (100.7 kg)   01/29/21 219 lb 12.8 oz (99.7 kg)     Body mass index is 33.05 kg/m². Physical Exam   Constitutional: He is oriented to person, place, and time and well-developed, well-nourished, and in no distress. HENT:   Head: Normocephalic and atraumatic. Eyes: Conjunctivaeare normal. Pupils are equal, round, and reactive to light. Right and left eye exhibits no discharge. Neck: Normal range of motion. No JVD present. No thyromegaly present. Cardiovascular: Bradycardia. regular rhythm and normal heart sounds. Exam reveals no friction rub. No murmur heard. Pulmonary/Chest: Effort normal and breath sounds normal. No stridor. No respiratory distress. He has no wheezes. Abdominal: Soft. Bowel sounds are normal. He exhibits no distension. There is no tenderness. Musculoskeletal: Normal range of motion. He exhibits no edema or tenderness. Neurological: He is alert and oriented to person, place, and time.  No cranial nerve deficit. Skin: Skin is warm and dry. No rash noted. No erythema. Psychiatric: Mood and affect normal.       CBC:   Lab Results   Component Value Date    WBC 6.7 03/29/2021    HGB 13.8 03/29/2021    HCT 41.8 03/29/2021     03/29/2021     Lipids:   Lab Results   Component Value Date    CHOL 132 02/20/2018    TRIG 50 02/20/2018    HDL 54 02/20/2018    LDLCALC 68 02/20/2018     PT/INR:   Lab Results   Component Value Date    INR 1.14 03/29/2021        BMP:    Lab Results   Component Value Date     03/29/2021    K 4.0 03/29/2021     03/29/2021    CO2 29 03/29/2021    BUN 14 03/29/2021     CMP:   Lab Results   Component Value Date    AST 18 10/17/2020    PROT 6.3 (L) 10/17/2020    BILITOT 0.4 10/17/2020    ALKPHOS 86 10/17/2020     TSH:  No results found for: TSH      EKG -Sinus Bradycardia    IMPRESSION / RECOMMENDATIONS:     SSS  Sinus Pause on loop recorder  Symptomatic Bradycardia  tikosyn monitoring therapy  Persistent atrial fibrillation s/p cardioversion   HTN      Patient with significant pause  Patient also symptomatic bradycardia  History of PAF on tikosyn. Recommend dual chamber pacemaker    The risks including, but not limited to, the risks of vascular injury, bleeding, infection, device malfunction, lead dislodgement, radiation exposure, injury to cardiac and surrounding structures (including pneumothorax), stroke, myocardial infarction and death were discussed in detail. The patient opted to proceed with the device implantation. Also discussed about the possible Covid exposure given the pandemic situation and patient also voiced concerns regarding that. With precautions we have been doing the procedures at the hospital and so far we have been able to limit the Covid exposure in the hospital for the procedural patients and we will take atmost precautions in the same way.  Patient understands the risk and decided to proceed with procedure      Thanks again for allowing me to

## 2021-04-01 NOTE — OP NOTE
blunt dissection, a pocket was created. Central venous access into the left subclavian vein was obtained using the modified Seldinger technique. After central venous access was obtained, a sheath was placed in the vein. A right ventricular lead was advanced into position in the RV apex (as septum had low sensing) under fluoroscopic guidance and using a series of curved stylets. The lead was actively fixated. After confirming appropriate function, the sheath was split and removed. The lead was secured to the underlying tissue using suture material. A new sheath was advanced over a second previously placed wire into the vein. The atrial lead was advanced to the right  atrial appendage and actively fixated under fluoroscopic guidance. After confirming appropriate function, the sheath was split and removed. The lead was secured to the underlying tissue using suture. The pocket was irrigated with an antibiotic solution. The leads were then connected to the new pulse generator which was then placed into the cleaned pocket. The pocket was then closed in two separate subcutaneous layers using 2-0 & 2-0 Vicryl and subcuticular layer using 4-0 Vicryl. The skin was covered with Steri-Strips and dressing. All sponge and needle counts were reported as correct at the end of the procedure. Procedures performed: Loop recorder removal     Details of procedure:     Patient was prepped and draped in usual sterile fashion. After injection of 2% lidocaine in the left pectoral area, a small incision was made on the left side of sternum just at the previous scar over the device. Pocket was created and then we blunt dissection performed and device was removed. Pocket was irrigated with antibiotic solution. Subcutaneous layer was closed with 2-0 vicryl and subcuticular later with 4-0 vicryl. Wound approximated well. The skin was covered with Steri-Strips and dressing.     The patient tolerated the procedure well and there were no complications. Patient was transported to the holding area in stable condition. Lead and device information:                 Plan:     The patient will be in observation status and have usual post-implant care, including chest x-ray, and antibiotic therapy and interrogation of the device.

## 2021-04-02 VITALS
HEART RATE: 63 BPM | OXYGEN SATURATION: 91 % | BODY MASS INDEX: 33.12 KG/M2 | HEIGHT: 67 IN | DIASTOLIC BLOOD PRESSURE: 68 MMHG | TEMPERATURE: 97.7 F | SYSTOLIC BLOOD PRESSURE: 111 MMHG | RESPIRATION RATE: 15 BRPM | WEIGHT: 211 LBS

## 2021-04-02 PROCEDURE — 6360000002 HC RX W HCPCS: Performed by: INTERNAL MEDICINE

## 2021-04-02 PROCEDURE — 94761 N-INVAS EAR/PLS OXIMETRY MLT: CPT

## 2021-04-02 PROCEDURE — 2580000003 HC RX 258: Performed by: INTERNAL MEDICINE

## 2021-04-02 PROCEDURE — 93280 PM DEVICE PROGR EVAL DUAL: CPT | Performed by: INTERNAL MEDICINE

## 2021-04-02 PROCEDURE — 94640 AIRWAY INHALATION TREATMENT: CPT

## 2021-04-02 PROCEDURE — 99024 POSTOP FOLLOW-UP VISIT: CPT | Performed by: NURSE PRACTITIONER

## 2021-04-02 PROCEDURE — 6370000000 HC RX 637 (ALT 250 FOR IP): Performed by: INTERNAL MEDICINE

## 2021-04-02 RX ORDER — TRAMADOL HYDROCHLORIDE 50 MG/1
50 TABLET ORAL EVERY 8 HOURS PRN
Qty: 9 TABLET | Refills: 0 | Status: SHIPPED | OUTPATIENT
Start: 2021-04-02 | End: 2021-04-05

## 2021-04-02 RX ADMIN — BUDESONIDE AND FORMOTEROL FUMARATE DIHYDRATE 2 PUFF: 160; 4.5 AEROSOL RESPIRATORY (INHALATION) at 08:53

## 2021-04-02 RX ADMIN — CYANOCOBALAMIN TAB 1000 MCG 500 MCG: 1000 TAB at 09:16

## 2021-04-02 RX ADMIN — SODIUM CHLORIDE, PRESERVATIVE FREE 10 ML: 5 INJECTION INTRAVENOUS at 09:14

## 2021-04-02 RX ADMIN — AMLODIPINE BESYLATE 5 MG: 5 TABLET ORAL at 09:15

## 2021-04-02 RX ADMIN — ATORVASTATIN CALCIUM 10 MG: 10 TABLET, FILM COATED ORAL at 09:16

## 2021-04-02 RX ADMIN — DOFETILIDE 125 MCG: 0.12 CAPSULE ORAL at 09:16

## 2021-04-02 RX ADMIN — CEFAZOLIN SODIUM 1000 MG: 1 INJECTION, POWDER, FOR SOLUTION INTRAMUSCULAR; INTRAVENOUS at 04:21

## 2021-04-02 RX ADMIN — FOLIC ACID 500 MCG: 1 TABLET ORAL at 09:15

## 2021-04-02 RX ADMIN — PANTOPRAZOLE SODIUM 40 MG: 40 TABLET, DELAYED RELEASE ORAL at 06:58

## 2021-04-02 RX ADMIN — ASPIRIN 81 MG: 81 TABLET, COATED ORAL at 09:15

## 2021-04-02 RX ADMIN — ESCITALOPRAM OXALATE 20 MG: 10 TABLET ORAL at 09:15

## 2021-04-02 RX ADMIN — METOPROLOL SUCCINATE 100 MG: 100 TABLET, EXTENDED RELEASE ORAL at 09:15

## 2021-04-02 RX ADMIN — LISINOPRIL 10 MG: 10 TABLET ORAL at 09:15

## 2021-04-02 NOTE — DISCHARGE SUMMARY
[facility]  Discharge Summary    Dara Alfonso  :    MRN:  [id number]    Admit date:  2021  Discharge date:      Admitting Physician:  Augustine Pino MD    Discharge Diagnoses:     1. SP Dual Chamber Pacemaker  2. S/P loop recorder removal       Patient Active Problem List   Diagnosis    Depression, major, in remission ([facility] [address]    Obesity (BMI 30-39. 9)    Restless leg syndrome    HTN (hypertension)    Hyperlipidemia    Rash    Eczema    Tobacco use    COPD [facility]chronic obstructive pulmonary disease) ([facility])    Diverticulosis    History of repair of left rotator cuff    Aortic atherosclerosis ([facility] [address]    Encounter for medication review    Nystagmus    Shortness of breath    Hypoxemia    Chronic bronchitis (HCC)    Elevated brain natriuretic peptide (BNP) level    Recurrent major depressive disorder, in partial remission (HCC)    Chronic respiratory failure with hypoxia (HCC)    Chronic atrial fibrillation (HCC)    Pulmonary HTN (HCC)    Chronic systolic congestive heart failure (HCC)    PAF (paroxysmal atrial fibrillation) ([facility])    Other insomnia    Other headache syndrome    Dizzy spells    Syncope    S/P placement of cardiac pacemaker       Admission Condition:  fair    Discharged Condition:  good    Hospital Course:   Patient with hx of symptomatic bradycardia and sick sinus syndrome presented for implantation of paceamker. Patient tolerated the procedure well. Observed overnight. Patient device area was clean, no hematoma and no tenderness. Patient device interrogation was stable. CXR confirmed placement of device with no complications. Patient stable for discharge with out patient follow up. Current Discharge Medication List           Details   traMADol (ULTRAM) 50 MG tablet Take 1 tablet by mouth every 8 hours as needed for Pain for up to 3 days. Intended supply: 3 days.  Take lowest dose possible to manage pain  Qty: 9 tablet, Refills: 0    Comments: Reduce doses taken as pain becomes manageable  Associated Diagnoses: S/P placement of cardiac pacemaker              Details   omeprazole (PRILOSEC) 40 MG delayed release capsule Take 1 capsule by mouth daily Do not crush or break. Qty: 90 capsule, Refills: 3    Associated Diagnoses: Dyspepsia      clonazePAM (KLONOPIN) 0.5 MG tablet Take 1 tablet by mouth as needed for Anxiety for up to 90 days.   Qty: 90 tablet, Refills: 0    Associated Diagnoses: Restless leg syndrome      traZODone (DESYREL) 50 MG tablet Take 1 tablet by mouth nightly  Qty: 30 tablet, Refills: 2    Associated Diagnoses: Insomnia, unspecified type      amLODIPine-benazepril (LOTREL) 5-10 MG per capsule Take 1 capsule by mouth daily  Qty: 90 capsule, Refills: 3    Associated Diagnoses: Essential hypertension      escitalopram (LEXAPRO) 20 MG tablet TAKE 1 TABLET DAILY  Qty: 90 tablet, Refills: 1    Associated Diagnoses: Recurrent major depressive disorder, in partial remission (HCC)      metoprolol succinate (TOPROL XL) 100 MG extended release tablet Take 1 tablet by mouth daily  Qty: 90 tablet, Refills: 1    Associated Diagnoses: Essential hypertension      montelukast (SINGULAIR) 10 MG tablet Take 1 tablet by mouth nightly  Qty: 90 tablet, Refills: 3    Associated Diagnoses: Chronic obstructive pulmonary disease, unspecified COPD type ([facility])      apixaban (ELIQUIS) 5 MG TABS tablet Take 1 tablet by mouth 2 times daily  Qty: 180 tablet, Refills: 3    Associated Diagnoses: Paroxysmal atrial fibrillation (HCC)      budesonide-formoterol (SYMBICORT) 160-4.5 MCG/ACT AERO Inhale 2 puffs into the lungs 2 times daily  Qty: 1 Inhaler, Refills: 5      albuterol sulfate HFA (VENTOLIN HFA) 108 (90 Base) MCG/ACT inhaler Inhale 2 puffs into the lungs 4 times daily as needed for Wheezing or Shortness of Breath  Qty: 3 Inhaler, Refills: 1    Associated Diagnoses: Chronic obstructive pulmonary disease, unspecified COPD type ([facility])      dofetilide (TIKOSYN) 125 MCG capsule Take 1 capsule by mouth 2 times daily  Qty: 180 capsule, Refills: 3    Associated Diagnoses: Paroxysmal atrial fibrillation (HCC)      naproxen (NAPROSYN) 375 MG tablet Take 1 tablet by mouth 2 times daily as needed for Pain  Qty: 20 tablet, Refills: 0      Misc. Devices ([employer] CANE) MISC 1 Device by Does not apply route as needed (for ambulation)  Qty: 1 each, Refills: 0      atorvastatin (LIPITOR) 10 MG tablet Take 1 tablet by mouth daily  Qty: 90 tablet, Refills: 3    Associated Diagnoses: Mixed hyperlipidemia      aspirin 81 MG EC tablet Take 81 mg by mouth daily. Associated Diagnoses: Hyperlipemia      Cyanocobalamin (B-12) 500 MCG TABS Take 1 tablet by mouth daily     Associated Diagnoses: Fatigue      B Complex Vitamins (B COMPLEX 1 PO) Take 1 tablet by mouth daily. Associated Diagnoses: Fatigue      folic acid (FOLVITE) 609 MCG tablet Take 400 mcg by mouth daily.       Associated Diagnoses: Fatigue      ondansetron (ZOFRAN) 4 MG tablet Take 1 tablet by mouth every 8 hours as needed for Nausea or Vomiting  Qty: 8 tablet, Refills: 0      UNABLE TO FIND Compounded betaval cream 15g/velvachol 135g  Qty: 1 Can, Refills: 5    Associated Diagnoses: Rash      sildenafil (VIAGRA) 100 MG tablet Take 1 tablet by mouth as needed for Erectile Dysfunction  Qty: 6 tablet, Refills: 3    Associated Diagnoses: Vasculogenic erectile dysfunction, unspecified vasculogenic erectile dysfunction type             Consults:  none    Discharge Exam:  /68   Pulse 63   Temp 97.7 °F (36.5 °C) (Oral)   Resp 15   Ht 5' 7\" (1.702 m)   Wt 211 lb (95.7 kg)   SpO2 91%   BMI 33.05 kg/m²   General appearance: alert, appears stated age and cooperative  Head: Normocephalic, without obvious abnormality, atraumatic  Lungs: clear to auscultation bilaterally  Heart: regular rate and rhythm, S1, S2 normal, no murmur, click, rub or gallop  Extremities: extremities normal, atraumatic, no cyanosis or edema  Pulses: 2+ and symmetric  Skin: Skin color, texture, turgor normal. No rashes or lesions. Left upper chest dressing clean dry intact. Minimal swelling and bruising. No hematoma. Neurologic: Grossly normal    Disposition:   home    Signed:  Rosie Pendleton  4/2/2021, 9:43 AM     Device Assessment:      The device is [employer]tronic pacemaker - Dual Chamber chamber      MRI Compatible : yes    Device interrogation was performed. Mode: AAIr --- DDDr     Sensing is normal. Impedence is normal.  Threshold is normal.     There has not been interval changes. Estimated battery life is new     The underlying rhythm is AP, VS.      Atrial Arrhythmia : No    Non sustained VT episodes : No    Sustained VT episodes : No    The patient is Partial atrial pacemaker dependent.           Interpreted by    Ang Villa M.D

## 2021-04-12 ENCOUNTER — NURSE ONLY (OUTPATIENT)
Dept: CARDIOLOGY CLINIC | Age: 76
End: 2021-04-12

## 2021-04-12 ENCOUNTER — TELEPHONE (OUTPATIENT)
Dept: CARDIOLOGY CLINIC | Age: 76
End: 2021-04-12

## 2021-04-12 VITALS — TEMPERATURE: 97.7 F

## 2021-04-12 DIAGNOSIS — Z95.0 STATUS POST PLACEMENT OF CARDIAC PACEMAKER: Primary | ICD-10-CM

## 2021-04-12 PROCEDURE — 99024 POSTOP FOLLOW-UP VISIT: CPT | Performed by: INTERNAL MEDICINE

## 2021-04-12 NOTE — PROGRESS NOTES
Patient seen for site check post pacer implant and removal of loop recorder. Dressing removed. No signs of inflammation or infection noted. Edges well approximated. Patient has no complaints of pain or discomfort. Single steri strip applied to both sites. Patient instructed to no lift arm higher than shoulder level.

## 2021-04-22 DIAGNOSIS — I10 ESSENTIAL HYPERTENSION: Chronic | ICD-10-CM

## 2021-04-22 DIAGNOSIS — E78.2 MIXED HYPERLIPIDEMIA: ICD-10-CM

## 2021-04-22 RX ORDER — ATORVASTATIN CALCIUM 10 MG/1
10 TABLET, FILM COATED ORAL DAILY
Qty: 90 TABLET | Refills: 3 | Status: SHIPPED | OUTPATIENT
Start: 2021-04-22 | End: 2022-01-14 | Stop reason: SDUPTHER

## 2021-04-22 RX ORDER — METOPROLOL SUCCINATE 100 MG/1
100 TABLET, EXTENDED RELEASE ORAL DAILY
Qty: 90 TABLET | Refills: 1 | Status: SHIPPED | OUTPATIENT
Start: 2021-04-22 | End: 2021-10-26

## 2021-04-29 ENCOUNTER — OFFICE VISIT (OUTPATIENT)
Dept: FAMILY MEDICINE CLINIC | Age: 76
End: 2021-04-29
Payer: MEDICARE

## 2021-04-29 ENCOUNTER — OFFICE VISIT (OUTPATIENT)
Dept: CARDIOLOGY CLINIC | Age: 76
End: 2021-04-29

## 2021-04-29 VITALS
OXYGEN SATURATION: 95 % | RESPIRATION RATE: 18 BRPM | SYSTOLIC BLOOD PRESSURE: 118 MMHG | TEMPERATURE: 97 F | WEIGHT: 222.2 LBS | DIASTOLIC BLOOD PRESSURE: 78 MMHG | BODY MASS INDEX: 34.8 KG/M2 | HEART RATE: 65 BPM

## 2021-04-29 VITALS
HEIGHT: 67 IN | BODY MASS INDEX: 34.69 KG/M2 | DIASTOLIC BLOOD PRESSURE: 76 MMHG | WEIGHT: 221 LBS | SYSTOLIC BLOOD PRESSURE: 126 MMHG | HEART RATE: 64 BPM

## 2021-04-29 DIAGNOSIS — F32.5 DEPRESSION, MAJOR, IN REMISSION (HCC): ICD-10-CM

## 2021-04-29 DIAGNOSIS — I48.0 PAROXYSMAL ATRIAL FIBRILLATION (HCC): ICD-10-CM

## 2021-04-29 DIAGNOSIS — G25.81 RESTLESS LEG SYNDROME: Primary | ICD-10-CM

## 2021-04-29 DIAGNOSIS — G47.09 OTHER INSOMNIA: ICD-10-CM

## 2021-04-29 PROCEDURE — 99214 OFFICE O/P EST MOD 30 MIN: CPT | Performed by: NURSE PRACTITIONER

## 2021-04-29 PROCEDURE — 1123F ACP DISCUSS/DSCN MKR DOCD: CPT | Performed by: NURSE PRACTITIONER

## 2021-04-29 PROCEDURE — 4040F PNEUMOC VAC/ADMIN/RCVD: CPT | Performed by: NURSE PRACTITIONER

## 2021-04-29 PROCEDURE — 99024 POSTOP FOLLOW-UP VISIT: CPT | Performed by: NURSE PRACTITIONER

## 2021-04-29 PROCEDURE — G8417 CALC BMI ABV UP PARAM F/U: HCPCS | Performed by: NURSE PRACTITIONER

## 2021-04-29 PROCEDURE — 4004F PT TOBACCO SCREEN RCVD TLK: CPT | Performed by: NURSE PRACTITIONER

## 2021-04-29 PROCEDURE — 3017F COLORECTAL CA SCREEN DOC REV: CPT | Performed by: NURSE PRACTITIONER

## 2021-04-29 PROCEDURE — G8427 DOCREV CUR MEDS BY ELIG CLIN: HCPCS | Performed by: NURSE PRACTITIONER

## 2021-04-29 RX ORDER — DOFETILIDE 0.12 MG/1
125 CAPSULE ORAL 2 TIMES DAILY
Qty: 180 CAPSULE | Refills: 3 | Status: SHIPPED | OUTPATIENT
Start: 2021-04-29 | End: 2021-07-30 | Stop reason: SDUPTHER

## 2021-04-29 RX ORDER — CLONAZEPAM 0.5 MG/1
0.5 TABLET ORAL PRN
Qty: 90 TABLET | Refills: 0 | Status: SHIPPED | OUTPATIENT
Start: 2021-04-29 | End: 2021-07-30 | Stop reason: SDUPTHER

## 2021-04-29 ASSESSMENT — ENCOUNTER SYMPTOMS
CONSTIPATION: 0
VOMITING: 0
COUGH: 0
NAUSEA: 0
STRIDOR: 0
SHORTNESS OF BREATH: 0
WHEEZING: 0
CHEST TIGHTNESS: 0
DIARRHEA: 0
SORE THROAT: 0
ABDOMINAL PAIN: 0

## 2021-04-29 ASSESSMENT — PATIENT HEALTH QUESTIONNAIRE - PHQ9
SUM OF ALL RESPONSES TO PHQ QUESTIONS 1-9: 0
SUM OF ALL RESPONSES TO PHQ9 QUESTIONS 1 & 2: 0

## 2021-04-29 NOTE — PROGRESS NOTES
syncope on July 2 that he remembers but he was off Tikosyn for couple of days in between as he ran out. Overall he is doing good and is taking precautions in this COVID time      Previous visit:(1/17/2019)      Chief Complaint   Patient presents with    Atrial Fibrillation     Dr Shipley Smoker patient here for 6 month follow up. Patient states Savannah Gonzalez has felt so good - it is scary that some thing will go wrong \" . Denies any symptoms. Patient denies alcohol. Patient reports drinking about 4 cups coffee through out daily. Will also drink decaff iced tea. Patient reports that he is sending back the oxygen also as he does not need it any more per his lung doctor. No shortness of breath. Fatigue but much better than before           Previous visit: (7/12/2019)      Chief Complaint   Patient presents with    Follow-up     Patient here today for 6 months follow up visit. States feels good and has lost 40 lbs since his last visit. Patient denies palpitations, chest pain, shortness of breath or edema. Reports some \"slight\" dizziness. Denies syncope.  Other     Patient sees Pulmonologist, Dr. Mikaela Salvador in [place]. Wears home oxygen at night but not needing it as much now. Patient reports he is doing fine  He went to Lung doctor in [place] and he made him upset because he tried to send him to other doctor for ablation  He told him that tikosyn was not good  Patient reports he has no problems with medication and had no atrial fibrillation  Patient did not like his approach            Previous visit:(1/11/2019)      Chief Complaint   Patient presents with    Atrial Fibrillation     Dr Shipley Smoker patient here for 6 month follow up. Patient states he has felt real good since last office visit. Deneis CP, palpitations, dizziness. Does report mild swelling in both lower legs, but not as bad as has been in the past. Also reports having to use his oxygen this past week due to SOB. He states he uses 2L O2 as needed.  Also reports Luis Carlos;    H/O echocardiogram 12/05/2019    EF 46-04%, Grade I diastolic dysfunction, sclerotic but non stenotic aortic valve, Mild AR, Mild-Mod TR, Normal pulmonary artery pressure, no pericardial effusion     History of diverticulitis of colon     History of Holter monitoring 04/12/2018    48 hr holter - SR with PAF    Hx of cardiac cath 07/08/2013    Mild LAD disease noted.  Hx of cardiovascular stress test 03/28/2018    EF 33% Pt in a fib with RVR. No infarct or ischemia. Decreased uptake inferiorly due to diaphragmatic artifact.  HX OTHER MEDICAL 7/30/13    14 DAY EVENT: APC's, short runs of sinus tachycardia.  Hyperlipidemia     Hypertension     Hypoxemia 2/22/2018    Irregular heart rate 2/16/2018    Palpitations     Pneumonia     Restless leg syndrome     Shortness of breath 2/16/2018    Tricuspid valve regurgitation 6/2001    mild       Surgical history :   Past Surgical History:   Procedure Laterality Date    BONE RESECTION, RIB      thoracic    COLONOSCOPY  10/14/2014    polyp, divertics,    DIAGNOSTIC CARDIAC CATH LAB PROCEDURE  07/08/2013    EF 55%, Mild LAD Disease    ENDOSCOPY, COLON, DIAGNOSTIC  10/14/2014    normal    NERVE SURGERY      ulnar nerver transfer    PACEMAKER INSERTION Left 04/01/2021    [employer] J LUIS XT DR MCCAULEY SURESCAN PPM    ROTATOR CUFF REPAIR  2007, 2006 2006-right rotator cuff; 2007-Left Rotator cuff    ROTATOR CUFF REPAIR Left 11/18/2015    Dr. Arpita Michaud ARTHROSCOPY Right 12/01/2010       Family history:   Family History   Problem Relation Age of Onset    Heart Disease Mother         Mitral valve disease    Heart Attack Mother 47    Coronary Art Dis Father         MI    Heart Disease Father     Heart Attack Father 46    Heart Attack Brother 50    Heart Attack Paternal Grandfather 48       Social history :  reports that he quit smoking about 27 years ago. His smoking use included cigarettes.  He has a 20.00 pack-year smoking history. His smokeless tobacco use includes chew. He reports that he does not drink alcohol or use drugs. No Known Allergies    Current Outpatient Medications on File Prior to Visit   Medication Sig Dispense Refill    atorvastatin (LIPITOR) 10 MG tablet Take 1 tablet by mouth daily 90 tablet 3    metoprolol succinate (TOPROL XL) 100 MG extended release tablet Take 1 tablet by mouth daily 90 tablet 1    omeprazole (PRILOSEC) 40 MG delayed release capsule Take 1 capsule by mouth daily Do not crush or break. 90 capsule 3    traZODone (DESYREL) 50 MG tablet Take 1 tablet by mouth nightly 30 tablet 2    amLODIPine-benazepril (LOTREL) 5-10 MG per capsule Take 1 capsule by mouth daily 90 capsule 3    escitalopram (LEXAPRO) 20 MG tablet TAKE 1 TABLET DAILY 90 tablet 1    montelukast (SINGULAIR) 10 MG tablet Take 1 tablet by mouth nightly 90 tablet 3    apixaban (ELIQUIS) 5 MG TABS tablet Take 1 tablet by mouth 2 times daily 180 tablet 3    ondansetron (ZOFRAN) 4 MG tablet Take 1 tablet by mouth every 8 hours as needed for Nausea or Vomiting 8 tablet 0    budesonide-formoterol (SYMBICORT) 160-4.5 MCG/ACT AERO Inhale 2 puffs into the lungs 2 times daily 1 Inhaler 5    albuterol sulfate HFA (VENTOLIN HFA) 108 (90 Base) MCG/ACT inhaler Inhale 2 puffs into the lungs 4 times daily as needed for Wheezing or Shortness of Breath 3 Inhaler 1    dofetilide (TIKOSYN) 125 MCG capsule Take 1 capsule by mouth 2 times daily 180 capsule 3    Misc. Devices ([employer] CAN[employer]) MISC 1 Device by Does not apply route as needed (for ambulation) 1 each 0    UNABLE TO FIND Compounded betaval cream 15g/velvachol 135g 1 Can 5    sildenafil (VIAGRA) 100 MG tablet Take 1 tablet by mouth as needed for Erectile Dysfunction 6 tablet 3    aspirin 81 MG EC tablet Take 81 mg by mouth daily.  Cyanocobalamin (B-12) 500 MCG TABS Take 1 tablet by mouth daily       B Complex Vitamins (B COMPLEX 1 PO) Take 1 tablet by mouth daily.         folic Conjunctivaeare normal. Pupils are equal, round, and reactive to light. Right and left eye exhibits no discharge. Neck: Normal range of motion. No JVD present. No thyromegaly present. Cardiovascular: Bradycardia. regular rhythm and normal heart sounds. Exam reveals no friction rub. No murmur heard. Pulmonary/Chest: Effort normal and breath sounds normal. No stridor. No respiratory distress. He has no wheezes. Abdominal: Soft. Bowel sounds are normal. He exhibits no distension. There is no tenderness. Musculoskeletal: Normal range of motion. He exhibits no edema or tenderness. Neurological: He is alert and oriented to person, place, and time. No cranial nerve deficit. Skin: Skin is warm and dry. No rash noted. No erythema. Left upper chest incision well approximated no redness no swelling no edema no drainage no hematoma. Psychiatric: Mood and affect normal.       CBC:   Lab Results   Component Value Date    WBC 6.7 03/29/2021    HGB 13.8 03/29/2021    HCT 41.8 03/29/2021     03/29/2021     Lipids:   Lab Results   Component Value Date    CHOL 132 02/20/2018    TRIG 50 02/20/2018    HDL 54 02/20/2018    LDLCALC 68 02/20/2018     PT/INR:   Lab Results   Component Value Date    INR 1.14 03/29/2021        BMP:    Lab Results   Component Value Date     03/29/2021    K 4.0 03/29/2021     03/29/2021    CO2 29 03/29/2021    BUN 14 03/29/2021     CMP:   Lab Results   Component Value Date    AST 18 10/17/2020    PROT 6.3 (L) 10/17/2020    BILITOT 0.4 10/17/2020    ALKPHOS 86 10/17/2020     TSH:  No results found for: TSH      EKG -  IMPRESSION / RECOMMENDATIONS:       Status post dual-chamber pacemaker  SSS  Tikosyn monitoring therapy  Persistent atrial fibrillation s/p cardioversion   HTN    Device Assessment:    The device is Med[employer]onic pacemaker - Dual Chamber chamber      MRI Compatible : yes    Device interrogation was performed.     Mode: AAIR --- DDDR     Sensing is normal. Impedence is normal.  Threshold is normal.     There has not been interval changes. Estimated battery life is 12.4 years     The underlying rhythm is AP,.  72.8 % atrial paced; 3.3 % ventricular paced. Atrial Arrhythmia : No    Non sustained VT episodes : No    Sustained VT episodes : No    Patient activity reported 4.2 hrs/day    The patient is atrial pacemaker dependent. Pacemaker is functioning appropriately  We will continue to monitor every 3 months transmissions    No arrhythmias patient to continue Tikosyn 125 mcg twice daily and and Eliquis 5 mg twice daily    Vitals:    04/29/21 0908   BP: 126/76   Pulse: 64     Blood pressure is stable patient to continue Toprol- mg daily and Lotrel 51 mg daily    I am concerned patient may be having sleep apnea. I did discuss with patient about sleep apnea screening however he would like to wait at this time    Patient to follow-up in 3 months            Thanks again for allowing me to participate in care of this patient. Please call me if you have any questions. With best regards.       Lucretia Boyer, APRN - CNP

## 2021-04-29 NOTE — PROGRESS NOTES
Subjective:      Chief Complaint   Patient presents with    Follow-up     Paatient presents today for a 3 month follow up.  Insomnia       HPI:  Suzanna Berry is a 78 y.o. male who presents today for 3 month follow up. He had dual chamber pacemaker placed 2021 for symptomatic bradycardia and SSS. He reports that he is feeling less fatigued, still has some mild CHATTERJEE. He denies dizziness, chest pain, palpitations, or edema. Depression  Has MDD and symptoms are well controlled with Lexapro.  Denies AVH/SIB/SI/HI. Insomnia  He is no longer taking Trazodone. He wasn't sure how well it really worked. He continues to have difficulty sleeping at night and reports daytime fatigue. [facility] cardiology recommended a sleep study but he does not want to pursue that at this time. RLS  Historically prescribed Klonopin 0.5mg nightly for symptoms associated with restless leg syndrome.  He does not take it every night - only when symptoms are severe.  [place] reviewed,medication last filled 2021. Past Medical History:   Diagnosis Date    Allergic rhinitis     Asthma     Atrial fibrillation (HCC)     CAD (coronary artery disease)     mild LAD    COPD (chronic obstructive pulmonary disease) ([facility] [address]     Depression     Diverticulosis of colon     Family history of early CAD     Father- MI-  age 46; a grandparent- age 46 of MI    Family history of valvular heart disease     Mother- Mitral valve disease    GERD (gastroesophageal reflux disease)     H/O 24 hour EKG monitoring 2001-Predominant rhythm is sinus rhythm. No signif ectopy noted.  H/O echocardiogram 2002, 2001-EF 60%. Normal LVSF. Mild MR.  Mild TR-Dr [facility];    H/O echocardiogram 2019    EF 03-66%, Grade I diastolic dysfunction, sclerotic but non stenotic aortic valve, Mild AR, Mild-Mod TR, Normal pulmonary artery pressure, no pericardial effusion     History of diverticulitis of colon     History of Holter monitoring 2018    48 hr holter - SR with PAF    Hx of cardiac cath 2013    Mild LAD disease noted.  Hx of cardiovascular stress test 2018    EF 33% Pt in a fib with RVR. No infarct or ischemia. Decreased uptake inferiorly due to diaphragmatic artifact.  HX OTHER MEDICAL 13    14 DAY EVENT: APC's, short runs of sinus tachycardia.  Hyperlipidemia     Hypertension     Hypoxemia 2018    Irregular heart rate 2018    Palpitations     Pneumonia     Restless leg syndrome     Shortness of breath 2018    Tricuspid valve regurgitation 2001    mild        Social History     Tobacco Use    Smoking status: Former Smoker     Packs/day: 1.00     Years: 20.00     Pack years: 20.00     Types: Cigarettes     Quit date: 1993     Years since quittin.8    Smokeless tobacco: Current User     Types: Chew    Tobacco comment: Reviewed    Substance Use Topics    Alcohol use: No     Comment: drinking \"1 cup decaff coffee\", also drinks decaff tea \"drink it all day long\"        Review of Systems   Constitutional: Negative for activity change, appetite change, chills, fatigue, fever and unexpected weight change. HENT: Negative for congestion, ear pain, hearing loss, sore throat and tinnitus. Eyes: Negative for visual disturbance. Respiratory: Negative for cough, chest tightness, shortness of breath, wheezing and stridor. Cardiovascular: Negative for chest pain, palpitations and leg swelling. Gastrointestinal: Negative for abdominal pain, constipation, diarrhea, nausea and vomiting. Musculoskeletal: Negative for arthralgias and gait problem. Skin: Negative for rash. Neurological: Negative for dizziness, tremors, seizures, syncope, speech difficulty, weakness and headaches. Hematological: Negative for adenopathy. Psychiatric/Behavioral: Positive for sleep disturbance.  Negative for behavioral problems, confusion and suicidal ideas. The patient is not nervous/anxious. Objective:      /78 (Site: Left Upper Arm, Position: Sitting, Cuff Size: Large Adult)   Pulse 65   Temp 97 °F (36.1 °C) (Temporal) Comment: 97.0  Resp 18   Wt 222 lb 3.2 oz (100.8 kg)   SpO2 95%   BMI 34.80 kg/m²      Physical Exam  Vitals signs reviewed. Constitutional:       General: He is not in acute distress. Appearance: Normal appearance. He is obese. HENT:      Head: Normocephalic and atraumatic. Right Ear: External ear normal.      Left Ear: External ear normal.      Mouth/Throat:      Mouth: Mucous membranes are moist.      Pharynx: Oropharynx is clear. Eyes:      Extraocular Movements: Extraocular movements intact. Conjunctiva/sclera: Conjunctivae normal.      Pupils: Pupils are equal, round, and reactive to light. Neck:      Musculoskeletal: Normal range of motion and neck supple. Vascular: No carotid bruit. Cardiovascular:      Rate and Rhythm: Normal rate and regular rhythm. Heart sounds: Normal heart sounds. Pulmonary:      Effort: Pulmonary effort is normal.      Breath sounds: Normal breath sounds. Abdominal:      General: Bowel sounds are normal. There is no distension. Palpations: Abdomen is soft. Tenderness: There is no abdominal tenderness. Musculoskeletal: Normal range of motion. Right lower leg: No edema. Left lower leg: No edema. Skin:     General: Skin is warm and dry. Capillary Refill: Capillary refill takes less than 2 seconds. Findings: No rash. Neurological:      General: No focal deficit present. Mental Status: He is alert and oriented to person, place, and time. Deep Tendon Reflexes: Reflexes normal.   Psychiatric:         Mood and Affect: Mood normal.         Behavior: Behavior normal.            Assessment / Plan:      1. Restless leg syndrome  · Try bathing in hot or cold water.  Applying a heating pad or ice bag to your legs may also help symptoms. · Stretch and massage your legs before bed or when discomfort begins. · Get some exercise for at least 30 minutes a day on most days of the week. Stop exercising at least 3 hours before bedtime. · Avoid caffeine products, such as coffee, tea, cola, and chocolate. Caffeine can interrupt your sleep and stimulate you. · Do not drink alcohol late in the evening  - clonazePAM (KLONOPIN) 0.5 MG tablet; Take 1 tablet by mouth as needed for Anxiety for up to 90 days. Dispense: 90 tablet; Refill: 0    2. Other insomnia  Sleep hygiene discussed. Reinforced need to sleep study to assess for sleep apnea. He prefers to not do at this time. 3. Depression, major, in remission ([facility] [address]  Stable. Continue medication. Patient to call if any concerns or SI before his follow up appointment. If he develops suicidal thoughts with a plan, he is instructed to go to emergency room immediately. Behavioral counseling recommended. Controlled Substance Monitoring:    Acute and Chronic Pain Monitoring:   RX Monitoring 4/29/2021   Attestation -   Periodic Controlled Substance Monitoring Possible medication side effects, risk of tolerance/dependence & alternative treatments discussed. ;No signs of potential drug abuse or diversion identified. ;Assessed functional status. ;Obtaining appropriate analgesic effect of treatment.             JANI Kenny - CNP

## 2021-04-29 NOTE — LETTER
Ricardo Coy  1945  [id number]    Have you had any Chest Pain that is not new? - No    Have you had any Shortness of Breath - Yes  If Yes - When on exertion    Have you had any dizziness - No    Have you had any palpitations that are not new?  - No      Is the patient on any of the following medications - Dofetilide (Tikosyn)      Do you have any edema - swelling in No        Do you have a surgery or procedure scheduled in the near future - No

## 2021-05-03 ENCOUNTER — TELEPHONE (OUTPATIENT)
Dept: CARDIOLOGY CLINIC | Age: 76
End: 2021-05-03

## 2021-05-03 NOTE — LETTER
Cardiology [address] [facility] [address]  Phone: [phone]  Fax: [phone]    5/3/2021        Lila Ruff  [address] [place] [id number]            Dear Iker Linares: This is your [facility] schedule. You can jeevan your calendar with these dates. Remember that your device is wireless and should automatically do these checks while you are sleeping. If for any reason I do not get your transmission then I will call you and ask that you send a manual transmission. If you have any questions or concerns, please call and ask for [place] at [phone]. Thank you.

## 2021-05-26 DIAGNOSIS — F33.41 RECURRENT MAJOR DEPRESSIVE DISORDER, IN PARTIAL REMISSION (HCC): ICD-10-CM

## 2021-05-27 RX ORDER — ESCITALOPRAM OXALATE 20 MG/1
TABLET ORAL
Qty: 90 TABLET | Refills: 1 | Status: SHIPPED | OUTPATIENT
Start: 2021-05-27 | End: 2021-08-30

## 2021-06-28 ENCOUNTER — PROCEDURE VISIT (OUTPATIENT)
Dept: CARDIOLOGY CLINIC | Age: 76
End: 2021-06-28
Payer: MEDICARE

## 2021-06-28 DIAGNOSIS — Z95.0 CARDIAC PACEMAKER IN SITU: Primary | ICD-10-CM

## 2021-06-28 DIAGNOSIS — I44.0 AV BLOCK, 1ST DEGREE: ICD-10-CM

## 2021-06-28 DIAGNOSIS — I49.5 SINUS NODE DYSFUNCTION (HCC): ICD-10-CM

## 2021-07-30 ENCOUNTER — OFFICE VISIT (OUTPATIENT)
Dept: FAMILY MEDICINE CLINIC | Age: 76
End: 2021-07-30
Payer: MEDICARE

## 2021-07-30 ENCOUNTER — OFFICE VISIT (OUTPATIENT)
Dept: CARDIOLOGY CLINIC | Age: 76
End: 2021-07-30
Payer: MEDICARE

## 2021-07-30 VITALS
RESPIRATION RATE: 15 BRPM | OXYGEN SATURATION: 95 % | SYSTOLIC BLOOD PRESSURE: 127 MMHG | DIASTOLIC BLOOD PRESSURE: 83 MMHG | BODY MASS INDEX: 33.42 KG/M2 | HEART RATE: 63 BPM | TEMPERATURE: 97.6 F | WEIGHT: 213.4 LBS

## 2021-07-30 VITALS
DIASTOLIC BLOOD PRESSURE: 78 MMHG | BODY MASS INDEX: 33.81 KG/M2 | RESPIRATION RATE: 14 BRPM | WEIGHT: 215.4 LBS | OXYGEN SATURATION: 98 % | HEART RATE: 67 BPM | SYSTOLIC BLOOD PRESSURE: 122 MMHG | HEIGHT: 67 IN

## 2021-07-30 DIAGNOSIS — G47.09 OTHER INSOMNIA: ICD-10-CM

## 2021-07-30 DIAGNOSIS — R21 RASH: ICD-10-CM

## 2021-07-30 DIAGNOSIS — I48.0 PAROXYSMAL ATRIAL FIBRILLATION (HCC): Primary | ICD-10-CM

## 2021-07-30 DIAGNOSIS — I48.20 CHRONIC ATRIAL FIBRILLATION (HCC): ICD-10-CM

## 2021-07-30 DIAGNOSIS — G25.81 RESTLESS LEG SYNDROME: Primary | ICD-10-CM

## 2021-07-30 DIAGNOSIS — E78.2 MIXED HYPERLIPIDEMIA: ICD-10-CM

## 2021-07-30 PROCEDURE — 4004F PT TOBACCO SCREEN RCVD TLK: CPT | Performed by: NURSE PRACTITIONER

## 2021-07-30 PROCEDURE — 1123F ACP DISCUSS/DSCN MKR DOCD: CPT | Performed by: INTERNAL MEDICINE

## 2021-07-30 PROCEDURE — 3017F COLORECTAL CA SCREEN DOC REV: CPT | Performed by: NURSE PRACTITIONER

## 2021-07-30 PROCEDURE — 3017F COLORECTAL CA SCREEN DOC REV: CPT | Performed by: INTERNAL MEDICINE

## 2021-07-30 PROCEDURE — 99214 OFFICE O/P EST MOD 30 MIN: CPT | Performed by: NURSE PRACTITIONER

## 2021-07-30 PROCEDURE — G8417 CALC BMI ABV UP PARAM F/U: HCPCS | Performed by: NURSE PRACTITIONER

## 2021-07-30 PROCEDURE — 93000 ELECTROCARDIOGRAM COMPLETE: CPT | Performed by: INTERNAL MEDICINE

## 2021-07-30 PROCEDURE — 4004F PT TOBACCO SCREEN RCVD TLK: CPT | Performed by: INTERNAL MEDICINE

## 2021-07-30 PROCEDURE — 4040F PNEUMOC VAC/ADMIN/RCVD: CPT | Performed by: NURSE PRACTITIONER

## 2021-07-30 PROCEDURE — 99213 OFFICE O/P EST LOW 20 MIN: CPT | Performed by: INTERNAL MEDICINE

## 2021-07-30 PROCEDURE — G8427 DOCREV CUR MEDS BY ELIG CLIN: HCPCS | Performed by: NURSE PRACTITIONER

## 2021-07-30 PROCEDURE — G8427 DOCREV CUR MEDS BY ELIG CLIN: HCPCS | Performed by: INTERNAL MEDICINE

## 2021-07-30 PROCEDURE — G8417 CALC BMI ABV UP PARAM F/U: HCPCS | Performed by: INTERNAL MEDICINE

## 2021-07-30 PROCEDURE — 4040F PNEUMOC VAC/ADMIN/RCVD: CPT | Performed by: INTERNAL MEDICINE

## 2021-07-30 PROCEDURE — 1123F ACP DISCUSS/DSCN MKR DOCD: CPT | Performed by: NURSE PRACTITIONER

## 2021-07-30 RX ORDER — DOFETILIDE 0.25 MG/1
250 CAPSULE ORAL 2 TIMES DAILY
Qty: 60 CAPSULE | Refills: 3 | Status: SHIPPED | OUTPATIENT
Start: 2021-07-30 | End: 2022-05-05 | Stop reason: SDUPTHER

## 2021-07-30 RX ORDER — CLONAZEPAM 0.5 MG/1
0.5 TABLET ORAL PRN
Qty: 90 TABLET | Refills: 0 | Status: SHIPPED | OUTPATIENT
Start: 2021-07-30 | End: 2022-01-14 | Stop reason: SDUPTHER

## 2021-07-30 RX ORDER — HYDROXYZINE HYDROCHLORIDE 25 MG/1
TABLET, FILM COATED ORAL
Qty: 60 TABLET | Refills: 0 | Status: SHIPPED | OUTPATIENT
Start: 2021-07-30 | End: 2022-07-22 | Stop reason: SDUPTHER

## 2021-07-30 SDOH — ECONOMIC STABILITY: FOOD INSECURITY: WITHIN THE PAST 12 MONTHS, YOU WORRIED THAT YOUR FOOD WOULD RUN OUT BEFORE YOU GOT MONEY TO BUY MORE.: NEVER TRUE

## 2021-07-30 SDOH — ECONOMIC STABILITY: FOOD INSECURITY: WITHIN THE PAST 12 MONTHS, THE FOOD YOU BOUGHT JUST DIDN'T LAST AND YOU DIDN'T HAVE MONEY TO GET MORE.: NEVER TRUE

## 2021-07-30 ASSESSMENT — PATIENT HEALTH QUESTIONNAIRE - PHQ9
SUM OF ALL RESPONSES TO PHQ9 QUESTIONS 1 & 2: 0
10. IF YOU CHECKED OFF ANY PROBLEMS, HOW DIFFICULT HAVE THESE PROBLEMS MADE IT FOR YOU TO DO YOUR WORK, TAKE CARE OF THINGS AT HOME, OR GET ALONG WITH OTHER PEOPLE: 1
8. MOVING OR SPEAKING SO SLOWLY THAT OTHER PEOPLE COULD HAVE NOTICED. OR THE OPPOSITE, BEING SO FIGETY OR RESTLESS THAT YOU HAVE BEEN MOVING AROUND A LOT MORE THAN USUAL: 0
SUM OF ALL RESPONSES TO PHQ QUESTIONS 1-9: 1
6. FEELING BAD ABOUT YOURSELF - OR THAT YOU ARE A FAILURE OR HAVE LET YOURSELF OR YOUR FAMILY DOWN: 0
SUM OF ALL RESPONSES TO PHQ QUESTIONS 1-9: 1
9. THOUGHTS THAT YOU WOULD BE BETTER OFF DEAD, OR OF HURTING YOURSELF: 0
SUM OF ALL RESPONSES TO PHQ QUESTIONS 1-9: 1
2. FEELING DOWN, DEPRESSED OR HOPELESS: 0
7. TROUBLE CONCENTRATING ON THINGS, SUCH AS READING THE NEWSPAPER OR WATCHING TELEVISION: 0
3. TROUBLE FALLING OR STAYING ASLEEP: 1
1. LITTLE INTEREST OR PLEASURE IN DOING THINGS: 0
5. POOR APPETITE OR OVEREATING: 0
4. FEELING TIRED OR HAVING LITTLE ENERGY: 0

## 2021-07-30 ASSESSMENT — ENCOUNTER SYMPTOMS
WHEEZING: 0
CHEST TIGHTNESS: 0
DIARRHEA: 0
SORE THROAT: 0
NAUSEA: 0
COUGH: 0
CONSTIPATION: 0
VOMITING: 0
ABDOMINAL PAIN: 0
STRIDOR: 0
SHORTNESS OF BREATH: 0

## 2021-07-30 ASSESSMENT — SOCIAL DETERMINANTS OF HEALTH (SDOH): HOW HARD IS IT FOR YOU TO PAY FOR THE VERY BASICS LIKE FOOD, HOUSING, MEDICAL CARE, AND HEATING?: NOT HARD AT ALL

## 2021-07-30 ASSESSMENT — ANXIETY QUESTIONNAIRES
2. NOT BEING ABLE TO STOP OR CONTROL WORRYING: 0
1. FEELING NERVOUS, ANXIOUS, OR ON EDGE: 0
GAD7 TOTAL SCORE: 0
5. BEING SO RESTLESS THAT IT IS HARD TO SIT STILL: 0
6. BECOMING EASILY ANNOYED OR IRRITABLE: 0
3. WORRYING TOO MUCH ABOUT DIFFERENT THINGS: 0
7. FEELING AFRAID AS IF SOMETHING AWFUL MIGHT HAPPEN: 0
4. TROUBLE RELAXING: 0
IF YOU CHECKED OFF ANY PROBLEMS ON THIS QUESTIONNAIRE, HOW DIFFICULT HAVE THESE PROBLEMS MADE IT FOR YOU TO DO YOUR WORK, TAKE CARE OF THINGS AT HOME, OR GET ALONG WITH OTHER PEOPLE: NOT DIFFICULT AT ALL

## 2021-07-30 NOTE — PROGRESS NOTES
Electrophysiology FU Note        Chief complaint : 6 month follow up      Primary care physician: JANI Waite CNP      History of Present Illness: This visit (7/30/2021)      Chief Complaint   Patient presents with    Atrial Fibrillation     Patient here for 6 month f/u. Patient has no complaints today. Patient does not drink alcohol or smoke. Patient drinks decaf coffee. Patient does exercise daily. Previous visit:(1/29/2021)      Chief Complaint   Patient presents with    6 Month Follow-Up     Pt here for 6 Month F/U. Pt denies Chest Pain, Palpitations, Dizziness, Edema. Pt does chew tobacco and does not drink alcohol.  Atrial Fibrillation     Pt states he gets SOB when doing something exerting himself. Previous visit:(7/24/2020)      Chief Complaint   Patient presents with    6 Month Follow-Up     Patient being seen for 6 month, he denies any chest pain,sob,dizziness, palpitations, and edema. Patient reports that he overall feels better and has not have an episode of dizziness or past syncope on July 2 that he remembers but he was off Tikosyn for couple of days in between as he ran out. Overall he is doing good and is taking precautions in this COVID time      Previous visit:(1/17/2019)      Chief Complaint   Patient presents with    Atrial Fibrillation     Dr Jessica Jha patient here for 6 month follow up. Patient states Edele Shows has felt so good - it is scary that some thing will go wrong \" . Denies any symptoms. Patient denies alcohol. Patient reports drinking about 4 cups coffee through out daily. Will also drink decaff iced tea. Patient reports that he is sending back the oxygen also as he does not need it any more per his lung doctor. No shortness of breath.  Fatigue but much better than before           Previous visit: (7/12/2019)      Chief Complaint   Patient presents with    Follow-up     Patient here today for 6 months follow up visit. States feels good and has lost 40 lbs since his last visit. Patient denies palpitations, chest pain, shortness of breath or edema. Reports some \"slight\" dizziness. Denies syncope.  Other     Patient sees Pulmonologist, Dr. Norlene Buerger in [place]. Wears home oxygen at night but not needing it as much now. Patient reports he is doing fine  He went to Lung doctor in [place] and he made him upset because he tried to send him to other doctor for ablation  He told him that tikosyn was not good  Patient reports he has no problems with medication and had no atrial fibrillation  Patient did not like his approach            Previous visit:(1/11/2019)      Chief Complaint   Patient presents with    Atrial Fibrillation     Dr Crys Varner patient here for 6 month follow up. Patient states he has felt real good since last office visit. Deneis CP, palpitations, dizziness. Does report mild swelling in both lower legs, but not as bad as has been in the past. Also reports having to use his oxygen this past week due to SOB. He states he uses 2L O2 as needed. Also reports problems sleeping recently. Patient did not bring medications or list but states no changes. Denies alcohol, does drink decaff tea. Previous visit:(6/20/2018)      Chief Complaint   Patient presents with    Atrial Fibrillation     Ak-s/p loop 5/17. Patient denies CP, palpitations, or edema. Does report 2 episodes of dizziness and SOB since having the loop inserted. He does report a history of COPD. Patient states most recent episode was about a week ago. 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 Patient did not bring med list but states no changes. He has seen a new pulmonoligist in [place] and was recently started on Clarithromycin and doxycycline, not sure why but did have pneumonia in Feb. Patient denies any fever.  He said his blood work was done and then it was decided to be placed on antibiotics           Previous visit:    Patient is a Ever Arpan old male with hx COPD, Afib with recent admission in hospital. Patient here for education of atrial fibrillation and management  He was placed on tikosyn in the hospital. Patient denies chest pain, shortness of breath. Denies fever or chills. Feeling better since discharge from hospital        Past medical history:   Past Medical History:   Diagnosis Date    Allergic rhinitis     Asthma     [facility] fibrillation [facility] [address] [facility]     CAD (coronary artery disease)     mild LAD    COPD (chronic obstructive pulmonary disease) ([facility] [address]     Depression     Diverticulosis of colon     Family history of early CAD     Father- MI-  age 46; a grandparent- age 46 of MI    Family history of valvular heart disease     Mother- Mitral valve disease    GERD (gastroesophageal reflux disease)     H/O 24 hour EKG monitoring 2001-Predominant rhythm is sinus rhythm. No signif ectopy noted.  H/O echocardiogram 2002, 2001-EF 60%. Normal LVSF. Mild MR. Mild TR-Dr Rosalba Harley;    H/O echocardiogram 2019    EF 58-92%, Grade I diastolic dysfunction, sclerotic but non stenotic aortic valve, Mild AR, Mild-Mod TR, Normal pulmonary artery pressure, no pericardial effusion     History of diverticulitis of colon     History of Holter monitoring 2018    48 hr holter - SR with PAF    Hx of cardiac cath 2013    Mild LAD disease noted.  Hx of cardiovascular stress test 2018    EF 33% Pt in a fib with RVR. No infarct or ischemia. Decreased uptake inferiorly due to diaphragmatic artifact.  HX OTHER MEDICAL 13    14 DAY EVENT: APC's, short runs of sinus tachycardia.      Hyperlipidemia     Hypertension     Hypoxemia 2018    Irregular heart rate 2018    Palpitations     Pneumonia     Restless leg syndrome     Shortness of breath 2018    Tricuspid valve regurgitation 6/2001    mild       Surgical history :   Past Surgical History:   Procedure Laterality Date    BONE RESECTION, RIB      thoracic    COLONOSCOPY  10/14/2014    polyp, divertics,    DIAGNOSTIC CARDIAC CATH LAB PROCEDURE  07/08/2013    EF 55%, Mild LAD Disease    ENDOSCOPY, COLON, DIAGNOSTIC  10/14/2014    normal    NERVE SURGERY      ulnar nerver transfer    PACEMAKER INSERTION Left 04/01/2021    [employer] J LUIS XT DR PARISA WEBBER PPM    ROTATOR CUFF REPAIR  2007, 2006 2006-right rotator cuff; 2007-Left Rotator cuff    ROTATOR CUFF REPAIR Left 11/18/2015    Dr. Lyle Fishman ARTHROSCOPY Right 12/01/2010       Family history:   Family History   Problem Relation Age of Onset    Heart Disease Mother         Mitral valve disease    Heart Attack Mother 47    Coronary Art Dis Father         MI    Heart Disease Father     Heart Attack Father 46    Heart Attack Brother 50    Heart Attack Paternal Grandfather 48       Social history :  reports that he quit smoking about 28 years ago. His smoking use included cigarettes. He has a 20.00 pack-year smoking history. His smokeless tobacco use includes chew. He reports that he does not drink alcohol and does not use drugs. No Known Allergies    Current Outpatient Medications on File Prior to Visit   Medication Sig Dispense Refill    escitalopram (LEXAPRO) 20 MG tablet TAKE 1 TABLET DAILY 90 tablet 1    apixaban (ELIQUIS) 5 MG TABS tablet Take 1 tablet by mouth 2 times daily 180 tablet 3    dofetilide (TIKOSYN) 125 MCG capsule Take 1 capsule by mouth 2 times daily 180 capsule 3    clonazePAM (KLONOPIN) 0.5 MG tablet Take 1 tablet by mouth as needed for Anxiety for up to 90 days.  90 tablet 0    atorvastatin (LIPITOR) 10 MG tablet Take 1 tablet by mouth daily 90 tablet 3    metoprolol succinate (TOPROL XL) 100 MG extended release tablet Take 1 tablet by mouth daily 90 tablet 1    omeprazole (PRILOSEC) 40 MG delayed release capsule Take 1 capsule by mouth daily Do not crush or break. 90 capsule 3    amLODIPine-benazepril (LOTREL) 5-10 MG per capsule Take 1 capsule by mouth daily 90 capsule 3    montelukast (SINGULAIR) 10 MG tablet Take 1 tablet by mouth nightly 90 tablet 3    ondansetron (ZOFRAN) 4 MG tablet Take 1 tablet by mouth every 8 hours as needed for Nausea or Vomiting 8 tablet 0    budesonide-formoterol (SYMBICORT) 160-4.5 MCG/ACT AERO Inhale 2 puffs into the lungs 2 times daily 1 Inhaler 5    albuterol sulfate HFA (VENTOLIN HFA) 108 (90 Base) MCG/ACT inhaler Inhale 2 puffs into the lungs 4 times daily as needed for Wheezing or Shortness of Breath 3 Inhaler 1    naproxen (NAPROSYN) 375 MG tablet Take 1 tablet by mouth 2 times daily as needed for Pain 20 tablet 0    Misc. Devices ([employer] CAN[facility]) MISC 1 Device by Does not apply route as needed (for ambulation) 1 each 0    UNABLE TO FIND Compounded betaval cream 15g/velvachol 135g 1 Can 5    sildenafil (VIAGRA) 100 MG tablet Take 1 tablet by mouth as needed for Erectile Dysfunction 6 tablet 3    aspirin 81 MG EC tablet Take 81 mg by mouth daily.  Cyanocobalamin (B-12) 500 MCG TABS Take 1 tablet by mouth daily       B Complex Vitamins (B COMPLEX 1 PO) Take 1 tablet by mouth daily.  folic acid (FOLVITE) 741 MCG tablet Take 400 mcg by mouth daily. No current facility-administered medications on file prior to visit. Review of Systems:   Review of Systems   Constitutional: . Negative for activity change, chills and fever. HENT: Negative for congestion, ear pain and tinnitus. Eyes: Negative for photophobia, pain and visual disturbance. Respiratory: shortness of breath with exertion only. He is sending back oxygen also as he does not need any more per his lung doctor. Negative for cough, chest tightness and wheezing. Cardiovascular: Negative for chest pain, palpitations and leg swelling.    Gastrointestinal: Negative for abdominal pain, blood in stool, 03/29/2021    HGB 13.8 03/29/2021    HCT 41.8 03/29/2021     03/29/2021     Lipids:   Lab Results   Component Value Date    CHOL 132 02/20/2018    TRIG 50 02/20/2018    HDL 54 02/20/2018    LDLCALC 68 02/20/2018     PT/INR:   Lab Results   Component Value Date    INR 1.14 03/29/2021        BMP:    Lab Results   Component Value Date     03/29/2021    K 4.0 03/29/2021     03/29/2021    CO2 29 03/29/2021    BUN 14 03/29/2021     CMP:   Lab Results   Component Value Date    AST 18 10/17/2020    PROT 6.3 (L) 10/17/2020    BILITOT 0.4 10/17/2020    ALKPHOS 86 10/17/2020     TSH:  No results found for: TSH      EKG - atrial paced rhythm, qtc with in limits    Device Assessment:      The device is Medtronic pacemaker - Dual Chamber chamber      MRI Compatible : yes    Device interrogation was performed. Mode: AAIr --- DDDr     Sensing is normal. Impedence is normal.  Threshold is normal.     There has not been interval changes. Estimated battery life is 13.3 years     The underlying rhythm is AP, VS.  80.6 % atrial paced; 1.5 % ventricular paced. Atrial Arrhythmia : 4 Episodes of atrial flutter / fibrillation controlled rate. Patient had 3 episodes on May 9 and one on July 28th     Non sustained VT episodes : No    Sustained VT episodes : No    Patient activity reported 3.8 hrs/day    The patient is atrial  pacemaker dependent. IMPRESSION / RECOMMENDATIONS:       tikosyn therapy monitoring    1. Persistent atrial fibrillation sp cardioversion and now PAF  2. Moderate to severe left ventricular systolic dysfunction IMPROVED WITH atrial fib control  3. HTN  4. HLD  5. OBESITY BMI 33  6. Sleep apnea  7. COPD      Patient reports during covid he had put on weight. Patient reports he refused to be assessed for sleep apnea and has to sleep difficulty at times.   Patient also has restless leg syndrome which could be from sleep apnea too    Increase in weight and sleep apnea could be the reason for his recurrence. Patient said he will make lifestyle changes and try to lose weight again. Patient has been very compliant with medication. Only 2 days of breakthrough episodes. Patient actually feels overall better and does not have any complaints. Increase Tikosyn to 250 mcg every 12 hours for now. We will follow in 3 months    If patient continued to have episodes of atrial fibrillation will discuss ablation if symptomatic as patient did have LV dysfunction when he was in atrial fibrillation    Patient voiced understanding and will follow up as scheduled    Continue anticoagulation    Thanks again for allowing me to participate in care of this patient. Please call me if you have any questions. With best regards.       Jose Schmitz MD

## 2021-07-30 NOTE — PROGRESS NOTES
diverticulitis of colon     History of Holter monitoring 2018    48 hr holter - SR with PAF    Hx of cardiac cath 2013    Mild LAD disease noted.  Hx of cardiovascular stress test 2018    EF 33% Pt in a fib with RVR. No infarct or ischemia. Decreased uptake inferiorly due to diaphragmatic artifact.  HX OTHER MEDICAL 13    14 DAY EVENT: APC's, short runs of sinus tachycardia.  Hyperlipidemia     Hypertension     Hypoxemia 2018    Irregular heart rate 2018    Palpitations     Pneumonia     Restless leg syndrome     Shortness of breath 2018    Tricuspid valve regurgitation 2001    mild        Social History     Tobacco Use    Smoking status: Former Smoker     Packs/day: 1.00     Years: 20.00     Pack years: 20.00     Types: Cigarettes     Quit date: 1993     Years since quittin.1    Smokeless tobacco: Current User     Types: Chew    Tobacco comment: Reviewed    Substance Use Topics    Alcohol use: No     Comment: drinking \"1 cup decaff coffee\", also drinks decaff tea \"drink it all day long\"        Review of Systems   Constitutional: Negative for activity change, appetite change, chills, fatigue, fever and unexpected weight change. HENT: Negative for congestion, ear pain, hearing loss, sore throat and tinnitus. Eyes: Negative for visual disturbance. Respiratory: Negative for cough, chest tightness, shortness of breath, wheezing and stridor. Cardiovascular: Negative for chest pain, palpitations and leg swelling. Gastrointestinal: Negative for abdominal pain, constipation, diarrhea, nausea and vomiting. Musculoskeletal: Negative for arthralgias and gait problem. Skin: Negative for rash. Neurological: Negative for dizziness, tremors, seizures, syncope, speech difficulty, weakness and headaches. Hematological: Negative for adenopathy. Psychiatric/Behavioral: Positive for sleep disturbance.  Negative for behavioral problems, confusion and suicidal ideas. The patient is not nervous/anxious. Objective:      /83 (Site: Right Upper Arm, Position: Sitting, Cuff Size: Large Adult)   Pulse 63   Temp 97.6 °F (36.4 °C) (Temporal)   Resp 15   Wt 213 lb 6.4 oz (96.8 kg)   SpO2 95%   BMI 33.42 kg/m²      Physical Exam  Vitals reviewed. Constitutional:       General: He is not in acute distress. Appearance: Normal appearance. He is obese. HENT:      Head: Normocephalic and atraumatic. Right Ear: External ear normal.      Left Ear: External ear normal.      Mouth/Throat:      Mouth: Mucous membranes are moist.      Pharynx: Oropharynx is clear. Eyes:      Extraocular Movements: Extraocular movements intact. Conjunctiva/sclera: Conjunctivae normal.      Pupils: Pupils are equal, round, and reactive to light. Neck:      Vascular: No carotid bruit. Cardiovascular:      Rate and Rhythm: Normal rate and regular rhythm. Heart sounds: Normal heart sounds. Pulmonary:      Effort: Pulmonary effort is normal.      Breath sounds: Normal breath sounds. Abdominal:      General: Bowel sounds are normal. There is no distension. Palpations: Abdomen is soft. Tenderness: There is no abdominal tenderness. Musculoskeletal:         General: Normal range of motion. Cervical back: Normal range of motion and neck supple. Right lower leg: No edema. Left lower leg: No edema. Skin:     General: Skin is warm and dry. Capillary Refill: Capillary refill takes less than 2 seconds. Findings: No rash. Neurological:      General: No focal deficit present. Mental Status: He is alert and oriented to person, place, and time. Deep Tendon Reflexes: Reflexes normal.   Psychiatric:         Mood and Affect: Mood normal.         Behavior: Behavior normal.            Assessment / Plan:      1. Restless leg syndrome  A discussion regarding medication was held with the patient.   Discussed the dangerous nature of narcotic/benzo medicines, including the risk of respiratory depression, death and addiction. OARRS reviewed, no concerning behaviors noted. - clonazePAM (KLONOPIN) 0.5 MG tablet; Take 1 tablet by mouth as needed for Anxiety for up to 90 days. Dispense: 90 tablet; Refill: 0    2. Other insomnia  Will start hydroxyzine 25-50 mg at HS for insomnia. Sleep hygiene discussed. Bed should only be used for sleep. Avoid watching TV, playing on your phone or tablet, or reading while in bed. Go to bed and wake up at the same time each day. Exercise regularly, preferably not before bedtime. Limit intake of caffeine, alcohol, and smoking. Sleep study recommended; pt refused. - hydrOXYzine (ATARAX) 25 MG tablet; 1-2 tablets at HS  Dispense: 60 tablet; Refill: 0    3. Rash  - UNABLE TO FIND; Compounded Betaval cream 15g/Eucerin 135g  Dispense: 1 Can; Refill: 5    4. Mixed hyperlipidemia  Will check labs. Exercise moderately, 30-45 minutes most days of the week. Lose weight if overweight. Increase your daily intake of grains, fresh fruits and vegetables. Reduce dietary sodium; less than 2.4 grams per day. If you smoke stop smoking. Limit alcohol intake. Reduce stress level  - Lipid, Fasting;  Future          JANI Slater - CNP

## 2021-08-02 DIAGNOSIS — I48.0 PAROXYSMAL ATRIAL FIBRILLATION (HCC): ICD-10-CM

## 2021-08-03 RX ORDER — DOFETILIDE 0.12 MG/1
125 CAPSULE ORAL 2 TIMES DAILY
Qty: 180 CAPSULE | Refills: 2 | OUTPATIENT
Start: 2021-08-03

## 2021-08-04 ENCOUNTER — HOSPITAL ENCOUNTER (OUTPATIENT)
Age: 76
Discharge: HOME | End: 2021-08-04
Payer: MEDICARE

## 2021-08-04 DIAGNOSIS — E78.2 MIXED HYPERLIPIDEMIA: ICD-10-CM

## 2021-08-04 LAB
CHOLESTEROL, FASTING: 161 MG/DL
HDLC SERPL-MCNC: 49 MG/DL
LDL CHOLESTEROL DIRECT: 100 MG/DL
TRIGLYCERIDE, FASTING: 79 MG/DL

## 2021-08-04 PROCEDURE — 93294 REM INTERROG EVL PM/LDLS PM: CPT | Performed by: INTERNAL MEDICINE

## 2021-08-04 PROCEDURE — 36415 COLL VENOUS BLD VENIPUNCTURE: CPT

## 2021-08-04 PROCEDURE — 93296 REM INTERROG EVL PM/IDS: CPT | Performed by: INTERNAL MEDICINE

## 2021-08-04 PROCEDURE — 80061 LIPID PANEL: CPT

## 2021-10-04 ENCOUNTER — PROCEDURE VISIT (OUTPATIENT)
Dept: CARDIOLOGY CLINIC | Age: 76
End: 2021-10-04

## 2021-10-04 DIAGNOSIS — Z95.0 CARDIAC PACEMAKER IN SITU: Primary | ICD-10-CM

## 2021-10-04 DIAGNOSIS — I49.5 SINUS NODE DYSFUNCTION (HCC): ICD-10-CM

## 2021-10-04 DIAGNOSIS — I44.0 AV BLOCK, 1ST DEGREE: ICD-10-CM

## 2021-10-04 PROCEDURE — 93296 REM INTERROG EVL PM/IDS: CPT | Performed by: INTERNAL MEDICINE

## 2021-10-04 PROCEDURE — 93294 REM INTERROG EVL PM/LDLS PM: CPT | Performed by: INTERNAL MEDICINE

## 2021-10-25 DIAGNOSIS — I10 ESSENTIAL HYPERTENSION: Chronic | ICD-10-CM

## 2021-10-26 RX ORDER — METOPROLOL SUCCINATE 100 MG/1
TABLET, EXTENDED RELEASE ORAL
Qty: 90 TABLET | Refills: 1 | Status: SHIPPED | OUTPATIENT
Start: 2021-10-26 | End: 2022-01-27

## 2021-10-27 DIAGNOSIS — I10 ESSENTIAL HYPERTENSION: Chronic | ICD-10-CM

## 2021-10-27 RX ORDER — METOPROLOL SUCCINATE 100 MG/1
TABLET, EXTENDED RELEASE ORAL
Qty: 90 TABLET | Refills: 1 | OUTPATIENT
Start: 2021-10-27

## 2021-11-03 ENCOUNTER — OFFICE VISIT (OUTPATIENT)
Dept: CARDIOLOGY CLINIC | Age: 76
End: 2021-11-03
Payer: MEDICARE

## 2021-11-03 VITALS
HEART RATE: 71 BPM | HEIGHT: 68 IN | DIASTOLIC BLOOD PRESSURE: 70 MMHG | SYSTOLIC BLOOD PRESSURE: 128 MMHG | WEIGHT: 214.2 LBS | BODY MASS INDEX: 32.46 KG/M2 | OXYGEN SATURATION: 95 %

## 2021-11-03 DIAGNOSIS — I48.0 PAF (PAROXYSMAL ATRIAL FIBRILLATION) (HCC): Primary | ICD-10-CM

## 2021-11-03 DIAGNOSIS — I48.20 CHRONIC ATRIAL FIBRILLATION (HCC): ICD-10-CM

## 2021-11-03 PROCEDURE — 3017F COLORECTAL CA SCREEN DOC REV: CPT | Performed by: INTERNAL MEDICINE

## 2021-11-03 PROCEDURE — 4004F PT TOBACCO SCREEN RCVD TLK: CPT | Performed by: INTERNAL MEDICINE

## 2021-11-03 PROCEDURE — 93000 ELECTROCARDIOGRAM COMPLETE: CPT | Performed by: INTERNAL MEDICINE

## 2021-11-03 PROCEDURE — 4040F PNEUMOC VAC/ADMIN/RCVD: CPT | Performed by: INTERNAL MEDICINE

## 2021-11-03 PROCEDURE — 1123F ACP DISCUSS/DSCN MKR DOCD: CPT | Performed by: INTERNAL MEDICINE

## 2021-11-03 PROCEDURE — G8427 DOCREV CUR MEDS BY ELIG CLIN: HCPCS | Performed by: INTERNAL MEDICINE

## 2021-11-03 PROCEDURE — G8417 CALC BMI ABV UP PARAM F/U: HCPCS | Performed by: INTERNAL MEDICINE

## 2021-11-03 PROCEDURE — 99213 OFFICE O/P EST LOW 20 MIN: CPT | Performed by: INTERNAL MEDICINE

## 2021-11-03 PROCEDURE — G8484 FLU IMMUNIZE NO ADMIN: HCPCS | Performed by: INTERNAL MEDICINE

## 2021-11-03 NOTE — PATIENT INSTRUCTIONS
Please be informed that if you contact our office outside of normal business hours the physician on call cannot help with any scheduling or rescheduling issues, procedure instruction questions or any type of medication issue. We advise you for any urgent/emergency that you go to the nearest emergency room! PLEASE CALL OUR OFFICE DURING NORMAL BUSINESS HOURS    Monday - Friday   8 am to 5 pm    [place]: [place] [address]: [phone]    [place]:  [phone]    **It is YOUR responsibilty to bring medication bottles and/or updated medication list to [address] [place] [address].  This will allow us to better serve you and all your healthcare needs**

## 2021-11-03 NOTE — PROGRESS NOTES
Electrophysiology FU Note        Chief complaint : 3 month follow up      Primary care physician: JANI An CNP      History of Present Illness: This visit (11/3/2021)      Chief Complaint   Patient presents with    3 Month Follow-Up     Pt denies any new cardiac symptoms since last visit . Pt doesn't drink alcohol or smoke. Pt does exercise            Previous visit: (7/30/2021)      Chief Complaint   Patient presents with    Atrial Fibrillation     Patient here for 6 month f/u. Patient has no complaints today. Patient does not drink alcohol or smoke. Patient drinks decaf coffee. Patient does exercise daily. Previous visit:(1/29/2021)      Chief Complaint   Patient presents with    6 Month Follow-Up     Pt here for 6 Month F/U. Pt denies Chest Pain, Palpitations, Dizziness, Edema. Pt does chew tobacco and does not drink alcohol.  Atrial Fibrillation     Pt states he gets SOB when doing something exerting himself. Previous visit:(7/24/2020)      Chief Complaint   Patient presents with    6 Month Follow-Up     Patient being seen for 6 month, he denies any chest pain,sob,dizziness, palpitations, and edema. Patient reports that he overall feels better and has not have an episode of dizziness or past syncope on July 2 that he remembers but he was off Tikosyn for couple of days in between as he ran out. Overall he is doing good and is taking precautions in this COVID time      Previous visit:(1/17/2019)      Chief Complaint   Patient presents with    Atrial Fibrillation     Dr Felix Pack patient here for 6 month follow up. Patient states Yeny Araiza has felt so good - it is scary that some thing will go wrong \" . Denies any symptoms. Patient denies alcohol. Patient reports drinking about 4 cups coffee through out daily. Will also drink decaff iced tea.  Patient reports that he is sending back the oxygen also as he does not need it any changes. He has seen a new pulmonoligist in [place] and was recently started on Clarithromycin and doxycycline, not sure why but did have pneumonia in Feb. Patient denies any fever. He said his blood work was done and then it was decided to be placed on antibiotics           Previous visit:    Patient is a [place] old male with hx COPD, Afib with recent admission in hospital. Patient here for education of atrial fibrillation and management  He was placed on tikosyn in the hospital. Patient denies chest pain, shortness of breath. Denies fever or chills. Feeling better since discharge from hospital        Past medical history:   Past Medical History:   Diagnosis Date    Allergic rhinitis     Asthma     [facility]     CAD (coronary artery disease)     mild LAD    COPD (chronic obstructive pulmonary disease) [facility]     Depression     Diverticulosis of colon     Family history of early CAD     Father- MI-  age 46; a grandparent- age 46 of MI    Family history of valvular heart disease     Mother- Mitral valve disease    GERD (gastroesophageal reflux disease)     H/O 24 hour EKG monitoring 2001-Predominant rhythm is sinus rhythm. No signif ectopy noted.  H/O echocardiogram 2002, 2001-EF 60%. Normal LVSF. Mild MR. Mild TR-Dr Gordo Terrazas;    H/O echocardiogram 2019    EF 09-93%, Grade I diastolic dysfunction, sclerotic but non stenotic aortic valve, Mild AR, Mild-Mod TR, Normal pulmonary artery pressure, no pericardial effusion     History of diverticulitis of colon     History of Holter monitoring 2018    48 hr holter - SR with PAF    Hx of cardiac cath 2013    Mild LAD disease noted.  Hx of cardiovascular stress test 2018    EF 33% Pt in a fib with RVR. No infarct or ischemia. Decreased uptake inferiorly due to diaphragmatic artifact.  HX OTHER MEDICAL 13    14 DAY EVENT: APC's, short runs of sinus tachycardia.      Hyperlipidemia     Hypertension     Hypoxemia 2/22/2018    Irregular heart rate 2/16/2018    Palpitations     Pneumonia     Restless leg syndrome     Shortness of breath 2/16/2018    Tricuspid valve regurgitation 6/2001    mild       Surgical history :   Past Surgical History:   Procedure Laterality Date    BONE RESECTION, RIB      thoracic    CATARACT REMOVAL Bilateral     COLONOSCOPY  10/14/2014    polyp, divertics,    DIAGNOSTIC CARDIAC CATH LAB PROCEDURE  07/08/2013    EF 55%, Mild LAD Disease    ENDOSCOPY, COLON, DIAGNOSTIC  10/14/2014    normal    NERVE SURGERY      ulnar nerver transfer    PACEMAKER INSERTION Left 04/01/2021    [employer] J LUIS XT DR MCCAULEY SURESCAN PPM    ROTATOR CUFF REPAIR  2007, 2006 2006-right rotator cuff; 2007-Left Rotator cuff    ROTATOR CUFF REPAIR Left 11/18/2015    Dr. Maria Fernanda Cooney ARTHROSCOPY Right 12/01/2010       Family history:   Family History   Problem Relation Age of Onset    Heart Disease Mother         Mitral valve disease    Heart Attack Mother 47    Coronary Art Dis Father         MI    Heart Disease Father     Heart Attack Father 46    Heart Attack Brother 50    Heart Attack Paternal Grandfather 48       Social history :  reports that he quit smoking about 28 years ago. His smoking use included cigarettes. He has a 20.00 pack-year smoking history. His smokeless tobacco use includes chew. He reports that he does not drink alcohol and does not use drugs.     No Known Allergies    Current Outpatient Medications on File Prior to Visit   Medication Sig Dispense Refill    metoprolol succinate (TOPROL XL) 100 MG extended release tablet TAKE 1 TABLET DAILY 90 tablet 1    escitalopram (LEXAPRO) 20 MG tablet TAKE 1 TABLET DAILY 90 tablet 2    dofetilide (TIKOSYN) 250 MCG capsule Take 1 capsule by mouth 2 times daily 60 capsule 3    B Complex Vitamins (VITAMIN B COMPLEX PO) DAILY      UNABLE TO FIND Compounded Betaval cream 15g/Eucerin 135g 1 Can 5    hydrOXYzine (ATARAX) 25 MG tablet 1-2 tablets at HS 60 tablet 0    apixaban (ELIQUIS) 5 MG TABS tablet Take 1 tablet by mouth 2 times daily 180 tablet 3    atorvastatin (LIPITOR) 10 MG tablet Take 1 tablet by mouth daily 90 tablet 3    omeprazole (PRILOSEC) 40 MG delayed release capsule Take 1 capsule by mouth daily Do not crush or break. 90 capsule 3    amLODIPine-benazepril (LOTREL) 5-10 MG per capsule Take 1 capsule by mouth daily 90 capsule 3    montelukast (SINGULAIR) 10 MG tablet Take 1 tablet by mouth nightly 90 tablet 3    ondansetron (ZOFRAN) 4 MG tablet Take 1 tablet by mouth every 8 hours as needed for Nausea or Vomiting 8 tablet 0    budesonide-formoterol (SYMBICORT) 160-4.5 MCG/ACT AERO Inhale 2 puffs into the lungs 2 times daily 1 Inhaler 5    albuterol sulfate HFA (VENTOLIN HFA) 108 (90 Base) MCG/ACT inhaler Inhale 2 puffs into the lungs 4 times daily as needed for Wheezing or Shortness of Breath 3 Inhaler 1    Misc. Devices ([employer] CAN[facility]) MISC 1 Device by Does not apply route as needed (for ambulation) 1 each 0    sildenafil (VIAGRA) 100 MG tablet Take 1 tablet by mouth as needed for Erectile Dysfunction 6 tablet 3    aspirin 81 MG EC tablet Take 81 mg by mouth daily.  Cyanocobalamin (B-12) 500 MCG TABS Take 1 tablet by mouth daily       folic acid (FOLVITE) 936 MCG tablet Take 400 mcg by mouth daily.  clonazePAM (KLONOPIN) 0.5 MG tablet Take 1 tablet by mouth as needed for Anxiety for up to 90 days. 90 tablet 0    naproxen (NAPROSYN) 375 MG tablet Take 1 tablet by mouth 2 times daily as needed for Pain 20 tablet 0     No current facility-administered medications on file prior to visit. Review of Systems:   Review of Systems   Constitutional: . Negative for activity change, chills and fever. HENT: Negative for congestion, ear pain and tinnitus. Eyes: Negative for photophobia, pain and visual disturbance.    Respiratory: shortness of breath with exertion only. He is sending back oxygen also as he does not need any more per his lung doctor. Negative for cough, chest tightness and wheezing. Cardiovascular: Negative for chest pain, palpitations and leg swelling. Gastrointestinal: Negative for abdominal pain, blood in stool, constipation, diarrhea, nausea and vomiting. Endocrine: Negative for cold intolerance and heat intolerance. Genitourinary: Negative for dysuria, flank pain and hematuria. Musculoskeletal: Positive for arthralgias. Negative for back pain, myalgias and neck stiffness. Skin: Negative for color change and rash. Allergic/Immunologic: Negative for food allergies. Neurological: Negative for dizziness, light-headedness, numbness and headaches. Hematological: Does not bruise/bleed easily. Psychiatric/Behavioral: Negative for agitation, behavioral problems and confusion. Physical Examination:        /70   Pulse 71   Ht 5' 7.5\" (1.715 m)   Wt 214 lb 3.2 oz (97.2 kg)   SpO2 95%   BMI 33.05 kg/m²    Wt Readings from Last 3 Encounters:   11/03/21 214 lb 3.2 oz (97.2 kg)   07/30/21 213 lb 6.4 oz (96.8 kg)   07/30/21 215 lb 6.4 oz (97.7 kg)     Body mass index is 33.05 kg/m². Physical Exam  Constitutional:       Appearance: Normal appearance. He is not ill-appearing. HENT:      Head: Normocephalic and atraumatic. Mouth/Throat:      Mouth: Mucous membranes are moist.   Eyes:      Conjunctiva/sclera: Conjunctivae normal.   Cardiovascular:      Rate and Rhythm: Normal rate and regular rhythm. Heart sounds: No murmur heard. Pulmonary:      Effort: Pulmonary effort is normal.      Breath sounds: No rales. Abdominal:      General: Abdomen is flat. Palpations: Abdomen is soft. Musculoskeletal:         General: No tenderness. Normal range of motion. Cervical back: Normal range of motion. Right lower leg: No edema. Left lower leg: No edema. Skin:     General: Skin is warm and dry. Neurological:      General: No focal deficit present. Mental Status: He is alert and oriented to person, place, and time. CBC:   Lab Results   Component Value Date    WBC 6.7 03/29/2021    HGB 13.8 03/29/2021    HCT 41.8 03/29/2021     03/29/2021     Lipids:   Lab Results   Component Value Date    CHOL 132 02/20/2018    TRIG 50 02/20/2018    HDL 49 08/04/2021    LDLCALC 68 02/20/2018    LDLDIRECT 100 (H) 08/04/2021     PT/INR:   Lab Results   Component Value Date    INR 1.14 03/29/2021        BMP:    Lab Results   Component Value Date     03/29/2021    K 4.0 03/29/2021     03/29/2021    CO2 29 03/29/2021    BUN 14 03/29/2021     CMP:   Lab Results   Component Value Date    AST 18 10/17/2020    PROT 6.3 (L) 10/17/2020    BILITOT 0.4 10/17/2020    ALKPHOS 86 10/17/2020     TSH:  No results found for: TSH      EKG - atrial paced rhythm, qtc with in limits          IMPRESSION / RECOMMENDATIONS:       tikosyn therapy monitoring    1. Persistent atrial fibrillation sp cardioversion and now PAF  2. Moderate to severe left ventricular systolic dysfunction IMPROVED WITH atrial fib control  3. HTN  4. HLD  5. OBESITY BMI 33  6. Sleep apnea  7. COPD      Patient reports he has been doing well and has no symptoms. Discussed with the patient about weight loss and patient said he would work on it  QTC is within normal limits on the EKG  Patient still does not want to be tested for sleep apnea  He reports he sleeps well and his problem is going to sleep and he is on medication to sleep now    Increase in weight and sleep apnea could be the reason for his recurrence. Patient said he will make lifestyle changes and try to lose weight again. Patient has been very compliant with medication. Patient actually feels overall better and does not have any complaints.     Continue with Tikosyn and anticoagulation - on eliquis    Patient is on metoprolol    We will follow in 6 months    If patient continued to have episodes of atrial fibrillation will discuss ablation if symptomatic as patient did have LV dysfunction when he was in atrial fibrillation    Patient voiced understanding and will follow up as scheduled      Thanks again for allowing me to participate in care of this patient. Please call me if you have any questions. With best regards.       Baltazar Pisano MD

## 2021-11-12 ENCOUNTER — OFFICE VISIT (OUTPATIENT)
Dept: CARDIOLOGY CLINIC | Age: 76
End: 2021-11-12
Payer: MEDICARE

## 2021-11-12 VITALS
SYSTOLIC BLOOD PRESSURE: 130 MMHG | BODY MASS INDEX: 33.01 KG/M2 | HEIGHT: 68 IN | WEIGHT: 217.8 LBS | HEART RATE: 84 BPM | DIASTOLIC BLOOD PRESSURE: 82 MMHG

## 2021-11-12 DIAGNOSIS — R07.9 CHEST PAIN, UNSPECIFIED TYPE: Primary | ICD-10-CM

## 2021-11-12 DIAGNOSIS — I48.0 PAROXYSMAL ATRIAL FIBRILLATION (HCC): ICD-10-CM

## 2021-11-12 PROCEDURE — 4040F PNEUMOC VAC/ADMIN/RCVD: CPT | Performed by: INTERNAL MEDICINE

## 2021-11-12 PROCEDURE — G8417 CALC BMI ABV UP PARAM F/U: HCPCS | Performed by: INTERNAL MEDICINE

## 2021-11-12 PROCEDURE — 99214 OFFICE O/P EST MOD 30 MIN: CPT | Performed by: INTERNAL MEDICINE

## 2021-11-12 PROCEDURE — 3017F COLORECTAL CA SCREEN DOC REV: CPT | Performed by: INTERNAL MEDICINE

## 2021-11-12 PROCEDURE — 1123F ACP DISCUSS/DSCN MKR DOCD: CPT | Performed by: INTERNAL MEDICINE

## 2021-11-12 PROCEDURE — 4004F PT TOBACCO SCREEN RCVD TLK: CPT | Performed by: INTERNAL MEDICINE

## 2021-11-12 PROCEDURE — G8427 DOCREV CUR MEDS BY ELIG CLIN: HCPCS | Performed by: INTERNAL MEDICINE

## 2021-11-12 PROCEDURE — G8484 FLU IMMUNIZE NO ADMIN: HCPCS | Performed by: INTERNAL MEDICINE

## 2021-11-12 NOTE — PROGRESS NOTES
CARDIOLOGY NOTE      11/12/2021    RE: Aren Alarcon  (1945)                               TO:  JANI Gusman  David Ty is a 78 y.o. male who was seen today for management of  hth                                    HPI:   Patient is here for    - Hypertension,is  well controlled, pt is  compliant with medicines  - Hyperlipidimea, lipids are in acceptable range. Pt  is  compliant with medicines  - Atrial fibrillation, pt is  compliant with meds.  Patient does not have symptoms from atrial fibrillation                The patient does not have cardiac complaints    Aren Alarcon has the following history recorded in care path:  Patient Active Problem List    Diagnosis Date Noted    PAF (paroxysmal atrial fibrillation) ([facility] 04/19/2018    Pulmonary HTN ([facility] 03/29/2018    Chronic systolic congestive heart failure ([address] 03/29/2018    Chronic atrial fibrillation ([address] 03/22/2018    Recurrent major depressive disorder, in partial remission ([address] 03/15/2018    Chronic respiratory failure with hypoxia (HCC) 03/15/2018    Chronic bronchitis (HCC) 03/12/2018    Elevated brain natriuretic peptide (BNP) level 03/12/2018    Hypoxemia 02/22/2018    Shortness of breath 02/16/2018    Nystagmus 09/18/2017    Aortic atherosclerosis ([facility] 11/30/2015    Encounter for medication review 11/30/2015    History of repair of left rotator cuff 08/17/2015    Diverticulosis 07/21/2014    COPD (chronic obstructive pulmonary disease) ([address] 05/05/2014    Tobacco use 11/20/2013    Eczema 08/20/2013    Rash 12/04/2012    Depression, major, in remission ([address] 08/06/2012    Obesity (BMI 30-39.9) 08/06/2012    Restless leg syndrome 08/06/2012    HTN (hypertension) 08/06/2012    Hyperlipidemia 08/06/2012    Sick sinus syndrome ([address]     S/P placement of cardiac pacemaker 04/01/2021    Syncope     Other insomnia 05/10/2018    Other headache syndrome 05/10/2018    MCG TABS Take 1 tablet by mouth daily       folic acid (FOLVITE) 079 MCG tablet Take 400 mcg by mouth daily. No current facility-administered medications for this visit. Allergies: Patient has no known allergies. Past Medical History:   Diagnosis Date    Allergic rhinitis     Asthma     Atrial fibrillation (HCC)     CAD (coronary artery disease)     mild LAD    COPD (chronic obstructive pulmonary disease) [facility]     Depression     Diverticulosis of colon     Family history of early CAD     Father- MI-  age 46; a grandparent- age 46 of MI    Family history of valvular heart disease     Mother- Mitral valve disease    GERD (gastroesophageal reflux disease)     H/O 24 hour EKG monitoring 2001-Predominant rhythm is sinus rhythm. No signif ectopy noted.  H/O echocardiogram 2002, 2001-EF 60%. Normal LVSF. Mild MR. Mild TR-Dr Christopher Watt;    H/O echocardiogram 2019    EF 83-01%, Grade I diastolic dysfunction, sclerotic but non stenotic aortic valve, Mild AR, Mild-Mod TR, Normal pulmonary artery pressure, no pericardial effusion     History of diverticulitis of colon     History of Holter monitoring 2018    48 hr holter - SR with PAF    Hx of cardiac cath 2013    Mild LAD disease noted.  Hx of cardiovascular stress test 2018    EF 33% Pt in a fib with RVR. No infarct or ischemia. Decreased uptake inferiorly due to diaphragmatic artifact.  HX OTHER MEDICAL 13    14 DAY EVENT: APC's, short runs of sinus tachycardia.      Hyperlipidemia     Hypertension     Hypoxemia 2018    Irregular heart rate 2018    Palpitations     Pneumonia     Restless leg syndrome     Shortness of breath 2018    Tricuspid valve regurgitation 2001    mild     Past Surgical History:   Procedure Laterality Date    BONE RESECTION, RIB      thoracic    CATARACT REMOVAL Bilateral     COLONOSCOPY  10/14/2014    polyp, divertics,    DIAGNOSTIC CARDIAC CATH LAB PROCEDURE  2013    EF 55%, Mild LAD Disease    ENDOSCOPY, COLON, DIAGNOSTIC  10/14/2014    normal    NERVE SURGERY      ulnar nerver transfer    PACEMAKER INSERTION Left 2021    [employer] J LUIS XT DR PARISA WEBBER PPM    ROTATOR CUFF REPAIR  , 2006-right rotator cuff; -Left Rotator cuff    ROTATOR CUFF REPAIR Left 2015    Dr. Miya Rodriguez ARTHROSCOPY Right 2010      As reviewed   Family History   Problem Relation Age of Onset    Heart Disease Mother         Mitral valve disease    Heart Attack Mother 47    Coronary Art Dis Father         MI    Heart Disease Father     Heart Attack Father 46    Heart Attack Brother 50    Heart Attack Paternal Grandfather 48     Social History     Tobacco Use    Smoking status: Former Smoker     Packs/day: 1.00     Years: 20.00     Pack years: 20.00     Types: Cigarettes     Quit date: 1993     Years since quittin.3    Smokeless tobacco: Current User     Types: Chew    Tobacco comment: Reviewed    Substance Use Topics    Alcohol use: No     Comment: drinking \"1 cup decaff coffee\", also drinks decaff tea \"drink it all day long\"      Review of Systems:    Constitutional: Negative for diaphoresis and fatigue  Psychological:Negative for anxiety or depression  HENT: Negative for headaches, nasal congestion, sinus pain or vertigo  Eyes: Negative for visual disturbance.    Endocrine: Negative for polydipsia/polyuria  Respiratory: Negative for shortness of breath  Cardiovascular: Negative for chest pain, dyspnea on exertion, claudication, edema, irregular heartbeat, murmur, palpitations or shortness of breath  Gastrointestinal: Negative for abdominal pain or heartburn  Genito-Urinary: Negative for urinary frequency/urgency  Musculoskeletal: Negative for muscle pain, muscular weakness, negative for pain in arm and leg or swelling in foot and leg  Neurological: Negative for CMP:   Lab Results   Component Value Date     03/29/2021    K 4.0 03/29/2021     03/29/2021    CO2 29 03/29/2021    BUN 14 03/29/2021    PROT 6.3 10/17/2020    PROT 7.1 12/04/2012     TSH:    Lab Results   Component Value Date    [facility] 2.030 04/20/2018           Impression:    1. Chest pain, unspecified type       Patient Active Problem List   Diagnosis Code    Depression, major[facility] F32.5    Obesity (BMI 30-39. 9) E66.9    Restless leg syndrome G25.81    HTN (hypertension) I10    Hyperlipidemia E78.5    Rash R21    Eczema L30.9    Tobacco use Z72.0    COPD (chronic obstructive pulmonary disease) ([facility]) J44.9    Diverticulosis K57.90    History of repair of left rotator cuff Z98.890    Aortic atherosclerosis ([facility]) I70.0    Encounter for medication review Z79.899    Nystagmus H55.00    Shortness of breath R06.02    Hypoxemia R09.02    Chronic bronchitis ([facility]) J42    Elevated brain natriuretic peptide (BNP) level R79.89    Recurrent major depressive disorder, in partial remission ([facility]) F33.41    Chronic respiratory failure with hypoxia ([facility]) J96.11    Chronic atrial fibrillation ([facility]) I48.20    Pulmonary HTN ([facility]) I27.20    Chronic systolic congestive heart failure ([facility]) I50.22    PAF (paroxysmal atrial fibrillation) ([facility]) I48.0    Other insomnia G47.09    Other headache syndrome G44.89    Dizzy spells R42    Syncope R55    S/P placement of cardiac pacemaker Z95.0    Sick sinus syndrome ([facility]) I49.5       Assessment & Plan:    -  Hypertension: Patients blood pressure is normal. Patient is advised about low sodium diet. Present medical regimen will not be changed. -  LIPID MANAGEMENT:  Available lipid  lab data reviewed  and patient was given dietary advice. NCEP- ATP III guidelines reviewed with patient.     -   Changes  in medicines made: No            - PAF  On tikosyn and eliquis  Per EP    - COPD   stable          Mortality from the morbid obesity is very high: Body mass index is 33.61 kg/m². Wt loss    Conclusions      Summary   Normal left ventricle structure and systolic function with an ejection   fraction of 55-60%. Grade I diastolic dysfunction. Sclerotic, but non-stenotic aortic valve. Mild aortic regurgitation is noted with a pressure half time of 731 msec. Mild to moderate tricuspid regurgitation. Normal pulmonary artery pressure. No evidence of pericardial effusion.       Signature      ------------------------------------------------------------------   Electronically signed by Michelle Mora MD   (Interpreting physician) on 12/05/2019 at 01:06 PM                        [address] [place]

## 2021-11-17 ENCOUNTER — TELEPHONE (OUTPATIENT)
Dept: CARDIOLOGY CLINIC | Age: 76
End: 2021-11-17

## 2021-11-17 NOTE — TELEPHONE ENCOUNTER
Jessica with [facility] called to see if patients Tykosyn dose has been changed   Patient is telling them we made a change at his last visit

## 2021-11-18 NOTE — TELEPHONE ENCOUNTER
Spoke with Dick Tavares, pharmacist with [facility]. Verified Tikosyn dose increase from 125 mcg BID to 250 mcg BID per Carlos Salazar office note from July.

## 2021-12-08 DIAGNOSIS — R53.83 FATIGUE: ICD-10-CM

## 2021-12-08 RX ORDER — CYANOCOBALAMIN (VITAMIN B-12) 500 MCG
1 TABLET ORAL 4 TIMES DAILY
Qty: 120 TABLET | Refills: 2 | Status: SHIPPED | OUTPATIENT
Start: 2021-12-08

## 2021-12-08 NOTE — TELEPHONE ENCOUNTER
----- Message from [facility] sent at 12/8/2021 10:15 AM EST -----  Subject: Refill Request    QUESTIONS  Name of Medication? Cyanocobalamin (B-12) 500 MCG TABS  Patient-reported dosage and instructions? 1 tablet 4 times daily  How many days do you have left? 0  Preferred Pharmacy? [facility] phone number (if available)? [phone]  ---------------------------------------------------------------------------  --------------  CALL BACK INFO  What is the best way for the office to contact you? OK to leave message on   voicemail  Preferred Call Back Phone Number?  [phone]

## 2021-12-09 ENCOUNTER — TELEPHONE (OUTPATIENT)
Dept: FAMILY MEDICINE CLINIC | Age: 76
End: 2021-12-09

## 2021-12-09 NOTE — TELEPHONE ENCOUNTER
Pt. Wanted this medication Cephalexin 500mg called in with the medication yesterday as well as the medication Verner Jameson called in. Pt. Has a pacemaker and feels as if he may be getting pheumonia and is out of this medication and would like it called in immediately. Please advise. Abdulaziz Russell pt.

## 2021-12-28 ENCOUNTER — HOSPITAL ENCOUNTER (OUTPATIENT)
Age: 76
Setting detail: SPECIMEN
Discharge: HOME | DRG: 177 | End: 2021-12-28
Payer: MEDICARE

## 2021-12-28 ENCOUNTER — OFFICE VISIT (OUTPATIENT)
Dept: FAMILY MEDICINE CLINIC | Age: 76
End: 2021-12-28
Payer: MEDICARE

## 2021-12-28 VITALS
RESPIRATION RATE: 12 BRPM | HEART RATE: 80 BPM | BODY MASS INDEX: 33.9 KG/M2 | WEIGHT: 216 LBS | TEMPERATURE: 97.2 F | HEIGHT: 67 IN | OXYGEN SATURATION: 91 %

## 2021-12-28 DIAGNOSIS — J44.1 COPD WITH ACUTE EXACERBATION (HCC): ICD-10-CM

## 2021-12-28 DIAGNOSIS — J06.9 URI WITH COUGH AND CONGESTION: Primary | ICD-10-CM

## 2021-12-28 PROCEDURE — G8926 SPIRO NO PERF OR DOC: HCPCS | Performed by: PHYSICIAN ASSISTANT

## 2021-12-28 PROCEDURE — U0003 INFECTIOUS AGENT DETECTION BY NUCLEIC ACID (DNA OR RNA); SEVERE ACUTE RESPIRATORY SYNDROME CORONAVIRUS 2 (SARS-COV-2) (CORONAVIRUS DISEASE [COVID-19]), AMPLIFIED PROBE TECHNIQUE, MAKING USE OF HIGH THROUGHPUT TECHNOLOGIES AS DESCRIBED BY CMS-2020-01-R: HCPCS

## 2021-12-28 PROCEDURE — 3023F SPIROM DOC REV: CPT | Performed by: PHYSICIAN ASSISTANT

## 2021-12-28 PROCEDURE — 1123F ACP DISCUSS/DSCN MKR DOCD: CPT | Performed by: PHYSICIAN ASSISTANT

## 2021-12-28 PROCEDURE — 3017F COLORECTAL CA SCREEN DOC REV: CPT | Performed by: PHYSICIAN ASSISTANT

## 2021-12-28 PROCEDURE — G8427 DOCREV CUR MEDS BY ELIG CLIN: HCPCS | Performed by: PHYSICIAN ASSISTANT

## 2021-12-28 PROCEDURE — G8484 FLU IMMUNIZE NO ADMIN: HCPCS | Performed by: PHYSICIAN ASSISTANT

## 2021-12-28 PROCEDURE — 4040F PNEUMOC VAC/ADMIN/RCVD: CPT | Performed by: PHYSICIAN ASSISTANT

## 2021-12-28 PROCEDURE — 99213 OFFICE O/P EST LOW 20 MIN: CPT | Performed by: PHYSICIAN ASSISTANT

## 2021-12-28 PROCEDURE — U0005 INFEC AGEN DETEC AMPLI PROBE: HCPCS

## 2021-12-28 PROCEDURE — G8417 CALC BMI ABV UP PARAM F/U: HCPCS | Performed by: PHYSICIAN ASSISTANT

## 2021-12-28 PROCEDURE — 4004F PT TOBACCO SCREEN RCVD TLK: CPT | Performed by: PHYSICIAN ASSISTANT

## 2021-12-28 RX ORDER — AZITHROMYCIN 250 MG/1
TABLET, FILM COATED ORAL
Qty: 6 TABLET | Refills: 0 | Status: ON HOLD | OUTPATIENT
Start: 2021-12-28 | End: 2022-01-01 | Stop reason: HOSPADM

## 2021-12-28 RX ORDER — METHYLPREDNISOLONE 4 MG/1
TABLET ORAL
Qty: 1 KIT | Refills: 0 | Status: ON HOLD | OUTPATIENT
Start: 2021-12-28 | End: 2022-01-01 | Stop reason: HOSPADM

## 2021-12-28 NOTE — PROGRESS NOTES
12/28/2021    HPI:  Chief complaint and history of present illness as per medical assistant/nurse documented today in the Flu/COVID-19 clinic. Patient presents to the clinic today with complaints of acute illness. This patient is new to me and is being seen today in outpatient walk-in clinic. He states he had a 3 to 4-day history of tactile fever, chills, cough and headache. He states he has felt tired. He denies chest pain, chest tightness or heaviness. He denies nausea, vomiting, diarrhea, loss of taste or smell. He states he has had \"a little bit of shortness of breath. \"  He denies palpitations, lightheadedness, dizziness, vision changes. He denies jaw pain, arm pain. No known ill contacts. He is vaccinated against COVID-19. He reports a well-established history of heart disease and COPD. MEDICATIONS:  Prior to Visit Medications    Medication Sig Taking?  Authorizing Provider   methylPREDNISolone (MEDROL, SELWYN,) 4 MG tablet Use as directed Yes Denice Corea PA-C   azithromycin (ZITHROMAX) 250 MG tablet Use as directed Yes Denice Corea PA-C   Cyanocobalamin (B-12) 500 MCG TABS Take 1 tablet by mouth 4 times daily  Andree [facility] [place]   omeprazole (PRILOSEC) 40 MG delayed release capsule TAKE 1 CAPSULE DAILY, DO   NOT CRUSH OR BREAK  Jhony Harrington PA-C   apixaban (ELIQUIS) 5 MG TABS tablet Take 1 tablet by mouth 2 times daily  Rupa Avina MD   metoprolol succinate (TOPROL XL) 100 MG extended release tablet TAKE 1 TABLET DAILY  [facility] [place], [place]   escitalopram (LEXAPRO) 20 MG tablet TAKE 1 TABLET DAILY  JANI Johnson CNP   dofetilide (TIKOSYN) 250 MCG capsule Take 1 capsule by mouth 2 times daily  Miguel Luna MD   B Complex Vitamins (VITAMIN B COMPLEX PO) DAILY  Historical Provider, MD   UNABLE TO FIND Compounded Betaval cream 15g/Eucerin 135g  JANI Loyola - CNP   hydrOXYzine (ATARAX) 25 MG tablet 1-2 tablets at HS  JANI Loyola CNP   clonazePAM (KLONOPIN) 0.5 MG tablet Take 1 tablet by mouth as needed for Anxiety for up to 90 days. JANI Suresh CNP   atorvastatin (LIPITOR) 10 MG tablet Take 1 tablet by mouth daily  JANI Barnett CNP   amLODIPine-benazepril (LOTREL) 5-10 MG per capsule Take 1 capsule by mouth daily  JANI Suresh CNP   montelukast (SINGULAIR) 10 MG tablet Take 1 tablet by mouth nightly  JANI Barnett CNP   ondansetron (ZOFRAN) 4 MG tablet Take 1 tablet by mouth every 8 hours as needed for Nausea or Vomiting  Romaine Ramos MD   budesonide-formoterol (SYMBICORT) 160-4.5 MCG/ACT AERO Inhale 2 puffs into the lungs 2 times daily  JANI Barnett CNP   albuterol sulfate HFA (VENTOLIN HFA) 108 (90 Base) MCG/ACT inhaler Inhale 2 puffs into the lungs 4 times daily as needed for Wheezing or Shortness of Breath  JANI Barnett CNP   naproxen (NAPROSYN) 375 MG tablet Take 1 tablet by mouth 2 times daily as needed for Pain  Mikey Trujillo PA-C   Misc. Devices ([employer] CANE) MISC 1 Device by Does not apply route as needed (for ambulation)  Mikey Trujillo PA-C   sildenafil (VIAGRA) 100 MG tablet Take 1 tablet by mouth as needed for Erectile Dysfunction  Kaushik Cabrales MD   aspirin 81 MG EC tablet Take 81 mg by mouth daily. Historical Provider, MD   folic acid (FOLVITE) 585 MCG tablet Take 400 mcg by mouth daily.     Historical Provider, MD       No Known Allergies,   Past Medical History:   Diagnosis Date    Allergic rhinitis     Asthma     [facility]rial fibrillation ([facility] [address]     CAD (coronary artery disease)     mild LAD    COPD (chronic obstructive pulmonary disease) ([facility] [address]     Depression     Diverticulosis of colon     Family history of early CAD     Father- MI-  age 46; a grandparent- age 46 of MI    Family history of valvular heart disease     Mother- Mitral valve disease    GERD (gastroesophageal reflux disease)     H/O 24 hour EKG monitoring 2001 6/18/2001-Predominant rhythm is sinus rhythm. No signif ectopy noted.  H/O echocardiogram 1/28/2002, 6/18/2001 1/28/2002-EF 60%. Normal LVSF. Mild MR. Mild TR-Dr Claudine Alva;    H/O echocardiogram 12/05/2019    EF 86-90%, Grade I diastolic dysfunction, sclerotic but non stenotic aortic valve, Mild AR, Mild-Mod TR, Normal pulmonary artery pressure, no pericardial effusion     History of diverticulitis of colon     History of Holter monitoring 04/12/2018    48 hr holter - SR with PAF    Hx of cardiac cath 07/08/2013    Mild LAD disease noted.  Hx of cardiovascular stress test 03/28/2018    EF 33% Pt in a fib with RVR. No infarct or ischemia. Decreased uptake inferiorly due to diaphragmatic artifact.  HX OTHER MEDICAL 7/30/13    14 DAY EVENT: APC's, short runs of sinus tachycardia.  Hyperlipidemia     Hypertension     Hypoxemia 2/22/2018    Irregular heart rate 2/16/2018    Palpitations     Pneumonia     Restless leg syndrome     Shortness of breath 2/16/2018    Tricuspid valve regurgitation 6/2001    mild       PHYSICAL EXAM:  Physical Exam    Vitals:    12/28/21 0839   Pulse: 80   Resp: 12   Temp: 97.2 °F (36.2 °C)   SpO2: 91%     Constitutional:  Well developed, well nourished, no work of breathing  HENT:  Normocephalic, atraumatic, bilateral external ears normal, bilateral ear canals normal, bilateral TMs normal, hearing aids in place, oropharynx moist, postnasal drip noted. No petechiae or exudate. Uvula midline and benign and airway patent. Nasal mucosa congested  Eyes:  conjunctiva normal, no discharge, no scleral icterus  Cardiovascular:  Normal heart rate, normal rhythm, no murmurs, gallops or rubs  Thorax & Lungs: Decreased breath sounds throughout all lung fields.   No respiratory distress, no wheezing, no rales, no rhonchi  Skin:  Warm, dry, no erythema, no rash, no lower extremity edema, no palpable cord  Neurologic:  Alert & oriented   Psychiatric:  Affect normal, mood normal    ASSESSMENT/PLAN:  1. URI with cough and congestion  - Covid-19 Ambulatory    2. COPD with acute exacerbation (HCC)  - methylPREDNISolone (MEDROL, SELWYN,) 4 MG tablet; Use as directed  Dispense: 1 kit; Refill: 0  - azithromycin (ZITHROMAX) 250 MG tablet; Use as directed  Dispense: 6 tablet; Refill: 0    Patient was encouraged to continue to monitor oxygen level. He states he has a pulse oximeter at home. If it stays consistently below 90% or worsens, go directly to the emergency department. Patient declined ER evaluation at this time. He understands his risks. Initiate Z-Selwyn and Medrol Dosepak as directed  Increase fluids and rest  Saline nasal spray as needed for nasal congestion  Warm salt gargles as needed for throat discomfort  Monitor temperature twice a day  Tylenol as needed for fevers and/or discomfort. Big deep breaths periodically throughout the day  Regular Mucinex over the counter as needed for chest congestion  If symptoms worsen -Go to the ER. Follow up with your primary care provider      I did don appropriate PPE (including N95 face mask, protective safety glasses, face shield, gloves, and gown) as recommended by the health facility/national standard best practice, during my interaction with the patient.     FOLLOW-UP:  No follow-ups on file.    n    CHIP ROSA PA-C

## 2021-12-28 NOTE — PATIENT INSTRUCTIONS
too much mucus in your lungs, coughing, and being short of breath. What can you do to manage most infections at home? · Do not smoke. Avoid secondhand smoke. · Take an over-the-counter pain medicine, such as acetaminophen (Tylenol), ibuprofen (Advil, Motrin), or naproxen (Aleve). Read and follow all instructions on the label. · Be careful when taking over-the-counter cold or flu medicines and Tylenol at the same time. Many of these medicines have acetaminophen, which is Tylenol. Read the labels to make sure that you are not taking more than the recommended dose. Too much acetaminophen (Tylenol) can be harmful. · If your doctor prescribed antibiotics, take them as directed. Do not stop taking them just because you feel better. You need to take the full course of antibiotics. · Ask your doctor about cough medicines and decongestants. Some doctors recommend these medicines, while others feel that they do not help. · Learn breathing techniques for COPD, such as breathing through pursed lips. These techniques can help you breathe easier. What can you do to prevent these infections? Stay healthy   · If you must be around people with colds or the flu, wash your hands often. · Do not smoke. This is the most important step you can take to prevent more damage to your lungs. If you need help quitting, talk to your doctor about stop-smoking programs and medicines. These can increase your chances of quitting for good. · Avoid secondhand smoke and air pollution. Try to stay inside with your windows closed when air pollution is bad. Exercise and eat well   · If your doctor recommends it, get more exercise. Walking is a good choice. Bit by bit, increase the amount you walk every day. Try for at least 30 minutes on most days of the week. · Try to eat a variety of healthy foods. This gives your body energy and helps your body fight infection. · Get plenty of rest and sleep.   Follow-up care is a key part of your treatment and safety. Be sure to make and go to all appointments, and call your doctor if you are having problems. It's also a good idea to know your test results and keep a list of the medicines you take. Where can you learn more? Go to [email or web address] org and sign in to your [employer] account. Enter L465 in the Fix[employer] box to learn more about \"Learning About COPD and Upper Respiratory Infections. \"     If you do not have an account, please click on the \"Sign Up Now\" link. Current as of: July 6, 2021               Content Version: 13.1  © 1110-9777 [employer]wise, [employer]. Care instructions adapted under license by [address] If you have questions about a medical condition or this instruction, always ask your healthcare professional. [address] any warranty or liability for your use of this information.

## 2021-12-28 NOTE — PROGRESS NOTES
12/28/21  [facility]  1945    FLU/COVID-19 CLINIC EVALUATION    HPI SYMPTOMS:    Employer:Retired  [x] Fevers  [x] Chills  [x] Cough  [] Coughing up blood  [] Chest Congestion  [] Nasal Congestion  [] Feeling short of breath  [] Sometimes  [] Frequently  [] All the time  [] Chest pain  [x] Headaches  [x]Tolerable  [] Severe  [] Sore throat  [] Muscle aches  [] Nausea  [] Vomiting  []Unable to keep fluids down  [] Diarrhea  []Severe    [x] OTHER SYMPTOMS:  Weakness  Symptom Duration:   [] 1  [] 2   [] 3   [x] 4    [] 5   [] 6   [] 7   [] 8   [] 9   [] 10   [] 11   [] 12   [] 13   [] 14   [] Longer than 14 days    Symptom course:   [] Worsening     [] Stable     [x] Improving    RISK FACTORS:    [] Pregnant or possibly pregnant  [x] Age over 61  [] Diabetes  [x] Heart disease  [] Asthma  [x] COPD/Other chronic lung diseases  [] Active Cancer  [] On Chemotherapy  [] Taking oral steroids  [] History Lymphoma/Leukemia  [] Close contact with a lab confirmed COVID-19 patient within 14 days of symptom onset  [] History of travel from affected geographical areas within 14 days of symptom onset       VITALS:  There were no vitals filed for this visit. TESTS:    POCT FLU:  [] Positive     []Negative    ASSESSMENT:    [] Flu  [] Possible COVID-19  [] Strep    PLAN:    [] Discharge home with written instructions for:  [] Flu management  [] Possible COVID-19 infection with self-quarantine and management of symptoms  [] Follow-up with primary care physician or emergency department if worsens  [] Evaluation per physician/NP/PA in clinic  [] Sent to ER       An  electronic signature was used to authenticate this note.      --Deborah Guevara MA on 12/28/2021 at 8:13 AM

## 2021-12-29 LAB
SARS-COV-2: DETECTED
SOURCE: ABNORMAL

## 2021-12-30 ENCOUNTER — HOSPITAL ENCOUNTER (INPATIENT)
Age: 76
LOS: 2 days | Discharge: HOME | DRG: 177 | End: 2022-01-01
Attending: INTERNAL MEDICINE | Admitting: INTERNAL MEDICINE
Payer: MEDICARE

## 2021-12-30 ENCOUNTER — APPOINTMENT (OUTPATIENT)
Dept: GENERAL RADIOLOGY | Age: 76
DRG: 177 | End: 2021-12-30
Payer: MEDICARE

## 2021-12-30 ENCOUNTER — TELEPHONE (OUTPATIENT)
Dept: FAMILY MEDICINE CLINIC | Age: 76
End: 2021-12-30

## 2021-12-30 ENCOUNTER — APPOINTMENT (OUTPATIENT)
Dept: CT IMAGING | Age: 76
DRG: 177 | End: 2021-12-30
Payer: MEDICARE

## 2021-12-30 DIAGNOSIS — U07.1 COVID-19: Primary | ICD-10-CM

## 2021-12-30 DIAGNOSIS — R09.02 HYPOXIA: ICD-10-CM

## 2021-12-30 DIAGNOSIS — R06.00 DYSPNEA, UNSPECIFIED TYPE: ICD-10-CM

## 2021-12-30 PROBLEM — J96.01 ACUTE RESPIRATORY FAILURE WITH HYPOXIA (HCC): Status: ACTIVE | Noted: 2021-12-30

## 2021-12-30 LAB
ALBUMIN SERPL-MCNC: 3.2 GM/DL (ref 3.4–5)
ALP BLD-CCNC: 67 IU/L (ref 40–129)
ALT SERPL-CCNC: 28 U/L (ref 10–40)
ANION GAP SERPL CALCULATED.3IONS-SCNC: 11 MMOL/L (ref 4–16)
AST SERPL-CCNC: 31 IU/L (ref 15–37)
BASE EXCESS MIXED: 5.5 (ref 0–1.2)
BASOPHILS ABSOLUTE: 0 K/CU MM
BASOPHILS RELATIVE PERCENT: 0.1 % (ref 0–1)
BILIRUB SERPL-MCNC: 0.4 MG/DL (ref 0–1)
BUN BLDV-MCNC: 19 MG/DL (ref 6–23)
CALCIUM SERPL-MCNC: 8.4 MG/DL (ref 8.3–10.6)
CHLORIDE BLD-SCNC: 100 MMOL/L (ref 99–110)
CO2: 28 MMOL/L (ref 21–32)
CREAT SERPL-MCNC: 0.7 MG/DL (ref 0.9–1.3)
D DIMER: 542 NG/ML(DDU)
DIFFERENTIAL TYPE: ABNORMAL
EOSINOPHILS ABSOLUTE: 0 K/CU MM
EOSINOPHILS RELATIVE PERCENT: 0 % (ref 0–3)
FERRITIN: 1074 NG/ML (ref 30–400)
GFR AFRICAN AMERICAN: >60 ML/MIN/1.73M2
GFR NON-AFRICAN AMERICAN: >60 ML/MIN/1.73M2
GLUCOSE BLD-MCNC: 103 MG/DL (ref 70–99)
HCO3 VENOUS: 31.1 MMOL/L (ref 19–25)
HCT VFR BLD CALC: 38.7 % (ref 42–52)
HEMOGLOBIN: 12.6 GM/DL (ref 13.5–18)
HIGH SENSITIVE C-REACTIVE PROTEIN: 38.1 MG/L
IMMATURE NEUTROPHIL %: 0.6 % (ref 0–0.43)
LACTATE DEHYDROGENASE: 290 IU/L (ref 120–246)
LACTATE: 1.5 MMOL/L (ref 0.4–2)
LYMPHOCYTES ABSOLUTE: 0.6 K/CU MM
LYMPHOCYTES RELATIVE PERCENT: 5.5 % (ref 24–44)
MAGNESIUM: 2.2 MG/DL (ref 1.8–2.4)
MCH RBC QN AUTO: 30.4 PG (ref 27–31)
MCHC RBC AUTO-ENTMCNC: 32.6 % (ref 32–36)
MCV RBC AUTO: 93.3 FL (ref 78–100)
MONOCYTES ABSOLUTE: 0.5 K/CU MM
MONOCYTES RELATIVE PERCENT: 5.1 % (ref 0–4)
NUCLEATED RBC %: 0 %
O2 SAT, VEN: 72.8 % (ref 50–70)
PCO2, VEN: 48 MMHG (ref 38–52)
PDW BLD-RTO: 13.2 % (ref 11.7–14.9)
PH VENOUS: 7.42 (ref 7.32–7.42)
PLATELET # BLD: 141 K/CU MM (ref 140–440)
PMV BLD AUTO: 10 FL (ref 7.5–11.1)
PO2, VEN: 36 MMHG (ref 28–48)
POTASSIUM SERPL-SCNC: 3.4 MMOL/L (ref 3.5–5.1)
PRO-BNP: 249.6 PG/ML
PROCALCITONIN: 0.22
RBC # BLD: 4.15 M/CU MM (ref 4.6–6.2)
SEGMENTED NEUTROPHILS ABSOLUTE COUNT: 9.2 K/CU MM
SEGMENTED NEUTROPHILS RELATIVE PERCENT: 88.7 % (ref 36–66)
SODIUM BLD-SCNC: 139 MMOL/L (ref 135–145)
TOTAL IMMATURE NEUTOROPHIL: 0.06 K/CU MM
TOTAL NUCLEATED RBC: 0 K/CU MM
TOTAL PROTEIN: 6.6 GM/DL (ref 6.4–8.2)
TROPONIN T: <0.01 NG/ML
WBC # BLD: 10.4 K/CU MM (ref 4–10.5)

## 2021-12-30 PROCEDURE — 84145 PROCALCITONIN (PCT): CPT

## 2021-12-30 PROCEDURE — 82805 BLOOD GASES W/O2 SATURATION: CPT

## 2021-12-30 PROCEDURE — 71045 X-RAY EXAM CHEST 1 VIEW: CPT

## 2021-12-30 PROCEDURE — 83605 ASSAY OF LACTIC ACID: CPT

## 2021-12-30 PROCEDURE — 2060000000 HC ICU INTERMEDIATE R&B

## 2021-12-30 PROCEDURE — 80053 COMPREHEN METABOLIC PANEL: CPT

## 2021-12-30 PROCEDURE — 83880 ASSAY OF NATRIURETIC PEPTIDE: CPT

## 2021-12-30 PROCEDURE — 82728 ASSAY OF FERRITIN: CPT

## 2021-12-30 PROCEDURE — 84484 ASSAY OF TROPONIN QUANT: CPT

## 2021-12-30 PROCEDURE — 71275 CT ANGIOGRAPHY CHEST: CPT

## 2021-12-30 PROCEDURE — 6370000000 HC RX 637 (ALT 250 FOR IP): Performed by: PHYSICIAN ASSISTANT

## 2021-12-30 PROCEDURE — 99285 EMERGENCY DEPT VISIT HI MDM: CPT

## 2021-12-30 PROCEDURE — 6360000002 HC RX W HCPCS: Performed by: INTERNAL MEDICINE

## 2021-12-30 PROCEDURE — 96374 THER/PROPH/DIAG INJ IV PUSH: CPT

## 2021-12-30 PROCEDURE — 83615 LACTATE (LD) (LDH) ENZYME: CPT

## 2021-12-30 PROCEDURE — 85379 FIBRIN DEGRADATION QUANT: CPT

## 2021-12-30 PROCEDURE — 86141 C-REACTIVE PROTEIN HS: CPT

## 2021-12-30 PROCEDURE — 6360000004 HC RX CONTRAST MEDICATION: Performed by: PHYSICIAN ASSISTANT

## 2021-12-30 PROCEDURE — 85025 COMPLETE CBC W/AUTO DIFF WBC: CPT

## 2021-12-30 PROCEDURE — 2580000003 HC RX 258: Performed by: INTERNAL MEDICINE

## 2021-12-30 PROCEDURE — 6360000002 HC RX W HCPCS: Performed by: PHYSICIAN ASSISTANT

## 2021-12-30 PROCEDURE — 93005 ELECTROCARDIOGRAM TRACING: CPT | Performed by: PHYSICIAN ASSISTANT

## 2021-12-30 PROCEDURE — 94640 AIRWAY INHALATION TREATMENT: CPT

## 2021-12-30 PROCEDURE — 83735 ASSAY OF MAGNESIUM: CPT

## 2021-12-30 RX ORDER — PANTOPRAZOLE SODIUM 40 MG/1
40 TABLET, DELAYED RELEASE ORAL
Status: DISCONTINUED | OUTPATIENT
Start: 2021-12-31 | End: 2022-01-02 | Stop reason: HOSPADM

## 2021-12-30 RX ORDER — BUDESONIDE AND FORMOTEROL FUMARATE DIHYDRATE 160; 4.5 UG/1; UG/1
2 AEROSOL RESPIRATORY (INHALATION) 2 TIMES DAILY
Status: DISCONTINUED | OUTPATIENT
Start: 2021-12-31 | End: 2022-01-02 | Stop reason: HOSPADM

## 2021-12-30 RX ORDER — DEXAMETHASONE SODIUM PHOSPHATE 10 MG/ML
6 INJECTION, SOLUTION INTRAMUSCULAR; INTRAVENOUS EVERY 24 HOURS
Status: DISCONTINUED | OUTPATIENT
Start: 2021-12-30 | End: 2022-01-02 | Stop reason: HOSPADM

## 2021-12-30 RX ORDER — LISINOPRIL 10 MG/1
10 TABLET ORAL DAILY
Status: DISCONTINUED | OUTPATIENT
Start: 2021-12-31 | End: 2022-01-02 | Stop reason: HOSPADM

## 2021-12-30 RX ORDER — POLYETHYLENE GLYCOL 3350 17 G/17G
17 POWDER, FOR SOLUTION ORAL DAILY PRN
Status: DISCONTINUED | OUTPATIENT
Start: 2021-12-30 | End: 2022-01-02 | Stop reason: HOSPADM

## 2021-12-30 RX ORDER — FOLIC ACID 1 MG/1
500 TABLET ORAL DAILY
Status: DISCONTINUED | OUTPATIENT
Start: 2021-12-31 | End: 2022-01-02 | Stop reason: HOSPADM

## 2021-12-30 RX ORDER — DOXYCYCLINE HYCLATE 100 MG
100 TABLET ORAL 2 TIMES DAILY
Status: DISCONTINUED | OUTPATIENT
Start: 2021-12-31 | End: 2021-12-31

## 2021-12-30 RX ORDER — AMLODIPINE BESYLATE AND BENAZEPRIL HYDROCHLORIDE 5; 10 MG/1; MG/1
1 CAPSULE ORAL DAILY
Status: DISCONTINUED | OUTPATIENT
Start: 2021-12-31 | End: 2021-12-30 | Stop reason: CLARIF

## 2021-12-30 RX ORDER — ACETAMINOPHEN 650 MG/1
650 SUPPOSITORY RECTAL EVERY 6 HOURS PRN
Status: DISCONTINUED | OUTPATIENT
Start: 2021-12-30 | End: 2022-01-02 | Stop reason: HOSPADM

## 2021-12-30 RX ORDER — ASPIRIN 81 MG/1
81 TABLET ORAL DAILY
Status: DISCONTINUED | OUTPATIENT
Start: 2021-12-31 | End: 2022-01-02 | Stop reason: HOSPADM

## 2021-12-30 RX ORDER — ONDANSETRON 2 MG/ML
4 INJECTION INTRAMUSCULAR; INTRAVENOUS EVERY 6 HOURS PRN
Status: DISCONTINUED | OUTPATIENT
Start: 2021-12-30 | End: 2022-01-02 | Stop reason: HOSPADM

## 2021-12-30 RX ORDER — DEXAMETHASONE SODIUM PHOSPHATE 10 MG/ML
6 INJECTION, SOLUTION INTRAMUSCULAR; INTRAVENOUS ONCE
Status: COMPLETED | OUTPATIENT
Start: 2021-12-30 | End: 2021-12-30

## 2021-12-30 RX ORDER — METOPROLOL SUCCINATE 100 MG/1
100 TABLET, EXTENDED RELEASE ORAL DAILY
Status: DISCONTINUED | OUTPATIENT
Start: 2021-12-31 | End: 2022-01-02 | Stop reason: HOSPADM

## 2021-12-30 RX ORDER — MONTELUKAST SODIUM 10 MG/1
10 TABLET ORAL NIGHTLY
Status: DISCONTINUED | OUTPATIENT
Start: 2021-12-31 | End: 2022-01-02 | Stop reason: HOSPADM

## 2021-12-30 RX ORDER — ACETAMINOPHEN 325 MG/1
650 TABLET ORAL EVERY 6 HOURS PRN
Status: DISCONTINUED | OUTPATIENT
Start: 2021-12-30 | End: 2022-01-02 | Stop reason: HOSPADM

## 2021-12-30 RX ORDER — HYDROXYZINE HYDROCHLORIDE 25 MG/1
25 TABLET, FILM COATED ORAL 3 TIMES DAILY PRN
Status: DISCONTINUED | OUTPATIENT
Start: 2021-12-30 | End: 2022-01-02 | Stop reason: HOSPADM

## 2021-12-30 RX ORDER — SODIUM CHLORIDE 0.9 % (FLUSH) 0.9 %
5-40 SYRINGE (ML) INJECTION EVERY 12 HOURS SCHEDULED
Status: DISCONTINUED | OUTPATIENT
Start: 2021-12-30 | End: 2022-01-02 | Stop reason: HOSPADM

## 2021-12-30 RX ORDER — ALBUTEROL SULFATE 90 UG/1
2 AEROSOL, METERED RESPIRATORY (INHALATION) ONCE
Status: COMPLETED | OUTPATIENT
Start: 2021-12-30 | End: 2021-12-30

## 2021-12-30 RX ORDER — ONDANSETRON 4 MG/1
4 TABLET, ORALLY DISINTEGRATING ORAL EVERY 8 HOURS PRN
Status: DISCONTINUED | OUTPATIENT
Start: 2021-12-30 | End: 2022-01-02 | Stop reason: HOSPADM

## 2021-12-30 RX ORDER — CLONAZEPAM 0.5 MG/1
0.5 TABLET ORAL DAILY PRN
Status: DISCONTINUED | OUTPATIENT
Start: 2021-12-30 | End: 2022-01-02 | Stop reason: HOSPADM

## 2021-12-30 RX ORDER — AMLODIPINE BESYLATE 5 MG/1
5 TABLET ORAL DAILY
Status: DISCONTINUED | OUTPATIENT
Start: 2021-12-31 | End: 2022-01-02 | Stop reason: HOSPADM

## 2021-12-30 RX ORDER — SODIUM CHLORIDE 9 MG/ML
25 INJECTION, SOLUTION INTRAVENOUS PRN
Status: DISCONTINUED | OUTPATIENT
Start: 2021-12-30 | End: 2022-01-02 | Stop reason: HOSPADM

## 2021-12-30 RX ORDER — ALBUTEROL SULFATE 90 UG/1
2 AEROSOL, METERED RESPIRATORY (INHALATION) 4 TIMES DAILY PRN
Status: DISCONTINUED | OUTPATIENT
Start: 2021-12-30 | End: 2022-01-02 | Stop reason: HOSPADM

## 2021-12-30 RX ORDER — ESCITALOPRAM OXALATE 10 MG/1
20 TABLET ORAL DAILY
Status: DISCONTINUED | OUTPATIENT
Start: 2021-12-31 | End: 2022-01-02 | Stop reason: HOSPADM

## 2021-12-30 RX ORDER — DOFETILIDE 0.25 MG/1
250 CAPSULE ORAL 2 TIMES DAILY
Status: DISCONTINUED | OUTPATIENT
Start: 2021-12-31 | End: 2022-01-02 | Stop reason: HOSPADM

## 2021-12-30 RX ORDER — GUAIFENESIN/DEXTROMETHORPHAN 100-10MG/5
5 SYRUP ORAL EVERY 4 HOURS PRN
Status: DISCONTINUED | OUTPATIENT
Start: 2021-12-30 | End: 2022-01-02 | Stop reason: HOSPADM

## 2021-12-30 RX ORDER — SODIUM CHLORIDE 0.9 % (FLUSH) 0.9 %
5-40 SYRINGE (ML) INJECTION PRN
Status: DISCONTINUED | OUTPATIENT
Start: 2021-12-30 | End: 2022-01-02 | Stop reason: HOSPADM

## 2021-12-30 RX ORDER — ATORVASTATIN CALCIUM 10 MG/1
10 TABLET, FILM COATED ORAL DAILY
Status: DISCONTINUED | OUTPATIENT
Start: 2021-12-31 | End: 2022-01-02 | Stop reason: HOSPADM

## 2021-12-30 RX ADMIN — IOPAMIDOL 75 ML: 755 INJECTION, SOLUTION INTRAVENOUS at 17:12

## 2021-12-30 RX ADMIN — DEXAMETHASONE SODIUM PHOSPHATE 6 MG: 10 INJECTION, SOLUTION INTRAMUSCULAR; INTRAVENOUS at 18:54

## 2021-12-30 RX ADMIN — SODIUM CHLORIDE, PRESERVATIVE FREE 10 ML: 5 INJECTION INTRAVENOUS at 23:14

## 2021-12-30 RX ADMIN — ALBUTEROL SULFATE 2 PUFF: 90 AEROSOL, METERED RESPIRATORY (INHALATION) at 15:47

## 2021-12-30 RX ADMIN — DEXAMETHASONE SODIUM PHOSPHATE 6 MG: 10 INJECTION, SOLUTION INTRAMUSCULAR; INTRAVENOUS at 22:10

## 2021-12-30 NOTE — ED TRIAGE NOTES
Patient presented to the ED complaining of shortness of breath with exertion. Patient stated that he tested positive for Covid this past week and was seen at urgent care three days ago with similar symptoms related to difficulty breathing. Initial O2 sat on room air according to EMS was 88 percent. Patient on 2 lpm @ 93% on canula.

## 2021-12-30 NOTE — ED NOTES
Bed: ED-21  Expected date:   Expected time:   Means of arrival:   Comments:  [address]  12/30/21 [id number]

## 2021-12-30 NOTE — TELEPHONE ENCOUNTER
----- Message from Lashaun Negron sent at 12/29/2021  1:27 PM EST -----  Subject: Refill Request    QUESTIONS  Name of Medication? Other - antibotics   Patient-reported dosage and instructions? as written  How many days do you have left? 0  Preferred Pharmacy? [address] phone number (if available)? [phone]  Additional Information for Provider? patient test positive for COVID on   12/28/2021 and O2 is at 91% as of yesterday, patient sees provider Kayla,   please call when request is made  ---------------------------------------------------------------------------  --------------  [address]  What is the best way for the office to contact you? OK to leave message on   voicemail  Preferred Call Back Phone Number?  [phone]

## 2021-12-30 NOTE — ED PROVIDER NOTES
EMERGENCY DEPARTMENT ENCOUNTER      PCP: JANI Rapp - CNP    CHIEF COMPLAINT    Chief Complaint   Patient presents with    Shortness of Breath         Of note, this patient was not evaluated by attending physician, attending physician was available for consultation. HPI    Ofelia Serrano is a 78 y.o. male who presents to the emergency department today with a chief complaint shortness of breath in the setting of COVID-19. Patient states that he tested +2 days ago, has felt more short of breath, now requiring oxygen. Has been vaccinated, has a history of COPD. States that he is just not been feeling well having a mild cough. He denies any chest pain no shortness of breath. He also has a history of a pacemaker. He denies peripheral edema. Has been eating and drinking. Overall appears well. REVIEW OF SYSTEMS    Constitutional:  Denies fever, chills, weight loss or weakness   HENT:  Denies sore throat or ear pain   Cardiovascular:  No chest pain. No palpitations, No syncope  Respiratory:  See HPI. GI:  Denies abdominal pain, nausea, vomiting, or diarrhea  :  Denies any urinary symptoms .    Musculoskeletal:  Denies back pain,   Skin:  Denies rash  Neurologic:  Denies headache, focal weakness or sensory changes   Endocrine:  Denies polyuria or polydypsia   Lymphatic:  Denies swollen glands   All other review of systems are negative  See HPI and nursing notes for additional information     PAST MEDICAL & SURGICAL HISTORY    Past Medical History:   Diagnosis Date    Allergic rhinitis     Asthma     Atrial fibrillation ([address]     CAD (coronary artery disease)     mild LAD    COPD (chronic obstructive pulmonary disease) ([address]     Depression     Diverticulosis of colon     Family history of early CAD     Father- MI-  age 46; a grandparent- age 46 of MI    Family history of valvular heart disease     Mother- Mitral valve disease    GERD (gastroesophageal reflux disease)     H/O 25 hour EKG monitoring 6/18/2001 6/18/2001-Predominant rhythm is sinus rhythm. No signif ectopy noted.  H/O echocardiogram 1/28/2002, 6/18/2001 1/28/2002-EF 60%. Normal LVSF. Mild MR. Mild TR-Dr Kaiden Bell;    H/O echocardiogram 12/05/2019    EF 02-73%, Grade I diastolic dysfunction, sclerotic but non stenotic aortic valve, Mild AR, Mild-Mod TR, Normal pulmonary artery pressure, no pericardial effusion     History of diverticulitis of colon     History of Holter monitoring 04/12/2018    48 hr holter - SR with PAF    Hx of cardiac cath 07/08/2013    Mild LAD disease noted.  Hx of cardiovascular stress test 03/28/2018    EF 33% Pt in a fib with RVR. No infarct or ischemia. Decreased uptake inferiorly due to diaphragmatic artifact.  HX OTHER MEDICAL 7/30/13    14 DAY EVENT: APC's, short runs of sinus tachycardia.      Hyperlipidemia     Hypertension     Hypoxemia 2/22/2018    Irregular heart rate 2/16/2018    Palpitations     Pneumonia     Restless leg syndrome     Shortness of breath 2/16/2018    Tricuspid valve regurgitation 6/2001    mild     Past Surgical History:   Procedure Laterality Date    BONE RESECTION, RIB      thoracic    CATARACT REMOVAL Bilateral     COLONOSCOPY  10/14/2014    polyp, divertics,    DIAGNOSTIC CARDIAC CATH LAB PROCEDURE  07/08/2013    EF 55%, Mild LAD Disease    ENDOSCOPY, COLON, DIAGNOSTIC  10/14/2014    normal    NERVE SURGERY      ulnar nerver transfer    PACEMAKER INSERTION Left 04/01/2021    [facility] J LUIS XT DR PARISA WEBBER PPM    ROTATOR CUFF REPAIR  2007, 2006 2006-right rotator cuff; 2007-Left Rotator cuff    ROTATOR CUFF REPAIR Left 11/18/2015    Dr. Randy Pettit ARTHROSCOPY Right 12/01/2010       CURRENT MEDICATIONS    Current Outpatient Rx   Medication Sig Dispense Refill    methylPREDNISolone (MEDROL, SELWYN,) 4 MG tablet Use as directed 1 kit 0    azithromycin (ZITHROMAX) 250 MG tablet Use as directed 6 tablet 0    Cyanocobalamin (B-12) 500 MCG TABS Take 1 tablet by mouth 4 times daily 120 tablet 2    omeprazole (PRILOSEC) 40 MG delayed release capsule TAKE 1 CAPSULE DAILY, DO   NOT CRUSH OR BREAK 90 capsule 0    apixaban (ELIQUIS) 5 MG TABS tablet Take 1 tablet by mouth 2 times daily 180 tablet 3    metoprolol succinate (TOPROL XL) 100 MG extended release tablet TAKE 1 TABLET DAILY 90 tablet 1    escitalopram (LEXAPRO) 20 MG tablet TAKE 1 TABLET DAILY 90 tablet 2    dofetilide (TIKOSYN) 250 MCG capsule Take 1 capsule by mouth 2 times daily 60 capsule 3    B Complex Vitamins (VITAMIN B COMPLEX PO) DAILY      UNABLE TO FIND Compounded Betaval cream 15g/Eucerin 135g 1 Can 5    hydrOXYzine (ATARAX) 25 MG tablet 1-2 tablets at HS 60 tablet 0    clonazePAM (KLONOPIN) 0.5 MG tablet Take 1 tablet by mouth as needed for Anxiety for up to 90 days. 90 tablet 0    atorvastatin (LIPITOR) 10 MG tablet Take 1 tablet by mouth daily 90 tablet 3    amLODIPine-benazepril (LOTREL) 5-10 MG per capsule Take 1 capsule by mouth daily 90 capsule 3    montelukast (SINGULAIR) 10 MG tablet Take 1 tablet by mouth nightly 90 tablet 3    ondansetron (ZOFRAN) 4 MG tablet Take 1 tablet by mouth every 8 hours as needed for Nausea or Vomiting 8 tablet 0    budesonide-formoterol (SYMBICORT) 160-4.5 MCG/ACT AERO Inhale 2 puffs into the lungs 2 times daily 1 Inhaler 5    albuterol sulfate HFA (VENTOLIN HFA) 108 (90 Base) MCG/ACT inhaler Inhale 2 puffs into the lungs 4 times daily as needed for Wheezing or Shortness of Breath 3 Inhaler 1    naproxen (NAPROSYN) 375 MG tablet Take 1 tablet by mouth 2 times daily as needed for Pain 20 tablet 0    Misc. Devices ([employer] CANE) MISC 1 Device by Does not apply route as needed (for ambulation) 1 each 0    sildenafil (VIAGRA) 100 MG tablet Take 1 tablet by mouth as needed for Erectile Dysfunction 6 tablet 3    aspirin 81 MG EC tablet Take 81 mg by mouth daily.         folic acid (FOLVITE) 734 MCG tablet Take 400 mcg by mouth daily. ALLERGIES    No Known Allergies    SOCIAL & FAMILY HISTORY    Social History     Socioeconomic History    Marital status:      Spouse name: None    Number of children: None    Years of education: None    Highest education level: None   Occupational History    None   Tobacco Use    Smoking status: Former Smoker     Packs/day: 1.00     Years: 20.00     Pack years: 20.00     Types: Cigarettes     Quit date: 1993     Years since quittin.5    Smokeless tobacco: Current User     Types: Chew    Tobacco comment: Reviewed    Vaping Use    Vaping Use: Never used   Substance and Sexual Activity    Alcohol use: No     Comment: drinking \"1 cup decaff coffee\", also drinks decaff tea \"drink it all day long\"    Drug use: No    Sexual activity: Yes     Partners: Male     Comment:    Other Topics Concern    None   Social History Narrative    None     Social Determinants of Health     Financial Resource Strain: Low Risk     Difficulty of Paying Living Expenses: Not hard at all   Food Insecurity: No Food Insecurity    Worried About Running Out of Food in the Last Year: Never true    Angela of Food in the Last Year: Never true   Transportation Needs:     Lack of Transportation (Medical): Not on file    Lack of Transportation (Non-Medical):  Not on file   Physical Activity:     Days of Exercise per Week: Not on file    Minutes of Exercise per Session: Not on file   Stress:     Feeling of Stress : Not on file   Social Connections:     Frequency of Communication with Friends and Family: Not on file    Frequency of Social Gatherings with Friends and Family: Not on file    Attends Jewish Services: Not on file    Active Member of Clubs or Organizations: Not on file    Attends Club or Organization Meetings: Not on file    Marital Status: Not on file   Intimate Partner Violence:     Fear of Current or Ex-Partner: Not on file    Emotionally Abused: Not on file   Laura Larger Physically Abused: Not on file    Sexually Abused: Not on file   Housing Stability:     Unable to Pay for Housing in the Last Year: Not on file    Number of Places Lived in the Last Year: Not on file    Unstable Housing in the Last Year: Not on file     Family History   Problem Relation Age of Onset    Heart Disease Mother         Mitral valve disease    Heart Attack Mother 47    Coronary Art Dis Father         MI    Heart Disease Father     Heart Attack Father 46    Heart Attack Brother 50    Heart Attack Paternal Grandfather 48       PHYSICAL EXAM    VITAL SIGNS: /86   Pulse 77   Temp 99.2 °F (37.3 °C) (Oral)   Resp 19   Ht 5' 7.5\" (1.715 m)   Wt 210 lb (95.3 kg)   SpO2 93%   BMI 32.41 kg/m²    Constitutional:  Well developed, well nourished, no acute distress   HENT:  Atraumatic, moist mucus membranes  Neck/Lymphatics: supple, no JVD, no swollen nodes  Respiratory:   Nonlabored breathing. Rate 19. Lungs CTAB, no retractions   Cardiovascular:  Rate regular, normal Rhythm,  no murmurs/rubs/gallops. No carotid bruits or murmurs heard in carotids. No JVD  GI:  Soft, nontender, normal bowel sounds  Musculoskeletal:    There is no edema, asymmetry, or calf / thigh tenderness bilaterally. No cyanosis. No cool or pale-appearing limb. Distal cap refill and pulses intact bilateral upper and lower extremities  Bilateral upper and lower extremity ROM intact without pain or obvious deficit  Integument:  Skin is warm and dry, no petechiae   Neurologic:  Alert & oriented, no slurred speech  Psych: Pleasant affect, no hallucinations      EKG    See supervising this note for EKG interpretation.     LABS:  Results for orders placed or performed during the hospital encounter of 12/30/21   CBC auto diff   Result Value Ref Range    WBC 10.4 4.0 - 10.5 K/CU MM    RBC 4.15 (L) 4.6 - 6.2 M/CU MM    Hemoglobin 12.6 (L) 13.5 - 18.0 GM/DL    Hematocrit 38.7 (L) 42 - 52 %    MCV 93.3 78 - 100 FL    MCH 30.4 27 - 31 PG    MCHC 32.6 32.0 - 36.0 %    RDW 13.2 11.7 - 14.9 %    Platelets 290 231 - 807 K/CU MM    MPV 10.0 7.5 - 11.1 FL    Differential Type AUTOMATED DIFFERENTIAL     Segs Relative 88.7 (H) 36 - 66 %    Lymphocytes % 5.5 (L) 24 - 44 %    Monocytes % 5.1 (H) 0 - 4 %    Eosinophils % 0.0 0 - 3 %    Basophils % 0.1 0 - 1 %    Segs Absolute 9.2 K/CU MM    Lymphocytes Absolute 0.6 K/CU MM    Monocytes Absolute 0.5 K/CU MM    Eosinophils Absolute 0.0 K/CU MM    Basophils Absolute 0.0 K/CU MM    Nucleated RBC % 0.0 %    Total Nucleated RBC 0.0 K/CU MM    Total Immature Neutrophil 0.06 K/CU MM    Immature Neutrophil % 0.6 (H) 0 - 0.43 %   CMP   Result Value Ref Range    Sodium 139 135 - 145 MMOL/L    Potassium 3.4 (L) 3.5 - 5.1 MMOL/L    Chloride 100 99 - 110 mMol/L    CO2 28 21 - 32 MMOL/L    BUN 19 6 - 23 MG/DL    CREATININE 0.7 (L) 0.9 - 1.3 MG/DL    Glucose 103 (H) 70 - 99 MG/DL    Calcium 8.4 8.3 - 10.6 MG/DL    Albumin 3.2 (L) 3.4 - 5.0 GM/DL    Total Protein 6.6 6.4 - 8.2 GM/DL    Total Bilirubin 0.4 0.0 - 1.0 MG/DL    ALT 28 10 - 40 U/L    AST 31 15 - 37 IU/L    Alkaline Phosphatase 67 40 - 129 IU/L    GFR Non-African American >60 >60 mL/min/1.73m2    GFR African American >60 >60 mL/min/1.73m2    Anion Gap 11 4 - 16   Troponin   Result Value Ref Range    Troponin T <0.010 <0.01 NG/ML   Brain Natriuretic Peptide   Result Value Ref Range    Pro-.6 <300 PG/ML   Magnesium   Result Value Ref Range    Magnesium 2.2 1.8 - 2.4 mg/dl   Blood Gas, Venous   Result Value Ref Range    pH, Homer 7.42 7.32 - 7.42    pCO2, Homer 48 38 - 52 mmHG    pO2, [place] 36 28 - 48 mmHG    Base Exc, Mixed 5.5 (H) 0 - 1.2    HCO3, Venous 31.1 (H) 19 - 25 MMOL/L    O2 Sat, Homer 72.8 (H) 50 - 70 %   Lactic Acid, Plasma   Result Value Ref Range    Lactate 1.5 0.4 - 2.0 mMOL/L   EKG 12 Lead   Result Value Ref Range    Ventricular Rate 72 BPM    Atrial Rate 72 BPM    P-R Interval 174 ms    QRS Duration 102 ms    Q-T Interval 460 ms    QTc Calculation (Bazett) 503 ms    P Axis 30 degrees    R Axis -15 degrees    T Axis 0 degrees    Diagnosis       Normal sinus rhythm  Nonspecific ST abnormality  Prolonged QT  Abnormal ECG  When compared with ECG of 17-OCT-2020 14:13,  T wave inversion less evident in Inferior leads       RADIOLOGY/PROCEDURES    XR CHEST PORTABLE    Result Date: 12/30/2021  EXAMINATION: ONE XRAY VIEW OF THE CHEST 12/30/2021 3:52 pm COMPARISON: 04/01/2021 HISTORY: ORDERING SYSTEM PROVIDED HISTORY: shortness of breath TECHNOLOGIST PROVIDED HISTORY: Reason for exam:->shortness of breath Reason for Exam: sob FINDINGS: Cardiomediastinal silhouette is normal in size. Aortic atherosclerosis. Left subclavian cardiac pacing device is unchanged in position. No large pleural effusion or pneumothorax. There are linear bibasilar opacities. No acute osseous abnormality. Linear bibasilar opacities, atelectasis versus pneumonia. ED COURSE & MEDICAL DECISION MAKING      Patient presents as above. Emergent etiologies considered. Patient seen and examined. Patient recently tested positive for COVID-19 2 days ago, has become more progressively short of breath. Now requiring oxygen, oxygen still in the 90s, 9192 on 2 L. Has a low-grade fever, is normotensive, pulse 77. Has history of COPD and a pacemaker. No signs of peripheral edema. No obvious cough or congestion. Broad work-up initiated secondary to his hypoxia and setting of Covid. Overall patient's work-up is reassuring. No significant abnormalities. He did have some mild inverted T waves on his EKG with troponin within normal limits. Chest x-ray did show linear bibasilar opacities, which is atelectasis versus underlying pneumonia. He is requiring oxygenation, will be given Decadron, will to be admitted for further evaluation of his COVID-19 with underlying hypoxia. CLINICAL IMPRESSION  1. COVID-19    2. Dyspnea, unspecified type    3.  Hypoxia      Vitals:  Blood pressure 128/78, pulse 72, temperature 98 °F (36.7 °C), temperature source Oral, resp. rate 20, height 5' 7.5\" (1.715 m), weight 210 lb (95.3 kg), SpO2 92 %. Comment: Please note this report has been produced using speech recognition software and may contain errors related to that system including errors in grammar, punctuation, and spelling, as well as words and phrases that may be inappropriate. If there are any questions or concerns please feel free to contact the dictating provider for clarification.                         Elizabeth Harrison 411, PA  01/02/22 Lalitha Aqq. 106, PA  01/02/22 [id number]

## 2021-12-31 LAB
ALBUMIN SERPL-MCNC: 3.3 GM/DL (ref 3.4–5)
ALP BLD-CCNC: 63 IU/L (ref 40–129)
ALT SERPL-CCNC: 25 U/L (ref 10–40)
ANION GAP SERPL CALCULATED.3IONS-SCNC: 10 MMOL/L (ref 4–16)
APTT: 38.3 SECONDS (ref 25.1–37.1)
AST SERPL-CCNC: 26 IU/L (ref 15–37)
BASOPHILS ABSOLUTE: 0 K/CU MM
BASOPHILS RELATIVE PERCENT: 0 % (ref 0–1)
BILIRUB SERPL-MCNC: 0.5 MG/DL (ref 0–1)
BUN BLDV-MCNC: 17 MG/DL (ref 6–23)
CALCIUM SERPL-MCNC: 8.3 MG/DL (ref 8.3–10.6)
CHLORIDE BLD-SCNC: 99 MMOL/L (ref 99–110)
CO2: 28 MMOL/L (ref 21–32)
CREAT SERPL-MCNC: 0.6 MG/DL (ref 0.9–1.3)
D DIMER: 528 NG/ML(DDU)
DIFFERENTIAL TYPE: ABNORMAL
EOSINOPHILS ABSOLUTE: 0 K/CU MM
EOSINOPHILS RELATIVE PERCENT: 0 % (ref 0–3)
FIBRINOGEN LEVEL: 513 MG/DL (ref 196.9–442.1)
GFR AFRICAN AMERICAN: >60 ML/MIN/1.73M2
GFR NON-AFRICAN AMERICAN: >60 ML/MIN/1.73M2
GLUCOSE BLD-MCNC: 155 MG/DL (ref 70–99)
HCT VFR BLD CALC: 38.5 % (ref 42–52)
HEMOGLOBIN: 12.7 GM/DL (ref 13.5–18)
HIGH SENSITIVE C-REACTIVE PROTEIN: 18.8 MG/L
IMMATURE NEUTROPHIL %: 1 % (ref 0–0.43)
INR BLD: 1.18 INDEX
LACTATE: 1.8 MMOL/L (ref 0.4–2)
LYMPHOCYTES ABSOLUTE: 0.4 K/CU MM
LYMPHOCYTES RELATIVE PERCENT: 8.7 % (ref 24–44)
MCH RBC QN AUTO: 30.3 PG (ref 27–31)
MCHC RBC AUTO-ENTMCNC: 33 % (ref 32–36)
MCV RBC AUTO: 91.9 FL (ref 78–100)
MONOCYTES ABSOLUTE: 0.2 K/CU MM
MONOCYTES RELATIVE PERCENT: 4.1 % (ref 0–4)
NUCLEATED RBC %: 0 %
PDW BLD-RTO: 13.2 % (ref 11.7–14.9)
PLATELET # BLD: 142 K/CU MM (ref 140–440)
PMV BLD AUTO: 10 FL (ref 7.5–11.1)
POTASSIUM SERPL-SCNC: 3.6 MMOL/L (ref 3.5–5.1)
PROTHROMBIN TIME: 15.2 SECONDS (ref 11.7–14.5)
RBC # BLD: 4.19 M/CU MM (ref 4.6–6.2)
SEGMENTED NEUTROPHILS ABSOLUTE COUNT: 4.2 K/CU MM
SEGMENTED NEUTROPHILS RELATIVE PERCENT: 86.2 % (ref 36–66)
SODIUM BLD-SCNC: 137 MMOL/L (ref 135–145)
TOTAL IMMATURE NEUTOROPHIL: 0.05 K/CU MM
TOTAL NUCLEATED RBC: 0 K/CU MM
TOTAL PROTEIN: 6.1 GM/DL (ref 6.4–8.2)
TROPONIN T: <0.01 NG/ML
VITAMIN D 25-HYDROXY: 19.85 NG/ML
WBC # BLD: 4.9 K/CU MM (ref 4–10.5)

## 2021-12-31 PROCEDURE — 85025 COMPLETE CBC W/AUTO DIFF WBC: CPT

## 2021-12-31 PROCEDURE — 2580000003 HC RX 258: Performed by: HOSPITALIST

## 2021-12-31 PROCEDURE — 94640 AIRWAY INHALATION TREATMENT: CPT

## 2021-12-31 PROCEDURE — 84484 ASSAY OF TROPONIN QUANT: CPT

## 2021-12-31 PROCEDURE — 2500000003 HC RX 250 WO HCPCS: Performed by: HOSPITALIST

## 2021-12-31 PROCEDURE — 2700000000 HC OXYGEN THERAPY PER DAY

## 2021-12-31 PROCEDURE — 85610 PROTHROMBIN TIME: CPT

## 2021-12-31 PROCEDURE — 87449 NOS EACH ORGANISM AG IA: CPT

## 2021-12-31 PROCEDURE — XW033E5 INTRODUCTION OF REMDESIVIR ANTI-INFECTIVE INTO PERIPHERAL VEIN, PERCUTANEOUS APPROACH, NEW TECHNOLOGY GROUP 5: ICD-10-PCS | Performed by: HOSPITALIST

## 2021-12-31 PROCEDURE — 6360000002 HC RX W HCPCS: Performed by: INTERNAL MEDICINE

## 2021-12-31 PROCEDURE — 83605 ASSAY OF LACTIC ACID: CPT

## 2021-12-31 PROCEDURE — 94761 N-INVAS EAR/PLS OXIMETRY MLT: CPT

## 2021-12-31 PROCEDURE — 36415 COLL VENOUS BLD VENIPUNCTURE: CPT

## 2021-12-31 PROCEDURE — 6370000000 HC RX 637 (ALT 250 FOR IP): Performed by: INTERNAL MEDICINE

## 2021-12-31 PROCEDURE — 80053 COMPREHEN METABOLIC PANEL: CPT

## 2021-12-31 PROCEDURE — 85730 THROMBOPLASTIN TIME PARTIAL: CPT

## 2021-12-31 PROCEDURE — 87899 AGENT NOS ASSAY W/OPTIC: CPT

## 2021-12-31 PROCEDURE — 87205 SMEAR GRAM STAIN: CPT

## 2021-12-31 PROCEDURE — 85384 FIBRINOGEN ACTIVITY: CPT

## 2021-12-31 PROCEDURE — 82306 VITAMIN D 25 HYDROXY: CPT

## 2021-12-31 PROCEDURE — 87070 CULTURE OTHR SPECIMN AEROBIC: CPT

## 2021-12-31 PROCEDURE — 86141 C-REACTIVE PROTEIN HS: CPT

## 2021-12-31 PROCEDURE — 2060000000 HC ICU INTERMEDIATE R&B

## 2021-12-31 PROCEDURE — 2580000003 HC RX 258: Performed by: INTERNAL MEDICINE

## 2021-12-31 PROCEDURE — 85379 FIBRIN DEGRADATION QUANT: CPT

## 2021-12-31 RX ORDER — 0.9 % SODIUM CHLORIDE 0.9 %
30 INTRAVENOUS SOLUTION INTRAVENOUS PRN
Status: DISCONTINUED | OUTPATIENT
Start: 2021-12-31 | End: 2022-01-02 | Stop reason: HOSPADM

## 2021-12-31 RX ADMIN — ATORVASTATIN CALCIUM 10 MG: 10 TABLET, FILM COATED ORAL at 09:26

## 2021-12-31 RX ADMIN — ALBUTEROL SULFATE 2 PUFF: 90 AEROSOL, METERED RESPIRATORY (INHALATION) at 11:02

## 2021-12-31 RX ADMIN — BUDESONIDE AND FORMOTEROL FUMARATE DIHYDRATE 2 PUFF: 160; 4.5 AEROSOL RESPIRATORY (INHALATION) at 09:00

## 2021-12-31 RX ADMIN — SODIUM CHLORIDE, PRESERVATIVE FREE 10 ML: 5 INJECTION INTRAVENOUS at 09:27

## 2021-12-31 RX ADMIN — CEFTRIAXONE SODIUM 1000 MG: 1 INJECTION, POWDER, FOR SOLUTION INTRAMUSCULAR; INTRAVENOUS at 00:44

## 2021-12-31 RX ADMIN — AMLODIPINE BESYLATE 5 MG: 5 TABLET ORAL at 09:26

## 2021-12-31 RX ADMIN — APIXABAN 5 MG: 5 TABLET, FILM COATED ORAL at 00:45

## 2021-12-31 RX ADMIN — REMDESIVIR 200 MG: 100 INJECTION, POWDER, LYOPHILIZED, FOR SOLUTION INTRAVENOUS at 23:13

## 2021-12-31 RX ADMIN — BUDESONIDE AND FORMOTEROL FUMARATE DIHYDRATE 2 PUFF: 160; 4.5 AEROSOL RESPIRATORY (INHALATION) at 23:20

## 2021-12-31 RX ADMIN — PANTOPRAZOLE SODIUM 40 MG: 40 TABLET, DELAYED RELEASE ORAL at 06:20

## 2021-12-31 RX ADMIN — DOXYCYCLINE HYCLATE 100 MG: 100 TABLET, COATED ORAL at 00:45

## 2021-12-31 RX ADMIN — HYDROXYZINE HYDROCHLORIDE 25 MG: 25 TABLET, FILM COATED ORAL at 20:07

## 2021-12-31 RX ADMIN — SODIUM CHLORIDE, PRESERVATIVE FREE 10 ML: 5 INJECTION INTRAVENOUS at 20:08

## 2021-12-31 RX ADMIN — DOFETILIDE 250 MCG: 0.25 CAPSULE ORAL at 01:30

## 2021-12-31 RX ADMIN — LISINOPRIL 10 MG: 10 TABLET ORAL at 09:26

## 2021-12-31 RX ADMIN — APIXABAN 5 MG: 5 TABLET, FILM COATED ORAL at 09:27

## 2021-12-31 RX ADMIN — APIXABAN 5 MG: 5 TABLET, FILM COATED ORAL at 20:07

## 2021-12-31 RX ADMIN — HYDROXYZINE HYDROCHLORIDE 25 MG: 25 TABLET, FILM COATED ORAL at 00:45

## 2021-12-31 RX ADMIN — MONTELUKAST 10 MG: 10 TABLET, FILM COATED ORAL at 00:45

## 2021-12-31 RX ADMIN — DOXYCYCLINE HYCLATE 100 MG: 100 TABLET, COATED ORAL at 09:27

## 2021-12-31 RX ADMIN — DEXAMETHASONE SODIUM PHOSPHATE 6 MG: 10 INJECTION, SOLUTION INTRAMUSCULAR; INTRAVENOUS at 20:08

## 2021-12-31 RX ADMIN — ESCITALOPRAM OXALATE 20 MG: 10 TABLET ORAL at 09:26

## 2021-12-31 RX ADMIN — DOFETILIDE 250 MCG: 0.25 CAPSULE ORAL at 09:27

## 2021-12-31 RX ADMIN — MONTELUKAST 10 MG: 10 TABLET, FILM COATED ORAL at 20:07

## 2021-12-31 RX ADMIN — ASPIRIN 81 MG: 81 TABLET, COATED ORAL at 09:26

## 2021-12-31 RX ADMIN — FOLIC ACID 500 MCG: 1 TABLET ORAL at 09:27

## 2021-12-31 RX ADMIN — METOPROLOL SUCCINATE 100 MG: 100 TABLET, EXTENDED RELEASE ORAL at 09:31

## 2021-12-31 RX ADMIN — DOFETILIDE 250 MCG: 0.25 CAPSULE ORAL at 20:07

## 2021-12-31 ASSESSMENT — PAIN SCALES - GENERAL
PAINLEVEL_OUTOF10: 0

## 2021-12-31 NOTE — ED PROVIDER NOTES
EKG is interpreted by me. EKG shows sinus rhythm at 72 bpm, left axis deviation, no remarkable ST segment elevations or depressions, T waves appear to be overall unremarkable, there is an inverted T wave in lead III, MS interval 174, QRS duration of 102, QTC of 503. Final impression, nonspecific EKG.     Anabel Fraser MD  12/30/2021  10:17 PM        Anabel Fraser MD  12/30/21 [id number]

## 2021-12-31 NOTE — PROGRESS NOTES
Pt reports that he has not had a productive cough. Pt reports that he will notify RN if he is able to give sputum sample.

## 2021-12-31 NOTE — H&P
HISTORY AND PHYSICAL  (Hospitalist, Internal Medicine)  IDENTIFYING INFORMATION   PATIENT:  Brittni Devine  MRN:  [id number]  ADMIT DATE: 12/30/2021      CHIEF COMPLAINT   Shortness of breath    HISTORY OF PRESENT ILLNESS   Brittni Devine is a 78 y.o. male with coronary artery disease, chronic atrial fibrillation (cardioversion-4/2018) s/p pacemaker (4/2021), on anticoagulation, chronic systolic CHF, pulmonary hypertension, COPD, not on home oxygen, hypertension, depression, hyperlipidemia, GERD presented to ED because of low oxygen at home. Patient reported that since last 3 weeks he has been feeling sick. Patient has chronic shortness of breath, but got worse in the past 1 week. Patient was seen in urgent care 12/28. Patient was diagnosed with COVID, was discharged on Medrol Dosepak, azithromycin. Patient was also noted to be having oxygen saturation 91% during his visit at urgent care. Patient was advised to monitor his oxygen saturation and present to ER if oxygen saturation less than 90%. Patient denied any fever, chills, nausea, vomiting, abdominal pain, denied any urinary complaints, denied any constipation or diarrhea. At presentation patient was noted to have /86, HR 77, RR 19, temp 99.2, saturating 93% on 3 L of oxygen. Lab work significant for potassium 3.4, lactic acid 1.5, procalcitonin 0.222, troponin<0.010, proBNP 249, hemoglobin 12.6. VBG-pH 7.42, PCO2 48, PO2 36, HCO3 31.1. Chest x-ray-linear bibasilar opacities, atelectasis versus pneumonia. CTA chest-no PE, patchy peripheral groundglass infiltrates within both lungs, compatible with COVID-19 pneumonia, moderate severity. Patient received albuterol HFA, dexamethasone 6 mg IV. Patient is vaccinated for Covid-received J&J vaccine.     PAST MEDICAL HISTORY PAST SURGICAL HISTORY   coronary artery disease, chronic atrial fibrillation (cardioversion-2018) s/p pacemaker (2021), on anticoagulation, chronic systolic CHF, pulmonary hypertension, COPD, not on home oxygen, hypertension, depression, hyperlipidemia, GERD   bilateral cataract surgery, pacemaker placement, bilateral rotator cuff repair   FAMILY HISTORY SOCIAL HISTORY    Strong family history of CAD-reports father, mother, brother  of MI less than 61years of age. Denies smoking-remote history, alcohol, illicit drug use   MEDICATIONS ALLERGIES    Reviewed medications with patient-vitamin B12 500 MCG daily, omeprazole 40 mg daily, Eliquis 5 mg twice daily, metoprolol succinate 100 mg daily, Escitalopram 20 mg daily, Tikosyn to 50 MCG twice daily, clonazepam 0.5 mg as needed, atorvastatin 10 mg daily, amlodipine-benazepril 5-10 daily, Singulair 10 mg nightly, Symbicort twice daily, aspirin 81 mg daily, folic acid daily. No known drug allergies. PAST MEDICAL, SURGICAL, FAMILY, and SOCIAL HISTORY         Past Medical History:   Diagnosis Date    Allergic rhinitis     Asthma     Atrial fibrillation [facility] [address]     CAD (coronary artery disease)     mild LAD    COPD (chronic obstructive pulmonary disease) ([facility] [address]     Depression     Diverticulosis of colon     Family history of early CAD     Father- MI-  age 46; a grandparent- age 46 of MI    Family history of valvular heart disease     Mother- Mitral valve disease    GERD (gastroesophageal reflux disease)     H/O 24 hour EKG monitoring 2001-Predominant rhythm is sinus rhythm. No signif ectopy noted.  H/O echocardiogram 2002, 2001-EF 60%. Normal LVSF. Mild MR.  Mild TR-Dr Segundo Sheffield;    H/O echocardiogram 2019    EF 75-12%, Grade I diastolic dysfunction, sclerotic but non stenotic aortic valve, Mild AR, Mild-Mod TR, Normal pulmonary artery pressure, no pericardial effusion     History of diverticulitis of colon     History of Holter monitoring 2018    48 hr holter - SR with PAF    Hx of cardiac cath 07/08/2013    Mild LAD disease noted.  Hx of cardiovascular stress test 03/28/2018    EF 33% Pt in a fib with RVR. No infarct or ischemia. Decreased uptake inferiorly due to diaphragmatic artifact.  HX OTHER MEDICAL 7/30/13    14 DAY EVENT: APC's, short runs of sinus tachycardia.      Hyperlipidemia     Hypertension     Hypoxemia 2/22/2018    Irregular heart rate 2/16/2018    Palpitations     Pneumonia     Restless leg syndrome     Shortness of breath 2/16/2018    Tricuspid valve regurgitation 6/2001    mild     Past Surgical History:   Procedure Laterality Date    BONE RESECTION, RIB      thoracic    CATARACT REMOVAL Bilateral     COLONOSCOPY  10/14/2014    polyp, divertics,    DIAGNOSTIC CARDIAC CATH LAB PROCEDURE  07/08/2013    EF 55%, Mild LAD Disease    ENDOSCOPY, COLON, DIAGNOSTIC  10/14/2014    normal    NERVE SURGERY      ulnar nerver transfer    PACEMAKER INSERTION Left 04/01/2021    [facility] J LUIS XT  MRI SURESCAN PPM    ROTATOR CUFF REPAIR  2007, 2006 2006-right rotator cuff; 2007-Left Rotator cuff    ROTATOR CUFF REPAIR Left 11/18/2015    Dr. Jazlyn Lawrence ARTHROSCOPY Right 12/01/2010     Family History   Problem Relation Age of Onset    Heart Disease Mother         Mitral valve disease    Heart Attack Mother 47    Coronary Art Dis Father         MI    Heart Disease Father     Heart Attack Father 46    Heart Attack Brother 50    Heart Attack Paternal Grandfather 48     Family Hx of HTN  Family Hx as reviewed above, otherwise non-contributory  Social History     Socioeconomic History    Marital status:      Spouse name: None    Number of children: None    Years of education: None    Highest education level: None   Occupational History    None   Tobacco Use    Smoking status: Former Smoker     Packs/day: 1.00     Years: 20.00     Pack years: 20.00     Types: Cigarettes     Quit date: 6/28/1993 Admin    sodium chloride flush 0.9 % injection 5-40 mL  5-40 mL IntraVENous 2 times per day Debbie Mondragon MD        sodium chloride flush 0.9 % injection 5-40 mL  5-40 mL IntraVENous PRN Debbie Mondragon MD        0.9 % sodium chloride infusion  25 mL IntraVENous PRN Debbie Mondragon MD        ondansetron (ZOFRAN-ODT) disintegrating tablet 4 mg  4 mg Oral Q8H PRN Debbie Mondragon MD        Or    ondansetron (ZOFRAN) injection 4 mg  4 mg IntraVENous Q6H PRN Debbie Mondragon MD        polyethylene glycol (GLYCOLAX) packet 17 g  17 g Oral Daily PRN Debbie Mondragon MD        acetaminophen (TYLENOL) tablet 650 mg  650 mg Oral Q6H PRN Debbie Mondragon MD        Or   Zhao acetaminophen (TYLENOL) suppository 650 mg  650 mg Rectal Q6H PRN Debbie Mondragon MD        guaiFENesin-dextromethorphan (ROBITUSSIN DM) 100-10 MG/5ML syrup 5 mL  5 mL Oral Q4H PRN Debbie Mondragon MD        dexamethasone (PF) (DECADRON) injection 6 mg  6 mg IntraVENous Q24H Debbie Mondragon MD   6 mg at 12/30/21 2210     Current Outpatient Medications   Medication Sig Dispense Refill    methylPREDNISolone (MEDROL, SELWYN,) 4 MG tablet Use as directed 1 kit 0    azithromycin (ZITHROMAX) 250 MG tablet Use as directed 6 tablet 0    Cyanocobalamin (B-12) 500 MCG TABS Take 1 tablet by mouth 4 times daily 120 tablet 2    omeprazole (PRILOSEC) 40 MG delayed release capsule TAKE 1 CAPSULE DAILY, DO   NOT CRUSH OR BREAK 90 capsule 0    apixaban (ELIQUIS) 5 MG TABS tablet Take 1 tablet by mouth 2 times daily 180 tablet 3    metoprolol succinate (TOPROL XL) 100 MG extended release tablet TAKE 1 TABLET DAILY 90 tablet 1    escitalopram (LEXAPRO) 20 MG tablet TAKE 1 TABLET DAILY 90 tablet 2    dofetilide (TIKOSYN) 250 MCG capsule Take 1 capsule by mouth 2 times daily 60 capsule 3    B Complex Vitamins (VITAMIN B COMPLEX PO) DAILY      UNABLE TO FIND Compounded Betaval cream 15g/Eucerin 135g 1 Can 5    hydrOXYzine (ATARAX) 25 MG tablet 1-2 tablets at HS 60 tablet 0    clonazePAM (KLONOPIN) 0.5 MG tablet Take 1 tablet by mouth as needed for Anxiety for up to 90 days. 90 tablet 0    atorvastatin (LIPITOR) 10 MG tablet Take 1 tablet by mouth daily 90 tablet 3    amLODIPine-benazepril (LOTREL) 5-10 MG per capsule Take 1 capsule by mouth daily 90 capsule 3    montelukast (SINGULAIR) 10 MG tablet Take 1 tablet by mouth nightly 90 tablet 3    ondansetron (ZOFRAN) 4 MG tablet Take 1 tablet by mouth every 8 hours as needed for Nausea or Vomiting 8 tablet 0    budesonide-formoterol (SYMBICORT) 160-4.5 MCG/ACT AERO Inhale 2 puffs into the lungs 2 times daily 1 Inhaler 5    albuterol sulfate HFA (VENTOLIN HFA) 108 (90 Base) MCG/ACT inhaler Inhale 2 puffs into the lungs 4 times daily as needed for Wheezing or Shortness of Breath 3 Inhaler 1    naproxen (NAPROSYN) 375 MG tablet Take 1 tablet by mouth 2 times daily as needed for Pain 20 tablet 0    Misc. Devices ([employer] CANE) MISC 1 Device by Does not apply route as needed (for ambulation) 1 each 0    sildenafil (VIAGRA) 100 MG tablet Take 1 tablet by mouth as needed for Erectile Dysfunction 6 tablet 3    aspirin 81 MG EC tablet Take 81 mg by mouth daily.  folic acid (FOLVITE) 428 MCG tablet Take 400 mcg by mouth daily. Allergies  No Known Allergies    REVIEW OF SYSTEMS   10 point review systems conducted and pertinent positives and negatives as per HPI. PHYSICAL EXAM     Wt Readings from Last 3 Encounters:   12/30/21 210 lb (95.3 kg)   12/28/21 216 lb (98 kg)   11/12/21 217 lb 12.8 oz (98.8 kg)       Blood pressure 129/86, pulse 77, temperature 99.2 °F (37.3 °C), temperature source Oral, resp. rate 19, height 5' 7.5\" (1.715 m), weight 210 lb (95.3 kg), SpO2 93 %. GEN  -Awake, alert, NAD.   EYES   -PERRL. HENT  -MM are moist.   RESP  -LS CTA equal bilat, no wheezes, rales or rhonchi. Symmetric chest movement. No respiratory distress noted.   C/V -S1/S2 auscultated. RRR without appreciable M/R/G. No JVD or carotid bruits. Peripheral pulses equal bilaterally and palpable. No peripheral edema. No reproducible chest wall tenderness. GI  -Abdomen is soft, non-distended, no significant tenderness. No masses or guarding. + BS in all quadrants. Rectal exam deferred.   -No CVA tenderness. Blanca catheter is not present. MS  -B/L extremities strong muscles strength. Full movements. No gross joint deformities. No swelling, intact sensation symmetrical.   SKIN  -Normal coloration, warm, dry. NEURO  - Awake, alert, oriented x 3, no focal deficits. PSYC  - Appropriate affect. LABS AND IMAGING     Results for Lorenzo Tan (MRN [id number]) as of 12/30/2021 22:46   Ref.  Range 12/30/2021 16:02   Sodium Latest Ref Range: 135 - 145 MMOL/L 139   Potassium Latest Ref Range: 3.5 - 5.1 MMOL/L 3.4 (L)   Chloride Latest Ref Range: 99 - 110 mMol/L 100   CO2 Latest Ref Range: 21 - 32 MMOL/L 28   BUN Latest Ref Range: 6 - 23 MG/DL 19   Creatinine Latest Ref Range: 0.9 - 1.3 MG/DL 0.7 (L)   Anion Gap Latest Ref Range: 4 - 16  11   GFR Non- Latest Ref Range: >60 mL/min/1.73m2 >60   GFR African American Latest Ref Range: >60 mL/min/1.73m2 >60   Magnesium Latest Ref Range: 1.8 - 2.4 mg/dl 2.2   Lactate, ser/plas Latest Ref Range: 0.4 - 2.0 mMOL/L 1.5   Glucose Latest Ref Range: 70 - 99 MG/ (H)   CALCIUM, SERUM, [id number] Latest Ref Range: 8.3 - 10.6 MG/DL 8.4   Total Protein Latest Ref Range: 6.4 - 8.2 GM/DL 6.6   Procalcitonin Unknown 0.222   CRP, High Sensitivity Latest Units: mg/L 38.1   LD Latest Ref Range: 120 - 246 IU/L 290 (H)   Pro-BNP Latest Ref Range: <300 PG/.6   Troponin T Latest Ref Range: <0.01 NG/ML <0.010   Albumin Latest Ref Range: 3.4 - 5.0 GM/DL 3.2 (L)   Alk Phos Latest Ref Range: 40 - 129 IU/L 67   ALT Latest Ref Range: 10 - 40 U/L 28   AST Latest Ref Range: 15 - 37 IU/L 31   Bilirubin Latest Ref Range: 0.0 - 1.0 MG/DL 0.4 WBC Latest Ref Range: 4.0 - 10.5 K/CU MM 10.4   RBC Latest Ref Range: 4.6 - 6.2 M/CU MM 4.15 (L)   Hemoglobin Quant Latest Ref Range: 13.5 - 18.0 GM/DL 12.6 (L)   Hematocrit Latest Ref Range: 42 - 52 % 38.7 (L)   MCV Latest Ref Range: 78 - 100 FL 93.3   MCH Latest Ref Range: 27 - 31 PG 30.4   MCHC Latest Ref Range: 32.0 - 36.0 % 32.6   MPV Latest Ref Range: 7.5 - 11.1 FL 10.0   RDW Latest Ref Range: 11.7 - 14.9 % 13.2   Platelet Count Latest Ref Range: 140 - 440 K/CU    Lymphocyte % Latest Ref Range: 24 - 44 % 5.5 (L)   Monocytes % Latest Ref Range: 0 - 4 % 5.1 (H)   Eosinophils % Latest Ref Range: 0 - 3 % 0.0   Basophils % Latest Ref Range: 0 - 1 % 0.1   Lymphocytes Absolute Latest Units: K/CU MM 0.6   Monocytes Absolute Latest Units: K/CU MM 0.5   Eosinophils Absolute Latest Units: K/CU MM 0.0   Basophils Absolute Latest Units: K/CU MM 0.0   Differential Type Unknown AUTOMATED DIFFERENTIAL   Segs Relative Latest Ref Range: 36 - 66 % 88.7 (H)   Segs Absolute Latest Units: K/CU MM 9.2   Nucleated RBC % Latest Units: % 0.0   Immature Neutrophil % Latest Ref Range: 0 - 0.43 % 0.6 (H)   Total Immature Neutrophil Latest Units: K/CU MM 0.06   Total Nucleated RBC Latest Units: K/CU MM 0.0   Ferritin Latest Ref Range: 30 - 400 NG/ML 1,074 (H)   D-Dimer, Quant Latest Ref Range: <230 NG/mL(DDU) 542 (H)     Results for Adriaa Morales (MRN [id number]) as of 12/30/2021 22:46   Ref. Range 12/30/2021 15:45   pH, Homer Latest Ref Range: 7.32 - 7.42  7.42   pCO2, Homer Latest Ref Range: 38 - 52 mmHG 48   pO2, Homer Latest Ref Range: 28 - 48 mmHG 36   HCO3, Venous Latest Ref Range: 19 - 25 MMOL/L 31.1 (H)   O2 Sat, Homer Latest Ref Range: 50 - 70 % 72.8 (H)   Base Exc, Mixed Latest Ref Range: 0 - 1.2  5.5 (H)     Results for Lucila Morales (MRN [id number]) as of 12/30/2021 22:46   Ref.  Range 12/28/2021 08:15   SARS-CoV-2 Latest Ref Range: NOT DETECTED  DETECTED (A)     Recent Imaging     CTA PULMONARY W CONTRAST [[id number]] Collected: 12/30/21 1746     Order Status: Completed Updated: 12/30/21 1807     Narrative:       EXAMINATION:   CTA OF THE CHEST 12/30/2021 2:13 pm     TECHNIQUE:   CTA of the chest was performed after the administration of intravenous   contrast.  Multiplanar reformatted images are provided for review.  MIP   images are provided for review. Dose modulation, iterative reconstruction,   and/or weight based adjustment of the mA/kV was utilized to reduce the   radiation dose to as low as reasonably achievable. COMPARISON:   10/17/2020     HISTORY:   ORDERING SYSTEM PROVIDED HISTORY: shortness of breath, COvid   TECHNOLOGIST PROVIDED HISTORY:   Reason for exam:->shortness of breath, COvid   Decision Support Exception - unselect if not a suspected or confirmed   emergency medical condition->Emergency Medical Condition (MA)   Reason for Exam: shortness of breath, COvid     FINDINGS:   Pulmonary Arteries: Respiratory motion degrades imaging of the segmental and   subsegmental pulmonary arterial branches.  No central pulmonary embolism is   detected. Mediastinum: Visualized thyroid is unremarkable.  No pathologically enlarged   mediastinal or hilar lymph nodes are seen.  The esophagus is unremarkable. Cardiac chambers are prominent but otherwise unremarkable.  No pericardial   effusion.  Thoracic aorta normal in caliber without evidence of dissection. Lungs/pleura: Patchy peripheral ground-glass infiltrates are seen within both   lungs, moderate severity.  Findings are superimposed on a background of   emphysema. No pneumothorax.  No pleural effusion. Upper Abdomen: Limited images of the upper abdomen are unremarkable. Soft Tissues/Bones: No acute bone or soft tissue abnormality.      Impression:       Limited evaluation of the segmental and subsegmental pulmonary arterial   branches.  No central pulmonary embolism is detected.      Patchy peripheral ground-glass infiltrates within both lungs, compatible with   COVID-19 pneumonia, moderate severity.      XR CHEST PORTABLE [[id number]] Collected: 12/30/21 1631     Order Status: Completed Updated: 12/30/21 1635     Narrative:       EXAMINATION:   ONE XRAY VIEW OF THE CHEST     12/30/2021 3:52 pm     COMPARISON:   04/01/2021     HISTORY:   ORDERING SYSTEM PROVIDED HISTORY: shortness of breath   TECHNOLOGIST PROVIDED HISTORY:   Reason for exam:->shortness of breath   Reason for Exam: sob     FINDINGS:   Cardiomediastinal silhouette is normal in size.  Aortic atherosclerosis. Left subclavian cardiac pacing device is unchanged in position.  No large   pleural effusion or pneumothorax.  There are linear bibasilar opacities.  No   acute osseous abnormality.      Impression:       Linear bibasilar opacities, atelectasis versus pneumonia.          Relevant labs and imaging reviewed    ASSESSMENT AND PLAN     #. Acute respiratory failure with hypoxia:  -Patient is currently on 3 L oxygen through nasal cannula-saturating 93%. -Monitor and wean off of oxygen as tolerated. -Patient reports feeling better with oxygen on.  -Patient has COPD and was on oxygen in the past, but was taken away by insurance. Patient reports checking his oxygen saturation at home. -VBG-pH 7.42, PCO2 48, PO2 36, HCO3 31.1.  -Chest x-ray-linear bibasilar opacities, CTA chest-no PE-patchy peripheral groundglass infiltrates within both lungs compatible with COVID-19 pneumonia, moderate severity. #.  COVID-19 pneumonia  -Continue dexamethasone  -Inflammatory markers ordered  -Procalcitonin elevated-ordered antibiotics-ceftriaxone, doxycycline. Did not order azithromycin due to patient being on Tikosyn, citalopram, prolonged QT. #. coronary artery disease  -Cath-2013, The left main coronary artery has no significant disease. Left anterior descending artery has 30% mid stenosis, is a calcified artery. Circumflex artery has no significant disease.  The right coronary artery is a dominant vessel with no significant disease  -Continue aspirin, statin, metoprolol succinate, benazepril     #. chronic atrial fibrillation (cardioversion-4/2018) s/p pacemaker (4/2021), on anticoagulation  -Continue Tikosyn, succinate, Eliquis    #. chronic systolic CHF-does not appear to be in exacerbation    #. pulmonary hypertension    #. COPD, not on home oxygen  -Does not appear to be in exacerbation  -Continue Symbicort, montelukast, albuterol HFA as needed    #. Hypertension-continue metoprolol succinate, amlodipine-benazepril    #. Depression/anxiety-patient on citalopram, Klonopin, hydroxyzine    #. Hyperlipidemia-continue atorvastatin    #. GERD-omeprazole    DVT Prophylaxis: Eliquis  GI Prophylaxis: Protonix  Code Status: FULL.     Case d/w ED physician      Carmita Rooney MD  Hospitalist, Internal Medicine  12/30/2021 at 10:17 PM

## 2021-12-31 NOTE — PROGRESS NOTES
HOSPITALIST PROGRESS NOTE  Date: 12/31/2021   Name: Destin Damon   MRN: [id number]   YOB: 1945  Chief Complaint   Patient presents with    Shortness of Breath        Subjective/Interval Hx:     Cold symptoms on dec 1  Around dec 24 felt sick with chills. Went to urgent care placed on steroids  Went home and pulse ox was 84%  Has COPD  Coughing up some phlegm with some blood  No fevers. ROS: negative unless mentioned above  Objective:   Physical Exam:   BP (!) 116/90   Pulse 84   Temp 97.7 °F (36.5 °C) (Oral)   Resp 16   Ht 5' 7.5\" (1.715 m)   Wt 210 lb (95.3 kg)   SpO2 90%   BMI 32.41 kg/m²   CONSTITUTIONAL:  No acute distress  EYES:  No discharge  HENT:  NCAT  LUNGS:  Decreased breath sounds, but do not appreciate significant wheezing  CARDIOVASCULAR:  s1s2 rrr  ABDOMEN:  Soft nt nd  MUSCULOSKELETAL/Extremities:  No pitting edema, no tenderness  NEUROLOGIC:  No gross deficits, aao  SKIN:  No gross lesions    Assessment/Plan:     1. Acute respiratory failure with hypoxia   -On 3 L nasal cannula. -Chest x-ray-linear bibasilar opacities, CTA chest-no PE-patchy peripheral groundglass infiltrates within both lungs compatible with COVID-19 pneumonia, moderate severity.  -Procalcitonin 0.22. Stop ceftriaxone and doxycycline. 2. COVID-19 pneumonia  -Continue dexamethasone. Check QuantiFERON. On anticoagulation. Check vitamin D level. Start oral vitamin D. Consult pharmacy to see if patient is a candidate for remdesivir. CRP 38. 3. coronary artery disease  -Continue aspirin, statin, metoprolol succinate, ACEI  4. chronic atrial fibrillation   (cardioversion-4/2018) s/p pacemaker (4/2021), on anticoagulation  -Continue Tikosyn, BB, Eliquis  5. chronic systolic CHF  -does not appear to be in exacerbation  6. pulmonary hypertension  7. COPD, not on home oxygen  -Does not appear to be in exacerbation  -Continue Symbicort, montelukast, albuterol HFA as needed  8.  Hypertension  -continue metoprolol succinate, amlodipine-ACEI  9. Depression/anxiety  -patient on citalopram, Klonopin, hydroxyzine  10. Hyperlipidemia  -continue atorvastatin  11. GERD  -omeprazole       DVT Prophylaxis: apixaban  Diet: ADULT DIET; Regular; Low Fat/Low Chol/High Fiber/2 gm Na  Code Status: Full Code      Dispo:  -Attempt to see if breathing improved. Ambulate patient. PT/OT. Dilma Kaufman MD  12/31/2021    Meds:   Meds:    sodium chloride flush  5-40 mL IntraVENous 2 times per day    dexamethasone  6 mg IntraVENous Q24H    apixaban  5 mg Oral BID    aspirin  81 mg Oral Daily    atorvastatin  10 mg Oral Daily    budesonide-formoterol  2 puff Inhalation BID    dofetilide  250 mcg Oral BID    escitalopram  20 mg Oral Daily    folic acid  064 mcg Oral Daily    metoprolol succinate  100 mg Oral Daily    montelukast  10 mg Oral Nightly    pantoprazole  40 mg Oral QAM AC    cefTRIAXone (ROCEPHIN) IV  1,000 mg IntraVENous Q24H    doxycycline hyclate  100 mg Oral BID    amLODIPine  5 mg Oral Daily    And    lisinopril  10 mg Oral Daily      Infusions:    sodium chloride       PRN Meds: sodium chloride flush, 5-40 mL, PRN  sodium chloride, 25 mL, PRN  ondansetron, 4 mg, Q8H PRN   Or  ondansetron, 4 mg, Q6H PRN  polyethylene glycol, 17 g, Daily PRN  acetaminophen, 650 mg, Q6H PRN   Or  acetaminophen, 650 mg, Q6H PRN  guaiFENesin-dextromethorphan, 5 mL, Q4H PRN  albuterol sulfate HFA, 2 puff, 4x Daily PRN  clonazePAM, 0.5 mg, Daily PRN  hydrOXYzine, 25 mg, TID PRN        Data/Labs:     Recent Labs     12/30/21  1602   WBC 10.4   HGB 12.6*   HCT 38.7*         Recent Labs     12/30/21  1602      K 3.4*      CO2 28   BUN 19   CREATININE 0.7*     Recent Labs     12/30/21  1602   AST 31   ALT 28   BILITOT 0.4   ALKPHOS 67     No results for input(s): INR in the last 72 hours.   Recent Labs     12/30/21  1602   TROPONINT <0.010     Lab Results   Component Value Date    LABA1C 5.5 09/21/2016 CALCIUM:  8.4/28 (12/30 1602)  Lab Results   Component Value Date    MG 2.2 12/30/2021

## 2022-01-01 VITALS
BODY MASS INDEX: 31.83 KG/M2 | OXYGEN SATURATION: 92 % | HEIGHT: 68 IN | RESPIRATION RATE: 20 BRPM | TEMPERATURE: 98 F | SYSTOLIC BLOOD PRESSURE: 128 MMHG | DIASTOLIC BLOOD PRESSURE: 78 MMHG | HEART RATE: 72 BPM | WEIGHT: 210 LBS

## 2022-01-01 LAB
ALBUMIN SERPL-MCNC: 3.6 GM/DL (ref 3.4–5)
ALP BLD-CCNC: 62 IU/L (ref 40–129)
ALT SERPL-CCNC: 26 U/L (ref 10–40)
ANION GAP SERPL CALCULATED.3IONS-SCNC: 12 MMOL/L (ref 4–16)
AST SERPL-CCNC: 25 IU/L (ref 15–37)
BASOPHILS ABSOLUTE: 0 K/CU MM
BASOPHILS RELATIVE PERCENT: 0.2 % (ref 0–1)
BILIRUB SERPL-MCNC: 0.5 MG/DL (ref 0–1)
BILIRUBIN DIRECT: 0.2 MG/DL (ref 0–0.3)
BILIRUBIN, INDIRECT: 0.3 MG/DL (ref 0–0.7)
BUN BLDV-MCNC: 17 MG/DL (ref 6–23)
CALCIUM SERPL-MCNC: 8.3 MG/DL (ref 8.3–10.6)
CHLORIDE BLD-SCNC: 101 MMOL/L (ref 99–110)
CO2: 25 MMOL/L (ref 21–32)
CREAT SERPL-MCNC: 0.6 MG/DL (ref 0.9–1.3)
D DIMER: 479 NG/ML(DDU)
DIFFERENTIAL TYPE: ABNORMAL
EOSINOPHILS ABSOLUTE: 0 K/CU MM
EOSINOPHILS RELATIVE PERCENT: 0 % (ref 0–3)
GFR AFRICAN AMERICAN: >60 ML/MIN/1.73M2
GFR NON-AFRICAN AMERICAN: >60 ML/MIN/1.73M2
GLUCOSE BLD-MCNC: 164 MG/DL (ref 70–99)
HCT VFR BLD CALC: 38.4 % (ref 42–52)
HEMOGLOBIN: 12.7 GM/DL (ref 13.5–18)
HIGH SENSITIVE C-REACTIVE PROTEIN: 11.4 MG/L
IMMATURE NEUTROPHIL %: 0.9 % (ref 0–0.43)
LEGIONELLA URINARY AG: NEGATIVE
LYMPHOCYTES ABSOLUTE: 0.4 K/CU MM
LYMPHOCYTES RELATIVE PERCENT: 6.9 % (ref 24–44)
MCH RBC QN AUTO: 30.5 PG (ref 27–31)
MCHC RBC AUTO-ENTMCNC: 33.1 % (ref 32–36)
MCV RBC AUTO: 92.1 FL (ref 78–100)
MONOCYTES ABSOLUTE: 0.2 K/CU MM
MONOCYTES RELATIVE PERCENT: 3.4 % (ref 0–4)
NUCLEATED RBC %: 0 %
PDW BLD-RTO: 13 % (ref 11.7–14.9)
PLATELET # BLD: 160 K/CU MM (ref 140–440)
PMV BLD AUTO: 10.3 FL (ref 7.5–11.1)
POTASSIUM SERPL-SCNC: 3.6 MMOL/L (ref 3.5–5.1)
RBC # BLD: 4.17 M/CU MM (ref 4.6–6.2)
SEGMENTED NEUTROPHILS ABSOLUTE COUNT: 5.2 K/CU MM
SEGMENTED NEUTROPHILS RELATIVE PERCENT: 88.6 % (ref 36–66)
SODIUM BLD-SCNC: 138 MMOL/L (ref 135–145)
STREP PNEUMONIAE ANTIGEN: NORMAL
TOTAL IMMATURE NEUTOROPHIL: 0.05 K/CU MM
TOTAL NUCLEATED RBC: 0 K/CU MM
TOTAL PROTEIN: 6.3 GM/DL (ref 6.4–8.2)
WBC # BLD: 5.8 K/CU MM (ref 4–10.5)

## 2022-01-01 PROCEDURE — 85379 FIBRIN DEGRADATION QUANT: CPT

## 2022-01-01 PROCEDURE — 94618 PULMONARY STRESS TESTING: CPT

## 2022-01-01 PROCEDURE — 82248 BILIRUBIN DIRECT: CPT

## 2022-01-01 PROCEDURE — 94640 AIRWAY INHALATION TREATMENT: CPT

## 2022-01-01 PROCEDURE — 94664 DEMO&/EVAL PT USE INHALER: CPT

## 2022-01-01 PROCEDURE — 6370000000 HC RX 637 (ALT 250 FOR IP): Performed by: INTERNAL MEDICINE

## 2022-01-01 PROCEDURE — 85025 COMPLETE CBC W/AUTO DIFF WBC: CPT

## 2022-01-01 PROCEDURE — 80053 COMPREHEN METABOLIC PANEL: CPT

## 2022-01-01 PROCEDURE — 84145 PROCALCITONIN (PCT): CPT

## 2022-01-01 PROCEDURE — 2700000000 HC OXYGEN THERAPY PER DAY

## 2022-01-01 PROCEDURE — 94761 N-INVAS EAR/PLS OXIMETRY MLT: CPT

## 2022-01-01 PROCEDURE — 2580000003 HC RX 258: Performed by: INTERNAL MEDICINE

## 2022-01-01 PROCEDURE — 86141 C-REACTIVE PROTEIN HS: CPT

## 2022-01-01 PROCEDURE — 36415 COLL VENOUS BLD VENIPUNCTURE: CPT

## 2022-01-01 RX ORDER — DEXAMETHASONE 6 MG/1
6 TABLET ORAL
Qty: 7 TABLET | Refills: 0 | Status: SHIPPED | OUTPATIENT
Start: 2022-01-02 | End: 2022-01-09

## 2022-01-01 RX ADMIN — BUDESONIDE AND FORMOTEROL FUMARATE DIHYDRATE 2 PUFF: 160; 4.5 AEROSOL RESPIRATORY (INHALATION) at 08:10

## 2022-01-01 RX ADMIN — ATORVASTATIN CALCIUM 10 MG: 10 TABLET, FILM COATED ORAL at 11:18

## 2022-01-01 RX ADMIN — APIXABAN 5 MG: 5 TABLET, FILM COATED ORAL at 11:17

## 2022-01-01 RX ADMIN — DOFETILIDE 250 MCG: 0.25 CAPSULE ORAL at 11:18

## 2022-01-01 RX ADMIN — LISINOPRIL 10 MG: 10 TABLET ORAL at 11:18

## 2022-01-01 RX ADMIN — FOLIC ACID 500 MCG: 1 TABLET ORAL at 11:17

## 2022-01-01 RX ADMIN — BUDESONIDE AND FORMOTEROL FUMARATE DIHYDRATE 2 PUFF: 160; 4.5 AEROSOL RESPIRATORY (INHALATION) at 20:08

## 2022-01-01 RX ADMIN — ESCITALOPRAM OXALATE 20 MG: 10 TABLET ORAL at 11:17

## 2022-01-01 RX ADMIN — SODIUM CHLORIDE, PRESERVATIVE FREE 10 ML: 5 INJECTION INTRAVENOUS at 11:18

## 2022-01-01 RX ADMIN — METOPROLOL SUCCINATE 100 MG: 100 TABLET, EXTENDED RELEASE ORAL at 11:18

## 2022-01-01 RX ADMIN — ASPIRIN 81 MG: 81 TABLET, COATED ORAL at 11:18

## 2022-01-01 RX ADMIN — AMLODIPINE BESYLATE 5 MG: 5 TABLET ORAL at 11:17

## 2022-01-01 ASSESSMENT — PAIN SCALES - GENERAL: PAINLEVEL_OUTOF10: 0

## 2022-01-01 NOTE — PROGRESS NOTES
Patient qualified for Home O2. Awaiting 's signatures      [id number]-all paperwork has been signed and faxed to [facility]. O2 will be delivered to patient as soon as its available. Please DO NOT let patient leave without their portable O2.

## 2022-01-01 NOTE — PROGRESS NOTES
Doctor Please copy and paste below in your progress note per DME requirements. Patient was seen in hospital for COPD,COVID-19 . I am prescribing oxygen because the diagnosis and testing requires the patient to have oxygen in the home. Conditions will improve or be benefited by oxygen use. The patient is able to perform good mobility and therefore requires the use of a portable oxygen system for ambulation.

## 2022-01-01 NOTE — DISCHARGE SUMMARY
Hospital Medicine  DISCHARGE SUMMARY  Date: 1/1/2022  Name: Lorene Barron  MRN: [id number]  YOB: 1945     Patient's PCP: JANI Parker CNP  Admit Date: 12/30/2021   Discharge Date: 1/1/2022  Admitting Physician: Roberto Gonzalez MD  Discharge Physician: Dahlia Roger MD      Discharge Diagnoses:  1. Acute respiratory failure with hypoxia   2. COVID-19 pneumonia  3. coronary artery disease  4. chronic atrial fibrillation   (cardioversion-4/2018) s/p pacemaker (4/2021), on anticoagulation  5. chronic systolic CHF  6. pulmonary hypertension  7. COPD  8. Hypertension  9. Depression/anxiety  10. Hyperlipidemia  11. GERD      HPI:    Chief Complaint   Patient presents with   Gearl Gens of Breath     Lorene Barron is a 78 y.o. male with coronary artery disease, chronic atrial fibrillation (cardioversion-4/2018) s/p pacemaker (4/2021), on anticoagulation, chronic systolic CHF, pulmonary hypertension, COPD, not on home oxygen, hypertension, depression, hyperlipidemia, GERD presented to ED because of low oxygen at home. Patient reported that since last 3 weeks he has been feeling sick. Patient has chronic shortness of breath, but got worse in the past 1 week. Patient was seen in urgent care 12/28. Patient was diagnosed with COVID, was discharged on Medrol Dosepak, azithromycin. Patient was also noted to be having oxygen saturation 91% during his visit at urgent care. Patient was advised to monitor his oxygen saturation and present to ER if oxygen saturation less than 90%. Patient denied any fever, chills, nausea, vomiting, abdominal pain, denied any urinary complaints, denied any constipation or diarrhea. At presentation patient was noted to have /86, HR 77, RR 19, temp 99.2, saturating 93% on 3 L of oxygen. Lab work significant for potassium 3.4, lactic acid 1.5, procalcitonin 0.222, troponin<0.010, proBNP 249, hemoglobin 12.6. VBG-pH 7.42, PCO2 48, PO2 36, HCO3 31.1.   Chest x-ray-linear bibasilar opacities, atelectasis versus pneumonia. CTA chest-no PE, patchy peripheral groundglass infiltrates within both lungs, compatible with COVID-19 pneumonia, moderate severity. Patient received albuterol HFA, dexamethasone 6 mg IV.     Patient is vaccinated for Covid-received J&J vaccine.  [facility] Course  Patient came in for shortness of breath. He was found to have acute respiratory failure with hypoxia due to COVID-19 pneumonia. He was placed on 3 L nasal cannula. CTA of the chest showed no PE but patchy peripheral groundglass infiltrates within both lungs. Procalcitonin was low at 0.22. Antibiotics were stopped. Patient was placed on steroids. Patient was also given remdesivir. Patient improved during the course of his stay. Patient qualified for home O2. He was discharged on an additional 7 days of Decadron. Patient was stable. He was discharged to home. Physical Exam:  /80   Pulse 67   Temp 98.2 °F (36.8 °C)   Resp 20   Ht 5' 7.5\" (1.715 m)   Wt 210 lb (95.3 kg)   SpO2 90%   BMI 32.41 kg/m²   CONSTITUTIONAL:  No acute distress, laying in bed  EYES:  No discharge  HENT:  NCAT  LUNGS: seems clear to auscultation b/l  bs+  CARDIOVASCULAR:  s1s2 rrr  ABDOMEN:  Soft nt nd  MUSCULOSKELETAL/Extremities:  No pitting edema, no tenderness  NEUROLOGIC:  No gross deficits, aao  SKIN:  No gross lesions    Patient Instructions:   Follow-up With  Details  Why  Contact JANI Barrientos CNP  Schedule an appointment as soon as possible for a visit in 1 week  for follow up of your hospital stay  [facility] [address]  [phone]         Medications: see computerized discharge medication list  Activity: activity as tolerated  Diet: cardiac diet   Respiratory: wear home oxygen  Disposition: home  Discharged Condition: Stable     -If your breathing begins to worsen or you begin to feel more sick and then please see a physician immediately or tablet by mouth nightly     omeprazole 40 MG delayed release capsule  Commonly known as: PRILOSEC  TAKE 1 CAPSULE DAILY, DO   NOT CRUSH OR BREAK     sildenafil 100 MG tablet  Commonly known as: Viagra  Take 1 tablet by mouth as needed for Erectile Dysfunction     UNABLE TO FIND  Compounded Betaval cream 15g/Eucerin 135g     VITAMIN B COMPLEX PO        STOP taking these medications    azithromycin 250 MG tablet  Commonly known as: ZITHROMAX     methylPREDNISolone 4 MG tablet  Commonly known as: MEDROL (SELWYN)     naproxen 375 MG tablet  Commonly known as: Naprosyn     ondansetron 4 MG tablet  Commonly known as: [place]foot           Where to Get Your Medications      These medications were sent to [address] [place] [address], [address] [place] [id number]    Phone: [phone]   · dexamethasone 6 MG tablet         Consultations  IP CONSULT TO HOSPITALIST  IP CONSULT TO PHARMACY    Invasive procedures:   none    Significant Diagnostic Studies:    Imaging  XR CHEST PORTABLE  Result Date: 12/30/2021  Linear bibasilar opacities, atelectasis versus pneumonia. CTA PULMONARY W CONTRAST  Result Date: 12/30/2021  Limited evaluation of the segmental and subsegmental pulmonary arterial branches. No central pulmonary embolism is detected. Patchy peripheral ground-glass infiltrates within both lungs, compatible with COVID-19 pneumonia, moderate severity.        Code Status:  Full Code        Yadira Carnes MD

## 2022-01-01 NOTE — PROGRESS NOTES
Patient was seen in hospital for COPD,COVID-19 . I am prescribing oxygen because the diagnosis and testing requires the patient to have oxygen in the home. Conditions will improve or be benefited by oxygen use. The patient is able to perform good mobility and therefore requires the use of a portable oxygen system for ambulation.

## 2022-01-01 NOTE — PROGRESS NOTES
NameOrdering User: Jatinder Machuca MD  Provider ID: [id number]  NPI:  [id number]  [x] Patient Qualifies      [] Patient Does NOT qualify

## 2022-01-02 LAB
CULTURE: NORMAL
EKG ATRIAL RATE: 72 BPM
EKG DIAGNOSIS: NORMAL
EKG P AXIS: 30 DEGREES
EKG P-R INTERVAL: 174 MS
EKG Q-T INTERVAL: 460 MS
EKG QRS DURATION: 102 MS
EKG QTC CALCULATION (BAZETT): 503 MS
EKG R AXIS: -15 DEGREES
EKG T AXIS: 0 DEGREES
EKG VENTRICULAR RATE: 72 BPM
Lab: NORMAL
SPECIMEN: NORMAL

## 2022-01-02 PROCEDURE — 93010 ELECTROCARDIOGRAM REPORT: CPT | Performed by: INTERNAL MEDICINE

## 2022-01-02 NOTE — PROGRESS NOTES
Reviewed discharge paperwork with patient. He is alert and oriented and verbalizes understanding. Wheelchair escort provided by staff to [facility] where family is providing private transportation to home.

## 2022-01-03 ENCOUNTER — CARE COORDINATION (OUTPATIENT)
Dept: CASE MANAGEMENT | Age: 77
End: 2022-01-03

## 2022-01-03 ENCOUNTER — TELEPHONE (OUTPATIENT)
Dept: FAMILY MEDICINE CLINIC | Age: 77
End: 2022-01-03

## 2022-01-03 NOTE — TELEPHONE ENCOUNTER
Naeem 45 Transitions Initial Follow Up Call    Call within 2 business days of discharge: yes    Patient: Stan Cota Patient : 1945 MRN: [id number]    [unfilled]    RARS: Readmission Risk Score: 10 ( )       Spoke with: left message  Discharge department/facility: [facility]    Non-face-to-face services provided:  Left message for pt to call office back pt does have appt 2022 with PCP    Follow Up  Future Appointments   Date Time Provider Kevin Isabel   2022  2:45 PM JANI Saini - CNP [facility]   2022  8:00 AM Tiffanie Mcconnell MD [facility]   2022  9:00 AM JANI Brown - CNP [facility]       Mame Castro MA

## 2022-01-03 NOTE — CARE COORDINATION
Care Transitions Outreach Attempt    Call within 2 business days of discharge: Yes   Attempted to reach patient for transitions of care follow up. Unable to reach patient. 1st attempt to reach for Covid 19 Monitoring discharge call unsuccessful. HIPAA compliant message left requesting call back. CTN to reattempt     Patient: Shaneka Mary Patient : 1945 MRN: <[id number]>    Last Discharge [facility]       Complaint Diagnosis Description Type Department Provider    21 Shortness of Breath COVID-19 . .. ED to Hosp-Admission (Discharged) (ADMITTED) [facility] [address] Herve Rowley MD; Carmela Jimenez. .. Was this an external facility discharge?  No Discharge Facility: [facility]     Noted following upcoming appointments from discharge chart review:   Critical access hospital [facility] follow up appointment(s):   Future Appointments   Date Time Provider Kevin Isabel   2022  2:45 PM JANI Cespedes - CNP [facility]   2022  8:00 AM David Mejia MD [facility]   2022  9:00 AM JANI Haynes - CNP [facility]     Non-[facility] follow up appointment(s):    Meera Chan RN [phone]

## 2022-01-04 ENCOUNTER — CARE COORDINATION (OUTPATIENT)
Dept: CASE MANAGEMENT | Age: 77
End: 2022-01-04

## 2022-01-04 NOTE — CARE COORDINATION
Care Transitions Outreach Attempt    Call within 2 business days of discharge: Yes   Attempted to reach patient for transitions of care follow up. Unable to reach patient. 2nd unsuccessful attempt to reach for Covid 19 Monitoring discharge call. HIPAA compliant message left requesting call back. Episode closed per protocol, no further outreach scheduled. Patient: Khang Sanders Patient : 1945 MRN: <[id number]>    Last Discharge [facility]       Complaint Diagnosis Description Type Department Provider    21 Shortness of Breath COVID-19 . .. ED to Hosp-Admission (Discharged) (ADMITTED) [address] Junito Cabrera MD; Alex Kearney. .. Was this an external facility discharge?  No Discharge Facility: [facility]    Noted following upcoming appointments from discharge chart review:   [facility] follow up appointment(s):   Future Appointments   Date Time Provider Kevin Isabel   2022  2:45 PM JANI Whitney - CNP [facility] FM [facility]   2022  8:00 AM Sheryl Sanchez MD [facility] Heart [facility]   2022  9:00 AM JANI Castro CNP [facility] Heart [facility]     Non-[facility] follow up appointment(s):     Kizzy Munguia RN CTN

## 2022-01-10 ENCOUNTER — TELEPHONE (OUTPATIENT)
Dept: CARDIOLOGY CLINIC | Age: 77
End: 2022-01-10

## 2022-01-10 ENCOUNTER — PROCEDURE VISIT (OUTPATIENT)
Dept: CARDIOLOGY CLINIC | Age: 77
End: 2022-01-10
Payer: MEDICARE

## 2022-01-10 DIAGNOSIS — I49.5 SINUS NODE DYSFUNCTION (HCC): ICD-10-CM

## 2022-01-10 DIAGNOSIS — I44.0 AV BLOCK, 1ST DEGREE: ICD-10-CM

## 2022-01-10 DIAGNOSIS — Z95.0 CARDIAC PACEMAKER IN SITU: Primary | ICD-10-CM

## 2022-01-10 NOTE — LETTER
Cardiology [address] [facility] [address]  Phone: [phone]  Fax: [phone]    1/10/2022        Carlos Kitchen  [address] [facility] [address]            Dear Mason Guy: This is your Carelink schedule. You can jeevan your calendar with these dates. Remember that your device is wireless and should automatically do these checks while you are sleeping. If for any reason I do not get your transmission then I will call you and ask that you send a manual transmission. If you have any questions or concerns, please call and ask for Mel Bey at [phone]. Thank you.

## 2022-01-11 PROCEDURE — 93296 REM INTERROG EVL PM/IDS: CPT | Performed by: NURSE PRACTITIONER

## 2022-01-11 PROCEDURE — 93294 REM INTERROG EVL PM/LDLS PM: CPT | Performed by: NURSE PRACTITIONER

## 2022-01-14 ENCOUNTER — OFFICE VISIT (OUTPATIENT)
Dept: FAMILY MEDICINE CLINIC | Age: 77
End: 2022-01-14
Payer: MEDICARE

## 2022-01-14 VITALS
SYSTOLIC BLOOD PRESSURE: 124 MMHG | DIASTOLIC BLOOD PRESSURE: 70 MMHG | OXYGEN SATURATION: 92 % | RESPIRATION RATE: 18 BRPM | WEIGHT: 209.4 LBS | BODY MASS INDEX: 32.31 KG/M2 | HEART RATE: 76 BPM | TEMPERATURE: 98 F

## 2022-01-14 DIAGNOSIS — J12.82 PNEUMONIA DUE TO COVID-19 VIRUS: ICD-10-CM

## 2022-01-14 DIAGNOSIS — G25.81 RESTLESS LEG SYNDROME: ICD-10-CM

## 2022-01-14 DIAGNOSIS — E78.2 MIXED HYPERLIPIDEMIA: ICD-10-CM

## 2022-01-14 DIAGNOSIS — U07.1 PNEUMONIA DUE TO COVID-19 VIRUS: ICD-10-CM

## 2022-01-14 DIAGNOSIS — Z09 HOSPITAL DISCHARGE FOLLOW-UP: Primary | ICD-10-CM

## 2022-01-14 DIAGNOSIS — R10.13 DYSPEPSIA: ICD-10-CM

## 2022-01-14 DIAGNOSIS — I10 ESSENTIAL HYPERTENSION: Chronic | ICD-10-CM

## 2022-01-14 DIAGNOSIS — J44.9 CHRONIC OBSTRUCTIVE PULMONARY DISEASE, UNSPECIFIED COPD TYPE (HCC): ICD-10-CM

## 2022-01-14 PROCEDURE — 3023F SPIROM DOC REV: CPT | Performed by: NURSE PRACTITIONER

## 2022-01-14 PROCEDURE — 3017F COLORECTAL CA SCREEN DOC REV: CPT | Performed by: NURSE PRACTITIONER

## 2022-01-14 PROCEDURE — 4040F PNEUMOC VAC/ADMIN/RCVD: CPT | Performed by: NURSE PRACTITIONER

## 2022-01-14 PROCEDURE — 1123F ACP DISCUSS/DSCN MKR DOCD: CPT | Performed by: NURSE PRACTITIONER

## 2022-01-14 PROCEDURE — 99214 OFFICE O/P EST MOD 30 MIN: CPT | Performed by: NURSE PRACTITIONER

## 2022-01-14 PROCEDURE — G8484 FLU IMMUNIZE NO ADMIN: HCPCS | Performed by: NURSE PRACTITIONER

## 2022-01-14 PROCEDURE — 4004F PT TOBACCO SCREEN RCVD TLK: CPT | Performed by: NURSE PRACTITIONER

## 2022-01-14 PROCEDURE — 1111F DSCHRG MED/CURRENT MED MERGE: CPT | Performed by: NURSE PRACTITIONER

## 2022-01-14 PROCEDURE — G8417 CALC BMI ABV UP PARAM F/U: HCPCS | Performed by: NURSE PRACTITIONER

## 2022-01-14 PROCEDURE — G8427 DOCREV CUR MEDS BY ELIG CLIN: HCPCS | Performed by: NURSE PRACTITIONER

## 2022-01-14 RX ORDER — OMEPRAZOLE 40 MG/1
CAPSULE, DELAYED RELEASE ORAL
Qty: 90 CAPSULE | Refills: 0 | Status: SHIPPED | OUTPATIENT
Start: 2022-01-14 | End: 2022-08-23

## 2022-01-14 RX ORDER — AMLODIPINE BESYLATE AND BENAZEPRIL HYDROCHLORIDE 5; 10 MG/1; MG/1
1 CAPSULE ORAL DAILY
Qty: 90 CAPSULE | Refills: 3 | Status: SHIPPED | OUTPATIENT
Start: 2022-01-14 | End: 2022-10-21 | Stop reason: SDUPTHER

## 2022-01-14 RX ORDER — ALBUTEROL SULFATE 90 UG/1
2 AEROSOL, METERED RESPIRATORY (INHALATION) 4 TIMES DAILY PRN
Qty: 1 EACH | Refills: 5 | Status: SHIPPED | OUTPATIENT
Start: 2022-01-14

## 2022-01-14 RX ORDER — DOXYCYCLINE HYCLATE 100 MG
100 TABLET ORAL 2 TIMES DAILY
Qty: 14 TABLET | Refills: 0 | Status: SHIPPED | OUTPATIENT
Start: 2022-01-14 | End: 2022-01-21

## 2022-01-14 RX ORDER — BUDESONIDE AND FORMOTEROL FUMARATE DIHYDRATE 160; 4.5 UG/1; UG/1
2 AEROSOL RESPIRATORY (INHALATION) 2 TIMES DAILY
Qty: 1 EACH | Refills: 5 | Status: SHIPPED | OUTPATIENT
Start: 2022-01-14 | End: 2022-08-23

## 2022-01-14 RX ORDER — ATORVASTATIN CALCIUM 10 MG/1
10 TABLET, FILM COATED ORAL DAILY
Qty: 90 TABLET | Refills: 3 | Status: SHIPPED | OUTPATIENT
Start: 2022-01-14 | End: 2022-10-21 | Stop reason: SDUPTHER

## 2022-01-14 RX ORDER — MONTELUKAST SODIUM 10 MG/1
10 TABLET ORAL NIGHTLY
Qty: 90 TABLET | Refills: 3 | Status: SHIPPED | OUTPATIENT
Start: 2022-01-14 | End: 2022-03-24 | Stop reason: SDUPTHER

## 2022-01-14 RX ORDER — CLONAZEPAM 0.5 MG/1
0.5 TABLET ORAL PRN
Qty: 90 TABLET | Refills: 0 | Status: SHIPPED | OUTPATIENT
Start: 2022-01-14 | End: 2022-05-01 | Stop reason: SDUPTHER

## 2022-01-14 ASSESSMENT — PATIENT HEALTH QUESTIONNAIRE - PHQ9
SUM OF ALL RESPONSES TO PHQ QUESTIONS 1-9: 2
SUM OF ALL RESPONSES TO PHQ QUESTIONS 1-9: 2
SUM OF ALL RESPONSES TO PHQ9 QUESTIONS 1 & 2: 2
SUM OF ALL RESPONSES TO PHQ QUESTIONS 1-9: 2
SUM OF ALL RESPONSES TO PHQ QUESTIONS 1-9: 2
1. LITTLE INTEREST OR PLEASURE IN DOING THINGS: 1
2. FEELING DOWN, DEPRESSED OR HOPELESS: 1

## 2022-01-14 NOTE — PROGRESS NOTES
janae Post-Discharge Transitional Care Management Services or Hospital Follow Up      Lorene Barron   YOB: 1945    Date of Office Visit:  1/14/2022  Date of Hospital Admission: 12/30/21  Date of Hospital Discharge: 1/1/22  Risk of hospital readmission (high >=14%. Medium >=10%) :Readmission Risk Score: 10 ( )      Care management risk score Rising risk (score 2-5) and Complex Care (Scores >=6): 3     Non face to face  following discharge, date last encounter closed (first attempt may have been earlier): 1/4/2022  6:35 PM    Call initiated 2 business days of discharge: Yes    Patient Active Problem List   Diagnosis    Depression, major, in remission ([facility] [address]    Obesity (BMI 30-39. 9)    Restless leg syndrome    HTN (hypertension)    Hyperlipidemia    Rash    Eczema    Tobacco use    COPD (chronic obstructive pulmonary disease) (HCC)    Diverticulosis    History of repair of left rotator cuff    Aortic atherosclerosis ([facility]    Encounter for medication review    Nystagmus    Shortness of breath    Hypoxemia    Chronic bronchitis (HCC)    Elevated brain natriuretic peptide (BNP) level    Recurrent major depressive disorder, in partial remission (HCC)    Chronic respiratory failure with hypoxia (HCC)    Chronic atrial fibrillation (HCC)    Pulmonary HTN (HCC)    Chronic systolic congestive heart failure (HCC)    PAF (paroxysmal atrial fibrillation) (HCC)    Other insomnia    Other headache syndrome    Dizzy spells    Syncope    S/P placement of cardiac pacemaker    Sick sinus syndrome (HCC)    Acute respiratory failure with hypoxia (HCC)       No Known Allergies    Medications listed as ordered at the time of discharge from hospital     Medication List          Accurate as of January 14, 2022  3:25 PM. If you have any questions, ask your nurse or doctor.             START taking these medications    doxycycline hyclate 100 MG tablet  Commonly known as: VIBRA-TABS  Take 1 tablet by mouth 2 times daily for 7 days  Started by: JANI Rodriguez - CNP        CONTINUE taking these medications    albuterol sulfate  (90 Base) MCG/ACT inhaler  Commonly known as: Ventolin HFA  Inhale 2 puffs into the lungs 4 times daily as needed for Wheezing or Shortness of Breath     amLODIPine-benazepril 5-10 MG per capsule  Commonly known as: LOTREL  Take 1 capsule by mouth daily     apixaban 5 MG Tabs tablet  Commonly known as: Eliquis  Take 1 tablet by mouth 2 times daily     aspirin 81 MG EC tablet     atorvastatin 10 MG tablet  Commonly known as: LIPITOR  Take 1 tablet by mouth daily     B-12 500 MCG Tabs  Take 1 tablet by mouth 4 times daily     budesonide-formoterol 160-4.5 MCG/ACT Aero  Commonly known as: Symbicort  Inhale 2 puffs into the lungs 2 times daily     clonazePAM 0.5 MG tablet  Commonly known as: KLONOPIN  Take 1 tablet by mouth as needed for Anxiety for up to 90 days.      [employer] Cane Misc  1 Device by Does not apply route as needed (for ambulation)     dofetilide 250 MCG capsule  Commonly known as: TIKOSYN  Take 1 capsule by mouth 2 times daily     escitalopram 20 MG tablet  Commonly known as: LEXAPRO  TAKE 1 TABLET DAILY     folic acid 340 MCG tablet  Commonly known as: FOLVITE     hydrOXYzine 25 MG tablet  Commonly known as: ATARAX  1-2 tablets at HS     metoprolol succinate 100 MG extended release tablet  Commonly known as: TOPROL XL  TAKE 1 TABLET DAILY     montelukast 10 MG tablet  Commonly known as: SINGULAIR  Take 1 tablet by mouth nightly     omeprazole 40 MG delayed release capsule  Commonly known as: PRILOSEC  TAKE 1 CAPSULE DAILY, DO   NOT CRUSH OR BREAK     sildenafil 100 MG tablet  Commonly known as: Viagra  Take 1 tablet by mouth as needed for Erectile Dysfunction     UNABLE TO FIND  Compounded Betaval cream 15g/Eucerin 135g     VITAMIN B COMPLEX PO           Where to Get Your Medications      These medications were sent to [place], [address] [facility] [phone] - [phone]  [place] [address] [place] [address]    Phone: [phone]   · albuterol sulfate  (90 Base) MCG/ACT inhaler  · amLODIPine-benazepril 5-10 MG per capsule  · atorvastatin 10 MG tablet  · budesonide-formoterol 160-4.5 MCG/ACT Aero  · montelukast 10 MG tablet  · omeprazole 40 MG delayed release capsule     These medications were sent to [address], [address]  [address] [place] [address]    Phone: [phone]   · clonazePAM 0.5 MG tablet  · doxycycline hyclate 100 MG tablet           Medications marked \"taking\" at this time  Outpatient Medications Marked as Taking for the 1/14/22 encounter (Office Visit) with JANI Odonnell CNP   Medication Sig Dispense Refill    albuterol sulfate HFA (VENTOLIN HFA) 108 (90 Base) MCG/ACT inhaler Inhale 2 puffs into the lungs 4 times daily as needed for Wheezing or Shortness of Breath 1 each 5    amLODIPine-benazepril (LOTREL) 5-10 MG per capsule Take 1 capsule by mouth daily 90 capsule 3    atorvastatin (LIPITOR) 10 MG tablet Take 1 tablet by mouth daily 90 tablet 3    budesonide-formoterol (SYMBICORT) 160-4.5 MCG/ACT AERO Inhale 2 puffs into the lungs 2 times daily 1 each 5    clonazePAM (KLONOPIN) 0.5 MG tablet Take 1 tablet by mouth as needed for Anxiety for up to 90 days.  90 tablet 0    montelukast (SINGULAIR) 10 MG tablet Take 1 tablet by mouth nightly 90 tablet 3    omeprazole (PRILOSEC) 40 MG delayed release capsule TAKE 1 CAPSULE DAILY, DO   NOT CRUSH OR BREAK 90 capsule 0    doxycycline hyclate (VIBRA-TABS) 100 MG tablet Take 1 tablet by mouth 2 times daily for 7 days 14 tablet 0    Cyanocobalamin (B-12) 500 MCG TABS Take 1 tablet by mouth 4 times daily 120 tablet 2    apixaban (ELIQUIS) 5 MG TABS tablet Take 1 tablet by mouth 2 times daily 180 tablet 3    metoprolol succinate (TOPROL XL) 100 MG extended release tablet TAKE 1 TABLET DAILY 90 tablet 1    escitalopram (LEXAPRO) 20 MG tablet TAKE 1 TABLET DAILY 90 tablet 2    dofetilide (TIKOSYN) 250 MCG capsule Take 1 capsule by mouth 2 times daily 60 capsule 3    B Complex Vitamins (VITAMIN B COMPLEX PO) DAILY      UNABLE TO FIND Compounded Betaval cream 15g/Eucerin 135g 1 Can 5    hydrOXYzine (ATARAX) 25 MG tablet 1-2 tablets at HS 60 tablet 0    Misc. Devices ([employer] CANE) MISC 1 Device by Does not apply route as needed (for ambulation) 1 each 0    sildenafil (VIAGRA) 100 MG tablet Take 1 tablet by mouth as needed for Erectile Dysfunction 6 tablet 3    aspirin 81 MG EC tablet Take 81 mg by mouth daily.  folic acid (FOLVITE) 074 MCG tablet Take 400 mcg by mouth daily. Medications patient taking as of now reconciled against medications ordered at time of hospital discharge: Yes    Chief Complaint   Patient presents with    Medication Refill    Follow-Up from Hospital     Was IP on 12/30 for Covid       History of Present illness - Follow up of Hospital diagnosis(es): COVID-19 pneumonia, acute respiratory failure and medication refills. Inpatient course: Discharge summary reviewed- see chart. Interval history/Current status:     COVID-19 Pneumonia  Patient discharged from [facility] on 01/01/2022. He was discharged home on 3 liters of continuous oxygen. He reports that he needs a portable tank and presents to today's appointment without oxygen. At rest his O2 is 90% on RA. With activity his oxygen saturation is less than 88% on RA. Symptoms since being home include: fatigue, weakness, non-productive cough, shortness of breath with activity and tightness in chest.  He denies fever, chills, lightheadedness, wheezing, or chest pain . He completed steroids on 01/08/22.      Depression  Has MDD and symptoms are well controlled with Lexapro.  Denies AVH/SIB/SI/HI.      Insomnia  He continues to have difficulty sleeping at night and reports daytime fatigue.  States cardiology recommended a sleep study but he does not want to pursue that at this time.        RLS  Historically prescribed Klonopin 0.5mg nightly for symptoms associated with restless leg syndrome.  He does not take it every night - only when symptoms are severe.  [place]ARRS reviewed,medication last filled 07/30/2021.      Physical Exam  Vitals reviewed. Constitutional:       General: He is not in acute distress. Appearance: Normal appearance. He is obese. Comments: Appears fatigued     HENT:      Head: Normocephalic and atraumatic. Right Ear: External ear normal.      Left Ear: External ear normal.      Mouth/Throat:      Mouth: Mucous membranes are moist.      Pharynx: Oropharynx is clear. Eyes:      Extraocular Movements: Extraocular movements intact. Conjunctiva/sclera: Conjunctivae normal.      Pupils: Pupils are equal, round, and reactive to light. Neck:      Vascular: No carotid bruit. Cardiovascular:      Rate and Rhythm: Normal rate and regular rhythm. Heart sounds: Normal heart sounds. Pulmonary:      Effort: Pulmonary effort is normal. No respiratory distress. Breath sounds: No stridor. Rales (bibasilar) present. No wheezing or rhonchi. Abdominal:      General: Bowel sounds are normal. There is no distension. Palpations: Abdomen is soft. Tenderness: There is no abdominal tenderness. Musculoskeletal:         General: Normal range of motion. Cervical back: Normal range of motion and neck supple. Right lower leg: No edema. Left lower leg: No edema. Skin:     General: Skin is warm and dry. Capillary Refill: Capillary refill takes less than 2 seconds. Findings: No rash. Neurological:      General: No focal deficit present. Mental Status: He is alert and oriented to person, place, and time.       Deep Tendon Reflexes: Reflexes normal.   Psychiatric:         Mood and Affect: Mood normal.         Behavior: Behavior spicy, and/or acidic foods, caffeine, and sweets. Stop smoking, reduce alcohol intake. Lose weight if you are overweight. - omeprazole (PRILOSEC) 40 MG delayed release capsule; TAKE 1 CAPSULE DAILY, DO   NOT CRUSH OR BREAK  Dispense: 90 capsule; Refill: 0    7. Restless leg syndrome  Sleep hygiene discussed. Bed should only be used for sleep. Avoid watching TV, playing on your phone or tablet, or reading while in bed. Go to bed and wake up at the same time each day. Exercise regularly, preferably not before bedtime. Limit intake of caffeine, alcohol, and smoking. Sleep study recommended; pt refused. - clonazePAM (KLONOPIN) 0.5 MG tablet; Take 1 tablet by mouth as needed for Anxiety for up to 90 days. Dispense: 90 tablet;  Refill: 0          Medical Decision Making: moderate complexity

## 2022-01-21 DIAGNOSIS — J12.82 PNEUMONIA DUE TO COVID-19 VIRUS: Primary | ICD-10-CM

## 2022-01-21 DIAGNOSIS — U07.1 PNEUMONIA DUE TO COVID-19 VIRUS: Primary | ICD-10-CM

## 2022-01-21 DIAGNOSIS — J44.9 CHRONIC OBSTRUCTIVE PULMONARY DISEASE, UNSPECIFIED COPD TYPE (HCC): ICD-10-CM

## 2022-01-26 DIAGNOSIS — I10 ESSENTIAL HYPERTENSION: Chronic | ICD-10-CM

## 2022-01-27 RX ORDER — METOPROLOL SUCCINATE 100 MG/1
TABLET, EXTENDED RELEASE ORAL
Qty: 90 TABLET | Refills: 1 | Status: SHIPPED | OUTPATIENT
Start: 2022-01-27 | End: 2022-08-23

## 2022-01-28 ENCOUNTER — OFFICE VISIT (OUTPATIENT)
Dept: FAMILY MEDICINE CLINIC | Age: 77
End: 2022-01-28
Payer: MEDICARE

## 2022-01-28 VITALS
HEART RATE: 70 BPM | DIASTOLIC BLOOD PRESSURE: 72 MMHG | BODY MASS INDEX: 32.44 KG/M2 | TEMPERATURE: 98 F | RESPIRATION RATE: 17 BRPM | WEIGHT: 210.2 LBS | SYSTOLIC BLOOD PRESSURE: 107 MMHG | OXYGEN SATURATION: 91 %

## 2022-01-28 DIAGNOSIS — U09.9 POST-COVID-19 CONDITION: ICD-10-CM

## 2022-01-28 PROCEDURE — 3017F COLORECTAL CA SCREEN DOC REV: CPT | Performed by: NURSE PRACTITIONER

## 2022-01-28 PROCEDURE — G8417 CALC BMI ABV UP PARAM F/U: HCPCS | Performed by: NURSE PRACTITIONER

## 2022-01-28 PROCEDURE — G8427 DOCREV CUR MEDS BY ELIG CLIN: HCPCS | Performed by: NURSE PRACTITIONER

## 2022-01-28 PROCEDURE — 4040F PNEUMOC VAC/ADMIN/RCVD: CPT | Performed by: NURSE PRACTITIONER

## 2022-01-28 PROCEDURE — 4004F PT TOBACCO SCREEN RCVD TLK: CPT | Performed by: NURSE PRACTITIONER

## 2022-01-28 PROCEDURE — 99213 OFFICE O/P EST LOW 20 MIN: CPT | Performed by: NURSE PRACTITIONER

## 2022-01-28 PROCEDURE — 1123F ACP DISCUSS/DSCN MKR DOCD: CPT | Performed by: NURSE PRACTITIONER

## 2022-01-28 PROCEDURE — 1111F DSCHRG MED/CURRENT MED MERGE: CPT | Performed by: NURSE PRACTITIONER

## 2022-01-28 PROCEDURE — G8484 FLU IMMUNIZE NO ADMIN: HCPCS | Performed by: NURSE PRACTITIONER

## 2022-01-28 RX ORDER — METOPROLOL SUCCINATE 100 MG/1
TABLET, EXTENDED RELEASE ORAL
Qty: 90 TABLET | Refills: 1 | Status: CANCELLED | OUTPATIENT
Start: 2022-01-28

## 2022-01-28 ASSESSMENT — PATIENT HEALTH QUESTIONNAIRE - PHQ9
5. POOR APPETITE OR OVEREATING: 0
3. TROUBLE FALLING OR STAYING ASLEEP: 0
7. TROUBLE CONCENTRATING ON THINGS, SUCH AS READING THE NEWSPAPER OR WATCHING TELEVISION: 0
SUM OF ALL RESPONSES TO PHQ QUESTIONS 1-9: 1
6. FEELING BAD ABOUT YOURSELF - OR THAT YOU ARE A FAILURE OR HAVE LET YOURSELF OR YOUR FAMILY DOWN: 0
SUM OF ALL RESPONSES TO PHQ9 QUESTIONS 1 & 2: 1
8. MOVING OR SPEAKING SO SLOWLY THAT OTHER PEOPLE COULD HAVE NOTICED. OR THE OPPOSITE, BEING SO FIGETY OR RESTLESS THAT YOU HAVE BEEN MOVING AROUND A LOT MORE THAN USUAL: 0
2. FEELING DOWN, DEPRESSED OR HOPELESS: 1
SUM OF ALL RESPONSES TO PHQ QUESTIONS 1-9: 1
10. IF YOU CHECKED OFF ANY PROBLEMS, HOW DIFFICULT HAVE THESE PROBLEMS MADE IT FOR YOU TO DO YOUR WORK, TAKE CARE OF THINGS AT HOME, OR GET ALONG WITH OTHER PEOPLE: 0
9. THOUGHTS THAT YOU WOULD BE BETTER OFF DEAD, OR OF HURTING YOURSELF: 0
SUM OF ALL RESPONSES TO PHQ QUESTIONS 1-9: 1
1. LITTLE INTEREST OR PLEASURE IN DOING THINGS: 0
SUM OF ALL RESPONSES TO PHQ QUESTIONS 1-9: 1
4. FEELING TIRED OR HAVING LITTLE ENERGY: 0

## 2022-01-28 ASSESSMENT — ENCOUNTER SYMPTOMS
CHEST TIGHTNESS: 0
COUGH: 1
WHEEZING: 0
SHORTNESS OF BREATH: 1

## 2022-01-28 NOTE — PROGRESS NOTES
Subjective:      Chief Complaint   Patient presents with    Follow-up     Patient presents today for a 2 week follow up       HPI:  Suzanna Berry is a 78 y.o. male who presents today for 2 week follow up for COVID pneumonia. He has completed antibiotics and steroids. He did qualify for  portable oxygen and is just waiting for it to be delivered. C/o non-productive cough and dyspnea with exertion. He is slowly regaining his strength but is easily fatigued. His appetite is improving. He has not returned to work. Past Medical History:   Diagnosis Date    Allergic rhinitis     Asthma     Atrial fibrillation (HCC)     CAD (coronary artery disease)     mild LAD    COPD (chronic obstructive pulmonary disease) ([facility] [address]     COVID-19     Depression     Diverticulosis of colon     Family history of early CAD     Father- MI-  age 46; a grandparent- age 46 of MI    Family history of valvular heart disease     Mother- Mitral valve disease    GERD (gastroesophageal reflux disease)     H/O 24 hour EKG monitoring 2001-Predominant rhythm is sinus rhythm. No signif ectopy noted.  H/O echocardiogram 2002, 2001-EF 60%. Normal LVSF. Mild MR. Mild TR-Dr [facility];    H/O echocardiogram 2019    EF 48-51%, Grade I diastolic dysfunction, sclerotic but non stenotic aortic valve, Mild AR, Mild-Mod TR, Normal pulmonary artery pressure, no pericardial effusion     History of diverticulitis of colon     History of Holter monitoring 2018    48 hr holter - SR with PAF    Hx of cardiac cath 2013    Mild LAD disease noted.  Hx of cardiovascular stress test 2018    EF 33% Pt in a fib with RVR. No infarct or ischemia. Decreased uptake inferiorly due to diaphragmatic artifact.  HX OTHER MEDICAL 13    14 DAY EVENT: APC's, short runs of sinus tachycardia.      Hyperlipidemia     Hypertension     Hypoxemia 2018    Irregular heart rate 2018    Palpitations     Pneumonia     Restless leg syndrome     Shortness of breath 2018    Tricuspid valve regurgitation 2001    mild        Social History     Tobacco Use    Smoking status: Former Smoker     Packs/day: 1.00     Years: 20.00     Pack years: 20.00     Types: Cigarettes     Quit date: 1993     Years since quittin.6    Smokeless tobacco: Current User     Types: Chew    Tobacco comment: Reviewed    Substance Use Topics    Alcohol use: No     Comment: drinking \"1 cup decaff coffee\", also drinks decaff tea \"drink it all day long\"        Review of Systems   Constitutional: Positive for fatigue. Negative for activity change, appetite change, chills, diaphoresis, fever and unexpected weight change. Respiratory: Positive for cough and shortness of breath. Negative for chest tightness and wheezing. Cardiovascular: Negative for chest pain, palpitations and leg swelling. Neurological: Positive for weakness. Negative for dizziness, tremors, seizures, syncope, facial asymmetry, speech difficulty, light-headedness, numbness and headaches. Objective:      /72 (Site: Left Upper Arm, Position: Sitting, Cuff Size: Large Adult)   Pulse 70   Temp 98 °F (36.7 °C) (Temporal)   Resp 17   Wt 210 lb 3.2 oz (95.3 kg)   SpO2 91%   BMI 32.44 kg/m²      Physical Exam  Vitals reviewed. Constitutional:       General: He is not in acute distress. Appearance: Normal appearance. He is not ill-appearing. HENT:      Head: Normocephalic. Mouth/Throat:      Mouth: Mucous membranes are moist.      Pharynx: Oropharynx is clear. Eyes:      Conjunctiva/sclera: Conjunctivae normal.      Pupils: Pupils are equal, round, and reactive to light. Cardiovascular:      Rate and Rhythm: Normal rate and regular rhythm. Heart sounds: Normal heart sounds. Pulmonary:      Effort: Pulmonary effort is normal. No respiratory distress.       Breath sounds: Normal breath sounds. No stridor. No wheezing, rhonchi or rales. Musculoskeletal:      Cervical back: Normal range of motion and neck supple. Right lower leg: No edema. Left lower leg: No edema. Skin:     General: Skin is warm and dry. Coloration: Skin is not pale. Findings: No bruising or erythema. Neurological:      General: No focal deficit present. Mental Status: He is alert and oriented to person, place, and time. Assessment / Plan:      1. Post-COVID-19 condition  Slowly improving. Continue to rest, increased fluid intake to maintain hydration. Return for new or worsening symptoms.             Florina Arevalo, APRN - CNP

## 2022-02-18 ENCOUNTER — TELEPHONE (OUTPATIENT)
Dept: FAMILY MEDICINE CLINIC | Age: 77
End: 2022-02-18

## 2022-02-18 ENCOUNTER — OFFICE VISIT (OUTPATIENT)
Dept: FAMILY MEDICINE CLINIC | Age: 77
End: 2022-02-18
Payer: MEDICARE

## 2022-02-18 VITALS
OXYGEN SATURATION: 95 % | BODY MASS INDEX: 33.43 KG/M2 | TEMPERATURE: 97.2 F | HEART RATE: 70 BPM | HEIGHT: 67 IN | WEIGHT: 213 LBS

## 2022-02-18 DIAGNOSIS — J40 BRONCHITIS: Primary | ICD-10-CM

## 2022-02-18 PROCEDURE — 99213 OFFICE O/P EST LOW 20 MIN: CPT | Performed by: NURSE PRACTITIONER

## 2022-02-18 PROCEDURE — 4040F PNEUMOC VAC/ADMIN/RCVD: CPT | Performed by: NURSE PRACTITIONER

## 2022-02-18 PROCEDURE — G8427 DOCREV CUR MEDS BY ELIG CLIN: HCPCS | Performed by: NURSE PRACTITIONER

## 2022-02-18 PROCEDURE — 3017F COLORECTAL CA SCREEN DOC REV: CPT | Performed by: NURSE PRACTITIONER

## 2022-02-18 PROCEDURE — G8417 CALC BMI ABV UP PARAM F/U: HCPCS | Performed by: NURSE PRACTITIONER

## 2022-02-18 PROCEDURE — 1123F ACP DISCUSS/DSCN MKR DOCD: CPT | Performed by: NURSE PRACTITIONER

## 2022-02-18 PROCEDURE — G8484 FLU IMMUNIZE NO ADMIN: HCPCS | Performed by: NURSE PRACTITIONER

## 2022-02-18 PROCEDURE — 4004F PT TOBACCO SCREEN RCVD TLK: CPT | Performed by: NURSE PRACTITIONER

## 2022-02-18 RX ORDER — GUAIFENESIN 600 MG/1
600 TABLET, EXTENDED RELEASE ORAL 2 TIMES DAILY
Qty: 20 TABLET | Refills: 0 | Status: SHIPPED | OUTPATIENT
Start: 2022-02-18 | End: 2022-07-22

## 2022-02-18 RX ORDER — AZITHROMYCIN 250 MG/1
TABLET, FILM COATED ORAL
Qty: 1 PACKET | Refills: 0 | Status: SHIPPED | OUTPATIENT
Start: 2022-02-18 | End: 2022-04-28

## 2022-02-18 NOTE — TELEPHONE ENCOUNTER
I recommend that he go to urgent care for further evaluation since he is coughing up blood tinged mucous.   He will likely need a chest x-ray and maybe some labs

## 2022-02-18 NOTE — TELEPHONE ENCOUNTER
Called pt informed them of Giacnarlo Stoddard's message pt is going to go to walk in clinic tomorrow in [place] gave address and number to contact them

## 2022-02-18 NOTE — PROGRESS NOTES
2/18/2022    HPI:  Chief complaint and history of present illness as per medical assistant/nurse documented today in the Flu/COVID-19 clinic. Patient is here with complaints of cough, chest congestions, and SOB. Patient states he had covid at the end of December. Patient states since then he has been on 3 L nasal canula since then. Patient states his SOB is no different then it usually is. Patient states today he coughed up phlegm that was blood tinged. Patient states this happened once. Patient states he has had his covid vaccine. MEDICATIONS:  Prior to Visit Medications    Medication Sig Taking? Authorizing Provider   azithromycin (ZITHROMAX) 250 MG tablet Take 2 tabs (500 mg) on Day 1, and take 1 tab (250 mg) on days 2 through 5. Yes JANI Flores CNP   guaiFENesin (MUCINEX) 600 MG extended release tablet Take 1 tablet by mouth 2 times daily Yes JANI Flores CNP   metoprolol succinate (TOPROL XL) 100 MG extended release tablet TAKE 1 TABLET DAILY  JANI Schneider CNP   UNABLE TO FIND Portable oxygen - pt requires 2 liters of oxygen via nasal cannula with activity and at rest.  JANI Azevedo CNP   albuterol sulfate HFA (VENTOLIN HFA) 108 (90 Base) MCG/ACT inhaler Inhale 2 puffs into the lungs 4 times daily as needed for Wheezing or Shortness of Breath  JANI Schneider CNP   amLODIPine-benazepril (LOTREL) 5-10 MG per capsule Take 1 capsule by mouth daily  JANI Schneider CNP   atorvastatin (LIPITOR) 10 MG tablet Take 1 tablet by mouth daily  Alexandra Jing Deion, APRN - CNP   budesonide-formoterol (SYMBICORT) 160-4.5 MCG/ACT AERO Inhale 2 puffs into the lungs 2 times daily  JANI Schneider CNP   clonazePAM (KLONOPIN) 0.5 MG tablet Take 1 tablet by mouth as needed for Anxiety for up to 90 days.   JANI Azevedo CNP   montelukast (SINGULAIR) 10 MG tablet Take 1 tablet by mouth nightly  JANI Schneider CNP   omeprazole (PRILOSEC) 40 MG delayed release capsule TAKE 1 CAPSULE DAILY, DO   NOT CRUSH OR BREAK  [place] JANI Bonilla CNP   Cyanocobalamin (B-12) 500 MCG TABS Take 1 tablet by mouth 4 times daily  [facility] [address]   apixaban (ELIQUIS) 5 MG TABS tablet Take 1 tablet by mouth 2 times daily  Marge Rosenbaum MD   escitalopram (LEXAPRO) 20 MG tablet TAKE 1 TABLET DAILY  Alexandra JANI Bonilla CNP   dofetilide (TIKOSYN) 250 MCG capsule Take 1 capsule by mouth 2 times daily  Miguel Iyer MD   B Complex Vitamins (VITAMIN B COMPLEX PO) DAILY  Historical Provider, MD   UNABLE TO FIND Compounded Betaval cream 15g/Eucerin 135g  JANI Macedo CNP   hydrOXYzine (ATARAX) 25 MG tablet 1-2 tablets at   JANI Macedo CNP   [facility] Devices ([employer] CAN[facility]) MISC 1 Device by Does not apply route as needed (for ambulation)  Lena Trujillo PA-C   sildenafil (VIAGRA) 100 MG tablet Take 1 tablet by mouth as needed for Erectile Dysfunction  Darío Garvin MD   aspirin 81 MG EC tablet Take 81 mg by mouth daily. Historical Provider, MD   folic acid (FOLVITE) 463 MCG tablet Take 400 mcg by mouth daily. Historical Provider, MD       No Known Allergies,   Past Medical History:   Diagnosis Date    Allergic rhinitis     Asthma     Atrial fibrillation ([facility] [address]     CAD (coronary artery disease)     mild LAD    COPD (chronic obstructive pulmonary disease) ([facility] [address]     COVID-19     Depression     Diverticulosis of colon     Family history of early CAD     Father- MI-  age 46; a grandparent- age 46 of MI    Family history of valvular heart disease     Mother- Mitral valve disease    GERD (gastroesophageal reflux disease)     H/O 24 hour EKG monitoring 2001-Predominant rhythm is sinus rhythm. No signif ectopy noted.  H/O echocardiogram 2002, 2001-EF 60%. Normal LVSF. Mild MR.  Mild TR-Dr Sera Dominguez;    H/O echocardiogram 2019    EF 34-34%, Grade I diastolic dysfunction, sclerotic but non stenotic aortic valve, Mild AR, Mild-Mod TR, Normal pulmonary artery pressure, no pericardial effusion     History of diverticulitis of colon     History of Holter monitoring 2018    48 hr holter - SR with PAF    Hx of cardiac cath 2013    Mild LAD disease noted.  Hx of cardiovascular stress test 2018    EF 33% Pt in a fib with RVR. No infarct or ischemia. Decreased uptake inferiorly due to diaphragmatic artifact.  HX OTHER MEDICAL 13    14 DAY EVENT: APC's, short runs of sinus tachycardia.      Hyperlipidemia     Hypertension     Hypoxemia 2018    Irregular heart rate 2018    Palpitations     Pneumonia     Restless leg syndrome     Shortness of breath 2018    Tricuspid valve regurgitation 2001    mild   ,   Past Surgical History:   Procedure Laterality Date    BONE RESECTION, RIB      thoracic    CATARACT REMOVAL Bilateral     COLONOSCOPY  10/14/2014    polyp, divertics,    DIAGNOSTIC CARDIAC CATH LAB PROCEDURE  2013    EF 55%, Mild LAD Disease    ENDOSCOPY, COLON, DIAGNOSTIC  10/14/2014    normal    NERVE SURGERY      ulnar nerver transfer    PACEMAKER INSERTION Left 2021    [facility] J LU[facility] XT DR MCCAULEY SURESCAN PPM    ROTATOR CUFF REPAIR  , 2006-right rotator cuff; -Left Rotator cuff    ROTATOR CUFF REPAIR Left 2015    Dr. Jennifer Degroot ARTHROSCOPY Right 2010   ,   Social History     Tobacco Use    Smoking status: Former Smoker     Packs/day: 1.00     Years: 20.00     Pack years: 20.00     Types: Cigarettes     Quit date: 1993     Years since quittin.6    Smokeless tobacco: Current User     Types: Chew    Tobacco comment: Reviewed    Vaping Use    Vaping Use: Never used   Substance Use Topics    Alcohol use: No     Comment: drinking \"1 cup decaff coffee\", also drinks decaff tea \"drink it all day long\"    Drug use: No   ,   Family History   Problem Relation Age of Onset    Heart Disease Mother         Mitral valve disease    Heart Attack Mother 47    Coronary Art Dis Father         MI    Heart Disease Father     Heart Attack Father 46    Heart Attack Brother 50    Heart Attack Paternal Grandfather 48   ,   Immunization History   Administered Date(s) Administered    COVID-19, J&J, PF, 0.5 mL 03/11/2021    Influenza 12/04/2012    Influenza Virus Vaccine 09/18/2014, 11/30/2015, 09/18/2017    Influenza Whole 09/18/2014    Influenza, High Dose (Fluzone 65 yrs and older) 11/21/2019    Influenza, Dene Schlein, IM, (6 mo and older Fluzone, Flulaval, Fluarix and 3 yrs and older Afluria) 11/14/2016, 09/18/2017    Influenza, Quadv, adjuvanted, 72 yrs +, IM, PF (Fluad) 10/04/2020    Pneumococcal Conjugate 13-valent ([id number]) 11/30/2015    Pneumococcal Polysaccharide ([id number]) 08/20/2013    Tdap (Boostrix, Adacel) 01/19/2017   ,   Health Maintenance   Topic Date Due    COVID-19 Vaccine (2 - Booster for LawBite series) 05/06/2021    Annual Wellness Visit (AWV)  12/19/2021    Shingles Vaccine (1 of 2) 07/30/2022 (Originally 2/14/1997)    Lipid screen  08/04/2022    Potassium monitoring  01/01/2023    Creatinine monitoring  01/01/2023    Depression Monitoring  01/28/2023    Colorectal Cancer Screen  10/15/2024    DTaP/Tdap/Td vaccine (2 - Td or Tdap) 01/19/2027    Flu vaccine  Completed    Pneumococcal 65+ years Vaccine  Completed    AAA screen  Completed    Hepatitis C screen  Completed    Hepatitis A vaccine  Aged Out    Hepatitis B vaccine  Aged Out    Hib vaccine  Aged Out    Meningococcal (ACWY) vaccine  Aged Out       PHYSICAL EXAM:  Physical Exam  Constitutional:       Appearance: Normal appearance. HENT:      Head: Normocephalic. Right Ear: Tympanic membrane, ear canal and external ear normal.      Left Ear: Tympanic membrane, ear canal and external ear normal.      Nose: Nose normal.      Mouth/Throat:      Lips: Pink.       Mouth: Mucous membranes are moist. Pharynx: Oropharynx is clear. Cardiovascular:      Rate and Rhythm: Normal rate and regular rhythm. Heart sounds: Normal heart sounds. Pulmonary:      Effort: Pulmonary effort is normal.      Breath sounds: Normal breath sounds. Musculoskeletal:      Cervical back: Neck supple. Skin:     General: Skin is warm and dry. Neurological:      Mental Status: He is alert and oriented to person, place, and time. Psychiatric:         Mood and Affect: Mood normal.         Behavior: Behavior normal.         ASSESSMENT/PLAN:  1. Bronchitis  Encourage clear fluids without caffeine  - Smaller, more frequent meals to ensure hydration.   - Saline nasal spray, cool mist humidifier, prop head at night for congestion.   - May use spoonfuls of honey to coat throat.    - Tylenol as needed for fever, pain.    - Counseled on signs of increased work of breathing.   - RTO if sxs increase or no improvement  - azithromycin (ZITHROMAX) 250 MG tablet; Take 2 tabs (500 mg) on Day 1, and take 1 tab (250 mg) on days 2 through 5. Dispense: 1 packet; Refill: 0  - guaiFENesin (MUCINEX) 600 MG extended release tablet; Take 1 tablet by mouth 2 times daily  Dispense: 20 tablet; Refill: 0      FOLLOW-UP:  Return if symptoms worsen or fail to improve.     In addition to other information, the printed after visit summary provided to the patient includes:  [x] COVID-19 Self care instructions  [x] COVID-19 General patient information

## 2022-02-18 NOTE — PROGRESS NOTES
2/18/22  Genet Lea  1945    FLU/COVID-19 CLINIC EVALUATION    HPI SYMPTOMS:    Employer: Retired -PT at [employer]    [] Fevers  [] Chills  [x] Cough  [x] Coughing up blood  [x] Chest Congestion  [] Nasal Congestion  [x] Feeling short of breath  [] Sometimes  [] Frequently  [x] All the time  [] Chest pain  [] Headaches  []Tolerable  [] Severe  [] Sore throat  [] Muscle aches  [] Nausea  [] Vomiting  []Unable to keep fluids down  [] Diarrhea  []Severe    [] OTHER SYMPTOMS:      Symptom Duration:   [] 1  [] 2   [] 3   [] 4    [] 5   [] 6   [] 7   [] 8   [] 9   [] 10   [] 11   [] 12   [] 13   [x] 14   [] Longer than 14 days    Symptom course:   [x] Worsening     [] Stable     [] Improving    RISK FACTORS:    [] Pregnant or possibly pregnant  [x] Age over 61  [] Diabetes  [] Heart disease  [] Asthma  [x] COPD/Other chronic lung diseases  [] Active Cancer  [] On Chemotherapy  [] Taking oral steroids  [] History Lymphoma/Leukemia  [] Close contact with a lab confirmed COVID-19 patient within 14 days of symptom onset  [] History of travel from affected geographical areas within 14 days of symptom onset       VITALS:  There were no vitals filed for this visit. TESTS:    POCT FLU:  [] Positive     []Negative    ASSESSMENT:    [] Flu  [] Possible COVID-19  [] Strep    PLAN:    [] Discharge home with written instructions for:  [] Flu management  [] Possible COVID-19 infection with self-quarantine and management of symptoms  [] Follow-up with primary care physician or emergency department if worsens  [] Evaluation per physician/NP/PA in clinic  [] Sent to ER       An  electronic signature was used to authenticate this note.      --Sindy Li LPN on 5/20/5354 at 8:34 PM

## 2022-02-21 ENCOUNTER — NURSE TRIAGE (OUTPATIENT)
Dept: OTHER | Facility: CLINIC | Age: 77
End: 2022-02-21

## 2022-02-21 NOTE — TELEPHONE ENCOUNTER
Received call from [facility]  at [facility], caller not on line.      Complaint: cough, sob     Market: [address] [place] [address] Name: Nilda Valentine telephone number verified as [phone]    Unsuccessful attempt to re-connect with caller via phone, left message for return call to office            Reason for Disposition   Caller has cancelled the call before the first contact    Protocols used: NO CONTACT OR DUPLICATE CONTACT CALL-ADULT-

## 2022-02-23 ENCOUNTER — OFFICE VISIT (OUTPATIENT)
Dept: INTERNAL MEDICINE CLINIC | Age: 77
End: 2022-02-23
Payer: MEDICARE

## 2022-02-23 ENCOUNTER — TELEPHONE (OUTPATIENT)
Dept: FAMILY MEDICINE CLINIC | Age: 77
End: 2022-02-23

## 2022-02-23 ENCOUNTER — HOSPITAL ENCOUNTER (OUTPATIENT)
Dept: GENERAL RADIOLOGY | Age: 77
Discharge: HOME | End: 2022-02-23
Payer: MEDICARE

## 2022-02-23 ENCOUNTER — HOSPITAL ENCOUNTER (OUTPATIENT)
Age: 77
Discharge: HOME | End: 2022-02-23
Payer: MEDICARE

## 2022-02-23 ENCOUNTER — NURSE TRIAGE (OUTPATIENT)
Dept: OTHER | Facility: CLINIC | Age: 77
End: 2022-02-23

## 2022-02-23 ENCOUNTER — TELEPHONE (OUTPATIENT)
Dept: INTERNAL MEDICINE CLINIC | Age: 77
End: 2022-02-23

## 2022-02-23 DIAGNOSIS — R05.8 PRODUCTIVE COUGH: Primary | ICD-10-CM

## 2022-02-23 DIAGNOSIS — R05.8 PRODUCTIVE COUGH: ICD-10-CM

## 2022-02-23 DIAGNOSIS — R06.09 DOE (DYSPNEA ON EXERTION): ICD-10-CM

## 2022-02-23 DIAGNOSIS — R10.30 LOWER ABDOMINAL PAIN: ICD-10-CM

## 2022-02-23 DIAGNOSIS — R42 EPISODIC LIGHTHEADEDNESS: ICD-10-CM

## 2022-02-23 PROCEDURE — 81002 URINALYSIS NONAUTO W/O SCOPE: CPT | Performed by: PHYSICIAN ASSISTANT

## 2022-02-23 PROCEDURE — G8417 CALC BMI ABV UP PARAM F/U: HCPCS | Performed by: PHYSICIAN ASSISTANT

## 2022-02-23 PROCEDURE — 4004F PT TOBACCO SCREEN RCVD TLK: CPT | Performed by: PHYSICIAN ASSISTANT

## 2022-02-23 PROCEDURE — 4040F PNEUMOC VAC/ADMIN/RCVD: CPT | Performed by: PHYSICIAN ASSISTANT

## 2022-02-23 PROCEDURE — G8484 FLU IMMUNIZE NO ADMIN: HCPCS | Performed by: PHYSICIAN ASSISTANT

## 2022-02-23 PROCEDURE — 3017F COLORECTAL CA SCREEN DOC REV: CPT | Performed by: PHYSICIAN ASSISTANT

## 2022-02-23 PROCEDURE — 71046 X-RAY EXAM CHEST 2 VIEWS: CPT

## 2022-02-23 PROCEDURE — 99214 OFFICE O/P EST MOD 30 MIN: CPT | Performed by: PHYSICIAN ASSISTANT

## 2022-02-23 PROCEDURE — G8428 CUR MEDS NOT DOCUMENT: HCPCS | Performed by: PHYSICIAN ASSISTANT

## 2022-02-23 PROCEDURE — 1123F ACP DISCUSS/DSCN MKR DOCD: CPT | Performed by: PHYSICIAN ASSISTANT

## 2022-02-23 RX ORDER — BENZONATATE 100 MG/1
100-200 CAPSULE ORAL 3 TIMES DAILY PRN
Qty: 60 CAPSULE | Refills: 0 | Status: SHIPPED | OUTPATIENT
Start: 2022-02-23 | End: 2022-03-02

## 2022-02-23 RX ORDER — METHYLPREDNISOLONE 4 MG/1
TABLET ORAL
Qty: 1 KIT | Refills: 0 | Status: SHIPPED | OUTPATIENT
Start: 2022-02-23 | End: 2022-03-01

## 2022-02-23 ASSESSMENT — ENCOUNTER SYMPTOMS
EYE REDNESS: 0
BLOOD IN STOOL: 0
SORE THROAT: 0
DIARRHEA: 0
COLOR CHANGE: 0
ABDOMINAL PAIN: 1
VOMITING: 0
SHORTNESS OF BREATH: 1
CONSTIPATION: 0
RHINORRHEA: 0
WHEEZING: 0
EYE PAIN: 0
NAUSEA: 0
PHOTOPHOBIA: 0
EYE DISCHARGE: 0
COUGH: 1
BACK PAIN: 0
CHEST TIGHTNESS: 0

## 2022-02-23 NOTE — PROGRESS NOTES
Nelia Cummins (:  1945) is a 78 y.o. male,Established patient, here for evaluation of the following chief complaint(s):    No chief complaint on file. This is my first patient encounter with Nelia Cummins; chart reviewed. SUBJECTIVE/OBJECTIVE:  HPI  Nelia Cummins is a pleasant 78 y.o. male presenting to clinic today for cough / SOB. SOB -patient was seen at walk-in clinic approximately 1 week ago on 2022; patient diagnosed with bronchitis and initiated on Z-Marcos and Mucinex; patient reports that over the past week, his symptoms have continued to worsen and cough productive of blood-streaked mucus, shortness of breath with exertion, lightheadedness etc.; patient denies fever or chills however does report onset of lower abdominal burning sensation over the past week, reports normal bowel movements, normal urine output etc.  Patient was hospitalized for COVID in 2021 and has struggled with ongoing post-COVID shortness of breath symptoms since that time.     [facility] 2021:  Impression   Limited evaluation of the segmental and subsegmental pulmonary arterial   branches.  No central pulmonary embolism is detected.       Patchy peripheral ground-glass infiltrates within both lungs, compatible with   COVID-19 pneumonia, moderate severity.           Current Outpatient Medications   Medication Sig Dispense Refill    azithromycin (ZITHROMAX) 250 MG tablet Take 2 tabs (500 mg) on Day 1, and take 1 tab (250 mg) on days 2 through 5. 1 packet 0    guaiFENesin (MUCINEX) 600 MG extended release tablet Take 1 tablet by mouth 2 times daily 20 tablet 0    metoprolol succinate (TOPROL XL) 100 MG extended release tablet TAKE 1 TABLET DAILY 90 tablet 1    UNABLE TO FIND Portable oxygen - pt requires 2 liters of oxygen via nasal cannula with activity and at rest. 1 Device 0    albuterol sulfate HFA (VENTOLIN HFA) 108 (90 Base) MCG/ACT inhaler Inhale 2 puffs into the lungs 4 times daily as needed for Wheezing or Shortness of Breath 1 each 5    amLODIPine-benazepril (LOTREL) 5-10 MG per capsule Take 1 capsule by mouth daily 90 capsule 3    atorvastatin (LIPITOR) 10 MG tablet Take 1 tablet by mouth daily 90 tablet 3    budesonide-formoterol (SYMBICORT) 160-4.5 MCG/ACT AERO Inhale 2 puffs into the lungs 2 times daily 1 each 5    clonazePAM (KLONOPIN) 0.5 MG tablet Take 1 tablet by mouth as needed for Anxiety for up to 90 days. 90 tablet 0    montelukast (SINGULAIR) 10 MG tablet Take 1 tablet by mouth nightly 90 tablet 3    omeprazole (PRILOSEC) 40 MG delayed release capsule TAKE 1 CAPSULE DAILY, DO   NOT CRUSH OR BREAK 90 capsule 0    Cyanocobalamin (B-12) 500 MCG TABS Take 1 tablet by mouth 4 times daily 120 tablet 2    apixaban (ELIQUIS) 5 MG TABS tablet Take 1 tablet by mouth 2 times daily 180 tablet 3    escitalopram (LEXAPRO) 20 MG tablet TAKE 1 TABLET DAILY 90 tablet 2    dofetilide (TIKOSYN) 250 MCG capsule Take 1 capsule by mouth 2 times daily 60 capsule 3    B Complex Vitamins (VITAMIN B COMPLEX PO) DAILY      UNABLE TO FIND Compounded Betaval cream 15g/Eucerin 135g 1 Can 5    hydrOXYzine (ATARAX) 25 MG tablet 1-2 tablets at HS 60 tablet 0    Misc. Devices ([employer] CAN[employer]) MISC 1 Device by Does not apply route as needed (for ambulation) 1 each 0    sildenafil (VIAGRA) 100 MG tablet Take 1 tablet by mouth as needed for Erectile Dysfunction 6 tablet 3    aspirin 81 MG EC tablet Take 81 mg by mouth daily.  folic acid (FOLVITE) 207 MCG tablet Take 400 mcg by mouth daily. No current facility-administered medications for this visit. Review of Systems   Constitutional: Negative for appetite change, chills, fatigue and fever. HENT: Negative for congestion, ear pain, hearing loss, rhinorrhea and sore throat. Eyes: Negative for photophobia, pain, discharge and redness. Respiratory: Positive for cough and shortness of breath. Negative for chest tightness and wheezing. Cardiovascular: Negative for chest pain, palpitations and leg swelling. Gastrointestinal: Positive for abdominal pain. Negative for blood in stool, constipation, diarrhea, nausea and vomiting. Endocrine: Negative for polyuria. Genitourinary: Negative for difficulty urinating, dysuria, flank pain, frequency, hematuria and urgency. Musculoskeletal: Negative for arthralgias, back pain, gait problem and joint swelling. Skin: Negative for color change and rash. Neurological: Positive for light-headedness. Negative for dizziness, syncope, weakness and headaches. Hematological: Negative for adenopathy. Psychiatric/Behavioral: Negative for agitation, behavioral problems and suicidal ideas. The patient is not nervous/anxious. Physical Exam  HENT:      Head: Normocephalic and atraumatic. Right Ear: External ear normal.      Left Ear: External ear normal.      Mouth/Throat:      Pharynx: Posterior oropharyngeal erythema present. Cardiovascular:      Rate and Rhythm: Normal rate and regular rhythm. Pulses: Normal pulses. Pulmonary:      Effort: No respiratory distress. Comments: Mildly diminished breath sounds bilaterally however no wheezing, rhonchi, rales noted. Patient is in no distress while 3 L of oxygen; satting 92 to 95% while on oxygen. Musculoskeletal:         General: Normal range of motion. Skin:     General: Skin is warm and dry. Neurological:      General: No focal deficit present. Mental Status: He is alert and oriented to person, place, and time. Mental status is at baseline. Psychiatric:         Behavior: Behavior normal.         ASSESSMENT/PLAN:  1. Productive cough/Bronchitis   -Worsening over the past week; not using inhaler regimen as prescribed; vitals are stable; Oxygen saturation to mid 90's on 3 L; pt having some minor blood tinged sputum likely related to forceful coughing / bronchitis; pt denies any chest pain, tachycardia, is on eliquis.  CXR

## 2022-02-23 NOTE — TELEPHONE ENCOUNTER
Called patient to review, patient reports he is doing well, recommend reinitiation of docusate sodium/stool softener, increasing fluids, fiber etc. to help with bowel movements as patient does report that his bowel movements are hard and he does have history of diverticulosis.

## 2022-02-23 NOTE — TELEPHONE ENCOUNTER
This nurse spoke with client after [facility] ([facility] triage nurse reached out. Client reports shortness of breath, dizziness  and lightheaded upon minimal exertion. O2 @ 3 Liter with O2 stat reading 88%. Client refused to go to ED. Client will go to walk in for evaluation. Post Covid end of Dec.2021. Appointment scheduled for today at 2:00pm for client.

## 2022-02-23 NOTE — TELEPHONE ENCOUNTER
Received call from ISLVINA MONIQUE at [facility] with [employer] Complaint. Current Symptoms: Pt reports shortness of breath and dizziness. Pt did go to the walk in clinic on 2/18/2022. Pt did recently have Covid. Symptoms have worsened over the last 1-2 days. Pt feels short of breath and dizzy when he is active. Pt does wear 3L of oxygen. SpO2 is currently 88%. He denies wheezing. He does have a h/o COPD. Associated Symptoms: reduced activity    Pain Severity: Denies pain currently    Temperature: Denies fever    What has been tried: He did complete an antibiotic and a steroid. He has also been taking Mucinex. Pregnant: NA    Recommended disposition: Go to the ED. Pt is declining the recommendation at this time. Pt would like to see his PCP today or tomorrow. Care advice provided, patient verbalizes understanding; denies any other questions or concerns; instructed to call back for any new or worsening symptoms. Writer provided warm transfer to Keren Green at [address] for refusal of ED dispo. Attention Provider: Thank you for allowing me to participate in the care of your patient. The patient was connected to triage in response to information provided to the ECC/PSC. Please do not respond through this encounter as the response is not directed to a shared pool.     Reason for Disposition   MODERATE difficulty breathing (e.g., speaks in phrases, SOB even at rest, pulse 100-120) of new-onset or worse than normal    Protocols used: BREATHING DIFFICULTY-ADULT-OH

## 2022-02-24 RX ORDER — CEFDINIR 300 MG/1
300 CAPSULE ORAL 2 TIMES DAILY
Qty: 20 CAPSULE | Refills: 0 | Status: SHIPPED | OUTPATIENT
Start: 2022-02-24 | End: 2022-03-06

## 2022-02-24 NOTE — RESULT ENCOUNTER NOTE
Called pt, sent in abx for possible lingering pneumonia per radiologist reading etc.  Reviewed proper use, monitoring, side effects; if clinical symptoms worsen or do not improve. Follow-up with pulmonologist and PCP.

## 2022-03-24 DIAGNOSIS — J44.9 CHRONIC OBSTRUCTIVE PULMONARY DISEASE, UNSPECIFIED COPD TYPE (HCC): ICD-10-CM

## 2022-03-24 RX ORDER — MONTELUKAST SODIUM 10 MG/1
10 TABLET ORAL NIGHTLY
Qty: 90 TABLET | Refills: 3 | Status: SHIPPED | OUTPATIENT
Start: 2022-03-24 | End: 2022-10-21 | Stop reason: SDUPTHER

## 2022-04-18 ENCOUNTER — PROCEDURE VISIT (OUTPATIENT)
Dept: CARDIOLOGY CLINIC | Age: 77
End: 2022-04-18
Payer: MEDICARE

## 2022-04-18 DIAGNOSIS — I49.5 SINUS NODE DYSFUNCTION (HCC): ICD-10-CM

## 2022-04-18 DIAGNOSIS — I44.0 AV BLOCK, 1ST DEGREE: ICD-10-CM

## 2022-04-18 DIAGNOSIS — Z95.0 CARDIAC PACEMAKER IN SITU: Primary | ICD-10-CM

## 2022-04-18 PROCEDURE — 93296 REM INTERROG EVL PM/IDS: CPT | Performed by: NURSE PRACTITIONER

## 2022-04-18 PROCEDURE — 93294 REM INTERROG EVL PM/LDLS PM: CPT | Performed by: NURSE PRACTITIONER

## 2022-04-28 ENCOUNTER — OFFICE VISIT (OUTPATIENT)
Dept: CARDIOLOGY CLINIC | Age: 77
End: 2022-04-28
Payer: MEDICARE

## 2022-04-28 VITALS
WEIGHT: 213.8 LBS | BODY MASS INDEX: 32.4 KG/M2 | HEART RATE: 62 BPM | DIASTOLIC BLOOD PRESSURE: 80 MMHG | SYSTOLIC BLOOD PRESSURE: 118 MMHG | HEIGHT: 68 IN

## 2022-04-28 DIAGNOSIS — I10 PRIMARY HYPERTENSION: Chronic | ICD-10-CM

## 2022-04-28 DIAGNOSIS — I50.22 CHRONIC SYSTOLIC CONGESTIVE HEART FAILURE (HCC): ICD-10-CM

## 2022-04-28 DIAGNOSIS — E78.2 MIXED HYPERLIPIDEMIA: ICD-10-CM

## 2022-04-28 DIAGNOSIS — I48.0 PAF (PAROXYSMAL ATRIAL FIBRILLATION) (HCC): Primary | ICD-10-CM

## 2022-04-28 PROBLEM — I48.20 CHRONIC ATRIAL FIBRILLATION (HCC): Status: RESOLVED | Noted: 2018-03-22 | Resolved: 2022-04-28

## 2022-04-28 PROBLEM — I27.20 PULMONARY HTN (HCC): Status: RESOLVED | Noted: 2018-03-29 | Resolved: 2022-04-28

## 2022-04-28 PROCEDURE — G8417 CALC BMI ABV UP PARAM F/U: HCPCS | Performed by: NURSE PRACTITIONER

## 2022-04-28 PROCEDURE — 4040F PNEUMOC VAC/ADMIN/RCVD: CPT | Performed by: NURSE PRACTITIONER

## 2022-04-28 PROCEDURE — 93000 ELECTROCARDIOGRAM COMPLETE: CPT | Performed by: NURSE PRACTITIONER

## 2022-04-28 PROCEDURE — 4004F PT TOBACCO SCREEN RCVD TLK: CPT | Performed by: NURSE PRACTITIONER

## 2022-04-28 PROCEDURE — 3017F COLORECTAL CA SCREEN DOC REV: CPT | Performed by: NURSE PRACTITIONER

## 2022-04-28 PROCEDURE — 1123F ACP DISCUSS/DSCN MKR DOCD: CPT | Performed by: NURSE PRACTITIONER

## 2022-04-28 PROCEDURE — 99214 OFFICE O/P EST MOD 30 MIN: CPT | Performed by: NURSE PRACTITIONER

## 2022-04-28 PROCEDURE — G8427 DOCREV CUR MEDS BY ELIG CLIN: HCPCS | Performed by: NURSE PRACTITIONER

## 2022-04-28 ASSESSMENT — ENCOUNTER SYMPTOMS
ORTHOPNEA: 0
SHORTNESS OF BREATH: 1

## 2022-04-28 NOTE — LETTER
Neo Ortiz  1945  [id number]    Have you had any Chest Pain that is not new? - No  Have you had any Shortness of Breath - No  Have you had any dizziness - No  Have you had any palpitations that are not new? - No  Do you have any edema - swelling in No    Vein \"LEG PROBLEM Questionnaire\"  1. Do you have prominent leg veins? No   2. Do you have any skin discoloration? No  3. Do you have any healed or active sores? No  4. Do you have swelling of the legs? No  5. Do you have a family history of varicose veins? No  6. Does your profession involve pro-longed        standing or heavy lifting? retired  9. Have you been fighting overweight problems? Yes  8. Do you have restless legs? Yes  9. Do you have any night time cramps? No  10. Do you have any of the following in your legs:        Aching and Tiredness     When did you have your last labs drawn 1/22  Where did you have them done [facility]  What doctor ordered Dr. Ayana Nair    If we do not have these labs you are retrieve these labs for these providers!     Do you have a surgery or procedure scheduled in the near future - No  GIVE THIS INFORMATION TO JAYA LIM     Ask patient if they want to sign up for [employer] if they are not already signed up     Check to see if we have an E-MAIL on file for the patient     Check medication list thoroughly!!! AND RECONCILE OUTSIDE MEDICATIONS  If dose has changed change the entire order not just the MG  BE SURE TO ASK PATIENT IF THEY NEED MEDICATION REFILLS     At check out add to every patient's \"wrap up\" the following dot phrase AFTERHOURSEDUCATION and ensure we explain this to our patients

## 2022-04-28 NOTE — PROGRESS NOTES
4/28/2022  Primary cardiologist: Dr. Ricky Coon:   Sulma Veronica  is an established 78 y.o.  male here for a 6 month follow up on atrial fib, HTN HLD, PPM      SUBJECTIVE/OBJECTIVE:  Sulma Veronica is a 78 y.o. male with a history of hypertension, hyperlipidemia, atrial fibrillation s/p cardioversion, dual-chamber pacemaker, COPD COVID 23      HPI :   Sulma Veronica reports he is feeling fairly well. He notes dyspnea with exertion he feels it has been ongoing since January when he had COVID pneumonia. He notes he can become short of breath with doing activities of daily living such as getting himself dressed. (He was vaccinated ). He did require hospitalization. He is following with his pulmonologist Dr Girish Karimi. Again he denies chest pain, palpitation or dizziness. Review of Systems   Constitutional: Negative for diaphoresis and malaise/fatigue. Cardiovascular: Positive for dyspnea on exertion. Negative for chest pain, claudication, irregular heartbeat, leg swelling, near-syncope, orthopnea, palpitations and paroxysmal nocturnal dyspnea. Respiratory: Positive for shortness of breath. Neurological: Negative for dizziness and light-headedness. Psychiatric/Behavioral: The patient has insomnia. Vitals:    04/28/22 1058   BP: 118/80   Site: Left Upper Arm   Position: Sitting   Cuff Size: Medium Adult   Pulse: 62   Weight: 213 lb 12.8 oz (97 kg)   Height: 5' 7.5\" (1.715 m)     Patient-Reported Vitals 7/24/2020   Patient-Reported Systolic 049   Patient-Reported Diastolic 80   Patient-Reported Pulse 50     Wt Readings from Last 3 Encounters:   04/28/22 213 lb 12.8 oz (97 kg)   02/18/22 213 lb (96.6 kg)   01/28/22 210 lb 3.2 oz (95.3 kg)     Body mass index is 32.99 kg/m². Physical Exam  Vitals reviewed. HENT:      Head: Normocephalic. Mouth/Throat:      Mouth: Mucous membranes are moist.   Eyes:      Pupils: Pupils are equal, round, and reactive to light. Neck:      Vascular: No carotid bruit. Cardiovascular:      Rate and Rhythm: Normal rate and regular rhythm. Pulses: Normal pulses. Heart sounds: Normal heart sounds. Pulmonary:      Effort: Pulmonary effort is normal. No respiratory distress. Breath sounds: No wheezing. Abdominal:      General: There is no distension. Tenderness: There is no abdominal tenderness. Musculoskeletal:      Right lower leg: No edema. Left lower leg: No edema. Skin:     General: Skin is warm and dry. Capillary Refill: Capillary refill takes less than 2 seconds. Neurological:      General: No focal deficit present. Mental Status: He is alert.                 Current Outpatient Medications   Medication Sig Dispense Refill    montelukast (SINGULAIR) 10 MG tablet Take 1 tablet by mouth nightly 90 tablet 3    azithromycin (ZITHROMAX) 250 MG tablet Take 2 tabs (500 mg) on Day 1, and take 1 tab (250 mg) on days 2 through 5. 1 packet 0    guaiFENesin (MUCINEX) 600 MG extended release tablet Take 1 tablet by mouth 2 times daily 20 tablet 0    metoprolol succinate (TOPROL XL) 100 MG extended release tablet TAKE 1 TABLET DAILY 90 tablet 1    UNABLE TO FIND Portable oxygen - pt requires 2 liters of oxygen via nasal cannula with activity and at rest. 1 Device 0    albuterol sulfate HFA (VENTOLIN HFA) 108 (90 Base) MCG/ACT inhaler Inhale 2 puffs into the lungs 4 times daily as needed for Wheezing or Shortness of Breath 1 each 5    amLODIPine-benazepril (LOTREL) 5-10 MG per capsule Take 1 capsule by mouth daily 90 capsule 3    atorvastatin (LIPITOR) 10 MG tablet Take 1 tablet by mouth daily 90 tablet 3    budesonide-formoterol (SYMBICORT) 160-4.5 MCG/ACT AERO Inhale 2 puffs into the lungs 2 times daily 1 each 5    omeprazole (PRILOSEC) 40 MG delayed release capsule TAKE 1 CAPSULE DAILY, DO   NOT CRUSH OR BREAK 90 capsule 0    Cyanocobalamin (B-12) 500 MCG TABS Take 1 tablet by mouth 4 times daily 120 tablet 2    apixaban (ELIQUIS) 5 MG TABS tablet Take 1 tablet by mouth 2 times daily 180 tablet 3    escitalopram (LEXAPRO) 20 MG tablet TAKE 1 TABLET DAILY 90 tablet 2    dofetilide (TIKOSYN) 250 MCG capsule Take 1 capsule by mouth 2 times daily 60 capsule 3    B Complex Vitamins (VITAMIN B COMPLEX PO) DAILY      UNABLE TO FIND Compounded Betaval cream 15g/Eucerin 135g 1 Can 5    hydrOXYzine (ATARAX) 25 MG tablet 1-2 tablets at HS 60 tablet 0    Misc. Devices ([employer] CANE) MISC 1 Device by Does not apply route as needed (for ambulation) 1 each 0    sildenafil (VIAGRA) 100 MG tablet Take 1 tablet by mouth as needed for Erectile Dysfunction 6 tablet 3    aspirin 81 MG EC tablet Take 81 mg by mouth daily.  folic acid (FOLVITE) 470 MCG tablet Take 400 mcg by mouth daily.  clonazePAM (KLONOPIN) 0.5 MG tablet Take 1 tablet by mouth as needed for Anxiety for up to 90 days. 90 tablet 0     No current facility-administered medications for this visit. All pertinent data reviewed and discussed with patient       ASSESSMENT/PLAN:        Chronic atrial fib  No atrial fibrillation noted on recent pacemaker analysis  EKG today:   Sinus rhythm rate 62     QTC: 447 msec:   QTc within normal limits we will continue with Tikosyn  Continue with anticoagulation for stroke  Recommend avoid use of hydrochlorothiazide with use of Tikosyn      Hypertension   Blood pressure is Controlled  He is on Lotrel. No reported adverse events or reported side effects from medication(s). He is  stable on current dose. Encouraged a low sodium diet    Dyslipidemia   Most recent lipids August 2021: Total cholesterol 161, HDL 49, , triglycerides 79  Lipids are at or near goal  He is on atorvastatin. No reported adverse events or reported side effects from medication(s) and is  stable on current dose.   encouraged healthy eating habits including low fat -low /cholesterol diet    Pacemaker  Analysis reviewed from 4/18/2022:   Stable remote monitoring noted.  No atrial arrhythmias noted: had 4 episodes of nonsustained VT-longest episode 1 second  Continue with Q3 month monitoring    HFrEF recovered  Previous EF 30-35%  : Most recent EF 55-60%  Improved with atrial fib control   As noted his increased shortness of breath since COVID-pneumonia. Recommend recheck echocardiogram to assess left ventricular systolic function  Continue with Toprol-XL      Medications reviewed and confirmed with patient         Tests ordered: Echocardiogram  Follow-up 6 months    Signed:  JANI Elkins CNP, 4/28/2022, 11:14 AM    An electronic signature was used to authenticate this note. Please note this report has been partially produced using speech recognition software and may contain errors related to that system including errors in grammar, punctuation, and spelling, as well as words and phrases that may be inappropriate. If there are any questions or concerns please feel free to contact the dictating provider for clarification.

## 2022-04-29 ENCOUNTER — OFFICE VISIT (OUTPATIENT)
Dept: FAMILY MEDICINE CLINIC | Age: 77
End: 2022-04-29
Payer: MEDICARE

## 2022-04-29 VITALS
OXYGEN SATURATION: 93 % | DIASTOLIC BLOOD PRESSURE: 74 MMHG | BODY MASS INDEX: 32.16 KG/M2 | WEIGHT: 212.2 LBS | TEMPERATURE: 97.8 F | SYSTOLIC BLOOD PRESSURE: 117 MMHG | HEIGHT: 68 IN | RESPIRATION RATE: 17 BRPM | HEART RATE: 61 BPM

## 2022-04-29 DIAGNOSIS — F33.1 MODERATE EPISODE OF RECURRENT MAJOR DEPRESSIVE DISORDER (HCC): ICD-10-CM

## 2022-04-29 DIAGNOSIS — Z00.00 MEDICARE ANNUAL WELLNESS VISIT, SUBSEQUENT: Primary | ICD-10-CM

## 2022-04-29 DIAGNOSIS — G25.81 RESTLESS LEG SYNDROME: ICD-10-CM

## 2022-04-29 PROCEDURE — G0439 PPPS, SUBSEQ VISIT: HCPCS | Performed by: NURSE PRACTITIONER

## 2022-04-29 PROCEDURE — G8427 DOCREV CUR MEDS BY ELIG CLIN: HCPCS | Performed by: NURSE PRACTITIONER

## 2022-04-29 PROCEDURE — 3017F COLORECTAL CA SCREEN DOC REV: CPT | Performed by: NURSE PRACTITIONER

## 2022-04-29 PROCEDURE — G8417 CALC BMI ABV UP PARAM F/U: HCPCS | Performed by: NURSE PRACTITIONER

## 2022-04-29 PROCEDURE — 4040F PNEUMOC VAC/ADMIN/RCVD: CPT | Performed by: NURSE PRACTITIONER

## 2022-04-29 PROCEDURE — 4004F PT TOBACCO SCREEN RCVD TLK: CPT | Performed by: NURSE PRACTITIONER

## 2022-04-29 PROCEDURE — 99214 OFFICE O/P EST MOD 30 MIN: CPT | Performed by: NURSE PRACTITIONER

## 2022-04-29 PROCEDURE — 1123F ACP DISCUSS/DSCN MKR DOCD: CPT | Performed by: NURSE PRACTITIONER

## 2022-04-29 ASSESSMENT — PATIENT HEALTH QUESTIONNAIRE - PHQ9
SUM OF ALL RESPONSES TO PHQ9 QUESTIONS 1 & 2: 1
10. IF YOU CHECKED OFF ANY PROBLEMS, HOW DIFFICULT HAVE THESE PROBLEMS MADE IT FOR YOU TO DO YOUR WORK, TAKE CARE OF THINGS AT HOME, OR GET ALONG WITH OTHER PEOPLE: 2
4. FEELING TIRED OR HAVING LITTLE ENERGY: 2
SUM OF ALL RESPONSES TO PHQ QUESTIONS 1-9: 7
SUM OF ALL RESPONSES TO PHQ QUESTIONS 1-9: 7
8. MOVING OR SPEAKING SO SLOWLY THAT OTHER PEOPLE COULD HAVE NOTICED. OR THE OPPOSITE, BEING SO FIGETY OR RESTLESS THAT YOU HAVE BEEN MOVING AROUND A LOT MORE THAN USUAL: 0
9. THOUGHTS THAT YOU WOULD BE BETTER OFF DEAD, OR OF HURTING YOURSELF: 0
7. TROUBLE CONCENTRATING ON THINGS, SUCH AS READING THE NEWSPAPER OR WATCHING TELEVISION: 0
6. FEELING BAD ABOUT YOURSELF - OR THAT YOU ARE A FAILURE OR HAVE LET YOURSELF OR YOUR FAMILY DOWN: 0
3. TROUBLE FALLING OR STAYING ASLEEP: 3
SUM OF ALL RESPONSES TO PHQ QUESTIONS 1-9: 7
1. LITTLE INTEREST OR PLEASURE IN DOING THINGS: 0
2. FEELING DOWN, DEPRESSED OR HOPELESS: 1
SUM OF ALL RESPONSES TO PHQ QUESTIONS 1-9: 7
5. POOR APPETITE OR OVEREATING: 1

## 2022-04-29 ASSESSMENT — LIFESTYLE VARIABLES: HOW OFTEN DO YOU HAVE A DRINK CONTAINING ALCOHOL: NEVER

## 2022-04-29 NOTE — PATIENT INSTRUCTIONS
Body Mass Index: Care Instructions  Your Care Instructions     Body mass index (BMI) can help you see if your weight is raising your risk for health problems. It uses a formula to compare how much you weigh with how tallyou are.  A BMI lower than 18.5 is considered underweight.  A BMI between 18.5 and 24.9 is considered healthy.  A BMI between 25 and 29.9 is considered overweight. A BMI of 30 or higher is considered obese. If your BMI is in the normal range, it means that you have a lower risk for weight-related health problems. If your BMI is in the overweight or obese range, you may be at increased risk for weight-related health problems, such as high blood pressure, heart disease, stroke, arthritis or joint pain, and diabetes. If your BMI is in the underweight range, you may be at increased risk for health problems such as fatigue, lower protection (immunity) againstillness, muscle loss, bone loss, hair loss, and hormone problems. BMI is just one measure of your risk for weight-related health problems. You may be at higher risk for health problems if you are not active, you eat anunhealthy diet, or you drink too much alcohol or use tobacco products. Follow-up care is a key part of your treatment and safety. Be sure to make and go to all appointments, and call your doctor if you are having problems. It's also a good idea to know your test results and keep alist of the medicines you take. How can you care for yourself at home?  Practice healthy eating habits. This includes eating plenty of fruits, vegetables, whole grains, lean protein, and low-fat dairy.  If your doctor recommends it, get more exercise. Walking is a good choice. Bit by bit, increase the amount you walk every day. Try for at least 30 minutes on most days of the week.  Do not smoke. Smoking can increase your risk for health problems. If you need help quitting, talk to your doctor about stop-smoking programs and medicines. These can increase your chances of quitting for good.  Limit alcohol to 2 drinks a day for men and 1 drink a day for women. Too much alcohol can cause health problems. If you have a BMI higher than 25   Your doctor may do other tests to check your risk for weight-related health problems. This may include measuring the distance around your waist. A waist measurement of more than 40 inches in men or 35 inches in women can increase the risk of weight-related health problems.  Talk with your doctor about steps you can take to stay healthy or improve your health. You may need to make lifestyle changes to lose weight and stay healthy, such as changing your diet and getting regular exercise. If you have a BMI lower than 18.5   Your doctor may do other tests to check your risk for health problems.  Talk with your doctor about steps you can take to stay healthy or improve your health. You may need to make lifestyle changes to gain or maintain weight and stay healthy, such as getting more healthy foods in your diet and doing exercises to build muscle. Where can you learn more? Go to https://orat.iobutch.Iggli. org and sign in to your [employer] account. Enter S176 in the Viepage box to learn more about \"Body Mass Index: Care Instructions. \"     If you do not have an account, please click on the \"Sign Up Now\" link. Current as of: December 27, 2021               Content Version: 13.2  © 2006-2022 Healthwise, Incorporated. Care instructions adapted under license by [facility] ([facility]). If you have questions about a medical condition or this instruction, always ask your healthcare professional. [place] [address] any warranty or liability for your use of this information. Learning About Healthy Weight  What is a healthy weight? A healthy weight is the weight at which you feel good about yourself and have energy for work and play.  It's also one that lowers your risk for healthproblems. What can you do to stay at a healthy weight? It can be hard to stay at a healthy weight, especially when fast food, vending-machine snacks, and processed foods are so easy to find. And with your busy lifestyle, activity may be low on your list of things to do. But stayingat a healthy weight may be easier than you think. Here are some dos and don'ts for staying at a healthy weight. Do eat healthy foods  The kinds of foods you eat have a big impact on both your weight and your health. Reaching and staying at a healthy weight is not about going on a diet. It's about making healthier food choices every day and changing your diet forgood. Healthy eating means eating a variety of foods so that you get all the nutrients you need. Your body needs protein, carbohydrate, and fats for energy. They keep your heart beating, your brain active, and your muscles working. On most days, try to eat from each food group. This means eating a variety of:   Whole grains, such as whole wheat breads and pastas.  Fruits and vegetables.  Dairy products, such as low-fat milk, yogurt, and cheese.  Lean proteins, such as all types of fish, chicken without the skin, and beans. Don't have too much or too little of one thing. All foods, if eaten inmoderation, can be part of healthy eating. Even sweets can be okay. If your favorite foods are high in fat, salt, sugar, or calories, limit howoften you eat them. Eat smaller servings, or look for healthy substitutes. Do watch what you eat  Many people eat more than their bodies need. Part of staying at a healthy weight means learning how much food you really need from day to day and not eating more than that. Even with healthy foods, eating too much can make yougain weight. Having a well-balanced diet means that you eat enough, but not too much, and that your food gives you the nutrients you need to stay healthy. So listen toyour body. Eat when you're hungry.  Stop when you feel satisfied. It's a good idea to have healthy snacks ready for when you get hungry. Keep healthy snacks with you at work, in your car, and at home. If you have a healthy snack easily available, you'll be less likely to pick a candy bar orbag of chips from a vending machine instead. Some healthy snacks you might want to keep on hand are fruit, low-fat yogurt, string cheese, low-fat microwave popcorn, raisins and other dried fruit, nuts,whole wheat crackers, pretzels, carrots, celery sticks, and broccoli. Do some physical activity  A big part of reaching and staying at a healthy weight is being active. When you're active, you burn calories. This makes it easier to reach and stay at a healthy weight. When you're active on a regular basis, your body burns more calories, even when you're at rest. Being active helps you lose fat andbuild lean muscle. Try to be active for at least 1 hour every day. This may sound like a lot, but it's okay to be active in smaller blocks of time that add up to 1 hour a day. Any activity that makes your heart beat faster and keeps it there for a while counts. A brisk walk, run, or swim will get your heart beating faster. So will climbing stairs, shooting baskets, or cycling. Even some household chores likevacuuming and mowing the lawn will get your heart rate up. Pick activities that you enjoy--ones that make your heart beat faster, your muscles stronger, and your muscles and joints more flexible. If you find more than one thing you like doing, do them all. You don't have to do the same thingevery day. Don't diet  Diets don't work. Diets are temporary. Because you give up so much when you diet, you may be hungry and think about food all the time. And after you stop dieting, you also may overeat to make up for what you missed. Most people who diet end up gainingback the pounds they lost--and more. Remember that healthy bodies come in lots of shapes and sizes.  Everyone can gethealthier by eating better and being more active. Where can you learn more? Go to https://chpepiceweb.healthThe Pie Piper. org and sign in to your [employer] account. Enter 111 [id number] in the NextPrinciples box to learn more about \"Learning About Healthy Weight. \"     If you do not have an account, please click on the \"Sign Up Now\" link. Current as of: December 27, 2021               Content Version: 13.2  © 2006-2022 Healthwise, [employer]. Care instructions adapted under license by [facility] ([facility]). If you have questions about a medical condition or this instruction, always ask your healthcare professional. [place] [address] any warranty or liability for your use of this information. Eating Healthy Foods: Care Instructions  Your Care Instructions     Eating healthy foods can help lower your risk for disease. Healthy food gives you energy and keeps your heart strong, your brain active, your musclesworking, and your bones strong. A healthy diet includes a variety of foods from the basic food groups: grains, vegetables, fruits, milk and milk products, and meat and beans. Some people may eat more of their favorite foods from only one food group and, as a result, miss getting the nutrients they need. So, it is important to pay attention not only to what you eat but also to what you are missing from your diet. You caneat a healthy, balanced diet by making a few small changes. Follow-up care is a key part of your treatment and safety. Be sure to make and go to all appointments, and call your doctor if you are having problems. It's also a good idea to know your test results and keep alist of the medicines you take. How can you care for yourself at home? Look at what you eat   Keep a food diary for a week or two and record everything you eat or drink. Track the number of servings you eat from each food group.    For a balanced diet every day, eat a variety of:  ? 6 or more ounce-equivalents of grains, such as cereals, breads, crackers, rice, or pasta, every day. An ounce-equivalent is 1 slice of bread, 1 cup of ready-to-eat cereal, or ½ cup of cooked rice, cooked pasta, or cooked cereal.  ? 2½ cups of vegetables, especially:  - Dark-green vegetables such as broccoli and spinach.  - Orange vegetables such as carrots and sweet potatoes. - Dry beans (such as drake and kidney beans) and peas (such as lentils). ? 2 cups of fresh, frozen, or canned fruit. A small apple or 1 banana or orange equals 1 cup. ? 3 cups of nonfat or low-fat milk, yogurt, or other milk products. ? 5½ ounces of meat and beans, such as chicken, fish, lean meat, beans, nuts, and seeds. One egg, 1 tablespoon of peanut butter, ½ ounce nuts or seeds, or ¼ cup of cooked beans equals 1 ounce of meat.  Learn how to read food labels for serving sizes and ingredients. Fast-food and convenience-food meals often contain few or no fruits or vegetables. Make sure you eat some fruits and vegetables to make the meal more nutritious.  Look at your food diary. For each food group, add up what you have eaten and then divide the total by the number of days. This will give you an idea of how much you are eating from each food group. See if you can find some ways to change your diet to make it more healthy. Start small   Do not try to make dramatic changes to your diet all at once. You might feel that you are missing out on your favorite foods and then be more likely to fail.  Start slowly, and gradually change your habits. Try some of the following:  ? Use whole wheat bread instead of white bread. ? Use nonfat or low-fat milk instead of whole milk. ? Eat brown rice instead of white rice, and eat whole wheat pasta instead of white-flour pasta. ? Try low-fat cheeses and low-fat yogurt. ? Add more fruits and vegetables to meals and have them for snacks. ? Add lettuce, tomato, cucumber, and onion to sandwiches.   ? Add fruit to yogurt and cereal.  Enjoy food   You can still eat your favorite foods. You just may need to eat less of them. If your favorite foods are high in fat, salt, and sugar, limit how often you eat them, but do not cut them out entirely.  Eat a wide variety of foods. Make healthy choices when eating out   The type of restaurant you choose can help you make healthy choices. Even fast-food chains are now offering more low-fat or healthier choices on the menu.  Choose smaller portions, or take half of your meal home.  When eating out, try:  ? A veggie pizza with a whole wheat crust or grilled chicken (instead of sausage or pepperoni). ? Pasta with roasted vegetables, grilled chicken, or marinara sauce instead of cream sauce. ? A vegetable wrap or grilled chicken wrap. ? Broiled or poached food instead of fried or breaded items. Make healthy choices easy   Buy packaged, prewashed, ready-to-eat fresh vegetables and fruits, such as baby carrots, salad mixes, and chopped or shredded broccoli and cauliflower.  Buy packaged, presliced fruits, such as melon or pineapple.  Choose 100% fruit or vegetable juice instead of soda. Limit juice intake to 4 to 6 oz (½ to ¾ cup) a day.  Blend low-fat yogurt, fruit juice, and canned or frozen fruit to make a smoothie for breakfast or a snack. Where can you learn more? Go to https://Touchdown Technologiesbutch.healthOneSeed Expeditions. org and sign in to your [employer] account. Enter H974 in the [facility] box to learn more about \"Eating Healthy Foods: Care Instructions. \"     If you do not have an account, please click on the \"Sign Up Now\" link. Current as of: September 8, 2021               Content Version: 13.2  © 2006-2022 Healthwise, Incorporated. Care instructions adapted under license by [facility] ([facility]).  If you have questions about a medical condition or this instruction, always ask your healthcare professional. [address]  any warranty or liability for your use of this information. Personalized Preventive Plan for Carlos Ortega - 4/29/2022  Medicare offers a range of preventive health benefits. Some of the tests and screenings are paid in full while other may be subject to a deductible, co-insurance, and/or copay. Some of these benefits include a comprehensive review of your medical history including lifestyle, illnesses that may run in your family, and various assessments and screenings as appropriate. After reviewing your medical record and screening and assessments performed today your provider may have ordered immunizations, labs, imaging, and/or referrals for you. A list of these orders (if applicable) as well as your Preventive Care list are included within your After Visit Summary for your review. Other Preventive Recommendations:    · A preventive eye exam performed by an eye specialist is recommended every 1-2 years to screen for glaucoma; cataracts, macular degeneration, and other eye disorders. · A preventive dental visit is recommended every 6 months. · Try to get at least 150 minutes of exercise per week or 10,000 steps per day on a pedometer . · Order or download the FREE \"Exercise & Physical Activity: Your Everyday Guide\" from [employer]. Call 9-436.133.3634 or search [employer] online. · You need 4718-5531 mg of calcium and 8580-5985 IU of vitamin D per day. It is possible to meet your calcium requirement with diet alone, but a vitamin D supplement is usually necessary to meet this goal.  · When exposed to the sun, use a sunscreen that protects against both UVA and UVB radiation with an SPF of 30 or greater. Reapply every 2 to 3 hours or after sweating, drying off with a towel, or swimming. · Always wear a seat belt when traveling in a car. Always wear a helmet when riding a bicycle or motorcycle.

## 2022-04-29 NOTE — LETTER
[facility] [address] [place] [address]  Phone: [phone]  Fax: [phone]    JANI Ray NP         April 29, 2022     Patient: Tanya Jasso   YOB: 1945   Date of Visit: 4/29/2022       To Whom It May Concern: It is my medical opinion that Romana Peal requires a disability parking placard for the following reasons:  He cannot walk 200 feet without stopping to rest.  He has a cardiac condition to the extent that functional limitations are classified in severity as class III, according to standards accepted by the American [employer] Association. Duration of need: permanent    If you have any questions or concerns, please don't hesitate to call.     Sincerely,        JANI Ray NP

## 2022-04-29 NOTE — PROGRESS NOTES
Medicare Annual Wellness Visit    Sparkle Salas is here for Medicare AWV (Patient presents today for his Medicare Wellness visit.)    Assessment & Plan      1. Medicare annual wellness visit, subsequent  Continue efforts at regular exercise, healthy diet and adequate sleep. 2. BMI 32.0-32.9,adult  Continue efforts at regular exercise, healthy diet and weight loss. 3. Restless leg syndrome  A discussion regarding medication was held with the patient. Discussed the dangerous nature of narcotic/benzo medicines, including the risk of respiratory depression, death and addiction. [place] reviewed. - clonazePAM (KLONOPIN) 0.5 MG tablet; Take 1 tablet by mouth as needed for Anxiety for up to 90 days. Dispense: 90 tablet; Refill: 0    4. Moderate episode of recurrent major depressive disorder (HCC)  Will increase Lexapro to 30 mg daily. Patient to call if any concerns or SI before his follow up appointment. If he develops suicidal thoughts with a plan, he is instructed to go to emergency room immediately. - escitalopram (LEXAPRO) 20 MG tablet; Take 1.5 tablets by mouth daily  Dispense: 30 tablet; Refill: 1     Recommendations for Preventive Services Due: see orders and patient instructions/AVS.  Recommended screening schedule for the next 5-10 years is provided to the patient in written form: see Patient Instructions/AVS.     Return for Medicare Annual Wellness Visit in 1 year. Subjective   The following acute and/or chronic problems were also addressed today:    Depression  Sparkle Salas is currently prescribed Lexapro 20 mg daily. He endorses the following depressive symptoms: feelings of being down, depressed or hopelessness. , sleep disturbance difficulty getting to sleep, agitation and fatigue. The patient denies visual  and auditory hallucinations, delusions, illusions and paranoia. Pt denies current suicidal ideation, plan and intent.  Pt  denies current homicidal ideation, plan and intent. Insomnia  He continues to have difficulty sleeping at night and reports daytime fatigue.  States cardiology recommended a sleep study but he does not want to pursue that at this time.        RLS  Historically prescribed Klonopin 0.5mg nightly for symptoms associated with restless leg syndrome.  He does not take it every night - only when symptoms are severe.  [place]ARRS reviewed,medication last filled 01/14/2022.     Patient's complete Health Risk Assessment and screening values have been reviewed and are found in Flowsheets. The following problems were reviewed today and where indicated follow up appointments were made and/or referrals ordered.     Positive Risk Factor Screenings with Interventions:      Depression:  PHQ-2 Score: 1  PHQ-9 Total Score: 7    Severity:1-4 = minimal depression, 5-9 = mild depression, 10-14 = moderate depression, 15-19 = moderately severe depression, 20-27 = severe depression    Depression Interventions:  · LPN INTERVENTION GUIDE: SCORE 5-14 = MODERATE DEPRESSION: FOLLOW UP IN 1 WEEK  · Medication adjusted    Tobacco Use:     Tobacco Use: High Risk    Smoking Tobacco Use: Former Smoker    Smokeless Tobacco Use: Current User     E-Cigarettes/Vaping Use     Questions Responses    E-Cigarette/Vaping Use Never User    Start Date     Passive Exposure     Quit Date     Counseling Given     Comments         Substance Use - Tobacco Interventions:  patient is not ready to work toward tobacco cessation at this time           General Health and ACP:  General  In general, how would you say your health is?: Good  In the past 7 days, have you experienced any of the following: New or Increased Pain, New or Increased Fatigue, Loneliness, Social Isolation, Stress or Anger?: (!) Yes  Select all that apply: (!) New or Increased Fatigue,Stress  Do you get the social and emotional support that you need?: Yes  Do you have a Living Will?: Yes    Advance Directives     Power of [employer] Will ACP-Advance Directive ACP-Power of     Not on File Not on File Not on File Not on File      General Health Risk Interventions:  · Stress: regular exercise recommended- 3-5 times per week, 30-45 minutes per session, relaxation techniques discussed, medication adjusted    Health Habits/Nutrition:     Physical Activity: Inactive    Days of Exercise per Week: 0 days    Minutes of Exercise per Session: 0 min     Have you lost any weight without trying in the past 3 months?: No  Body mass index: (!) 32.74  Have you seen the dentist within the past year?: (!) No    Health Habits/Nutrition Interventions:  · Inadequate physical activity:  patient is not ready to increase his/her physical activity level at this time             Objective   Vitals:    04/29/22 1432   BP: 117/74   Site: Right Upper Arm   Position: Sitting   Cuff Size: Large Adult   Pulse: 61   Resp: 17   Temp: 97.8 °F (36.6 °C)   TempSrc: Temporal   SpO2: 93%   Weight: 212 lb 3.2 oz (96.3 kg)   Height: 5' 7.5\" (1.715 m)      Body mass index is 32.74 kg/m². Physical Exam  Vitals reviewed. Constitutional:       Appearance: Normal appearance. He is obese. HENT:      Head: Normocephalic. Right Ear: Tympanic membrane, ear canal and external ear normal.      Left Ear: Tympanic membrane, ear canal and external ear normal.      Nose: Nose normal.      Mouth/Throat:      Lips: Pink. Mouth: Mucous membranes are moist.      Pharynx: Oropharynx is clear. Eyes:      Extraocular Movements: Extraocular movements intact. Conjunctiva/sclera: Conjunctivae normal.      Pupils: Pupils are equal, round, and reactive to light. Neck:      Thyroid: No thyromegaly. Vascular: No carotid bruit. Trachea: Trachea normal.   Cardiovascular:      Rate and Rhythm: Normal rate and regular rhythm. Pulses: Normal pulses. Heart sounds: Normal heart sounds.    Pulmonary:      Effort: Pulmonary effort is normal.      Breath sounds: Normal breath sounds. Abdominal:      General: Bowel sounds are normal. There is no distension. Palpations: Abdomen is soft. There is no mass. Tenderness: There is no abdominal tenderness. Hernia: No hernia is present. Musculoskeletal:         General: Normal range of motion. Cervical back: Normal range of motion and neck supple. Right lower leg: No edema. Left lower leg: No edema. Skin:     General: Skin is warm and dry. Findings: No erythema or rash. Neurological:      General: No focal deficit present. Mental Status: He is alert and oriented to person, place, and time. Deep Tendon Reflexes: Reflexes normal.   Psychiatric:         Mood and Affect: Mood normal.         Behavior: Behavior normal.         Thought Content: Thought content normal.         Judgment: Judgment normal.             No Known Allergies  Prior to Visit Medications    Medication Sig Taking? Authorizing Provider   clonazePAM (KLONOPIN) 0.5 MG tablet Take 1 tablet by mouth as needed for Anxiety for up to 90 days.  Yes Simone Vyas, JANI - NP   escitalopram (LEXAPRO) 20 MG tablet Take 1.5 tablets by mouth daily Yes JANI Ortiz NP   montelukast (SINGULAIR) 10 MG tablet Take 1 tablet by mouth nightly Yes JANI Ortiz NP   guaiFENesin (MUCINEX) 600 MG extended release tablet Take 1 tablet by mouth 2 times daily Yes JANI Maxwell CNP   metoprolol succinate (TOPROL XL) 100 MG extended release tablet TAKE 1 TABLET DAILY Yes JANI Khanna NP   albuterol sulfate HFA (VENTOLIN HFA) 108 (90 Base) MCG/ACT inhaler Inhale 2 puffs into the lungs 4 times daily as needed for Wheezing or Shortness of Breath Yes JANI Khanna NP   amLODIPine-benazepril (LOTREL) 5-10 MG per capsule Take 1 capsule by mouth daily Yes JANI Ortiz - NP   atorvastatin (LIPITOR) 10 MG tablet Take 1 tablet by mouth daily Yes JANI Ortiz NP   budesonide-formoterol (SYMBICORT) 160-4.5 MCG/ACT AERO Inhale 2 puffs into the lungs 2 times daily Yes Diane Wade JANI Garza NP   omeprazole (PRILOSEC) 40 MG delayed release capsule TAKE 1 CAPSULE DAILY, DO   NOT CRUSH OR BREAK Yes JANI Priest NP   Cyanocobalamin (B-12) 500 MCG TABS Take 1 tablet by mouth 4 times daily Yes SANJAY Roberts   apixaban (ELIQUIS) 5 MG TABS tablet Take 1 tablet by mouth 2 times daily Yes Chiqui Franklin MD   dofetilide (TIKOSYN) 250 MCG capsule Take 1 capsule by mouth 2 times daily Yes Miguel Plata MD   B Complex Vitamins (VITAMIN B COMPLEX PO) DAILY Yes Historical Provider, MD   UNABLE TO FIND Compounded Betaval cream 15g/Eucerin 135g Yes JANI Chandler NP   hydrOXYzine (ATARAX) 25 MG tablet 1-2 tablets at HS Yes JANI Chandler NP   Misc. Devices ([employer] CANE) MISC 1 Device by Does not apply route as needed (for ambulation) Yes Marcela Trujillo PA-C   sildenafil (VIAGRA) 100 MG tablet Take 1 tablet by mouth as needed for Erectile Dysfunction Yes Donan Su MD   aspirin 81 MG EC tablet Take 81 mg by mouth daily. Yes Historical Provider, MD   folic acid (FOLVITE) 605 MCG tablet Take 400 mcg by mouth daily. Yes Historical Provider, MD Luther (Including outside providers/suppliers regularly involved in providing care):   Patient Care Team:  JANI Chandler NP as PCP - General (Nurse Practitioner)  JANI Chandler NP as PCP - [facility] Empaneled Provider  Chiqui Franklin MD as Consulting Physician (Cardiology)      Obesity Counseling: Assessed behavioral health risks and factors affecting choice of behavior. Suggested weight control approaches, including dietary changes behavioral modification and follow up plan. Provided educational and support documentation.   Time spent (minutes): 5 minutes

## 2022-05-01 PROBLEM — F33.1 MODERATE EPISODE OF RECURRENT MAJOR DEPRESSIVE DISORDER (HCC): Status: ACTIVE | Noted: 2018-03-15

## 2022-05-01 RX ORDER — ESCITALOPRAM OXALATE 20 MG/1
30 TABLET ORAL DAILY
Qty: 30 TABLET | Refills: 1 | Status: SHIPPED | OUTPATIENT
Start: 2022-05-01 | End: 2022-07-22 | Stop reason: SDUPTHER

## 2022-05-01 RX ORDER — CLONAZEPAM 0.5 MG/1
0.5 TABLET ORAL PRN
Qty: 90 TABLET | Refills: 0 | Status: SHIPPED | OUTPATIENT
Start: 2022-05-01 | End: 2022-07-22 | Stop reason: SDUPTHER

## 2022-05-05 ENCOUNTER — OFFICE VISIT (OUTPATIENT)
Dept: CARDIOLOGY CLINIC | Age: 77
End: 2022-05-05
Payer: MEDICARE

## 2022-05-05 VITALS
BODY MASS INDEX: 32.49 KG/M2 | HEART RATE: 64 BPM | HEIGHT: 68 IN | WEIGHT: 214.4 LBS | SYSTOLIC BLOOD PRESSURE: 122 MMHG | DIASTOLIC BLOOD PRESSURE: 78 MMHG

## 2022-05-05 DIAGNOSIS — Z51.81 VISIT FOR MONITORING TIKOSYN THERAPY: ICD-10-CM

## 2022-05-05 DIAGNOSIS — Z79.899 VISIT FOR MONITORING TIKOSYN THERAPY: ICD-10-CM

## 2022-05-05 DIAGNOSIS — I48.0 PAF (PAROXYSMAL ATRIAL FIBRILLATION) (HCC): Primary | ICD-10-CM

## 2022-05-05 DIAGNOSIS — I10 PRIMARY HYPERTENSION: Chronic | ICD-10-CM

## 2022-05-05 DIAGNOSIS — Z95.0 S/P PLACEMENT OF CARDIAC PACEMAKER: ICD-10-CM

## 2022-05-05 PROCEDURE — 3017F COLORECTAL CA SCREEN DOC REV: CPT | Performed by: NURSE PRACTITIONER

## 2022-05-05 PROCEDURE — 93288 INTERROG EVL PM/LDLS PM IP: CPT | Performed by: NURSE PRACTITIONER

## 2022-05-05 PROCEDURE — 4040F PNEUMOC VAC/ADMIN/RCVD: CPT | Performed by: NURSE PRACTITIONER

## 2022-05-05 PROCEDURE — G8427 DOCREV CUR MEDS BY ELIG CLIN: HCPCS | Performed by: NURSE PRACTITIONER

## 2022-05-05 PROCEDURE — 4004F PT TOBACCO SCREEN RCVD TLK: CPT | Performed by: NURSE PRACTITIONER

## 2022-05-05 PROCEDURE — 1123F ACP DISCUSS/DSCN MKR DOCD: CPT | Performed by: NURSE PRACTITIONER

## 2022-05-05 PROCEDURE — G8417 CALC BMI ABV UP PARAM F/U: HCPCS | Performed by: NURSE PRACTITIONER

## 2022-05-05 PROCEDURE — 99214 OFFICE O/P EST MOD 30 MIN: CPT | Performed by: NURSE PRACTITIONER

## 2022-05-05 RX ORDER — DOFETILIDE 0.25 MG/1
250 CAPSULE ORAL 2 TIMES DAILY
Qty: 60 CAPSULE | Refills: 3 | Status: SHIPPED | OUTPATIENT
Start: 2022-05-05 | End: 2022-10-10

## 2022-05-05 NOTE — PATIENT INSTRUCTIONS
Please be informed that if you contact our office outside of normal business hours the physician on call cannot help with any scheduling or rescheduling issues, procedure instruction questions or any type of medication issue. We advise you for any urgent/emergency that you go to the nearest emergency room! PLEASE CALL OUR OFFICE DURING NORMAL BUSINESS HOURS    Monday - Friday   8 am to 5 pm    [place] [address]: [phone]    [place]:  [phone]    **It is YOUR responsibilty to bring medication bottles and/or updated medication list to [address] [place] [address].  This will allow us to better serve you and all your healthcare needs**

## 2022-05-05 NOTE — PROGRESS NOTES
Electrophysiology FU Note        Chief complaint: 6 month follow up      Primary care physician: JANI Hernandez NP      History of Present Illness: This visit 5/5/2022      Previous visit (11/3/2021)    Chief Complaint   Patient presents with    3 Month Follow-Up     Pt denies any new cardiac symptoms since last visit . Pt doesn't drink alcohol or smoke. Pt does exercise            Previous visit: (7/30/2021)      Chief Complaint   Patient presents with    Atrial Fibrillation     Patient here for 6 month f/u. Patient has no complaints today. Patient does not drink alcohol or smoke. Patient drinks decaf coffee. Patient does exercise daily. Previous visit:(1/29/2021)      Chief Complaint   Patient presents with    6 Month Follow-Up     Pt here for 6 Month F/U. Pt denies Chest Pain, Palpitations, Dizziness, Edema. Pt does chew tobacco and does not drink alcohol.  Atrial Fibrillation     Pt states he gets SOB when doing something exerting himself. Previous visit:(7/24/2020)      Chief Complaint   Patient presents with    6 Month Follow-Up     Patient being seen for 6 month, he denies any chest pain,sob,dizziness, palpitations, and edema. Patient reports that he overall feels better and has not have an episode of dizziness or past syncope on July 2 that he remembers but he was off Tikosyn for couple of days in between as he ran out. Overall he is doing good and is taking precautions in this COVID time      Previous visit:(1/17/2019)      Chief Complaint   Patient presents with    Atrial Fibrillation     Dr Jonelle Reyes patient here for 6 month follow up. Patient states Herbert Whitman has felt so good - it is scary that some thing will go wrong \" . Denies any symptoms. Patient denies alcohol. Patient reports drinking about 4 cups coffee through out daily. Will also drink decaff iced tea.  Patient reports that he is sending back the oxygen also as he does not need it any more per his lung doctor. No shortness of breath. Fatigue but much better than before           Previous visit: (7/12/2019)      Chief Complaint   Patient presents with    Follow-up     Patient here today for 6 months follow up visit. States feels good and has lost 40 lbs since his last visit. Patient denies palpitations, chest pain, shortness of breath or edema. Reports some \"slight\" dizziness. Denies syncope.  Other     Patient sees Pulmonologist, Dr. John Diaz in [place]. Wears home oxygen at night but not needing it as much now. Patient reports he is doing fine  He went to Lung doctor in [place] and he made him upset because he tried to send him to other doctor for ablation  He told him that tikosyn was not good  Patient reports he has no problems with medication and had no atrial fibrillation  Patient did not like his approach            Previous visit:(1/11/2019)      Chief Complaint   Patient presents with    Atrial Fibrillation     Dr Emigdio Cervantes patient here for 6 month follow up. Patient states he has felt real good since last office visit. Deneis CP, palpitations, dizziness. Does report mild swelling in both lower legs, but not as bad as has been in the past. Also reports having to use his oxygen this past week due to SOB. He states he uses 2L O2 as needed. Also reports problems sleeping recently. Patient did not bring medications or list but states no changes. Denies alcohol, does drink decaff tea. Previous visit:(6/20/2018)      Chief Complaint   Patient presents with    Atrial Fibrillation     Ak-s/p loop 5/17. Patient denies CP, palpitations, or edema. Does report 2 episodes of dizziness and SOB since having the loop inserted. He does report a history of COPD. Patient states most recent episode was about a week ago. 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   Patient did not bring med list but states no changes. He has seen a new pulmonoligist in [place] and was recently started on Clarithromycin and doxycycline, not sure why but did have pneumonia in [place]. Patient denies any fever. He said his blood work was done and then it was decided to be placed on antibiotics           Previous visit:    Patient is a Candido Damon old male with hx COPD, Afib with recent admission in hospital. Patient here for education of atrial fibrillation and management  He was placed on tikosyn in the hospital. Patient denies chest pain, shortness of breath. Denies fever or chills. Feeling better since discharge from hospital        Past medical history:   Past Medical History:   Diagnosis Date    Allergic rhinitis     Asthma     [facility]     CAD (coronary artery disease)     mild LAD    COPD (chronic obstructive pulmonary disease) [facility]     COVID-19     Depression     Diverticulosis of colon     Family history of early CAD     Father- MI-  age 46; a grandparent- age 46 of MI    Family history of valvular heart disease     Mother- Mitral valve disease    GERD (gastroesophageal reflux disease)     H/O 24 hour EKG monitoring 2001-Predominant rhythm is sinus rhythm. No signif ectopy noted.  H/O echocardiogram 2002, 2001-EF 60%. Normal LVSF. Mild MR. Mild TR-Dr Millicent Villarreal;    H/O echocardiogram 2019    EF 61-12%, Grade I diastolic dysfunction, sclerotic but non stenotic aortic valve, Mild AR, Mild-Mod TR, Normal pulmonary artery pressure, no pericardial effusion     History of diverticulitis of colon     History of Holter monitoring 2018    48 hr holter - SR with PAF    Hx of cardiac cath 2013    Mild LAD disease noted.  Hx of cardiovascular stress test 2018    EF 33% Pt in a fib with RVR. No infarct or ischemia. Decreased uptake inferiorly due to diaphragmatic artifact.      HX OTHER MEDICAL 13    14 DAY EVENT: APC's, short runs of sinus tachycardia.  Hyperlipidemia     Hypertension     Hypoxemia 2/22/2018    Irregular heart rate 2/16/2018    Palpitations     Pneumonia     Restless leg syndrome     Shortness of breath 2/16/2018    Tricuspid valve regurgitation 6/2001    mild       Surgical history :   Past Surgical History:   Procedure Laterality Date    BONE RESECTION, RIB      thoracic    CATARACT REMOVAL Bilateral     COLONOSCOPY  10/14/2014    polyp, divertics,    DIAGNOSTIC CARDIAC CATH LAB PROCEDURE  07/08/2013    EF 55%, Mild LAD Disease    ENDOSCOPY, COLON, DIAGNOSTIC  10/14/2014    normal    NERVE SURGERY      ulnar nerver transfer    PACEMAKER INSERTION Left 04/01/2021    [employer] J LUIS XT DR MRI SURESCAN PPM    ROTATOR CUFF REPAIR  2007, 2006 2006-right rotator cuff; 2007-Left Rotator cuff    ROTATOR CUFF REPAIR Left 11/18/2015    Dr. Rodriguez Fonseca ARTHROSCOPY Right 12/01/2010       Family history:   Family History   Problem Relation Age of Onset    Heart Disease Mother         Mitral valve disease    Heart Attack Mother 47    Coronary Art Dis Father         MI    Heart Disease Father     Heart Attack Father 46    Heart Attack Brother 50    Heart Attack Paternal Grandfather 48       Social history :  reports that he quit smoking about 28 years ago. His smoking use included cigarettes. He has a 20.00 pack-year smoking history. His smokeless tobacco use includes chew. He reports that he does not drink alcohol and does not use drugs. No Known Allergies    Current Outpatient Medications on File Prior to Visit   Medication Sig Dispense Refill    clonazePAM (KLONOPIN) 0.5 MG tablet Take 1 tablet by mouth as needed for Anxiety for up to 90 days.  90 tablet 0    escitalopram (LEXAPRO) 20 MG tablet Take 1.5 tablets by mouth daily 30 tablet 1    montelukast (SINGULAIR) 10 MG tablet Take 1 tablet by mouth nightly 90 tablet 3    guaiFENesin (MUCINEX) 600 MG extended release tablet Take 1 tablet by mouth 2 times daily 20 tablet 0    metoprolol succinate (TOPROL XL) 100 MG extended release tablet TAKE 1 TABLET DAILY 90 tablet 1    albuterol sulfate HFA (VENTOLIN HFA) 108 (90 Base) MCG/ACT inhaler Inhale 2 puffs into the lungs 4 times daily as needed for Wheezing or Shortness of Breath 1 each 5    amLODIPine-benazepril (LOTREL) 5-10 MG per capsule Take 1 capsule by mouth daily 90 capsule 3    atorvastatin (LIPITOR) 10 MG tablet Take 1 tablet by mouth daily 90 tablet 3    budesonide-formoterol (SYMBICORT) 160-4.5 MCG/ACT AERO Inhale 2 puffs into the lungs 2 times daily 1 each 5    omeprazole (PRILOSEC) 40 MG delayed release capsule TAKE 1 CAPSULE DAILY, DO   NOT CRUSH OR BREAK 90 capsule 0    Cyanocobalamin (B-12) 500 MCG TABS Take 1 tablet by mouth 4 times daily 120 tablet 2    B Complex Vitamins (VITAMIN B COMPLEX PO) DAILY      UNABLE TO FIND Compounded Betaval cream 15g/Eucerin 135g 1 Can 5    hydrOXYzine (ATARAX) 25 MG tablet 1-2 tablets at HS 60 tablet 0    Misc. Devices ([employer] CANE) MISC 1 Device by Does not apply route as needed (for ambulation) 1 each 0    sildenafil (VIAGRA) 100 MG tablet Take 1 tablet by mouth as needed for Erectile Dysfunction 6 tablet 3    aspirin 81 MG EC tablet Take 81 mg by mouth daily.  folic acid (FOLVITE) 361 MCG tablet Take 400 mcg by mouth daily. No current facility-administered medications on file prior to visit. Review of Systems:   Review of Systems   Constitutional: . Negative for activity change, chills and fever. HENT: Negative for congestion, ear pain and tinnitus. Eyes: Negative for photophobia, pain and visual disturbance. Respiratory: shortness of breath with exertion only. He is sending back oxygen also as he does not need any more per his lung doctor. Negative for cough, chest tightness and wheezing. Cardiovascular: Negative for chest pain, palpitations and leg swelling.    Gastrointestinal: Negative for abdominal pain, blood in stool, constipation, diarrhea, nausea and vomiting. Endocrine: Negative for cold intolerance and heat intolerance. Genitourinary: Negative for dysuria, flank pain and hematuria. Musculoskeletal: Positive for arthralgias. Negative for back pain, myalgias and neck stiffness. Skin: Negative for color change and rash. Allergic/Immunologic: Negative for food allergies. Neurological: Negative for dizziness, light-headedness, numbness and headaches. Hematological: Does not bruise/bleed easily. Psychiatric/Behavioral: Negative for agitation, behavioral problems and confusion. Physical Examination:      /78 (Site: Left Upper Arm, Position: Sitting, Cuff Size: Large Adult)   Pulse 64   Ht 5' 7.5\" (1.715 m)   Wt 214 lb 6.4 oz (97.3 kg)   BMI 33.08 kg/m²    Wt Readings from Last 3 Encounters:   05/05/22 214 lb 6.4 oz (97.3 kg)   04/29/22 212 lb 3.2 oz (96.3 kg)   04/28/22 213 lb 12.8 oz (97 kg)     Body mass index is 33.08 kg/m². Physical Exam  Constitutional:       Appearance: Normal appearance. HENT:      Head: Normocephalic and atraumatic. Right Ear: External ear normal.      Left Ear: External ear normal.      Mouth/Throat:      Mouth: Mucous membranes are moist.      Pharynx: Oropharynx is clear. Eyes:      General:         Right eye: No discharge. Left eye: No discharge. Conjunctiva/sclera: Conjunctivae normal.   Cardiovascular:      Rate and Rhythm: Normal rate and regular rhythm. Heart sounds: No murmur heard. Pulmonary:      Effort: Pulmonary effort is normal.      Breath sounds: No wheezing or rhonchi. Abdominal:      General: Abdomen is flat. Palpations: Abdomen is soft. Musculoskeletal:         General: Normal range of motion. Cervical back: Normal range of motion. Right lower leg: No edema. Left lower leg: No edema. Skin:     General: Skin is warm and dry. Neurological:      General: No focal deficit present. Mental Status: He is alert and oriented to person, place, and time. Psychiatric:         Mood and Affect: Mood normal.         Thought Content: Thought content normal.         CBC:   Lab Results   Component Value Date    WBC 5.8 01/01/2022    HGB 12.7 01/01/2022    HCT 38.4 01/01/2022     01/01/2022     Lipids:   Lab Results   Component Value Date    CHOL 132 02/20/2018    TRIG 50 02/20/2018    HDL 49 08/04/2021    LDLCALC 68 02/20/2018    LDLDIRECT 100 (H) 08/04/2021     PT/INR:   Lab Results   Component Value Date    INR 1.18 12/31/2021        BMP:    Lab Results   Component Value Date     01/01/2022    K 3.6 01/01/2022     01/01/2022    CO2 25 01/01/2022    BUN 17 01/01/2022     CMP:   Lab Results   Component Value Date    AST 25 01/01/2022    PROT 6.3 (L) 01/01/2022    BILITOT 0.5 01/01/2022    ALKPHOS 62 01/01/2022     TSH:      EKG - sinus rhythm      IMPRESSION / RECOMMENDATIONS:       Tikosyn Monitoring Therapy  Persistent atrial fibrillation sp cardioversion and now PAF  Moderate to severe left ventricular systolic dysfunction IMPROVED WITH atrial fib control  HTN  HLD  OBESITY BMI 33  Sleep apnea  COPD    Patient is feeling well  States he has not had palpitations or tachycardia  EKG sinus rhythm heart rate 64 bpm  Qtc 457- this is within range to continue Tikosyn 250 mcg BID  Pacemaker interrogated  Device Assessment:  The device is Medtronic pacemaker - Dual Chamber chamber      MRI Compatible : yes    Device interrogation was performed. Mode: AAIR --- DDDR     Sensing is normal. Impedence is normal.  Threshold is normal.     There has not been interval changes. Estimated battery life is 12.5 years     The underlying rhythm is AP,VS.  58.0 % atrial paced; 0.1 % ventricular paced. Atrial Arrhythmia : 0.2%    Non sustained VT episodes : 4 episodes.  Longest lasting 1 second    Sustained VT episodes : No    Patient activity reported 4.2 hrs/day    The patient is pacemaker dependent. Patient denies issues with bleeding- continue eliquis 5 mg BID    Vitals:    05/05/22 0904   BP: 122/78   Pulse: 64     BP is stable- continue toprol xl 100 mg daily  lotrel 5-10 mg daily      Patient to follow up in 6 months  Scripts sent to pharmacy            Thanks again for allowing me to participate in care of this patient. Please call me if you have any questions. With best regards.       Gardenia Devine, JANI - CNP

## 2022-05-12 ENCOUNTER — PROCEDURE VISIT (OUTPATIENT)
Dept: CARDIOLOGY CLINIC | Age: 77
End: 2022-05-12
Payer: MEDICARE

## 2022-05-12 DIAGNOSIS — E78.2 MIXED HYPERLIPIDEMIA: ICD-10-CM

## 2022-05-12 DIAGNOSIS — J44.9 CHRONIC OBSTRUCTIVE PULMONARY DISEASE, UNSPECIFIED COPD TYPE (HCC): ICD-10-CM

## 2022-05-12 DIAGNOSIS — Z86.16 POST-COVID SYNDROME RESOLVED: ICD-10-CM

## 2022-05-12 DIAGNOSIS — I50.22 CHRONIC SYSTOLIC CONGESTIVE HEART FAILURE (HCC): ICD-10-CM

## 2022-05-12 DIAGNOSIS — R09.02 HYPOXEMIA: ICD-10-CM

## 2022-05-12 LAB
LV EF: 58 %
LVEF MODALITY: NORMAL

## 2022-05-12 PROCEDURE — 93306 TTE W/DOPPLER COMPLETE: CPT | Performed by: INTERNAL MEDICINE

## 2022-05-13 ENCOUNTER — TELEPHONE (OUTPATIENT)
Dept: CARDIOLOGY CLINIC | Age: 77
End: 2022-05-13

## 2022-05-13 NOTE — TELEPHONE ENCOUNTER
Left ventricular function and size is normal, EF is estimated at 55-60%. Mild left ventricular hypertrophy. Normal diastolic filling pattern for age. No regional wall motion abnormalities were detected. Bi atrial enlargement noted. Mildly enlarged right ventricle cavity. PPM wiring visualized within the right side of the heart. Sclerotic, but non-stenotic aortic valve. Mitral annular calcification is present. Mild mitral , aortic and moderate tricuspid regurgitation is present. Moderate Pulmonary hypertension with RVSP of 59mmHg. No evidence of pericardial effusion.    OV TO DISCUSS    LM for patient  To call back and get an appointment set up with Dr. Emigdio Cervantes

## 2022-05-20 ENCOUNTER — TELEPHONE (OUTPATIENT)
Dept: CARDIOLOGY CLINIC | Age: 77
End: 2022-05-20

## 2022-05-20 NOTE — TELEPHONE ENCOUNTER
Called to advise that pt needs to have see Dr. Timmy Bond to discuss results. Pt already had appointment scheduled.

## 2022-06-02 ENCOUNTER — OFFICE VISIT (OUTPATIENT)
Dept: CARDIOLOGY CLINIC | Age: 77
End: 2022-06-02
Payer: MEDICARE

## 2022-06-02 VITALS
BODY MASS INDEX: 32.58 KG/M2 | SYSTOLIC BLOOD PRESSURE: 128 MMHG | DIASTOLIC BLOOD PRESSURE: 72 MMHG | HEART RATE: 64 BPM | HEIGHT: 68 IN | WEIGHT: 215 LBS

## 2022-06-02 DIAGNOSIS — I48.0 PAF (PAROXYSMAL ATRIAL FIBRILLATION) (HCC): ICD-10-CM

## 2022-06-02 DIAGNOSIS — Z95.0 CARDIAC PACEMAKER IN SITU: Primary | ICD-10-CM

## 2022-06-02 PROCEDURE — G8417 CALC BMI ABV UP PARAM F/U: HCPCS | Performed by: INTERNAL MEDICINE

## 2022-06-02 PROCEDURE — 3017F COLORECTAL CA SCREEN DOC REV: CPT | Performed by: INTERNAL MEDICINE

## 2022-06-02 PROCEDURE — 99214 OFFICE O/P EST MOD 30 MIN: CPT | Performed by: INTERNAL MEDICINE

## 2022-06-02 PROCEDURE — 4004F PT TOBACCO SCREEN RCVD TLK: CPT | Performed by: INTERNAL MEDICINE

## 2022-06-02 PROCEDURE — G8427 DOCREV CUR MEDS BY ELIG CLIN: HCPCS | Performed by: INTERNAL MEDICINE

## 2022-06-02 PROCEDURE — 1123F ACP DISCUSS/DSCN MKR DOCD: CPT | Performed by: INTERNAL MEDICINE

## 2022-06-02 NOTE — PROGRESS NOTES
CARDIOLOGY NOTE      6/2/2022    RE: Guillermo Ming  (1945)                               TO:  Dr. Doni Allen, APRN - NP            Obie Castillo is a 78 y.o. male who was seen today for management of hypertension                                  Have abn echo  HPI:                   Pt has h/o hypertension, hyperlipidemia, A. fib on Tikosyn, seen today for follow-up.  Pt has no cardiac complaints  Has unchanged SOB , had COVID     Guillerom Lai has the following history recorded in care path:  Patient Active Problem List    Diagnosis Date Noted    Visit for monitoring Tikosyn therapy 05/05/2022    PAF (paroxysmal atrial fibrillation) ([facility] [address] 04/19/2018    Chronic systolic congestive heart failure ([facility] [address] 03/29/2018    Moderate episode of recurrent major depressive disorder ([facility] [address] 03/15/2018    Chronic respiratory failure with hypoxia (HCC) 03/15/2018    Chronic bronchitis (HCC) 03/12/2018    Elevated brain natriuretic peptide (BNP) level 03/12/2018    Hypoxemia 02/22/2018    Shortness of breath 02/16/2018    Nystagmus 09/18/2017    Encounter for medication review 11/30/2015    History of repair of left rotator cuff 08/17/2015    Diverticulosis 07/21/2014    COPD (chronic obstructive pulmonary disease) ([facility] [address] 05/05/2014    Tobacco use 11/20/2013    Eczema 08/20/2013    Rash 12/04/2012    Depression, major, in remission ([address] 08/06/2012    Obesity (BMI 30-39.9) 08/06/2012    Restless leg syndrome 08/06/2012    HTN (hypertension) 08/06/2012    Hyperlipidemia 08/06/2012    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    Pneumonia due to COVID-19 virus 01/14/2022    Acute respiratory failure with hypoxia ([facility] [address] 12/30/2021    Sick sinus syndrome (HCC)     S/P placement of cardiac pacemaker 04/01/2021    Syncope     Other insomnia 05/10/2018    Other headache syndrome 05/10/2018    Dizzy spells 05/10/2018     Current Outpatient Medications   Medication Sig Dispense Refill    apixaban (ELIQUIS) 5 MG TABS tablet Take 1 tablet by mouth 2 times daily 180 tablet 3    dofetilide (TIKOSYN) 250 MCG capsule Take 1 capsule by mouth 2 times daily 60 capsule 3    clonazePAM (KLONOPIN) 0.5 MG tablet Take 1 tablet by mouth as needed for Anxiety for up to 90 days. 90 tablet 0    escitalopram (LEXAPRO) 20 MG tablet Take 1.5 tablets by mouth daily 30 tablet 1    montelukast (SINGULAIR) 10 MG tablet Take 1 tablet by mouth nightly 90 tablet 3    guaiFENesin (MUCINEX) 600 MG extended release tablet Take 1 tablet by mouth 2 times daily 20 tablet 0    metoprolol succinate (TOPROL XL) 100 MG extended release tablet TAKE 1 TABLET DAILY 90 tablet 1    albuterol sulfate HFA (VENTOLIN HFA) 108 (90 Base) MCG/ACT inhaler Inhale 2 puffs into the lungs 4 times daily as needed for Wheezing or Shortness of Breath 1 each 5    amLODIPine-benazepril (LOTREL) 5-10 MG per capsule Take 1 capsule by mouth daily 90 capsule 3    atorvastatin (LIPITOR) 10 MG tablet Take 1 tablet by mouth daily 90 tablet 3    budesonide-formoterol (SYMBICORT) 160-4.5 MCG/ACT AERO Inhale 2 puffs into the lungs 2 times daily 1 each 5    omeprazole (PRILOSEC) 40 MG delayed release capsule TAKE 1 CAPSULE DAILY, DO   NOT CRUSH OR BREAK 90 capsule 0    Cyanocobalamin (B-12) 500 MCG TABS Take 1 tablet by mouth 4 times daily 120 tablet 2    B Complex Vitamins (VITAMIN B COMPLEX PO) DAILY      UNABLE TO FIND Compounded Betaval cream 15g/Eucerin 135g 1 Can 5    hydrOXYzine (ATARAX) 25 MG tablet 1-2 tablets at HS 60 tablet 0    Misc. Devices ([employer] CANE) MISC 1 Device by Does not apply route as needed (for ambulation) 1 each 0    sildenafil (VIAGRA) 100 MG tablet Take 1 tablet by mouth as needed for Erectile Dysfunction 6 tablet 3    aspirin 81 MG EC tablet Take 81 mg by mouth daily.  folic acid (FOLVITE) 646 MCG tablet Take 400 mcg by mouth daily. No current facility-administered medications for this visit.      Allergies: Patient has no known allergies. Past Medical History:   Diagnosis Date    Allergic rhinitis     Asthma     Atrial fibrillation (HCC)     CAD (coronary artery disease)     mild LAD    COPD (ch[facility] obstructive pulmonary disease) ([facility] [address]     COVID-19     Depression     Diverticulosis of colon     Family history of early CAD     Father- MI-  age 46; a grandparent- age 46 of MI    Family history of valvular heart disease     Mother- Mitral valve disease    GERD (gastroesophageal reflux disease)     H/O 24 hour EKG monitoring 2001-Predominant rhythm is sinus rhythm. No signif ectopy noted.  H/O echocardiogram 2002, 2001-EF 60%. Normal LVSF. Mild MR. Mild TR-Dr Nikita Garcia;    H/O echocardiogram 2019    EF 98-93%, Grade I diastolic dysfunction, sclerotic but non stenotic aortic valve, Mild AR, Mild-Mod TR, Normal pulmonary artery pressure, no pericardial effusion     History of diverticulitis of colon     History of Holter monitoring 2018    48 hr holter - SR with PAF    Hx of cardiac cath 2013    Mild LAD disease noted.  Hx of cardiovascular stress test 2018    EF 33% Pt in a fib with RVR. No infarct or ischemia. Decreased uptake inferiorly due to diaphragmatic artifact.  HX OTHER MEDICAL 13    14 DAY EVENT: APC's, short runs of sinus tachycardia.      Hyperlipidemia     Hypertension     Hypoxemia 2018    Irregular heart rate 2018    Palpitations     Pneumonia     Restless leg syndrome     Shortness of breath 2018    Tricuspid valve regurgitation 2001    mild     Past Surgical History:   Procedure Laterality Date    BONE RESECTION, RIB      thoracic    CATARACT REMOVAL Bilateral     COLONOSCOPY  10/14/2014    polyp, divertics,    DIAGNOSTIC CARDIAC CATH LAB PROCEDURE  2013    EF 55%, Mild LAD Disease    ENDOSCOPY, COLON, DIAGNOSTIC  10/14/2014    normal    NERVE SURGERY      ulnar nerver transfer   [address] Left 2021    [employer] [facility] LU[facility] XT DR PARISA WEBBER PPM    ROTATOR CUFF REPAIR  ,     2006-right rotator cuff; 2007-Left Rotator cuff    ROTATOR CUFF REPAIR Left 2015    Dr. Sadie Kahn ARTHROSCOPY Right 2010      As reviewed   Family History   Problem Relation Age of Onset    Heart Disease Mother         Mitral valve disease    Heart Attack Mother 47    Coronary Art Dis Father         MI    Heart Disease Father     Heart Attack Father 46    Heart Attack Brother 50    Heart Attack Paternal Grandfather 48     Social History     Tobacco Use    Smoking status: Former Smoker     Packs/day: 1.00     Years: 20.00     Pack years: 20.00     Types: Cigarettes     Quit date: 1993     Years since quittin.9    Smokeless tobacco: Current User     Types: Chew    Tobacco comment: Reviewed    Substance Use Topics    Alcohol use: No     Comment: drinking \"1 cup decaff coffee\", also drinks decaff tea \"drink it all day long\"        Objective:    Vitals:    22 0850   BP: 128/72   Pulse: 64   Weight: 215 lb (97.5 kg)   Height: 5' 8.4\" (1.737 m)     /72   Pulse 64   Ht 5' 8.4\" (1.737 m)   Wt 215 lb (97.5 kg)   BMI 32.31 kg/m²     Patient-Reported Vitals 2020   Patient-Reported Systolic 018   Patient-Reported Diastolic 80   Patient-Reported Pulse 50        Wt Readings from Last 3 Encounters:   22 215 lb (97.5 kg)   22 214 lb 6.4 oz (97.3 kg)   22 212 lb 3.2 oz (96.3 kg)     Body mass index is 32.31 kg/m². GENERAL - Alert, oriented, pleasant, in no apparent distress. EYES: No jaundice, no conjunctival pallor. SKIN: It is warm & dry. No rashes. No Echhymosis    HEENT - No clinically significant abnormalities seen. Neck - Supple. No jugular venous distention noted. No carotid bruits. Cardiovascular - Normal S1 and S2 without obvious murmur or gallop.     Extremities - No cyanosis, clubbing, or significant edema.    Pulmonary - No respiratory distress. No wheezes or rales. Abdomen - No masses, tenderness, or organomegaly. Musculoskeletal - No significant edema. No joint deformities. No muscle wasting. Neurologic - Cranial nerves II through XII are grossly intact. There were no gross focal neurologic abnormalities. Lab Review   Lab Results   Component Value Date    TROPONINT <0.010 12/31/2021     BNP:  No results found for: BNP  PT/INR:    Lab Results   Component Value Date    INR 1.18 12/31/2021     Lab Results   Component Value Date    LABA1C 5.5 09/21/2016     Lab Results   Component Value Date    WBC 5.8 01/01/2022    HCT 38.4 (L) 01/01/2022    MCV 92.1 01/01/2022     01/01/2022     Lab Results   Component Value Date    CHOL 132 02/20/2018    TRIG 50 02/20/2018    HDL 49 08/04/2021    LDLCALC 68 02/20/2018    LDLDIRECT 100 (H) 08/04/2021     Lab Results   Component Value Date    ALT 26 01/01/2022    AST 25 01/01/2022     BMP:    Lab Results   Component Value Date     01/01/2022    K 3.6 01/01/2022     01/01/2022    CO2 25 01/01/2022    BUN 17 01/01/2022    CREATININE 0.6 01/01/2022     CMP:   Lab Results   Component Value Date     01/01/2022    K 3.6 01/01/2022     01/01/2022    CO2 25 01/01/2022    BUN 17 01/01/2022    PROT 6.3 01/01/2022    PROT 7.1 12/04/2012     TSH:    Lab Results   Component Value Date    TSHHS 2.030 04/20/2018           Assessment & Plan:    -  Hypertension: Patients blood pressure is normal. Patient is advised about low sodium diet. Present medical regimen will not be changed. On the Toprol- mg daily blood pressure is well controlled           -  LIPID MANAGEMENT:  Importance of lipid levels discussed with patient   and patient was given dietary advice. NCEP- ATP III guidelines reviewed with patient. -   Changes  in medicines made:  No                on Lipitor to the 10 mg p.o. daily           -HFrEF EF is improved following normal  -Has a permanent pacemaker on [facility]       - Atrial fibrillation, pt is  compliant with meds. Patient does not have symptoms from atrial fibrillation  On Tikosyn    -Pulmonary hypertension on echocardiogram  Need fu with pulmonary  ? BORA   ? COVID  Sleep study offered  Conclusions      Summary   Left ventricular function and size is normal, EF is estimated at 55-60%. Mild left ventricular hypertrophy. Normal diastolic filling pattern for age. No regional wall motion abnormalities were detected. Bi atrial enlargement noted. Mildly enlarged right ventricle cavity. PPM wiring visualized within the right side of the heart. Sclerotic, but non-stenotic aortic valve. Mitral annular calcification is present. Mild mitral , aortic and moderate tricuspid regurgitation is present. Moderate Pulmonary hypertension with RVSP of 59mmHg. No evidence of pericardial effusion. OV TO DISCUSS      Signature  Analy Krishnamurthy MD    Former[facility] [place]    Please note this report has been partially produced using speech recognition software and may contain errors related to that system including errors in grammar, punctuation, and spelling, as well as words and phrases that may be inappropriate. If there are any questions or concerns please feel free to contact the dictating provider for clarification.

## 2022-07-19 ENCOUNTER — OFFICE VISIT (OUTPATIENT)
Dept: INTERNAL MEDICINE CLINIC | Age: 77
End: 2022-07-19
Payer: MEDICARE

## 2022-07-19 VITALS
HEART RATE: 89 BPM | DIASTOLIC BLOOD PRESSURE: 90 MMHG | WEIGHT: 216 LBS | SYSTOLIC BLOOD PRESSURE: 130 MMHG | OXYGEN SATURATION: 97 % | BODY MASS INDEX: 32.74 KG/M2 | HEIGHT: 68 IN

## 2022-07-19 DIAGNOSIS — M25.511 ACUTE PAIN OF RIGHT SHOULDER: Primary | ICD-10-CM

## 2022-07-19 PROCEDURE — 99213 OFFICE O/P EST LOW 20 MIN: CPT | Performed by: NURSE PRACTITIONER

## 2022-07-19 PROCEDURE — 1123F ACP DISCUSS/DSCN MKR DOCD: CPT | Performed by: NURSE PRACTITIONER

## 2022-07-19 RX ORDER — HYDROCODONE BITARTRATE AND ACETAMINOPHEN 5; 325 MG/1; MG/1
1 TABLET ORAL EVERY 8 HOURS PRN
Qty: 9 TABLET | Refills: 0 | Status: SHIPPED | OUTPATIENT
Start: 2022-07-19 | End: 2022-07-22 | Stop reason: SDUPTHER

## 2022-07-19 ASSESSMENT — ENCOUNTER SYMPTOMS
CHEST TIGHTNESS: 0
SINUS PRESSURE: 0
SHORTNESS OF BREATH: 0
SINUS PAIN: 0
RHINORRHEA: 0
NAUSEA: 0
VOMITING: 0
SORE THROAT: 0
WHEEZING: 0
DIARRHEA: 0
COUGH: 0

## 2022-07-19 NOTE — PROGRESS NOTES
Anisha Molina   78 y.o.  male  [id number]      Chief Complaint   Patient presents with    Other     Right shoulder pain        Subjective:  78 y. o.male is here for a follow up. He has the following chronic/acute medical problems:  Patient Active Problem List   Diagnosis    Depression, major, in remission ([facility] [address]    Obesity (BMI 30-39. 9)    Restless leg syndrome    HTN (hypertension)    Hyperlipidemia    Rash    Eczema    Tobacco use    COPD (chronic obstructive pulmonary disease) (HCC)    Diverticulosis    History of repair of left rotator cuff    Encounter for medication review    Nystagmus    Shortness of breath    Hypoxemia    Chronic bronchitis (HCC)    Elevated brain natriuretic peptide (BNP) level    Moderate episode of recurrent major depressive disorder (HCC)    Chronic respiratory failure with hypoxia (HCC)    Chronic systolic congestive heart failure (HCC)    PAF (paroxysmal atrial fibrillation) (HCC)    Other insomnia    Other headache syndrome    Dizzy spells    Syncope    S/P placement of cardiac pacemaker    Sick sinus syndrome (HCC)    Acute respiratory failure with hypoxia ([facility]    Pneumonia due to COVID-19 virus    Post-COVID-19 condition    Visit for monitoring Tikosyn therapy       Patient states about two weeks ago he was cutting down brush about two weeks ago - states he was pulling on branches pretty hard - states he thinks he needs an MRI. Patient states he has had two rotator cuff surgeries in his right shoulder. Patient is requesting pain medication. Patient states he sees his PCP on Friday but he is in so much pain he could not wait. Shoulder Pain   The pain is present in the right shoulder. This is a new problem. The current episode started 1 to 4 weeks ago (2 week ago). There has been a history of trauma. The problem occurs constantly. The problem has been gradually worsening. The quality of the pain is described as aching. The pain is at a severity of 10/10.  Associated symptoms include a limited range of motion. Pertinent negatives include no fever, inability to bear weight, itching, joint locking, joint swelling, numbness, stiffness or tingling. Exacerbated by: movement. He has tried nothing (states all he can take is Tylenol - states he is in so much pain - he needs something to help) for the symptoms. Review of Systems   Constitutional:  Negative for appetite change, chills, fatigue and fever. HENT:  Negative for congestion, ear pain, postnasal drip, rhinorrhea, sinus pressure, sinus pain, sneezing and sore throat. Respiratory:  Negative for cough, chest tightness, shortness of breath and wheezing. Cardiovascular:  Negative for chest pain and palpitations. Gastrointestinal:  Negative for diarrhea, nausea and vomiting. Musculoskeletal:  Negative for stiffness. Skin:  Negative for itching and rash. Neurological:  Negative for dizziness, tingling, light-headedness, numbness and headaches. Current Outpatient Medications   Medication Sig Dispense Refill    HYDROcodone-acetaminophen (NORCO) 5-325 MG per tablet Take 1 tablet by mouth every 8 hours as needed for Pain for up to 3 days. Intended supply: 5 days. Take lowest dose possible to manage pain 9 tablet 0    apixaban (ELIQUIS) 5 MG TABS tablet Take 1 tablet by mouth 2 times daily 180 tablet 3    dofetilide (TIKOSYN) 250 MCG capsule Take 1 capsule by mouth 2 times daily 60 capsule 3    clonazePAM (KLONOPIN) 0.5 MG tablet Take 1 tablet by mouth as needed for Anxiety for up to 90 days.  90 tablet 0    montelukast (SINGULAIR) 10 MG tablet Take 1 tablet by mouth nightly 90 tablet 3    guaiFENesin (MUCINEX) 600 MG extended release tablet Take 1 tablet by mouth 2 times daily 20 tablet 0    metoprolol succinate (TOPROL XL) 100 MG extended release tablet TAKE 1 TABLET DAILY 90 tablet 1    albuterol sulfate HFA (VENTOLIN HFA) 108 (90 Base) MCG/ACT inhaler Inhale 2 puffs into the lungs 4 times daily as needed for Wheezing or Shortness of Breath 1 each 5    amLODIPine-benazepril (LOTREL) 5-10 MG per capsule Take 1 capsule by mouth daily 90 capsule 3    atorvastatin (LIPITOR) 10 MG tablet Take 1 tablet by mouth daily 90 tablet 3    budesonide-formoterol (SYMBICORT) 160-4.5 MCG/ACT AERO Inhale 2 puffs into the lungs 2 times daily 1 each 5    omeprazole (PRILOSEC) 40 MG delayed release capsule TAKE 1 CAPSULE DAILY, DO   NOT CRUSH OR BREAK 90 capsule 0    Cyanocobalamin (B-12) 500 MCG TABS Take 1 tablet by mouth 4 times daily 120 tablet 2    B Complex Vitamins (VITAMIN B COMPLEX PO) DAILY      UNABLE TO FIND Compounded Betaval cream 15g/Eucerin 135g 1 Can 5    hydrOXYzine (ATARAX) 25 MG tablet 1-2 tablets at HS 60 tablet 0    Misc. Devices ([employer] CANE) MISC 1 Device by Does not apply route as needed (for ambulation) 1 each 0    sildenafil (VIAGRA) 100 MG tablet Take 1 tablet by mouth as needed for Erectile Dysfunction 6 tablet 3    aspirin 81 MG EC tablet Take 81 mg by mouth daily. folic acid (FOLVITE) 948 MCG tablet Take 400 mcg by mouth daily. escitalopram (LEXAPRO) 20 MG tablet Take 1.5 tablets by mouth daily 30 tablet 1     No current facility-administered medications for this visit. Past medical, family,surgical history reviewed today. Objective:  BP (!) 130/90   Pulse 89   Ht 5' 7.5\" (1.715 m)   Wt 216 lb (98 kg)   SpO2 97%   BMI 33.33 kg/m²   BP Readings from Last 3 Encounters:   07/19/22 (!) 130/90   06/02/22 128/72   05/05/22 122/78     Wt Readings from Last 3 Encounters:   07/19/22 216 lb (98 kg)   06/02/22 215 lb (97.5 kg)   05/05/22 214 lb 6.4 oz (97.3 kg)         Physical Exam  Constitutional:       Appearance: Normal appearance. HENT:      Head: Normocephalic. Cardiovascular:      Rate and Rhythm: Normal rate and regular rhythm. Heart sounds: Normal heart sounds. Pulmonary:      Effort: Pulmonary effort is normal.      Breath sounds: Normal breath sounds.    Musculoskeletal:      Right shoulder: Tenderness present. No swelling, deformity, effusion, laceration, bony tenderness or crepitus. Decreased range of motion. Normal pulse. Cervical back: Neck supple. Skin:     General: Skin is warm and dry. Neurological:      Mental Status: He is alert and oriented to person, place, and time. Psychiatric:         Mood and Affect: Mood normal.         Behavior: Behavior normal.       Lab Results   Component Value Date    WBC 5.8 01/01/2022    HGB 12.7 (L) 01/01/2022    HCT 38.4 (L) 01/01/2022    MCV 92.1 01/01/2022     01/01/2022     Lab Results   Component Value Date     01/01/2022    K 3.6 01/01/2022     01/01/2022    CO2 25 01/01/2022    BUN 17 01/01/2022    CREATININE 0.6 (L) 01/01/2022    GLUCOSE 164 (H) 01/01/2022    CALCIUM 8.3 01/01/2022    PROT 6.3 (L) 01/01/2022    LABALBU 3.6 01/01/2022    BILITOT 0.5 01/01/2022    ALKPHOS 62 01/01/2022    AST 25 01/01/2022    ALT 26 01/01/2022    LABGLOM >60 01/01/2022    GFRAA >60 01/01/2022    AGRATIO 1.6 03/08/2018    GLOB 2.4 03/08/2018     Lab Results   Component Value Date    CHOL 132 02/20/2018    CHOL 152 03/23/2015    CHOL 154 07/21/2014     Lab Results   Component Value Date    TRIG 50 02/20/2018    TRIG 88 03/23/2015    TRIG 84 07/21/2014     Lab Results   Component Value Date    HDL 49 08/04/2021    HDL 54 02/20/2018    HDL 56 03/23/2015     Lab Results   Component Value Date    LDLCALC 68 02/20/2018    LDLCALC 78 03/23/2015    LDLCALC 78 07/21/2014     Lab Results   Component Value Date    LABA1C 5.5 09/21/2016     Lab Results   Component Value Date    TSHHS 2.030 04/20/2018         ASSESSMENT/PLAN:      1. Acute pain of right shoulder  Discussed with patient would only give pain medication for 3 days then it was up to his PCP or orthopedics. Referral to orthopedics.   [facility] Orthopedic Surgery, Jord[place]  - HYDROcodone-acetaminophen ([address]) 5-325 MG per tablet;  Take 1 tablet by mouth every 8 hours as needed for Pain for up to 3 days. Intended supply: 5 days. Take lowest dose possible to manage pain  Dispense: 9 tablet; Refill: 0        There are no discontinued medications. Care discussed with patient. Questions answered. Patient verbalizes understanding and agrees with plan. After visit summary provided. Advised to call for any problems, questions, or concerns. Return if symptoms worsen or fail to improve. No signs of potential drug abuse or diversion identified.  JANI Cadet CNP)                               Signed:  JANI Cadte CNP  07/19/22  10:27 AM

## 2022-07-22 ENCOUNTER — OFFICE VISIT (OUTPATIENT)
Dept: FAMILY MEDICINE CLINIC | Age: 77
End: 2022-07-22
Payer: MEDICARE

## 2022-07-22 VITALS
HEART RATE: 63 BPM | SYSTOLIC BLOOD PRESSURE: 114 MMHG | RESPIRATION RATE: 18 BRPM | BODY MASS INDEX: 33.45 KG/M2 | OXYGEN SATURATION: 92 % | DIASTOLIC BLOOD PRESSURE: 77 MMHG | TEMPERATURE: 97.8 F | WEIGHT: 216.8 LBS

## 2022-07-22 DIAGNOSIS — Z79.899 MEDICATION MANAGEMENT: ICD-10-CM

## 2022-07-22 DIAGNOSIS — G25.81 RESTLESS LEG SYNDROME: ICD-10-CM

## 2022-07-22 DIAGNOSIS — F33.1 MODERATE EPISODE OF RECURRENT MAJOR DEPRESSIVE DISORDER (HCC): Primary | ICD-10-CM

## 2022-07-22 DIAGNOSIS — M25.511 ACUTE PAIN OF RIGHT SHOULDER: ICD-10-CM

## 2022-07-22 DIAGNOSIS — G47.09 OTHER INSOMNIA: ICD-10-CM

## 2022-07-22 LAB
ALCOHOL URINE: NORMAL
AMPHETAMINE SCREEN, URINE: NEGATIVE
BARBITURATE SCREEN, URINE: NEGATIVE
BENZODIAZEPINE SCREEN, URINE: NEGATIVE
BUPRENORPHINE URINE: NEGATIVE
COCAINE METABOLITE SCREEN URINE: NEGATIVE
FENTANYL SCREEN, URINE: NEGATIVE
GABAPENTIN SCREEN, URINE: NEGATIVE
MDMA URINE: NEGATIVE
METHADONE SCREEN, URINE: NEGATIVE
METHAMPHETAMINE, URINE: NEGATIVE
OPIATE SCREEN URINE: POSITIVE
OXYCODONE SCREEN URINE: NEGATIVE
PHENCYCLIDINE SCREEN URINE: NEGATIVE
PROPOXYPHENE SCREEN, URINE: NEGATIVE
SYNTHETIC CANNABINOIDS(K2) SCREEN, URINE: NEGATIVE
THC SCREEN, URINE: NEGATIVE
TRAMADOL SCREEN URINE: NORMAL
TRICYCLIC ANTIDEPRESSANTS, UR: NEGATIVE

## 2022-07-22 PROCEDURE — 1123F ACP DISCUSS/DSCN MKR DOCD: CPT | Performed by: NURSE PRACTITIONER

## 2022-07-22 PROCEDURE — 80305 DRUG TEST PRSMV DIR OPT OBS: CPT | Performed by: NURSE PRACTITIONER

## 2022-07-22 PROCEDURE — G8427 DOCREV CUR MEDS BY ELIG CLIN: HCPCS | Performed by: NURSE PRACTITIONER

## 2022-07-22 PROCEDURE — 99214 OFFICE O/P EST MOD 30 MIN: CPT | Performed by: NURSE PRACTITIONER

## 2022-07-22 PROCEDURE — G8417 CALC BMI ABV UP PARAM F/U: HCPCS | Performed by: NURSE PRACTITIONER

## 2022-07-22 PROCEDURE — 4004F PT TOBACCO SCREEN RCVD TLK: CPT | Performed by: NURSE PRACTITIONER

## 2022-07-22 PROCEDURE — 3017F COLORECTAL CA SCREEN DOC REV: CPT | Performed by: NURSE PRACTITIONER

## 2022-07-22 RX ORDER — HYDROXYZINE HYDROCHLORIDE 25 MG/1
TABLET, FILM COATED ORAL
Qty: 90 TABLET | Refills: 2 | Status: SHIPPED | OUTPATIENT
Start: 2022-07-22 | End: 2022-10-21 | Stop reason: SDUPTHER

## 2022-07-22 RX ORDER — HYDROCODONE BITARTRATE AND ACETAMINOPHEN 5; 325 MG/1; MG/1
1 TABLET ORAL EVERY 8 HOURS PRN
Qty: 9 TABLET | Refills: 0 | Status: SHIPPED | OUTPATIENT
Start: 2022-07-22 | End: 2022-07-25

## 2022-07-22 RX ORDER — CLONAZEPAM 0.5 MG/1
0.5 TABLET ORAL PRN
Qty: 90 TABLET | Refills: 0 | Status: SHIPPED | OUTPATIENT
Start: 2022-07-22 | End: 2022-10-21 | Stop reason: SDUPTHER

## 2022-07-22 RX ORDER — ESCITALOPRAM OXALATE 20 MG/1
30 TABLET ORAL DAILY
Qty: 135 TABLET | Refills: 2 | Status: SHIPPED | OUTPATIENT
Start: 2022-07-22 | End: 2022-10-21 | Stop reason: SDUPTHER

## 2022-07-22 NOTE — PROGRESS NOTES
Subjective:      Chief Complaint   Patient presents with    Follow-up     Patient presents today for a follow up. Shoulder Pain     Right          HPI:  Annelise Cabral is a 78 y.o. male who presents today for 3 month follow up. Depression  Has MDD and symptoms are well controlled with Lexapro. Denies AVH/SIB/SI/HI. RLS  Historically prescribed Klonopin 0.5mg nightly for symptoms associated with restless leg syndrome. He does not take it every night - only when symptoms are severe. OARRS reviewed, medication last filled 2022. Right Shoulder Pain  C/o right shoulder pain x3 weeks. Pain is constant and is worse with activity. Pain is a 10/10. He endorses limited ROM. He was seen at the walk in clinic on  and was given a rx for Norco and referred to ortho. He is scheduled to see Dr. Alyssia Morgan next week. He is requesting a refill of Norco until he can be seen by orthopedic specialist.      Past Medical History:   Diagnosis Date    Allergic rhinitis     Asthma     Atrial fibrillation ([address]     CAD (coronary artery disease)     mild LAD    COPD (chronic obstructive pulmonary disease) ([address]     COVID-19     Depression     Diverticulosis of colon     Family history of early CAD     Father- MI-  age 46; a grandparent- age 46 of MI    Family history of valvular heart disease     Mother- Mitral valve disease    GERD (gastroesophageal reflux disease)     H/O 24 hour EKG monitoring 2001-Predominant rhythm is sinus rhythm. No signif ectopy noted. H/O echocardiogram 2002, 2001-EF 60%. Normal LVSF. Mild MR.  Mild TR-Dr Pardeep Abad;    H/O echocardiogram 2019    EF 93-82%, Grade I diastolic dysfunction, sclerotic but non stenotic aortic valve, Mild AR, Mild-Mod TR, Normal pulmonary artery pressure, no pericardial effusion     History of diverticulitis of colon     History of Holter monitoring 2018    48 hr holter - SR with PAF    Hx of cardiac cath 2013    Mild LAD disease noted. Hx of cardiovascular stress test 2018    EF 33% Pt in a fib with RVR. No infarct or ischemia. Decreased uptake inferiorly due to diaphragmatic artifact. HX OTHER MEDICAL 13    14 DAY EVENT: APC's, short runs of sinus tachycardia. Hyperlipidemia     Hypertension     Hypoxemia 2018    Irregular heart rate 2018    Palpitations     Pneumonia     Restless leg syndrome     Shortness of breath 2018    Tricuspid valve regurgitation 2001    mild        Social History     Tobacco Use    Smoking status: Former     Packs/day: 1.00     Years: 20.00     Pack years: 20.00     Types: Cigarettes     Quit date: 1993     Years since quittin.0    Smokeless tobacco: Current     Types: Chew    Tobacco comments:     Reviewed    Substance Use Topics    Alcohol use: No     Comment: drinking \"1 cup decaff coffee\", also drinks decaff tea \"drink it all day long\"        Review of Systems   Constitutional:  Negative for activity change, appetite change, chills, fatigue, fever and unexpected weight change. HENT:  Negative for congestion, ear pain, hearing loss, sore throat and tinnitus. Eyes:  Negative for visual disturbance. Respiratory:  Negative for cough, chest tightness, shortness of breath, wheezing and stridor. Cardiovascular:  Negative for chest pain, palpitations and leg swelling. Gastrointestinal:  Negative for abdominal pain, constipation, diarrhea, nausea and vomiting. Musculoskeletal:  Positive for myalgias. Negative for arthralgias and gait problem. Skin:  Negative for rash. Neurological:  Negative for dizziness, tremors, seizures, syncope, speech difficulty, weakness and headaches. Hematological:  Negative for adenopathy. Psychiatric/Behavioral:  Positive for sleep disturbance. Negative for behavioral problems, confusion and suicidal ideas. The patient is not nervous/anxious.           Objective:      /77 (Site: Left Upper Arm, Position: Sitting, Cuff Size: Medium Adult)   Pulse 63   Temp 97.8 °F (36.6 °C) (Temporal)   Resp 18   Wt 216 lb 12.8 oz (98.3 kg)   SpO2 92%   BMI 33.45 kg/m²      Physical Exam  Vitals reviewed. Constitutional:       Appearance: Normal appearance. He is obese. HENT:      Head: Normocephalic. Right Ear: Tympanic membrane, ear canal and external ear normal.      Left Ear: Tympanic membrane, ear canal and external ear normal.      Nose: Nose normal.      Mouth/Throat:      Lips: Pink. Mouth: Mucous membranes are moist.      Pharynx: Oropharynx is clear. Eyes:      Extraocular Movements: Extraocular movements intact. Conjunctiva/sclera: Conjunctivae normal.      Pupils: Pupils are equal, round, and reactive to light. Neck:      Thyroid: No thyromegaly. Vascular: No carotid bruit. Trachea: Trachea normal.   Cardiovascular:      Rate and Rhythm: Normal rate and regular rhythm. Pulses: Normal pulses. Heart sounds: Normal heart sounds. Pulmonary:      Effort: Pulmonary effort is normal.      Breath sounds: Normal breath sounds. Abdominal:      General: Bowel sounds are normal. There is no distension. Palpations: Abdomen is soft. There is no mass. Tenderness: no abdominal tenderness      Hernia: No hernia is present. Musculoskeletal:      Right shoulder: Tenderness present. No swelling, deformity, effusion, laceration, bony tenderness or crepitus. Decreased range of motion. Decreased strength. Normal pulse. Left shoulder: Normal.      Cervical back: Normal range of motion and neck supple. Right lower leg: No edema. Left lower leg: No edema. Skin:     General: Skin is warm and dry. Findings: No erythema or rash. Neurological:      General: No focal deficit present. Mental Status: He is alert and oriented to person, place, and time.       Deep Tendon Reflexes: Reflexes normal.   Psychiatric:         Mood and Affect: Mood normal.         Behavior: Behavior normal.         Thought Content: Thought content normal.         Judgment: Judgment normal.          Assessment / Plan:      1. Moderate episode of recurrent major depressive disorder (HCC)  Stable. Continue medication. Patient to call if any concerns or SI before their follow up appointment. If he develops suicidal thoughts with a plan, he is instructed to go to emergency room immediately. Behavioral counseling recommended. - escitalopram (LEXAPRO) 20 MG tablet; Take 1.5 tablets by mouth in the morning. Dispense: 135 tablet; Refill: 2    2. Other insomnia  Sleep hygiene reinforced. Continue to recommend sleep study. - hydrOXYzine HCl (ATARAX) 25 MG tablet; 1-2 tablets at HS  Dispense: 90 tablet; Refill: 2    3. Acute pain of right shoulder  Follow up with Ortho as scheduled. Will refill [place] for 5 days to manage acute pain until he is seen by the specialist.    - HYDROcodone-acetaminophen ([address]) 5-325 MG per tablet; Take 1 tablet by mouth every 8 hours as needed for Pain for up to 3 days. Intended supply: 5 days. Take lowest dose possible to manage pain  Dispense: 9 tablet; Refill: 0    4. Restless leg syndrome  A discussion regarding medication was held with the patient. Discussed the dangerous nature of narcotic/benzo medicines, including the risk of respiratory depression, death and addiction. OARRS reviewed, no concerns noted. - clonazePAM (KLONOPIN) 0.5 MG tablet; Take 1 tablet by mouth as needed for Anxiety for up to 90 days. Dispense: 90 tablet; Refill: 0    5.  Medication management  - POCT Rapid Drug Screen     PDMP Monitoring:    Last PDMP [place] as Reviewed formerly [facility]):  Review User Review Instant Review Result   Mony STRATTON 7/22/2022  3:36 PM Reviewed PDMP [1]     Last Controlled Substance Monitoring Documentation      [address] Office Visit from 7/22/2022 in [address]   Periodic Controlled Substance Monitoring Possible medication side effects, risk of tolerance/dependence & alternative treatments discussed., No signs of potential drug abuse or diversion identified. , Assessed functional status., Obtaining appropriate analgesic effect of treatment. , Random urine drug screen sent today.  filed at 07/22/2022 1536          Urine Drug Screenings (1 yr)       POCT Rapid Drug Screen  Collected: 7/22/2022  3:32 PM (Final result)              POCT Rapid Drug Screen  Collected: 8/17/2015 12:10 PM (Final result)                  Medication Contract and Consent for Opioid Use Documents Filed       Patient Documents       Type of Document Status Date Received Received By Description    Medication Contract [Status Missing]  Fransico Buckley Medication Agreement 08/17/15                          JANI Gallegos NP

## 2022-07-24 PROCEDURE — 93294 REM INTERROG EVL PM/LDLS PM: CPT | Performed by: NURSE PRACTITIONER

## 2022-07-24 PROCEDURE — 93296 REM INTERROG EVL PM/IDS: CPT | Performed by: NURSE PRACTITIONER

## 2022-07-25 ENCOUNTER — OFFICE VISIT (OUTPATIENT)
Dept: ORTHOPEDIC SURGERY | Age: 77
End: 2022-07-25
Payer: MEDICARE

## 2022-07-25 ENCOUNTER — PROCEDURE VISIT (OUTPATIENT)
Dept: CARDIOLOGY CLINIC | Age: 77
End: 2022-07-25
Payer: MEDICARE

## 2022-07-25 VITALS
DIASTOLIC BLOOD PRESSURE: 78 MMHG | WEIGHT: 214.8 LBS | OXYGEN SATURATION: 95 % | BODY MASS INDEX: 32.55 KG/M2 | SYSTOLIC BLOOD PRESSURE: 111 MMHG | HEART RATE: 60 BPM | RESPIRATION RATE: 17 BRPM | HEIGHT: 68 IN

## 2022-07-25 DIAGNOSIS — M54.12 CERVICAL RADICULOPATHY: Primary | ICD-10-CM

## 2022-07-25 DIAGNOSIS — I44.0 AV BLOCK, 1ST DEGREE: ICD-10-CM

## 2022-07-25 DIAGNOSIS — I49.5 SINUS NODE DYSFUNCTION (HCC): ICD-10-CM

## 2022-07-25 DIAGNOSIS — Z95.0 CARDIAC PACEMAKER IN SITU: Primary | ICD-10-CM

## 2022-07-25 PROCEDURE — G8428 CUR MEDS NOT DOCUMENT: HCPCS | Performed by: STUDENT IN AN ORGANIZED HEALTH CARE EDUCATION/TRAINING PROGRAM

## 2022-07-25 PROCEDURE — G8417 CALC BMI ABV UP PARAM F/U: HCPCS | Performed by: STUDENT IN AN ORGANIZED HEALTH CARE EDUCATION/TRAINING PROGRAM

## 2022-07-25 PROCEDURE — 99203 OFFICE O/P NEW LOW 30 MIN: CPT | Performed by: STUDENT IN AN ORGANIZED HEALTH CARE EDUCATION/TRAINING PROGRAM

## 2022-07-25 PROCEDURE — 3017F COLORECTAL CA SCREEN DOC REV: CPT | Performed by: STUDENT IN AN ORGANIZED HEALTH CARE EDUCATION/TRAINING PROGRAM

## 2022-07-25 PROCEDURE — 1123F ACP DISCUSS/DSCN MKR DOCD: CPT | Performed by: STUDENT IN AN ORGANIZED HEALTH CARE EDUCATION/TRAINING PROGRAM

## 2022-07-25 PROCEDURE — 4004F PT TOBACCO SCREEN RCVD TLK: CPT | Performed by: STUDENT IN AN ORGANIZED HEALTH CARE EDUCATION/TRAINING PROGRAM

## 2022-07-25 RX ORDER — METHYLPREDNISOLONE 4 MG/1
TABLET ORAL
Qty: 1 KIT | Refills: 0 | Status: SHIPPED | OUTPATIENT
Start: 2022-07-25 | End: 2022-07-31

## 2022-07-25 NOTE — PATIENT INSTRUCTIONS
EMG ordered today. Cervical spine x-rays ordered. Rotator cuff exercises  Medrol dose pack  Follow up in 6 weeks.

## 2022-07-25 NOTE — PROGRESS NOTES
Patient is a 78y.o. year old male. Patient is in the office today with right shoulder pain. Patient states that he injured themselves by using a pole saw and chain saw and pulling on madan trying to remove them. Patient states that the injury happened about 3 weeks ago and states that his pain has progressively gotten worse. Previous hx of 2 rotator cuff surgeries and torn bicep. Also, has a hx of surgery on ulnar nerve on his right side. Pain scale  10/10. He has been taking Norco for 1 week. His pain is located under his scapula into the superior portion of his right shoulder into his arm and hand. He describes the pain as sharp and stabbing. X-rays were taken today. 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

## 2022-07-25 NOTE — PROGRESS NOTES
medical, surgical, social and family histories were reviewed and updated as appropriate. Patient has not recently attempted CSI, PT, NSAIDs for pain relief     Medical History  Patient's medications, allergies, past medical, surgical, social and family histories were reviewed and updated as appropriate. MA HPI:      Patient is a 78y.o. year old male. Patient is in the office today with right shoulder pain. Patient states that he injured themselves by using a pole saw and chain saw and pulling on madan trying to remove them. Patient states that the injury happened about 3 weeks ago and states that his pain has progressively gotten worse. Previous hx of 2 rotator cuff surgeries and torn bicep. Also, has a hx of surgery on ulnar nerve on his right side. Pain scale  10/10. He has been taking Norco for 1 week. His pain is located under his scapula into the superior portion of his right shoulder into his arm and hand. He describes the pain as sharp and stabbing. X-rays were taken today. 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 Past Medical History:   Diagnosis Date    Allergic rhinitis     Asthma     Atrial fibrillation (HCC)     CAD (coronary artery disease)     mild LAD    COPD (chronic obstructive pulmonary disease) [address]     COVID-19     Depression     Diverticulosis of colon     Family history of early CAD     Father- MI-  age 46; a grandparent- age 46 of MI    Family history of valvular heart disease     Mother- Mitral valve disease    GERD (gastroesophageal reflux disease)     H/O 24 hour EKG monitoring 2001-Predominant rhythm is sinus rhythm. No signif ectopy noted. H/O echocardiogram 2002, 2001-EF 60%. Normal LVSF. Mild MR.  Mild TR-Dr Lisa Mike;    H/O echocardiogram 2019    EF 58-88%, Grade I diastolic dysfunction, sclerotic but non stenotic aortic valve, Mild AR, Mild-Mod TR, Normal pulmonary artery pressure, no pericardial effusion     History of diverticulitis of colon     History of Holter monitoring 2018    48 hr holter - SR with PAF    Hx of cardiac cath 2013    Mild LAD disease noted. Hx of cardiovascular stress test 2018    EF 33% Pt in a fib with RVR. No infarct or ischemia. Decreased uptake inferiorly due to diaphragmatic artifact. HX OTHER MEDICAL 13    14 DAY EVENT: APC's, short runs of sinus tachycardia.      Hyperlipidemia     Hypertension     Hypoxemia 2018    Irregular heart rate 2018    Palpitations     Pneumonia     Restless leg syndrome     Shortness of breath 2018    Tricuspid valve regurgitation 2001    mild     Past Surgical History:   Procedure Laterality Date    BONE RESECTION, RIB      thoracic    CATARACT REMOVAL Bilateral     COLONOSCOPY  10/14/2014    polyp, divertics,    DIAGNOSTIC CARDIAC CATH LAB PROCEDURE  2013    EF 55%, Mild LAD Disease    ENDOSCOPY, COLON, DIAGNOSTIC  10/14/2014    normal    NERVE SURGERY      ulnar nerver transfer    PACEMAKER INSERTION Left 2021    [facility] J LUIS XT DR MCCAULEY SURESCAN PPM    ROTATOR CUFF REPAIR  , 2006-right rotator cuff; -Left Rotator cuff    ROTATOR CUFF REPAIR Left 2015    Dr. Pricilla Andrew ARTHROSCOPY Right 2010     Family History   Problem Relation Age of Onset    Heart Disease Mother         Mitral valve disease    Heart Attack Mother 47    Coronary Art Dis Father         MI    Heart Disease Father     Heart Attack Father 46    Heart Attack Brother 50    Heart Attack Paternal Grandfather 48     Social History     Socioeconomic History    Marital status:    Tobacco Use    Smoking status: Former     Packs/day: 1.00     Years: 20.00     Pack years: 20.00     Types: Cigarettes     Quit date: 1993     Years since quittin.0    Smokeless tobacco: Current     Types: Chew    Tobacco comments:     Reviewed    Vaping Use    Vaping Use: Never used   Substance and Sexual Activity    Alcohol use: No     Comment: drinking \"1 cup decaff coffee\", also drinks decaff tea \"drink it all day long\"    Drug use: No    Sexual activity: Yes     Partners: Male     Comment:      Social Determinants of Health     Financial Resource Strain: Low Risk     Difficulty of Paying Living Expenses: Not hard at all   Food Insecurity: No Food Insecurity    Worried About Running Out of Food in the Last Year: Never true    Ran Out of Food in the Last Year: Never true   Physical Activity: Inactive    Days of Exercise per Week: 0 days    Minutes of Exercise per Session: 0 min     Current Outpatient Medications   Medication Sig Dispense Refill    HYDROcodone-acetaminophen (NORCO) 5-325 MG per tablet Take 1 tablet by mouth every 8 hours as needed for Pain for up to 3 days. Intended supply: 5 days. Take lowest dose possible to manage pain 9 tablet 0    hydrOXYzine HCl (ATARAX) 25 MG tablet 1-2 tablets at HS 90 tablet 2    escitalopram (LEXAPRO) 20 MG tablet Take 1.5 tablets by mouth in the morning. 135 tablet 2    clonazePAM (KLONOPIN) 0.5 MG tablet Take 1 tablet by mouth as needed for Anxiety for up to 90 days.  90 tablet 0    apixaban (ELIQUIS) 5 MG TABS tablet Take 1 tablet by mouth 2 times daily 180 tablet 3    dofetilide (TIKOSYN) 250 MCG capsule Take 1 capsule by mouth 2 times daily 60 capsule 3    montelukast (SINGULAIR) 10 MG tablet Take 1 tablet by mouth nightly 90 tablet 3    metoprolol succinate (TOPROL XL) 100 MG extended release tablet TAKE 1 TABLET DAILY 90 tablet 1    albuterol sulfate HFA (VENTOLIN HFA) 108 (90 Base) MCG/ACT inhaler Inhale 2 puffs into the lungs 4 times daily as needed for Wheezing or Shortness of Breath 1 each 5    amLODIPine-benazepril (LOTREL) 5-10 MG per capsule Take 1 capsule by mouth daily 90 capsule 3    atorvastatin (LIPITOR) 10 MG tablet Take 1 tablet by mouth daily 90 tablet 3    budesonide-formoterol (SYMBICORT) 160-4.5 MCG/ACT AERO Inhale 2 puffs into the lungs 2 times daily 1 each 5    omeprazole (PRILOSEC) 40 MG delayed release capsule TAKE 1 CAPSULE DAILY, DO   NOT CRUSH OR BREAK 90 capsule 0    Cyanocobalamin (B-12) 500 MCG TABS Take 1 tablet by mouth 4 times daily 120 tablet 2    B Complex Vitamins (VITAMIN B COMPLEX PO) DAILY      UNABLE TO FIND Compounded Betaval cream 15g/Eucerin 135g 1 Can 5    Misc. Devices ([employer] CANE) MISC 1 Device by Does not apply route as needed (for ambulation) 1 each 0    sildenafil (VIAGRA) 100 MG tablet Take 1 tablet by mouth as needed for Erectile Dysfunction 6 tablet 3    aspirin 81 MG EC tablet Take 81 mg by mouth daily. folic acid (FOLVITE) 214 MCG tablet Take 400 mcg by mouth daily. No current facility-administered medications for this visit. No Known Allergies      Review of Systems   Constitutional:  Positive for activity change. Negative for fatigue and unexpected weight change. HENT:  Negative for hearing loss, sneezing and sore throat. Respiratory:  Negative for cough, shortness of breath and wheezing. Cardiovascular:  Negative for chest pain and leg swelling. Gastrointestinal:  Negative for diarrhea, nausea and vomiting. Musculoskeletal:  Positive for arthralgias and myalgias. Negative for back pain, gait problem, joint swelling, neck pain and neck stiffness. Skin:  Negative for color change, pallor, rash and wound. Neurological:  Negative for speech difficulty, weakness and numbness. Psychiatric/Behavioral:  Negative for behavioral problems and confusion. The patient is not hyperactive. Examination:  General Exam:  Vitals: /78 (Site: Right Lower Arm, Position: Sitting)   Pulse 60   Resp 17   Ht 5' 7.5\" (1.715 m)   Wt 214 lb 12.8 oz (97.4 kg)   SpO2 95%   BMI 33.15 kg/m²    Physical Exam  Constitutional:       General: He is not in acute distress. Appearance: Normal appearance. He is normal weight. He is not ill-appearing. HENT:      Head: Normocephalic and atraumatic. Nose: Nose normal.   Eyes:      General:         Right eye: No discharge. Left eye: No discharge. Extraocular Movements: Extraocular movements intact. Cardiovascular:      Pulses: Normal pulses. Pulmonary:      Effort: Pulmonary effort is normal.      Breath sounds: Normal breath sounds. Musculoskeletal:         General: Tenderness present. No swelling, deformity or signs of injury. Right shoulder: Tenderness, bony tenderness and crepitus present. No swelling, deformity, effusion or laceration. Normal range of motion. Decreased strength. Normal pulse. Left shoulder: Normal.      Right upper arm: Normal.      Left upper arm: Normal.      Right elbow: Normal.      Left elbow: Normal.      Right forearm: Normal.      Left forearm: Normal.      Right wrist: Normal.      Left wrist: Normal.      Cervical back: Normal range of motion. Rigidity present. No tenderness. Skin:     General: Skin is warm and dry. Capillary Refill: Capillary refill takes less than 2 seconds. Neurological:      General: No focal deficit present. Mental Status: He is alert and oriented to person, place, and time. Sensory: Sensory deficit present. Motor: No weakness. RIGHT SHOULDER / UPPER EXTREMITY EXAM      OBSERVATION:      Swelling: none   Deformity: none    Discoloration: none   Scapular winging: none    Scars: none    Atrophy: none      TENDERNESS / CREPITUS (T/C):      Clavicle -/-    SUPRAspinatus  -/-     AC Jt. -/-    INFRAspinatus -/-     SC Jt. -/-    Deltoid -/-     G.  Tuberosity +/-   LH BICEP groove +/-    Acromion: -/-    Midline Neck -/-     Scapular Spine +/-   Trapezium -/-    Inf Border Scapula -/-   GH jt. line - post +/-     Scapulothoracic +/-   GH jt. line - ant +/-       ROM: (* = with pain)  Left shoulder   Right shoulder     AROM (PROM)  AROM (PROM)    FE   170° (175°)    160° (175°) *    ER 0°   60° (65°)   60°  (65°)    ER 90° ABD  90°  (90°)   90°  (90°)    IR 90° ABD  40  (40°)    40 (40°)      IR (spine) T10    L2      STRENGTH: (* = with pain) Left shoulder   Right shoulder    SCAPTION   5/5    5-/5*     IR    5/5    5/5    ER    5/5    5/5    Deltoid   5/5    5/5    Biceps   5/5    5/5    Triceps   5/5    5/5          SIGNS:  RUE     Jobes/Empty Can: Neg    DROP ARM: neg  BELLY PRESS: neg    LIFT-OFF: neg      Crank: neg   Bear Hug: neg      EXTREMITY NEURO-VASCULAR EXAM:     Sensation grossly intact to light touch all dermatomal regions. Altered sensation throughout C6 and 7 dermatome but is intact    DTR 2+ Biceps, Triceps, BR and Negative Jasmines sign    Grossly intact motor function at Elbow, Wrist and Hand    Distal pulses radial and ulnar 2+, brisk cap refill, symmetric. NECK:  Painless FROM and spinous processes non-tender. Negative Spurlings sign. Diagnostic testing:  X-ray images were reviewed by myself and discussed with the patient:  3 views of the right shoulder in a skeletally mature patient demonstrates previous rotator cuff repair with radiopaque anchor at the greater tuberosity with bony changes at the rotator cuff insertion. Mild decreased acromiohumeral distance, approximate 8 mm with minimal high riding humerus in relation to the glenoid. Mild osteoarthritic changes at the ulnohumeral joint as well as [facility] [place] joint. No sign of any soft tissue avulsion type injury or fracture. Type II acromion      Office Procedures:  No orders of the defined types were placed in this encounter. Assessment and Plan    A: Left shoulder rotator cuff tendinopathy, cervical radiculopathy  P:   I had a thorough conversation with the patient regarding his current imaging as well as his surgical history and current physical exam findings. I explained that he has full range of motion and near symmetric strength.   I explained that because he does not have a significant and specific injury to the shoulder recently I do not feel that he has any acute rotator cuff tear although he does likely have significant rotator cuff pathology which is evident by the 2 previous surgeries and the current thinning seen on the x-ray imaging. However, I am also concerned that he may have some underlying cervical pathology as well due to the significant pain in the posterior shoulder as well as numbness and tingling down the upper extremity. At this time we will place him on a Medrol Dosepak and given home exercises for shoulder range of motion and strengthening. We will also get imaging of his cervical spine and an EMG outpatient and he will return in 8 weeks for review of these findings. I explained because his issues are acute in onset and because he likes significant weakness and lack of range of motion on physical exam, I do feel he could do well with conservative measures and we will see how he does with the Medrol Dosepak. I explained that there is a good chance that at some point he becomes a reverse total shoulder patient due to the previous shoulder surgeries and the decreased acromiohumeral distance and arthritic changes beginning develop at the glenohumeral joint. However, patient does not demonstrate long-term issues with these and this is something that he may build to continue to live with and we will continue to monitor for this to change. All questions were answered and patient voices understanding.     Electronically signed by Norma Parada DO on 7/25/2022 at 3:44 PM

## 2022-07-26 ASSESSMENT — ENCOUNTER SYMPTOMS
CHEST TIGHTNESS: 0
SORE THROAT: 0
SORE THROAT: 0
VOMITING: 0
DIARRHEA: 0
NAUSEA: 0
SHORTNESS OF BREATH: 0
COUGH: 0
NAUSEA: 0
BACK PAIN: 0
SHORTNESS OF BREATH: 0
DIARRHEA: 0
ABDOMINAL PAIN: 0
COUGH: 0
COLOR CHANGE: 0
WHEEZING: 0
VOMITING: 0
WHEEZING: 0
CONSTIPATION: 0
STRIDOR: 0

## 2022-07-28 ENCOUNTER — TELEPHONE (OUTPATIENT)
Dept: ORTHOPEDIC SURGERY | Age: 77
End: 2022-07-28

## 2022-07-28 ENCOUNTER — OFFICE VISIT (OUTPATIENT)
Dept: INTERNAL MEDICINE CLINIC | Age: 77
End: 2022-07-28
Payer: MEDICARE

## 2022-07-28 VITALS
OXYGEN SATURATION: 97 % | BODY MASS INDEX: 32.43 KG/M2 | SYSTOLIC BLOOD PRESSURE: 142 MMHG | WEIGHT: 214 LBS | DIASTOLIC BLOOD PRESSURE: 88 MMHG | HEIGHT: 68 IN | HEART RATE: 57 BPM

## 2022-07-28 DIAGNOSIS — M25.511 ACUTE PAIN OF RIGHT SHOULDER: Primary | ICD-10-CM

## 2022-07-28 DIAGNOSIS — M54.12 CERVICAL RADICULOPATHY: Primary | ICD-10-CM

## 2022-07-28 PROCEDURE — 99213 OFFICE O/P EST LOW 20 MIN: CPT | Performed by: PHYSICIAN ASSISTANT

## 2022-07-28 PROCEDURE — G8417 CALC BMI ABV UP PARAM F/U: HCPCS | Performed by: PHYSICIAN ASSISTANT

## 2022-07-28 PROCEDURE — G8427 DOCREV CUR MEDS BY ELIG CLIN: HCPCS | Performed by: PHYSICIAN ASSISTANT

## 2022-07-28 PROCEDURE — 1123F ACP DISCUSS/DSCN MKR DOCD: CPT | Performed by: PHYSICIAN ASSISTANT

## 2022-07-28 PROCEDURE — 4004F PT TOBACCO SCREEN RCVD TLK: CPT | Performed by: PHYSICIAN ASSISTANT

## 2022-07-28 PROCEDURE — 3017F COLORECTAL CA SCREEN DOC REV: CPT | Performed by: PHYSICIAN ASSISTANT

## 2022-07-28 RX ORDER — CYCLOBENZAPRINE HCL 5 MG
5-10 TABLET ORAL NIGHTLY PRN
Qty: 30 TABLET | Refills: 1 | Status: SHIPPED | OUTPATIENT
Start: 2022-07-28 | End: 2022-08-07

## 2022-07-28 RX ORDER — TRAMADOL HYDROCHLORIDE 50 MG/1
50 TABLET ORAL EVERY 6 HOURS PRN
Qty: 28 TABLET | Refills: 0 | Status: SHIPPED | OUTPATIENT
Start: 2022-07-28 | End: 2022-08-04

## 2022-07-28 ASSESSMENT — ENCOUNTER SYMPTOMS
PHOTOPHOBIA: 0
CONSTIPATION: 0
RHINORRHEA: 0
CHEST TIGHTNESS: 0
BLOOD IN STOOL: 0
WHEEZING: 0
EYE REDNESS: 0
VOMITING: 0
ABDOMINAL PAIN: 0
BACK PAIN: 0
EYE PAIN: 0
DIARRHEA: 0
COLOR CHANGE: 0
NAUSEA: 0
SORE THROAT: 0
EYE DISCHARGE: 0
COUGH: 0
SHORTNESS OF BREATH: 0

## 2022-07-28 NOTE — TELEPHONE ENCOUNTER
Patient was advised of the tramadol. Patient was also seen at walk in clinic to receive some muscle relaxer.

## 2022-07-28 NOTE — PROGRESS NOTES
Balbir Galindo (:  1945) is a 78 y.o. male,Established patient, here for evaluation of the following chief complaint(s):    Shoulder Pain (/)      SUBJECTIVE/OBJECTIVE:  CHRISTOPHE Galindo is a pleasant 78 y.o. male presenting to clinic today for shoulder pain. Patient is currently seeing orthopedics for right shoulder pain; patient reports history of multiple rotator cuff surgeries many years ago; patient reports that he was pulling down branches at his house prior to onset of symptoms; patient denies acute inciting event or sharp pain pain however reports that the following day, he began to experience right shoulder pain, paresthesias which are worse with certain postures; patient reports normal range of motion etc. patient reports he initially had some relief of pain with first day of Medrol Dosepak however reports that over the past 2 to 3 days, his pain has persisted and states that he has not slept in 48 hours due to pain and discomfort. Patient denies shortness of breath, fever, chest pain, numbness, weakness, hemiplegia, new neurologic symptoms etc.    R Shoulder Xray 2022:  Narrative   3 views of the right shoulder in a skeletally mature patient demonstrates    previous rotator cuff repair with radiopaque anchor at the greater    tuberosity with bony changes at the rotator cuff insertion. Mild    decreased acromiohumeral distance, approximate 8 mm with minimal high    riding humerus in relation to the glenoid. Mild osteoarthritic changes at    the ulnohumeral joint as well as the [facility] joint. No sign of any soft tissue    avulsion type injury or fracture.   Type II acromion                   PDMP Reviewed:  Filled  Written  Sold  ID  Drug  QTY  Days  Prescriber  RX #  Dispenser  Refill  Daily Dose*  Pymt Type       2022   1  Hydrocodone-Acetamin 5-325 Mg   9.00  3  Le Swa  [id number]  [facility] ([id number])  0  15.00 MME  [place]     2022   1 Hydrocodone-Acetamin 5-325 Mg   9.00  3  Christelle   [id number]  [facility] ([id number])  0  15.00 MME  [place]     05/02/2022 05/01/2022   1  Clonazepam 0.5 Mg Tablet   90.00  90  [facility]  [id number]  [facility] ([id number])  0  1.00 LME  [place]     01/14/2022 01/14/2022   1  Clonazepam 0.5 Mg Tablet   90.00  90  [facility]  [id number]  [facility] ([id number])  0  1.00 LME  Medicare  [place]        Current Outpatient Medications   Medication Sig Dispense Refill    traMADol (ULTRAM) 50 MG tablet Take 1 tablet by mouth every 6 hours as needed for Pain for up to 7 days. Intended supply: 7 days. Take lowest dose possible to manage pain 28 tablet 0    cyclobenzaprine (FLEXERIL) 5 MG tablet Take 1-2 tablets by mouth nightly as needed for Muscle spasms 30 tablet 1    methylPREDNISolone (MEDROL DOSEPACK) 4 MG tablet Take by mouth. 1 kit 0    hydrOXYzine HCl (ATARAX) 25 MG tablet 1-2 tablets at HS 90 tablet 2    escitalopram (LEXAPRO) 20 MG tablet Take 1.5 tablets by mouth in the morning. 135 tablet 2    clonazePAM (KLONOPIN) 0.5 MG tablet Take 1 tablet by mouth as needed for Anxiety for up to 90 days.  90 tablet 0    apixaban (ELIQUIS) 5 MG TABS tablet Take 1 tablet by mouth 2 times daily 180 tablet 3    dofetilide (TIKOSYN) 250 MCG capsule Take 1 capsule by mouth 2 times daily 60 capsule 3    montelukast (SINGULAIR) 10 MG tablet Take 1 tablet by mouth nightly 90 tablet 3    metoprolol succinate (TOPROL XL) 100 MG extended release tablet TAKE 1 TABLET DAILY 90 tablet 1    albuterol sulfate HFA (VENTOLIN HFA) 108 (90 Base) MCG/ACT inhaler Inhale 2 puffs into the lungs 4 times daily as needed for Wheezing or Shortness of Breath 1 each 5    amLODIPine-benazepril (LOTREL) 5-10 MG per capsule Take 1 capsule by mouth daily 90 capsule 3    atorvastatin (LIPITOR) 10 MG tablet Take 1 tablet by mouth daily 90 tablet 3    budesonide-formoterol (SYMBICORT) 160-4.5 MCG/ACT AERO Inhale 2 puffs into the lungs 2 times daily 1 each 5    omeprazole (PRILOSEC) 40 MG delayed release capsule TAKE 1 CAPSULE DAILY, DO   NOT CRUSH OR BREAK 90 capsule 0    Cyanocobalamin (B-12) 500 MCG TABS Take 1 tablet by mouth 4 times daily 120 tablet 2    B Complex Vitamins (VITAMIN B COMPLEX PO) DAILY      UNABLE TO FIND Compounded Betaval cream 15g/Eucerin 135g 1 Can 5    Misc. Devices ([facility] CANE) MISC 1 Device by Does not apply route as needed (for ambulation) 1 each 0    sildenafil (VIAGRA) 100 MG tablet Take 1 tablet by mouth as needed for Erectile Dysfunction 6 tablet 3    aspirin 81 MG EC tablet Take 81 mg by mouth daily. folic acid (FOLVITE) 280 MCG tablet Take 400 mcg by mouth daily. No current facility-administered medications for this visit. Review of Systems   Constitutional:  Negative for appetite change, chills, fatigue and fever. HENT:  Negative for congestion, ear pain, hearing loss, rhinorrhea and sore throat. Eyes:  Negative for photophobia, pain, discharge and redness. Respiratory:  Negative for cough, chest tightness, shortness of breath and wheezing. Cardiovascular:  Negative for chest pain, palpitations and leg swelling. Gastrointestinal:  Negative for abdominal pain, blood in stool, constipation, diarrhea, nausea and vomiting. Endocrine: Negative for polyuria. Genitourinary:  Negative for difficulty urinating, dysuria, flank pain, frequency, hematuria and urgency. Musculoskeletal:  Positive for arthralgias. Negative for back pain, gait problem and joint swelling. Skin:  Negative for color change and rash. Neurological:  Negative for dizziness, syncope, weakness, light-headedness and headaches. Hematological:  Negative for adenopathy. Psychiatric/Behavioral:  Negative for agitation, behavioral problems and suicidal ideas. The patient is not nervous/anxious. Physical Exam  HENT:      Head: Normocephalic and atraumatic. Right Ear: External ear normal.      Left Ear: External ear normal.   Cardiovascular:      Rate and Rhythm: Regular rhythm. Pulses: Normal pulses. Pulmonary:      Effort: No respiratory distress. Musculoskeletal:         General: No swelling, tenderness, deformity or signs of injury. Normal range of motion. Comments: R shoulder normal ROM; minimal tenderness over R upper trapezius; no point tenderness to [facility] joint or cervical spine etc; no skin changes noted; patient reports paresthesia/pain when right upper extremity is hanging to his side; patient reports relief of pain when he supports his right arm and holds it at his side; negative special tests; normal  strength, equal strength in bilateral shoulders   Skin:     General: Skin is warm and dry. Neurological:      General: No focal deficit present. Mental Status: He is alert and oriented to person, place, and time. Mental status is at baseline. Psychiatric:         Behavior: Behavior normal.       ASSESSMENT/PLAN   1. Acute pain of right shoulder   -Patient is established with Ortho; likely rotator cuff tendinopathy as indicated by orthopedic surgeon; patient does have intermittent paresthesias, possible nerve impingement in cervical region or other proximal location; patient to obtain EMG in the near future; patient did have tramadol sent in by orthopedic surgeon which he was unaware of prior to coming into the office today; reminded patient that he can go pick this up; reviewed proper use, monitoring, side effects; patient reports he has not slept over the past 48 hours due to pain and discomfort;can trial Flexeril at bedtime to help with sleep; do not take during the daytime; do not take with tramadol etc.  Report to emergency department for any acute worsening or changes. Obtain cervical x-ray as ordered by orthopedics. -     cyclobenzaprine (FLEXERIL) 5 MG tablet;  Take 1-2 tablets by mouth nightly as needed for Muscle spasms, Disp-30 tablet, R-1Normal    Return in about 1 week (around 8/4/2022), or if symptoms worsen or fail to improve, for Follow Up.            An electronic signature was used to authenticate this note.     --SANJAY Gasca

## 2022-07-28 NOTE — TELEPHONE ENCOUNTER
Patient is wants medication called into his pharmacy due to the amount of pain that he is in. Patient states that Dr. Brigid Luevano still hasn't reached out to him for an appointment. So he is needing pain medication until he is seen by Dr. Brigid Luevano.

## 2022-08-02 ENCOUNTER — OFFICE VISIT (OUTPATIENT)
Dept: PHYSICAL MEDICINE AND REHAB | Age: 77
End: 2022-08-02
Payer: MEDICARE

## 2022-08-02 VITALS — TEMPERATURE: 97.9 F

## 2022-08-02 DIAGNOSIS — G56.21 ULNAR NEUROPATHY AT WRIST, RIGHT: ICD-10-CM

## 2022-08-02 DIAGNOSIS — R20.2 PARESTHESIA AND PAIN OF BOTH UPPER EXTREMITIES: ICD-10-CM

## 2022-08-02 DIAGNOSIS — M79.601 PARESTHESIA AND PAIN OF BOTH UPPER EXTREMITIES: ICD-10-CM

## 2022-08-02 DIAGNOSIS — M79.602 PARESTHESIA AND PAIN OF BOTH UPPER EXTREMITIES: ICD-10-CM

## 2022-08-02 DIAGNOSIS — G56.03 BILATERAL CARPAL TUNNEL SYNDROME: Primary | ICD-10-CM

## 2022-08-02 PROCEDURE — 95911 NRV CNDJ TEST 9-10 STUDIES: CPT | Performed by: PHYSICAL MEDICINE & REHABILITATION

## 2022-08-02 PROCEDURE — 95886 MUSC TEST DONE W/N TEST COMP: CPT | Performed by: PHYSICAL MEDICINE & REHABILITATION

## 2022-08-02 NOTE — PROGRESS NOTES
Normal Normal None Normal   Infraspinatus Normal None Normal Normal None Normal   Deltoid   Normal None Normal Normal None Normal   Biceps   Normal None Normal Normal None Normal   Triceps   Normal None Dec #, Polys Normal None Normal   Pronator Teres Normal None \" Normal None Normal   Extensor Indicis Normal None Dec # Normal None Normal   1st Dorsal Interosseus Normal None Normal Normal None Normal   Abductor Pollicis Br. Increased Occ Dec #, Larger Normal None Dec #, Larger       FINDINGS:   EMG of the cervical paraspinals and both upper limbs demonstrated paraspinal muscle guarding. No significant limb muscle membrane irritability was encountered but a diminished number of polyphasic motor units were seen in the right C7 myotome. Usual fibrillations were seen in the right APB muscle. Significantly neuropathic motor units were noted in both APB muscles. Median sensory and motor latencies were delayed across each wrist.  The median motor evoked amplitudes were markedly diminished bilaterally. The median motor forearm nerve conduction velocities were slowed, more so on the right. Ulnar sensory latency delay was seen across the right wrist.  Ulnar motor nerve conductions were well-maintained bilaterally. IMPRESSION:      1. Abnormal EMG. There is an electrically mild/acute right C7 spinal nerve root injury (acute/electrically mild right C7 radiculopathy). Correlation with cervical spine imaging may help explain this finding. 2.  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     4. No evidence of a concurrent plexopathy, generalized neuropathy or primary muscle disease.        Thank you for this interesting referral.

## 2022-08-08 ENCOUNTER — TELEPHONE (OUTPATIENT)
Dept: FAMILY MEDICINE CLINIC | Age: 77
End: 2022-08-08

## 2022-08-08 ENCOUNTER — TELEPHONE (OUTPATIENT)
Dept: ORTHOPEDIC SURGERY | Age: 77
End: 2022-08-08

## 2022-08-08 DIAGNOSIS — M54.12 CERVICAL RADICULOPATHY: Primary | ICD-10-CM

## 2022-08-08 NOTE — TELEPHONE ENCOUNTER
Wife called pt. Is in pain and can not go on like this. Pt. Was referred to a surgeon. Nothing is being done. Pt. Wife is reaching out to provider to see what PCP can do to get things moving so something can be done please advise.

## 2022-08-08 NOTE — TELEPHONE ENCOUNTER
Patient states that he is in the worst pain of his life and he is needing some help with the pain.  Please advise

## 2022-08-09 DIAGNOSIS — M54.12 CERVICAL RADICULOPATHY: Primary | ICD-10-CM

## 2022-08-09 RX ORDER — GABAPENTIN 100 MG/1
100 CAPSULE ORAL 3 TIMES DAILY
Qty: 90 CAPSULE | Refills: 1 | Status: SHIPPED | OUTPATIENT
Start: 2022-08-09 | End: 2022-10-11

## 2022-08-09 NOTE — TELEPHONE ENCOUNTER
Brayden Cynthia patient's wife called back-ortho called her and they spoke them about needing more test-ortho sent rx into pharmacy for patient-she called to thank me again and to cancel appointment with Russell Rojas instructed her to call and please let us know if she needs any thing

## 2022-08-09 NOTE — TELEPHONE ENCOUNTER
I was able to contact the patient this afternoon and discussed with him and his wife the EMG findings as well as his treatment course. I explained that he does have acute findings of the C7 nerve root which was my fear based off of his physical exam findings. At this time we will place a referral to Dr. Concepcion Marti to be seen for cervical spine. He is also continuing to be in significant pain and I ordered the patient gabapentin to help with some of this nerve pain as he feels that the pain medication and muscle relaxers has been provided so far have not provided help. He is also had no relief from the Medrol Dosepak. Patient will call if he has any new issues.

## 2022-08-09 NOTE — TELEPHONE ENCOUNTER
Maryanne's wife called again today-she is very unhappy-she has been to multiple places for his pain-she states\" he can't go on like this and he doesn't have an appt with orthopedic until 9-23-22-I reassured her I would call her back-I instructed her I would call and see what I could do on my end-she verbalizes understanding

## 2022-08-09 NOTE — TELEPHONE ENCOUNTER
Called patient's wife back-instructions given regarding moving the patient's appointment up to a sooner date she verbalizes understanding-also appointment set up with Arturo Best for medications till his appointment with orthopedic-she verbalizes understanding

## 2022-08-09 NOTE — TELEPHONE ENCOUNTER
Called orthoppedic office-spoke with the -explained the situation-she moved patient's appointment up to 8-15-22 in [place]-appt scheduled@ this time

## 2022-08-10 ENCOUNTER — HOSPITAL ENCOUNTER (OUTPATIENT)
Age: 77
Discharge: HOME | End: 2022-08-10
Payer: MEDICARE

## 2022-08-10 ENCOUNTER — TELEPHONE (OUTPATIENT)
Dept: ORTHOPEDIC SURGERY | Age: 77
End: 2022-08-10

## 2022-08-10 ENCOUNTER — HOSPITAL ENCOUNTER (OUTPATIENT)
Dept: GENERAL RADIOLOGY | Age: 77
Discharge: HOME | End: 2022-08-10
Payer: MEDICARE

## 2022-08-10 DIAGNOSIS — M54.12 CERVICAL RADICULOPATHY: ICD-10-CM

## 2022-08-10 PROCEDURE — 72050 X-RAY EXAM NECK SPINE 4/5VWS: CPT

## 2022-08-10 NOTE — TELEPHONE ENCOUNTER
Left message that we are cancelling his appointment on 8/15/22.  He can call and reschedule once he is seen by Dr. Yusuf Ying

## 2022-08-22 DIAGNOSIS — R10.13 DYSPEPSIA: ICD-10-CM

## 2022-08-23 DIAGNOSIS — I10 ESSENTIAL HYPERTENSION: Chronic | ICD-10-CM

## 2022-08-23 RX ORDER — BUDESONIDE AND FORMOTEROL FUMARATE DIHYDRATE 160; 4.5 UG/1; UG/1
AEROSOL RESPIRATORY (INHALATION)
Qty: 10.2 G | Refills: 5 | Status: SHIPPED | OUTPATIENT
Start: 2022-08-23 | End: 2022-10-21 | Stop reason: SDUPTHER

## 2022-08-23 RX ORDER — METOPROLOL SUCCINATE 100 MG/1
TABLET, EXTENDED RELEASE ORAL
Qty: 90 TABLET | Refills: 1 | Status: SHIPPED | OUTPATIENT
Start: 2022-08-23 | End: 2022-10-21 | Stop reason: SDUPTHER

## 2022-08-23 RX ORDER — OMEPRAZOLE 40 MG/1
CAPSULE, DELAYED RELEASE ORAL
Qty: 90 CAPSULE | Refills: 0 | Status: SHIPPED | OUTPATIENT
Start: 2022-08-23 | End: 2022-10-21 | Stop reason: SDUPTHER

## 2022-09-21 ENCOUNTER — COMMUNITY OUTREACH (OUTPATIENT)
Dept: FAMILY MEDICINE CLINIC | Age: 77
End: 2022-09-21

## 2022-09-21 NOTE — PROGRESS NOTES
Patient's HM shows they are overdue for Colorectal Screening. [employer] and  files searched. No results to attach to order nor HM updated. Frequency changed from 10 years to 5 years per last Colonoscopy.

## 2022-09-22 ENCOUNTER — TELEPHONE (OUTPATIENT)
Dept: CARDIOLOGY CLINIC | Age: 77
End: 2022-09-22

## 2022-09-22 NOTE — TELEPHONE ENCOUNTER
Patient waiting for Dr Radha Nuñez to approve MRI due to devise, patient very anxious as he has been waiting 3 weeks. Advised I would forward to [facility].

## 2022-09-22 NOTE — TELEPHONE ENCOUNTER
2 mo's ago injured his back. MRI was scheduled 9/2. Not able to do this because of pacemaker. 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 Not sure if Surg. (Dr. Sanju Hendrickson) Sent over Cardiac Clearance. Having Servere Back pain. Has the Cardiac Letter been sent?

## 2022-09-23 ENCOUNTER — TELEPHONE (OUTPATIENT)
Dept: CARDIOLOGY CLINIC | Age: 77
End: 2022-09-23

## 2022-09-23 NOTE — TELEPHONE ENCOUNTER
Pt called and states we were to receive a form for Abhijeet to fill out for pt to have MRI with a ppm please advise.

## 2022-09-23 NOTE — TELEPHONE ENCOUNTER
I spoke with patient and advised him to call the physician that is ordering the MRI and have them send order to [facility] and we will get a form to fill out clearing him for the MRI with his pacemaker. He voiced understanding.

## 2022-09-26 ENCOUNTER — TELEPHONE (OUTPATIENT)
Dept: FAMILY MEDICINE CLINIC | Age: 77
End: 2022-09-26

## 2022-09-26 ENCOUNTER — TELEPHONE (OUTPATIENT)
Dept: CARDIOLOGY CLINIC | Age: 77
End: 2022-09-26

## 2022-09-26 NOTE — TELEPHONE ENCOUNTER
Spoke to [facility] patient , she states nobody has contacted them regarding patients MRI, patient has been in pain for weeks and she feels like nobody is taking care of her . Wife wants us to reach out to Dr Josette Alva office and see what the hold up is. I contacted Dr Eleanor Jim office and spoke to Reynold [place] she states that due to patient pace maker he was unable to have the MRI done at [facility] so order was faxed to [facility] on sept 2nd and they were supposed to reach out to them. Reynold [place] states she will reach back out to patient and she will refax order to [facility]. I reached back out to patients wife she states [facility] cant do the MRI unless they get a letter from cardiologist office. As I was on phone with wife she got a call from Dr Monalisa Mora office.  Advised wife to call us back if she needs anything further from us

## 2022-09-26 NOTE — TELEPHONE ENCOUNTER
Pt wife called and states [facility] still does not have form for pt to get this done. Wife states pt is in a lot of pain.  Please advise

## 2022-09-28 ENCOUNTER — TELEPHONE (OUTPATIENT)
Dept: CARDIOLOGY CLINIC | Age: 77
End: 2022-09-28

## 2022-10-05 ENCOUNTER — HOSPITAL ENCOUNTER (OUTPATIENT)
Dept: MRI IMAGING | Age: 77
Discharge: HOME | End: 2022-10-05
Payer: MEDICARE

## 2022-10-05 DIAGNOSIS — M54.12 RADICULOPATHY, CERVICAL REGION: ICD-10-CM

## 2022-10-05 PROCEDURE — 72141 MRI NECK SPINE W/O DYE: CPT

## 2022-10-10 RX ORDER — DOFETILIDE 0.25 MG/1
250 CAPSULE ORAL 2 TIMES DAILY
Qty: 60 CAPSULE | Refills: 2 | Status: SHIPPED | OUTPATIENT
Start: 2022-10-10 | End: 2022-11-04 | Stop reason: SDUPTHER

## 2022-10-11 DIAGNOSIS — M54.12 CERVICAL RADICULOPATHY: ICD-10-CM

## 2022-10-11 RX ORDER — GABAPENTIN 100 MG/1
CAPSULE ORAL
Qty: 90 CAPSULE | Refills: 1 | Status: SHIPPED | OUTPATIENT
Start: 2022-10-11 | End: 2022-11-10

## 2022-10-21 ENCOUNTER — TELEPHONE (OUTPATIENT)
Dept: CARDIOLOGY CLINIC | Age: 77
End: 2022-10-21

## 2022-10-21 ENCOUNTER — OFFICE VISIT (OUTPATIENT)
Dept: FAMILY MEDICINE CLINIC | Age: 77
End: 2022-10-21
Payer: MEDICARE

## 2022-10-21 VITALS
SYSTOLIC BLOOD PRESSURE: 117 MMHG | OXYGEN SATURATION: 98 % | DIASTOLIC BLOOD PRESSURE: 82 MMHG | BODY MASS INDEX: 34.14 KG/M2 | HEART RATE: 73 BPM | WEIGHT: 218 LBS | TEMPERATURE: 98.2 F | RESPIRATION RATE: 18 BRPM

## 2022-10-21 DIAGNOSIS — G47.09 OTHER INSOMNIA: ICD-10-CM

## 2022-10-21 DIAGNOSIS — J44.9 CHRONIC OBSTRUCTIVE PULMONARY DISEASE, UNSPECIFIED COPD TYPE (HCC): Primary | ICD-10-CM

## 2022-10-21 DIAGNOSIS — E78.2 MIXED HYPERLIPIDEMIA: ICD-10-CM

## 2022-10-21 DIAGNOSIS — G25.81 RESTLESS LEG SYNDROME: ICD-10-CM

## 2022-10-21 DIAGNOSIS — M54.12 CERVICAL RADICULOPATHY: ICD-10-CM

## 2022-10-21 DIAGNOSIS — Z23 ENCOUNTER FOR IMMUNIZATION: ICD-10-CM

## 2022-10-21 DIAGNOSIS — F33.1 MODERATE EPISODE OF RECURRENT MAJOR DEPRESSIVE DISORDER (HCC): ICD-10-CM

## 2022-10-21 DIAGNOSIS — I10 PRIMARY HYPERTENSION: Chronic | ICD-10-CM

## 2022-10-21 DIAGNOSIS — R10.13 DYSPEPSIA: ICD-10-CM

## 2022-10-21 PROBLEM — R42 DIZZY SPELLS: Status: RESOLVED | Noted: 2018-05-10 | Resolved: 2022-10-21

## 2022-10-21 PROBLEM — R09.02 HYPOXEMIA: Status: RESOLVED | Noted: 2018-02-22 | Resolved: 2022-10-21

## 2022-10-21 PROBLEM — J96.01 ACUTE RESPIRATORY FAILURE WITH HYPOXIA (HCC): Status: RESOLVED | Noted: 2021-12-30 | Resolved: 2022-10-21

## 2022-10-21 PROBLEM — U07.1 PNEUMONIA DUE TO COVID-19 VIRUS: Status: RESOLVED | Noted: 2022-01-14 | Resolved: 2022-10-21

## 2022-10-21 PROBLEM — R79.89 ELEVATED BRAIN NATRIURETIC PEPTIDE (BNP) LEVEL: Status: RESOLVED | Noted: 2018-03-12 | Resolved: 2022-10-21

## 2022-10-21 PROBLEM — J12.82 PNEUMONIA DUE TO COVID-19 VIRUS: Status: RESOLVED | Noted: 2022-01-14 | Resolved: 2022-10-21

## 2022-10-21 PROBLEM — J96.11 CHRONIC RESPIRATORY FAILURE WITH HYPOXIA (HCC): Status: RESOLVED | Noted: 2018-03-15 | Resolved: 2022-10-21

## 2022-10-21 PROCEDURE — G8427 DOCREV CUR MEDS BY ELIG CLIN: HCPCS | Performed by: NURSE PRACTITIONER

## 2022-10-21 PROCEDURE — G8482 FLU IMMUNIZE ORDER/ADMIN: HCPCS | Performed by: NURSE PRACTITIONER

## 2022-10-21 PROCEDURE — 1123F ACP DISCUSS/DSCN MKR DOCD: CPT | Performed by: NURSE PRACTITIONER

## 2022-10-21 PROCEDURE — 4004F PT TOBACCO SCREEN RCVD TLK: CPT | Performed by: NURSE PRACTITIONER

## 2022-10-21 PROCEDURE — G8417 CALC BMI ABV UP PARAM F/U: HCPCS | Performed by: NURSE PRACTITIONER

## 2022-10-21 PROCEDURE — 3017F COLORECTAL CA SCREEN DOC REV: CPT | Performed by: NURSE PRACTITIONER

## 2022-10-21 PROCEDURE — 3023F SPIROM DOC REV: CPT | Performed by: NURSE PRACTITIONER

## 2022-10-21 PROCEDURE — 99214 OFFICE O/P EST MOD 30 MIN: CPT | Performed by: NURSE PRACTITIONER

## 2022-10-21 PROCEDURE — 90674 CCIIV4 VAC NO PRSV 0.5 ML IM: CPT | Performed by: NURSE PRACTITIONER

## 2022-10-21 PROCEDURE — G0008 ADMIN INFLUENZA VIRUS VAC: HCPCS | Performed by: NURSE PRACTITIONER

## 2022-10-21 RX ORDER — MONTELUKAST SODIUM 10 MG/1
10 TABLET ORAL NIGHTLY
Qty: 90 TABLET | Refills: 3 | Status: SHIPPED | OUTPATIENT
Start: 2022-10-21

## 2022-10-21 RX ORDER — METOPROLOL SUCCINATE 100 MG/1
TABLET, EXTENDED RELEASE ORAL
Qty: 90 TABLET | Refills: 2 | Status: SHIPPED | OUTPATIENT
Start: 2022-10-21

## 2022-10-21 RX ORDER — TRAMADOL HYDROCHLORIDE 50 MG/1
1 TABLET ORAL EVERY 4 HOURS PRN
COMMUNITY
Start: 2022-10-13 | End: 2022-10-21

## 2022-10-21 RX ORDER — ATORVASTATIN CALCIUM 10 MG/1
10 TABLET, FILM COATED ORAL DAILY
Qty: 90 TABLET | Refills: 3 | Status: SHIPPED | OUTPATIENT
Start: 2022-10-21

## 2022-10-21 RX ORDER — CLONAZEPAM 0.5 MG/1
0.5 TABLET ORAL PRN
Qty: 90 TABLET | Refills: 0 | Status: SHIPPED | OUTPATIENT
Start: 2022-10-21 | End: 2023-01-19

## 2022-10-21 RX ORDER — BUDESONIDE AND FORMOTEROL FUMARATE DIHYDRATE 160; 4.5 UG/1; UG/1
AEROSOL RESPIRATORY (INHALATION)
Qty: 2 EACH | Refills: 2 | Status: SHIPPED | OUTPATIENT
Start: 2022-10-21

## 2022-10-21 RX ORDER — ESCITALOPRAM OXALATE 20 MG/1
30 TABLET ORAL DAILY
Qty: 135 TABLET | Refills: 2 | Status: SHIPPED | OUTPATIENT
Start: 2022-10-21 | End: 2023-01-19

## 2022-10-21 RX ORDER — AMLODIPINE BESYLATE AND BENAZEPRIL HYDROCHLORIDE 5; 10 MG/1; MG/1
1 CAPSULE ORAL DAILY
Qty: 90 CAPSULE | Refills: 3 | Status: SHIPPED | OUTPATIENT
Start: 2022-10-21

## 2022-10-21 RX ORDER — HYDROXYZINE HYDROCHLORIDE 25 MG/1
TABLET, FILM COATED ORAL
Qty: 90 TABLET | Refills: 2 | Status: ON HOLD | OUTPATIENT
Start: 2022-10-21 | End: 2022-11-18 | Stop reason: CLARIF

## 2022-10-21 RX ORDER — OMEPRAZOLE 40 MG/1
40 CAPSULE, DELAYED RELEASE ORAL DAILY
Qty: 90 CAPSULE | Refills: 2 | Status: SHIPPED | OUTPATIENT
Start: 2022-10-21 | End: 2023-01-19

## 2022-10-21 ASSESSMENT — ANXIETY QUESTIONNAIRES
4. TROUBLE RELAXING: 1
6. BECOMING EASILY ANNOYED OR IRRITABLE: 2
5. BEING SO RESTLESS THAT IT IS HARD TO SIT STILL: 0
GAD7 TOTAL SCORE: 6
7. FEELING AFRAID AS IF SOMETHING AWFUL MIGHT HAPPEN: 0
1. FEELING NERVOUS, ANXIOUS, OR ON EDGE: 1
IF YOU CHECKED OFF ANY PROBLEMS ON THIS QUESTIONNAIRE, HOW DIFFICULT HAVE THESE PROBLEMS MADE IT FOR YOU TO DO YOUR WORK, TAKE CARE OF THINGS AT HOME, OR GET ALONG WITH OTHER PEOPLE: NOT DIFFICULT AT ALL
3. WORRYING TOO MUCH ABOUT DIFFERENT THINGS: 1
2. NOT BEING ABLE TO STOP OR CONTROL WORRYING: 1

## 2022-10-21 ASSESSMENT — PATIENT HEALTH QUESTIONNAIRE - PHQ9
SUM OF ALL RESPONSES TO PHQ QUESTIONS 1-9: 6
7. TROUBLE CONCENTRATING ON THINGS, SUCH AS READING THE NEWSPAPER OR WATCHING TELEVISION: 0
9. THOUGHTS THAT YOU WOULD BE BETTER OFF DEAD, OR OF HURTING YOURSELF: 0
SUM OF ALL RESPONSES TO PHQ9 QUESTIONS 1 & 2: 2
6. FEELING BAD ABOUT YOURSELF - OR THAT YOU ARE A FAILURE OR HAVE LET YOURSELF OR YOUR FAMILY DOWN: 0
SUM OF ALL RESPONSES TO PHQ QUESTIONS 1-9: 6
2. FEELING DOWN, DEPRESSED OR HOPELESS: 0
SUM OF ALL RESPONSES TO PHQ QUESTIONS 1-9: 6
SUM OF ALL RESPONSES TO PHQ QUESTIONS 1-9: 6
8. MOVING OR SPEAKING SO SLOWLY THAT OTHER PEOPLE COULD HAVE NOTICED. OR THE OPPOSITE, BEING SO FIGETY OR RESTLESS THAT YOU HAVE BEEN MOVING AROUND A LOT MORE THAN USUAL: 1
3. TROUBLE FALLING OR STAYING ASLEEP: 3
4. FEELING TIRED OR HAVING LITTLE ENERGY: 0
1. LITTLE INTEREST OR PLEASURE IN DOING THINGS: 2
10. IF YOU CHECKED OFF ANY PROBLEMS, HOW DIFFICULT HAVE THESE PROBLEMS MADE IT FOR YOU TO DO YOUR WORK, TAKE CARE OF THINGS AT HOME, OR GET ALONG WITH OTHER PEOPLE: 0
5. POOR APPETITE OR OVEREATING: 0

## 2022-10-21 NOTE — PROGRESS NOTES
Subjective:      Chief Complaint   Patient presents with    Follow-up     3 month, patient states that [facility] told him the lexapro is no longer available       HPI:  Avtar Johns is a 78 y.o. male who presents today for 3 month follow up and medication refills. A-Fib/HTN/HLD  He follows with Dr. Johnna Cooks who is takes care of his Tikosyn and Eliquis. Dr. Geo Marin manages his HTN. BP is well controlled. He denies headaches, vision changes, chest pain/tightness, shortness of breath or edema. COPD:  Current treatment includes short-acting beta agonist inhaler, combined beta agonist/steroid inhaler. Residual symptoms: chronic mild dyspnea with exertion. He denies any other symptoms. Depression  Has MDD and symptoms are well controlled with Lexapro. Denies AVH/SIB/SI/HI. GERD  Longstanding hx of heartburn. Symptoms are well controlled with PPI. RLS  Historically prescribed Klonopin 0.5mg nightly for symptoms associated with restless leg syndrome. He does not take it every night - only when symptoms are severe. OARRS reviewed, medication last filled 2022. Right Shoulder Pain  Hehas been following with Dr. Ema Hart. He has surgery on  but he isn't sure what surgery he is having. Upon review of chart, he is scheduled for C5-6, C6-7 anterior disectomy fusion    Past Medical History:   Diagnosis Date    Allergic rhinitis     Asthma     Atrial fibrillation (HCC)     CAD (coronary artery disease)     mild LAD    COPD (chronic obstructive pulmonary disease) ([facility] [address]     COVID-19     Depression     Diverticulosis of colon     Family history of early CAD     Father- MI-  age 46; a grandparent- age 46 of MI    Family history of valvular heart disease     Mother- Mitral valve disease    GERD (gastroesophageal reflux disease)     H/O 24 hour EKG monitoring 2001-Predominant rhythm is sinus rhythm. No signif ectopy noted.     H/O echocardiogram 2002, 2001 2002-EF 60%. Normal LVSF. Mild MR. Mild TR-Dr Kira Lilly;    H/O echocardiogram 2019    EF 36-75%, Grade I diastolic dysfunction, sclerotic but non stenotic aortic valve, Mild AR, Mild-Mod TR, Normal pulmonary artery pressure, no pericardial effusion     History of diverticulitis of colon     History of Holter monitoring 2018    48 hr holter - SR with PAF    History of repair of left rotator cuff 2015    Hx surgery  Dr Domonique Schultz, surgery L shoulder 2015 Dr Jesus Berumen     Hx of cardiac cath 2013    Mild LAD disease noted. Hx of cardiovascular stress test 2018    EF 33% Pt in a fib with RVR. No infarct or ischemia. Decreased uptake inferiorly due to diaphragmatic artifact. HX OTHER MEDICAL 13    14 DAY EVENT: APC's, short runs of sinus tachycardia. Hyperlipidemia     Hypertension     Hypoxemia 2018    Irregular heart rate 2018    Palpitations     Pneumonia     Restless leg syndrome     Shortness of breath 2018    Tricuspid valve regurgitation 2001    mild        Social History     Tobacco Use    Smoking status: Former     Packs/day: 1.00     Years: 20.00     Pack years: 20.00     Types: Cigarettes     Quit date: 1993     Years since quittin.3    Smokeless tobacco: Current     Types: Chew    Tobacco comments:     Reviewed    Substance Use Topics    Alcohol use: No     Comment: drinking \"1 cup decaff coffee\", also drinks decaff tea \"drink it all day long\"        Review of Systems   Constitutional:  Negative for activity change, appetite change, chills, fatigue, fever and unexpected weight change. HENT:  Negative for congestion, ear pain, hearing loss, sore throat and tinnitus. Eyes:  Negative for visual disturbance. Respiratory:  Negative for cough, chest tightness, shortness of breath, wheezing and stridor. Cardiovascular:  Negative for chest pain, palpitations and leg swelling.    Gastrointestinal:  Negative for abdominal pain, constipation, diarrhea, nausea and vomiting. Hx of GERD   Musculoskeletal:  Positive for myalgias. Negative for arthralgias and gait problem. RLS   Skin:  Negative for rash. Neurological:  Negative for dizziness, tremors, seizures, syncope, speech difficulty, weakness and headaches. Hematological:  Negative for adenopathy. Psychiatric/Behavioral:  Negative for behavioral problems, confusion, dysphoric mood, sleep disturbance and suicidal ideas. The patient is not nervous/anxious. Objective:      /82 (Site: Left Upper Arm, Position: Sitting, Cuff Size: Large Adult)   Pulse 73   Temp 98.2 °F (36.8 °C) (Temporal)   Resp 18   Wt 218 lb (98.9 kg)   SpO2 98%   BMI 34.14 kg/m²      Physical Exam  Vitals reviewed. Constitutional:       General: He is not in acute distress. Appearance: Normal appearance. He is obese. HENT:      Head: Normocephalic and atraumatic. Right Ear: Tympanic membrane, ear canal and external ear normal.      Left Ear: Tympanic membrane, ear canal and external ear normal.      Nose: Nose normal.      Mouth/Throat:      Lips: Pink. Mouth: Mucous membranes are moist.      Pharynx: Oropharynx is clear. Eyes:      Extraocular Movements: Extraocular movements intact. Conjunctiva/sclera: Conjunctivae normal.      Pupils: Pupils are equal, round, and reactive to light. Neck:      Thyroid: No thyromegaly. Vascular: No carotid bruit. Trachea: Trachea normal.   Cardiovascular:      Rate and Rhythm: Normal rate and regular rhythm. Pulses: Normal pulses. Heart sounds: Normal heart sounds. Pulmonary:      Effort: Pulmonary effort is normal.      Breath sounds: Normal breath sounds. Abdominal:      General: Bowel sounds are normal. There is no distension. Palpations: Abdomen is soft. There is no mass. Tenderness: There is no abdominal tenderness. Hernia: No hernia is present.    Musculoskeletal: General: Normal range of motion. Right shoulder: Tenderness present. No swelling, deformity, effusion, laceration, bony tenderness or crepitus. Decreased strength. Normal pulse. Left shoulder: Normal.      Cervical back: Normal range of motion and neck supple. No rigidity or tenderness. Right lower leg: No edema. Left lower leg: No edema. Skin:     General: Skin is warm and dry. Capillary Refill: Capillary refill takes less than 2 seconds. Findings: No erythema or rash. Neurological:      General: No focal deficit present. Mental Status: He is alert and oriented to person, place, and time. Deep Tendon Reflexes: Reflexes normal.   Psychiatric:         Mood and Affect: Mood normal.         Behavior: Behavior normal.         Thought Content: Thought content normal.         Judgment: Judgment normal.          Assessment / Plan:      1. Chronic obstructive pulmonary disease, [facility]  Stable, continue current medications. - montelukast (SINGULAIR) 10 MG tablet; Take 1 tablet by mouth nightly  Dispense: 90 tablet; Refill: 3  - budesonide-formoterol (SYMBICORT) 160-4.5 MCG/ACT AERO; USE 2 INHALATIONS ORALLY   TWICE DAILY  Dispense: 2 each; Refill: 2    2. Primary hypertension  BP well controlled. Regular exercise, weight loss and low sodium diet encouraged. - CBC with Auto Differential; Future  - Comprehensive Metabolic Panel, Fasting; Future  - metoprolol succinate (TOPROL XL) 100 MG extended release tablet; TAKE 1 TABLET DAILY  Dispense: 90 tablet; Refill: 2  - amLODIPine-benazepril (LOTREL) 5-10 MG per capsule; Take 1 capsule by mouth daily  Dispense: 90 capsule; Refill: 3    3. Mixed hyperlipidemia  . I recommend the following lifestyle modifications:  Exercise moderately, 30-45 minutes most days of the week. Lose weight if overweight. Increase your daily intake of grains, fresh fruits and vegetables.    Reduce your daily intake of saturated fats, refined sugar, and carbs. Limit dietary sodium to less than 2.4 grams per day. If you smoke stop smoking. Limit alcohol intake. - Comprehensive Metabolic Panel, Fasting; Future  - Lipid, Fasting; Future  - atorvastatin (LIPITOR) 10 MG tablet; Take 1 tablet by mouth daily  Dispense: 90 tablet; Refill: 3    4. Dyspepsia  Avoid lying flat until 2 hours after eating. Elevated head of bed. Reduce size of your meals including the amount of fatty, spicy, and/or acidic foods, caffeine, and sweets. Stop smoking, reduce alcohol intake. Lose weight if you are overweight. - omeprazole (PRILOSEC) 40 MG delayed release capsule; Take 1 capsule by mouth daily  Dispense: 90 capsule; Refill: 2    5. Moderate episode of recurrent major depressive disorder (HCC)  Stable. Continue medication. Patient to call if any concerns or SI before their follow up appointment. If he develops suicidal thoughts with a plan, he is instructed to go to emergency room immediately. Behavioral counseling recommended. - escitalopram (LEXAPRO) 20 MG tablet; Take 1.5 tablets by mouth daily  Dispense: 135 tablet; Refill: 2    6. Other insomnia  Sleep hygiene reinforced. - hydrOXYzine HCl (ATARAX) 25 MG tablet; 1-2 tablets at HS  Dispense: 90 tablet; Refill: 2    7. Restless leg syndrome  A discussion regarding medication was held with the patient. Discussed the dangerous nature of narcotic/benzo medicines, including the risk of respiratory depression, death and addiction. OARRS reviewed,   - clonazePAM (KLONOPIN) 0.5 MG tablet; Take 1 tablet by mouth as needed for Anxiety for up to 90 days. Dispense: 90 tablet; Refill: 0    8. Cervical radiculopathy  Follow up with Dr. Al Jay as scheduled.      9. Encounter for immunization  - Influenza, FLUCELVAX, (age 10 mo+), IM, PF, 0.5 mL     PDMP Monitoring:    Last PDMP Tarik as Reviewed:  Review User Review Instant Review Result   [facility] 10/21/2022  2:30 PM Reviewed PDMP [1]     Last Controlled Substance Monitoring Documentation      [address] Office Visit from 7/22/2022 in [address]   Periodic Controlled Substance Monitoring Possible medication side effects, risk of tolerance/dependence & alternative treatments discussed., No signs of potential drug abuse or diversion identified. , Assessed functional status., Obtaining appropriate analgesic effect of treatment. , Random urine drug screen sent today.  filed at 07/22/2022 1536          Urine Drug Screenings (1 yr)       POCT Rapid Drug Screen  Collected: 7/22/2022  3:32 PM (Final result)              POCT Rapid Drug Screen  Collected: 8/17/2015 12:10 PM (Final result)                  Medication Contract and Consent for Opioid Use Documents Filed       Patient Documents       Type of Document Status Date Received Received By Description    Medication Contract [Status Missing]  Jaleesa Hoang Medication Agreement 08/17/15                        JANI Rivas NP

## 2022-10-21 NOTE — PROGRESS NOTES
Vaccine Information Sheet, \"Influenza - Inactivated\"  given to Iman Sheffield, or parent/legal guardian of  Iman Sheffield and verbalized understanding. Patient responses:    Have you ever had a reaction to a flu vaccine? No  Do you have any current illness? No  Have you ever had Guillian Conway Syndrome? No  Do you have a serious allergy to any of the follow: Neomycin, Polymyxin, Thimerosal, eggs or egg products? No    Flu vaccine given per order. Please see immunization tab. Risks and benefits explained. Current VIS given.

## 2022-10-21 NOTE — TELEPHONE ENCOUNTER
Cardiologist: Dr. Seema Franco  Surgeon: Dr. Anil Nowak  Surgery: ACDF C5-6, C6-7  Anesthesia: General  Date: 11/18/2022  FAX# [phone]  # [phone]    Last OV 6/2/2022 w/Luis Carlos      -  Hypertension: Patients blood pressure is normal. Patient is advised about low sodium diet. Present medical regimen will not be changed. On the Toprol- mg daily blood pressure is well controlled              -  LIPID MANAGEMENT:  Importance of lipid levels discussed with patient   and patient was given dietary advice. NCEP- ATP III guidelines reviewed with patient. -   Changes  in medicines made: No                on Lipitor to the 10 mg p.o. daily            -HFrEF EF is improved following normal  -Has a permanent pacemaker on [facility]        - Atrial fibrillation, pt is  compliant with meds. Patient does not have symptoms from atrial fibrillation  On Tikosyn     -Pulmonary hypertension on echocardiogram  Need fu with pulmonary  ? BORA   ? COVID  Sleep study offered        Last EKG- 5/5/2022      NM-3/28/2018  Pt. in A.Fib. EF may be adversely affected    Supervising physician Dr. Romayne Mings . No ischemic ECG changes with Lexiscan infusion. Abnormal pharmacologic stress test.    Patient in Afib with RVR    In view of Afib this calculated EF is not reliable    No infarct or ischemia noted. Decreased uptake inferiorly due to diaphragmatic artifact. EF 33 % with global hypokinesis         Echo- 5/12/2022   Left ventricular function and size is normal, EF is estimated at 55-60%. Mild left ventricular hypertrophy. Normal diastolic filling pattern for age. No regional wall motion abnormalities were detected. Bi atrial enlargement noted. Mildly enlarged right ventricle cavity. PPM wiring visualized within the right side of the heart. Sclerotic, but non-stenotic aortic valve. Mitral annular calcification is present. Mild mitral , aortic and moderate tricuspid regurgitation is present.    Moderate Pulmonary hypertension with RVSP of 59mmHg. No evidence of pericardial effusion.       Pacemaker insert- 4/1/2021      Eliquis    Aspirin

## 2022-10-24 ASSESSMENT — ENCOUNTER SYMPTOMS
VOMITING: 0
SORE THROAT: 0
DIARRHEA: 0
NAUSEA: 0
STRIDOR: 0
CHEST TIGHTNESS: 0
COUGH: 0
ABDOMINAL PAIN: 0
CONSTIPATION: 0
WHEEZING: 0
SHORTNESS OF BREATH: 0

## 2022-10-27 ENCOUNTER — HOSPITAL ENCOUNTER (OUTPATIENT)
Age: 77
Discharge: HOME | End: 2022-10-27
Payer: MEDICARE

## 2022-10-27 LAB
ALBUMIN SERPL-MCNC: 4.3 GM/DL (ref 3.4–5)
ALP BLD-CCNC: 86 IU/L (ref 40–128)
ALT SERPL-CCNC: 10 U/L (ref 10–40)
ANION GAP SERPL CALCULATED.3IONS-SCNC: 11 MMOL/L (ref 4–16)
AST SERPL-CCNC: 19 IU/L (ref 15–37)
BASOPHILS ABSOLUTE: 0 K/CU MM
BASOPHILS RELATIVE PERCENT: 0.5 % (ref 0–1)
BILIRUB SERPL-MCNC: 0.5 MG/DL (ref 0–1)
BUN BLDV-MCNC: 18 MG/DL (ref 6–23)
CALCIUM SERPL-MCNC: 8.8 MG/DL (ref 8.3–10.6)
CHLORIDE BLD-SCNC: 106 MMOL/L (ref 99–110)
CHOLESTEROL: 140 MG/DL
CO2: 26 MMOL/L (ref 21–32)
CREAT SERPL-MCNC: 0.9 MG/DL (ref 0.9–1.3)
DIFFERENTIAL TYPE: ABNORMAL
EOSINOPHILS ABSOLUTE: 0.2 K/CU MM
EOSINOPHILS RELATIVE PERCENT: 3.3 % (ref 0–3)
GFR SERPL CREATININE-BSD FRML MDRD: >60 ML/MIN/1.73M2
GLUCOSE BLD-MCNC: 109 MG/DL (ref 70–99)
HCT VFR BLD CALC: 40.7 % (ref 42–52)
HDLC SERPL-MCNC: 46 MG/DL
HEMOGLOBIN: 13.4 GM/DL (ref 13.5–18)
IMMATURE NEUTROPHIL %: 0.3 % (ref 0–0.43)
LDL CHOLESTEROL CALCULATED: 81 MG/DL
LYMPHOCYTES ABSOLUTE: 1.2 K/CU MM
LYMPHOCYTES RELATIVE PERCENT: 20.1 % (ref 24–44)
MCH RBC QN AUTO: 30.4 PG (ref 27–31)
MCHC RBC AUTO-ENTMCNC: 32.9 % (ref 32–36)
MCV RBC AUTO: 92.3 FL (ref 78–100)
MONOCYTES ABSOLUTE: 0.7 K/CU MM
MONOCYTES RELATIVE PERCENT: 12.2 % (ref 0–4)
NUCLEATED RBC %: 0 %
PDW BLD-RTO: 13.4 % (ref 11.7–14.9)
PLATELET # BLD: 158 K/CU MM (ref 140–440)
PMV BLD AUTO: 10.1 FL (ref 7.5–11.1)
POTASSIUM SERPL-SCNC: 3.7 MMOL/L (ref 3.5–5.1)
RBC # BLD: 4.41 M/CU MM (ref 4.6–6.2)
SEGMENTED NEUTROPHILS ABSOLUTE COUNT: 3.7 K/CU MM
SEGMENTED NEUTROPHILS RELATIVE PERCENT: 63.6 % (ref 36–66)
SODIUM BLD-SCNC: 143 MMOL/L (ref 135–145)
TOTAL IMMATURE NEUTOROPHIL: 0.02 K/CU MM
TOTAL NUCLEATED RBC: 0 K/CU MM
TOTAL PROTEIN: 6.8 GM/DL (ref 6.4–8.2)
TRIGL SERPL-MCNC: 67 MG/DL
WBC # BLD: 5.8 K/CU MM (ref 4–10.5)

## 2022-10-27 PROCEDURE — 80061 LIPID PANEL: CPT

## 2022-10-27 PROCEDURE — 36415 COLL VENOUS BLD VENIPUNCTURE: CPT

## 2022-10-27 PROCEDURE — 80053 COMPREHEN METABOLIC PANEL: CPT

## 2022-10-27 PROCEDURE — 85025 COMPLETE CBC W/AUTO DIFF WBC: CPT

## 2022-10-28 ENCOUNTER — TELEPHONE (OUTPATIENT)
Dept: FAMILY MEDICINE CLINIC | Age: 77
End: 2022-10-28

## 2022-10-31 ENCOUNTER — PROCEDURE VISIT (OUTPATIENT)
Dept: CARDIOLOGY CLINIC | Age: 77
End: 2022-10-31

## 2022-10-31 ENCOUNTER — TELEPHONE (OUTPATIENT)
Dept: CARDIOLOGY CLINIC | Age: 77
End: 2022-10-31

## 2022-10-31 DIAGNOSIS — Z95.0 CARDIAC PACEMAKER IN SITU: Primary | ICD-10-CM

## 2022-10-31 DIAGNOSIS — I44.0 AV BLOCK, 1ST DEGREE: ICD-10-CM

## 2022-10-31 DIAGNOSIS — I49.5 SINUS NODE DYSFUNCTION (HCC): ICD-10-CM

## 2022-10-31 NOTE — LETTER
Cardiology [address]  Phone: [phone]  Fax: [phone]    10/31/2022        Manjeet Ge  [address] [address]            Dear Adis Parmar: This is your [facility] schedule. You can jeevan your calendar with these dates. Remember that your device is wireless and should automatically do these checks while you are sleeping. If for any reason I do not get your transmission then I will call you and ask that you send a manual transmission. If you have any questions or concerns, please call and ask for Eron Dials at [phone]. Thank you.

## 2022-11-01 NOTE — PROGRESS NOTES
11/1/22 - . Reminded of pre-testing on 11/2/22 @0930. Bring insurance card, picture ID, a list of your home medications and wear a mask. Check in at the information desk in the lobby upon your arrival. You can eat and take morning medications. Patient verbalized understanding.

## 2022-11-02 ENCOUNTER — HOSPITAL ENCOUNTER (OUTPATIENT)
Dept: GENERAL RADIOLOGY | Age: 77
Discharge: HOME | End: 2022-11-02
Payer: MEDICARE

## 2022-11-02 ENCOUNTER — HOSPITAL ENCOUNTER (OUTPATIENT)
Age: 77
Discharge: HOME | End: 2022-11-02
Payer: MEDICARE

## 2022-11-02 ENCOUNTER — HOSPITAL ENCOUNTER (OUTPATIENT)
Dept: PREADMISSION TESTING | Age: 77
Discharge: HOME | End: 2022-11-06
Payer: MEDICARE

## 2022-11-02 VITALS
HEART RATE: 76 BPM | TEMPERATURE: 97.6 F | SYSTOLIC BLOOD PRESSURE: 134 MMHG | WEIGHT: 219.4 LBS | BODY MASS INDEX: 34.44 KG/M2 | RESPIRATION RATE: 16 BRPM | DIASTOLIC BLOOD PRESSURE: 90 MMHG | OXYGEN SATURATION: 91 % | HEIGHT: 67 IN

## 2022-11-02 DIAGNOSIS — Z01.818 PREOPERATIVE TESTING: Primary | ICD-10-CM

## 2022-11-02 DIAGNOSIS — Z01.818 PREOPERATIVE TESTING: ICD-10-CM

## 2022-11-02 LAB
ANION GAP SERPL CALCULATED.3IONS-SCNC: 11 MMOL/L (ref 4–16)
APTT: 40.5 SECONDS (ref 25.1–37.1)
BASOPHILS ABSOLUTE: 0 K/CU MM
BASOPHILS RELATIVE PERCENT: 0.4 % (ref 0–1)
BILIRUBIN URINE: NEGATIVE MG/DL
BLOOD, URINE: NEGATIVE
BUN BLDV-MCNC: 14 MG/DL (ref 6–23)
CALCIUM SERPL-MCNC: 8.8 MG/DL (ref 8.3–10.6)
CHLORIDE BLD-SCNC: 103 MMOL/L (ref 99–110)
CLARITY: CLEAR
CO2: 26 MMOL/L (ref 21–32)
COLOR: YELLOW
CREAT SERPL-MCNC: 0.9 MG/DL (ref 0.9–1.3)
DIFFERENTIAL TYPE: ABNORMAL
EKG ATRIAL RATE: 63 BPM
EKG DIAGNOSIS: NORMAL
EKG P-R INTERVAL: 252 MS
EKG Q-T INTERVAL: 470 MS
EKG QRS DURATION: 88 MS
EKG QTC CALCULATION (BAZETT): 480 MS
EKG R AXIS: -23 DEGREES
EKG T AXIS: -14 DEGREES
EKG VENTRICULAR RATE: 63 BPM
EOSINOPHILS ABSOLUTE: 0.2 K/CU MM
EOSINOPHILS RELATIVE PERCENT: 2.8 % (ref 0–3)
ESTIMATED AVERAGE GLUCOSE: 103 MG/DL
GFR SERPL CREATININE-BSD FRML MDRD: >60 ML/MIN/1.73M2
GLUCOSE BLD-MCNC: 95 MG/DL (ref 70–99)
GLUCOSE, URINE: NEGATIVE MG/DL
HBA1C MFR BLD: 5.2 % (ref 4.2–6.3)
HCT VFR BLD CALC: 41.9 % (ref 42–52)
HEMOGLOBIN: 14.1 GM/DL (ref 13.5–18)
IMMATURE NEUTROPHIL %: 0.3 % (ref 0–0.43)
INR BLD: 1.28 INDEX
KETONES, URINE: NEGATIVE MG/DL
LEUKOCYTE ESTERASE, URINE: NEGATIVE
LYMPHOCYTES ABSOLUTE: 1.3 K/CU MM
LYMPHOCYTES RELATIVE PERCENT: 18.7 % (ref 24–44)
MCH RBC QN AUTO: 30.7 PG (ref 27–31)
MCHC RBC AUTO-ENTMCNC: 33.7 % (ref 32–36)
MCV RBC AUTO: 91.3 FL (ref 78–100)
MONOCYTES ABSOLUTE: 0.8 K/CU MM
MONOCYTES RELATIVE PERCENT: 10.9 % (ref 0–4)
NITRITE URINE, QUANTITATIVE: NEGATIVE
NUCLEATED RBC %: 0 %
PDW BLD-RTO: 13.2 % (ref 11.7–14.9)
PH, URINE: 7 (ref 5–8)
PLATELET # BLD: 166 K/CU MM (ref 140–440)
PMV BLD AUTO: 9.7 FL (ref 7.5–11.1)
POTASSIUM SERPL-SCNC: 3.7 MMOL/L (ref 3.5–5.1)
PROTEIN UA: NEGATIVE MG/DL
PROTHROMBIN TIME: 16.5 SECONDS (ref 11.7–14.5)
RBC # BLD: 4.59 M/CU MM (ref 4.6–6.2)
SEGMENTED NEUTROPHILS ABSOLUTE COUNT: 4.7 K/CU MM
SEGMENTED NEUTROPHILS RELATIVE PERCENT: 66.9 % (ref 36–66)
SODIUM BLD-SCNC: 140 MMOL/L (ref 135–145)
SPECIFIC GRAVITY UA: <1.005 (ref 1–1.03)
TOTAL IMMATURE NEUTOROPHIL: 0.02 K/CU MM
TOTAL NUCLEATED RBC: 0 K/CU MM
UROBILINOGEN, URINE: 0.2 MG/DL (ref 0.2–1)
WBC # BLD: 7.1 K/CU MM (ref 4–10.5)

## 2022-11-02 PROCEDURE — 93010 ELECTROCARDIOGRAM REPORT: CPT | Performed by: INTERNAL MEDICINE

## 2022-11-02 PROCEDURE — 93005 ELECTROCARDIOGRAM TRACING: CPT | Performed by: ORTHOPAEDIC SURGERY

## 2022-11-02 PROCEDURE — 81003 URINALYSIS AUTO W/O SCOPE: CPT

## 2022-11-02 PROCEDURE — 85610 PROTHROMBIN TIME: CPT

## 2022-11-02 PROCEDURE — 71046 X-RAY EXAM CHEST 2 VIEWS: CPT

## 2022-11-02 PROCEDURE — 85025 COMPLETE CBC W/AUTO DIFF WBC: CPT

## 2022-11-02 PROCEDURE — 83036 HEMOGLOBIN GLYCOSYLATED A1C: CPT

## 2022-11-02 PROCEDURE — 80048 BASIC METABOLIC PNL TOTAL CA: CPT

## 2022-11-02 PROCEDURE — 36415 COLL VENOUS BLD VENIPUNCTURE: CPT

## 2022-11-02 PROCEDURE — 85730 THROMBOPLASTIN TIME PARTIAL: CPT

## 2022-11-02 NOTE — PROGRESS NOTES
.Surgery @ [facility] on 11/18/22 you will be called 11/17/22 with times               1. Do not eat or drink anything after midnight - unless instructed by your doctor prior to surgery. This includes                   no water, chewing gum or mints. 2. Follow your directions as prescribed by the doctor for your procedure and medications. Take Metoprolol & Dofetilide morning of surgery with sips of water   3. Check with your Doctor regarding stopping vitamins, supplements, blood thinners and follow their instructions. Last dose Eliquis: 11/15/22                  Stop vitamins, supplements, aspirin and NSAIDS: 11/11/22   4. Do not smoke, vape or use chewing tobacco morning of surgery. Do not drink any alcoholic beverages 24 hours prior to surgery. This includes NA Beer. No street drugs 7 days prior to surgery. 5. You may brush your teeth and gargle the morning of surgery. DO NOT SWALLOW WATER   6. You MUST make arrangements for a responsible adult to take you home after your surgery and be able to check on you every couple                   hours for the day. You will not be allowed to leave alone or drive yourself home. It is strongly suggested someone stay with you the first 24                   hrs. Your surgery will be cancelled if you do not have a ride home. 7. Please wear simple, loose fitting clothing to the hospital.  [place] not bring valuables (money, credit cards, checkbooks, etc.) Do not wear any                   makeup (including no eye makeup) or nail polish on your fingers or toes. 8. DO NOT wear any jewelry or piercings on day of surgery. All body piercing jewelry must be removed. 9. If you have dentures, they will be removed before going to the OR; we will provide you a container. If you wear contact lenses or glasses,                  they will be removed; please bring a case for them.            10. If you  have a Living Will and Durable Power of  for Healthcare, please bring in a copy. 11. Please bring picture ID,  insurance card, paperwork from the doctors office    (H & P, Consent, & card for implantable devices). 12. Take a shower the morning of your procedure with the soaps provided. Do not apply any make-up, deodorant, lotion, oil or powder. 15.  Enter thru the main entrance on the day of surgery.

## 2022-11-04 ENCOUNTER — OFFICE VISIT (OUTPATIENT)
Dept: CARDIOLOGY CLINIC | Age: 77
End: 2022-11-04
Payer: MEDICARE

## 2022-11-04 VITALS
WEIGHT: 217 LBS | BODY MASS INDEX: 32.89 KG/M2 | HEART RATE: 62 BPM | HEIGHT: 68 IN | SYSTOLIC BLOOD PRESSURE: 110 MMHG | DIASTOLIC BLOOD PRESSURE: 68 MMHG

## 2022-11-04 DIAGNOSIS — I50.22 CHRONIC SYSTOLIC CONGESTIVE HEART FAILURE (HCC): Primary | ICD-10-CM

## 2022-11-04 DIAGNOSIS — E78.2 MIXED HYPERLIPIDEMIA: ICD-10-CM

## 2022-11-04 DIAGNOSIS — I10 PRIMARY HYPERTENSION: Chronic | ICD-10-CM

## 2022-11-04 DIAGNOSIS — I48.0 PAF (PAROXYSMAL ATRIAL FIBRILLATION) (HCC): ICD-10-CM

## 2022-11-04 PROCEDURE — G8427 DOCREV CUR MEDS BY ELIG CLIN: HCPCS | Performed by: NURSE PRACTITIONER

## 2022-11-04 PROCEDURE — 99214 OFFICE O/P EST MOD 30 MIN: CPT | Performed by: NURSE PRACTITIONER

## 2022-11-04 PROCEDURE — 3078F DIAST BP <80 MM HG: CPT | Performed by: NURSE PRACTITIONER

## 2022-11-04 PROCEDURE — G8417 CALC BMI ABV UP PARAM F/U: HCPCS | Performed by: NURSE PRACTITIONER

## 2022-11-04 PROCEDURE — G8482 FLU IMMUNIZE ORDER/ADMIN: HCPCS | Performed by: NURSE PRACTITIONER

## 2022-11-04 PROCEDURE — 1123F ACP DISCUSS/DSCN MKR DOCD: CPT | Performed by: NURSE PRACTITIONER

## 2022-11-04 PROCEDURE — 3074F SYST BP LT 130 MM HG: CPT | Performed by: NURSE PRACTITIONER

## 2022-11-04 PROCEDURE — 3017F COLORECTAL CA SCREEN DOC REV: CPT | Performed by: NURSE PRACTITIONER

## 2022-11-04 PROCEDURE — 4004F PT TOBACCO SCREEN RCVD TLK: CPT | Performed by: NURSE PRACTITIONER

## 2022-11-04 RX ORDER — DOFETILIDE 0.25 MG/1
250 CAPSULE ORAL 2 TIMES DAILY
Qty: 180 CAPSULE | Refills: 1 | Status: SHIPPED | OUTPATIENT
Start: 2022-11-04

## 2022-11-04 ASSESSMENT — ENCOUNTER SYMPTOMS
ORTHOPNEA: 0
BACK PAIN: 1
SHORTNESS OF BREATH: 0

## 2022-11-04 NOTE — PATIENT INSTRUCTIONS
Please be informed that if you contact our office outside of normal business hours the physician on call cannot help with any scheduling or rescheduling issues, procedure instruction questions or any type of medication issue. We advise you for any urgent/emergency that you go to the nearest emergency room! PLEASE CALL OUR OFFICE DURING NORMAL BUSINESS HOURS    Monday - Friday   8 am to 5 pm    [place] [address]: [phone]    [place]:  [phone]      **It is YOUR responsibilty to bring medication bottles and/or updated medication list to [address] [place] [address]. This will allow us to better serve you and all your healthcare needs**      Thank you for allowing us to care for you today! We want to ensure we can follow your treatment plan and we strive to give you the best outcomes and experience possible. If you ever have a life threatening emergency and call 911 - for an ambulance (EMS)   Our providers can only care for you at:   [facility] or [facility]. Even if you have someone take you or you drive yourself we can only care for you in a [address] [facility] facility. Our providers are not setup at the other healthcare locations!

## 2022-11-04 NOTE — PROGRESS NOTES
Atrial Fibrillation CHADSVASC2 Score Stroke Risk:   78 y.o. > 76 - 2    male Male - 0   CHF HX: No - 0   HTN HX: Yes - 1   Stroke/TIA/Thromboembolism No - 0   Vascular Disease HX: Yes - 1   Diabetes Mellitus No - 0   CHADSVASC 2 Score 4      Annual Stroke Risk 4.8%- moderate-high

## 2022-11-04 NOTE — TELEPHONE ENCOUNTER
Patient was here this morning to see Vernell and ask her to change the tikosyn to 90 day (180 pills) like it used to be.   She ask for the address and he called to advise:  [facility], [address] [facility]

## 2022-11-08 ENCOUNTER — TELEPHONE (OUTPATIENT)
Dept: FAMILY MEDICINE CLINIC | Age: 77
End: 2022-11-08

## 2022-11-08 NOTE — TELEPHONE ENCOUNTER
Dr. Nancy Deshpande office called and said they need a letter from you stating \"from a medical standpoint Adis Parmar is cleared for surgery\" if you are in agreement with this. They already received clearance from cardiology.

## 2022-11-17 ENCOUNTER — ANESTHESIA EVENT (OUTPATIENT)
Dept: OPERATING ROOM | Age: 77
End: 2022-11-17
Payer: MEDICARE

## 2022-11-17 ASSESSMENT — LIFESTYLE VARIABLES: SMOKING_STATUS: 1

## 2022-11-17 ASSESSMENT — ENCOUNTER SYMPTOMS: SHORTNESS OF BREATH: 1

## 2022-11-17 NOTE — ANESTHESIA PRE PROCEDURE
Department of Anesthesiology  Preprocedure Note       Name:  Leanna Chaparro   Age:  78 y.o.  :  1945                                          MRN:  [id number]         Date:  2022      Surgeon: Gaby Sahu):  Les Trammell MD    Procedure: Procedure(s):  C5-6, C6-7  CERVICAL DISCECTOMY FUSION ANTERIOR TWO LEVELS    Medications prior to admission:   Prior to Admission medications    Medication Sig Start Date End Date Taking? Authorizing Provider   dofetilide (TIKOSYN) 250 MCG capsule Take 1 capsule by mouth 2 times daily 22   JANI Hernandez CNP   omeprazole (PRILOSEC) 40 MG delayed release capsule Take 1 capsule by mouth daily 10/21/22 1/19/23  JANI Groves NP   montelukast (SINGULAIR) 10 MG tablet Take 1 tablet by mouth nightly 10/21/22   JANI Groves NP   metoprolol succinate (TOPROL XL) 100 MG extended release tablet TAKE 1 TABLET DAILY 10/21/22   JANI Groves NP   hydrOXYzine HCl (ATARAX) 25 MG tablet 1-2 tablets at [facility] 10/21/22   JANI Groves NP   escitalopram (LEXAPRO) 20 MG tablet Take 1.5 tablets by mouth daily 10/21/22 1/19/23  JANI Groves NP   clonazePAM (KLONOPIN) 0.5 MG tablet Take 1 tablet by mouth as needed for Anxiety for up to 90 days. 10/21/22 1/19/23  JANI Groves NP   budesonide-formoterol (SYMBICORT) 160-4.5 MCG/ACT AERO USE 2 INHALATIONS ORALLY   TWICE DAILY  Patient taking differently: as needed USE 2 INHALATIONS ORALLY   TWICE DAILY 10/21/22   JANI Groves NP   atorvastatin (LIPITOR) 10 MG tablet Take 1 tablet by mouth daily 10/21/22   JANI Groves NP   amLODIPine-benazepril (LOTREL) 5-10 MG per capsule Take 1 capsule by mouth daily 10/21/22   JANI Groves NP   gabapentin (NEURONTIN) 100 MG capsule TAKE ONE CAPSULE IN THE MORNING, AT NOON, AND BEFORE BED FOR 30 DAYS.   Patient not taking: No sig reported 10/11/22 11/10/22  Peggye All, DO   apixaban (ELIQUIS) 5 MG TABS tablet Take 1 tablet by mouth 2 times daily 5/5/22   Nichole Roth APRN - CNP   albuterol sulfate HFA (VENTOLIN HFA) 108 (90 Base) MCG/ACT inhaler Inhale 2 puffs into the lungs 4 times daily as needed for Wheezing or Shortness of Breath 1/14/22   JANI Mora NP   Cyanocobalamin (B-12) 500 MCG TABS Take 1 tablet by mouth 4 times daily 12/8/21   Joan Santana, [id number]   B Complex Vitamins (VITAMIN B COMPLEX PO) DAILY 6/21/21   Historical Provider, MD   UNABLE TO FIND Compounded Betaval cream 15g/Eucerin 135g 7/30/21   JANI Mora NP   Misc. Devices ([employer] [facility]) MISC 1 Device by Does not apply route as needed (for ambulation) 2/15/20   Candie Trujillo PA-C   sildenafil (VIAGRA) 100 MG tablet Take 1 tablet by mouth as needed for Erectile Dysfunction  Patient not taking: Reported on 11/4/2022 3/20/17   Khoa De La Rosa MD   aspirin 81 MG EC tablet Take 81 mg by mouth daily. Historical Provider, MD   folic acid (FOLVITE) 300 MCG tablet Take 400 mcg by mouth daily. Historical Provider, MD       Current medications:    No current facility-administered medications for this encounter. Current Outpatient Medications   Medication Sig Dispense Refill    dofetilide (TIKOSYN) 250 MCG capsule Take 1 capsule by mouth 2 times daily 180 capsule 1    omeprazole (PRILOSEC) 40 MG delayed release capsule Take 1 capsule by mouth daily 90 capsule 2    montelukast (SINGULAIR) 10 MG tablet Take 1 tablet by mouth nightly 90 tablet 3    metoprolol succinate (TOPROL XL) 100 MG extended release tablet TAKE 1 TABLET DAILY 90 tablet 2    hydrOXYzine HCl (ATARAX) 25 MG tablet 1-2 tablets at HS 90 tablet 2    escitalopram (LEXAPRO) 20 MG tablet Take 1.5 tablets by mouth daily 135 tablet 2    clonazePAM (KLONOPIN) 0.5 MG tablet Take 1 tablet by mouth as needed for Anxiety for up to 90 days.  90 tablet 0    budesonide-formoterol (SYMBICORT) 160-4.5 MCG/ACT AERO USE 2 INHALATIONS ORALLY   TWICE DAILY (Patient taking differently: as needed USE 2 INHALATIONS ORALLY   TWICE DAILY) 2 each 2    atorvastatin (LIPITOR) 10 MG tablet Take 1 tablet by mouth daily 90 tablet 3    amLODIPine-benazepril (LOTREL) 5-10 MG per capsule Take 1 capsule by mouth daily 90 capsule 3    gabapentin (NEURONTIN) 100 MG capsule TAKE ONE CAPSULE IN THE MORNING, AT NOON, AND BEFORE BED FOR 30 DAYS. (Patient not taking: No sig reported) 90 capsule 1    apixaban (ELIQUIS) 5 MG TABS tablet Take 1 tablet by mouth 2 times daily 180 tablet 3    albuterol sulfate HFA (VENTOLIN HFA) 108 (90 Base) MCG/ACT inhaler Inhale 2 puffs into the lungs 4 times daily as needed for Wheezing or Shortness of Breath 1 each 5    Cyanocobalamin (B-12) 500 MCG TABS Take 1 tablet by mouth 4 times daily 120 tablet 2    B Complex Vitamins (VITAMIN B COMPLEX PO) DAILY      UNABLE TO FIND Compounded Betaval cream 15g/Eucerin 135g 1 Can 5    Misc. Devices ([employer] CAN[facility]) MISC 1 Device by Does not apply route as needed (for ambulation) 1 each 0    sildenafil (VIAGRA) 100 MG tablet Take 1 tablet by mouth as needed for Erectile Dysfunction (Patient not taking: Reported on 11/4/2022) 6 tablet 3    aspirin 81 MG EC tablet Take 81 mg by mouth daily.  folic acid (FOLVITE) 919 MCG tablet Take 400 mcg by mouth daily. Allergies: Allergies   Allergen Reactions    Lyrica [Pregabalin] Hallucinations       Problem List:    Patient Active Problem List   Diagnosis Code    Depression, major, in remission ([facility] [address] F32.5    Obesity (BMI 30-39. 9) E66.9    Restless leg syndrome G25.81    HTN (hypertension) I10    Hyperlipidemia E78.5    Eczema L30.9    Tobacco use Z72.0    COPD (chronic obstructive pulmonary disease) ([facility]) J44.9    Diverticulosis K57.90    Encounter for medication review Z79.899    Nystagmus H55.00    Moderate episode of recurrent major depressive disorder (HCC) F33.1    Chronic systolic congestive heart failure (HCC) I50.22    PAF (paroxysmal atrial fibrillation) (HCC) I48.0    Other insomnia G47.09    Other headache syndrome G44.89    S/P placement of cardiac pacemaker Z95.0    Sick sinus syndrome (HCC) I49.5    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)     Dr. Divine Romero & Dr. Ligia Leary CAD (coronary artery disease)     mild LAD - Dr. David Wood COPD (chronic obstructive pulmonary disease) ([facility] [address]     COVID-19 2021    Depression     Diverticulosis of colon     Family history of early CAD     Father- MI-  age 46; a grandparent- age 46 of MI    Family history of valvular heart disease     Mother- Mitral valve disease    GERD (gastroesophageal reflux disease)     H/O 24 hour EKG monitoring 2001-Predominant rhythm is sinus rhythm. No signif ectopy noted.  H/O echocardiogram 2002, 2001-EF 60%. Normal LVSF. Mild MR. Mild TR-Dr Divine Romero;    H/O echocardiogram 2019    EF 58-06%, Grade I diastolic dysfunction, sclerotic but non stenotic aortic valve, Mild AR, Mild-Mod TR, Normal pulmonary artery pressure, no pericardial effusion     History of diverticulitis of colon     History of Holter monitoring 2018    48 hr holter - SR with PAF    History of repair of left rotator cuff 2015    Hx surgery  Dr Nichole Sheppard, surgery L shoulder 2015 Dr Cristhian Lovell (hard of hearing)     Bilat hearing aids    Hx of cardiac cath 2013    Mild LAD disease noted.  Hx of cardiovascular stress test 2018    EF 33% Pt in a fib with RVR. No infarct or ischemia. Decreased uptake inferiorly due to diaphragmatic artifact.  HX OTHER MEDICAL 2013    14 DAY EVENT: APC's, short runs of sinus tachycardia.      Hyperlipidemia     Hypertension     Hypoxemia 2018    Irregular heart rate 2018    Palpitations     Pneumonia     Restless leg syndrome     Shortness of breath 2018    Tricuspid valve regurgitation 2001    mild       Past Surgical History:        Procedure Laterality Date    BONE RESECTION, RIB      thoracic    CATARACT REMOVAL Bilateral     COLONOSCOPY  10/14/2014    polyp, divertics,    DIAGNOSTIC CARDIAC CATH LAB PROCEDURE  2013    EF 55%, Mild LAD Disease    ENDOSCOPY, COLON, DIAGNOSTIC  10/14/2014    normal    NERVE SURGERY      ulnar nerver transfer    PACEMAKER INSERTION Left 2021    [employer] J LUIS XT DR PARISA WEBBER PPM    ROTATOR CUFF REPAIR  , 2006-right rotator cuff; -Left Rotator cuff    ROTATOR CUFF REPAIR Left 2015    Dr. Summers Breeding ARTHROSCOPY Right 2010       Social History:    Social History     Tobacco Use    Smoking status: Former     Packs/day: 1.00     Years: 20.00     Pack years: 20.00     Types: Cigarettes     Quit date: 1993     Years since quittin.4    Smokeless tobacco: Current     Types: Chew    Tobacco comments:     Reviewed    Substance Use Topics    Alcohol use: No     Comment: drinking \"1 cup decaff coffee\", also drinks decaff tea \"drink it all day long\"                                Ready to quit: Not Answered  Counseling given: Not Answered  Tobacco comments: Reviewed       Vital Signs (Current): There were no vitals filed for this visit.                                            BP Readings from Last 3 Encounters:   22 (!) 134/90   22 110/68   10/21/22 117/82       NPO Status:                                                                                 BMI:   Wt Readings from Last 3 Encounters:   22 219 lb 6.4 oz (99.5 kg)   22 217 lb (98.4 kg)   10/21/22 218 lb (98.9 kg)     There is no height or weight on file to calculate BMI.    CBC:   Lab Results   Component Value Date/Time    WBC 7.1 2022 10:15 AM    RBC 4.59 2022 10:15 AM    HGB 14.1 11/02/2022 10:15 AM    HCT 41.9 11/02/2022 10:15 AM    MCV 91.3 11/02/2022 10:15 AM    RDW 13.2 11/02/2022 10:15 AM     11/02/2022 10:15 AM       CMP:   Lab Results   Component Value Date/Time     11/02/2022 10:15 AM    K 3.7 11/02/2022 10:15 AM     11/02/2022 10:15 AM    CO2 26 11/02/2022 10:15 AM    BUN 14 11/02/2022 10:15 AM    CREATININE 0.9 11/02/2022 10:15 AM    GFRAA >60 01/01/2022 02:12 AM    GFRAA >60 12/04/2012 08:49 AM    AGRATIO 1.6 03/08/2018 01:09 PM    LABGLOM >60 11/02/2022 10:15 AM    GLUCOSE 95 11/02/2022 10:15 AM    PROT 6.8 10/27/2022 08:58 AM    PROT 7.1 12/04/2012 08:49 AM    CALCIUM 8.8 11/02/2022 10:15 AM    BILITOT 0.5 10/27/2022 08:58 AM    ALKPHOS 86 10/27/2022 08:58 AM    AST 19 10/27/2022 08:58 AM    ALT 10 10/27/2022 08:58 AM       POC Tests: No results for input(s): POCGLU, POCNA, POCK, POCCL, POCBUN, POCHEMO, POCHCT in the last 72 hours.     Coags:   Lab Results   Component Value Date/Time    PROTIME 16.5 11/02/2022 10:15 AM    INR 1.28 11/02/2022 10:15 AM    APTT 40.5 11/02/2022 10:15 AM       HCG (If Applicable): No results found for: PREGTESTUR, PREGSERUM, HCG, HCGQUANT     ABGs: No results found for: PHART, PO2ART, WOB5ONL, IYM4ZLC, BEART, E3WEZPVZ     Type & Screen (If Applicable):  No results found for: LABABO, LABRH    Drug/Infectious Status (If Applicable):  No results found for: HIV, HEPCAB    COVID-19 Screening (If Applicable):   Lab Results   Component Value Date/Time    COVID19 DETECTED 12/28/2021 08:15 AM           Anesthesia Evaluation  Patient summary reviewed  Airway: Mallampati: II          Dental:          Pulmonary:   (+) pneumonia:  COPD:  shortness of breath:  decreased breath sounds asthma: current smoker                           Cardiovascular:    (+) hypertension:, valvular problems/murmurs:, pacemaker: pacemaker, CAD:, CABG/stent:, dysrhythmias: atrial fibrillation, CHF:, pulmonary hypertension: moderate, hyperlipidemia      ECG reviewed  Rhythm: regular    Echocardiogram reviewed    Cleared by cardiology           ROS comment: Atrial-paced rhythm with prolonged AV conduction   Prolonged QT   Abnormal ECG   When compared with ECG of 30-DEC-2021 17:23,   Electronic atrial pacemaker has replaced Sinus rhythm   Confirmed by [facility] Woo PATRICIO ([id number]) on 11/2/2022 2:29:41 PM      Summary   Left ventricular function and size is normal, EF is estimated at 55-60%. Mild left ventricular hypertrophy. Normal diastolic filling pattern for age. No regional wall motion abnormalities were detected. Bi atrial enlargement noted. Mildly enlarged right ventricle cavity. PPM wiring visualized within the right side of the heart. Sclerotic, but non-stenotic aortic valve. Mitral annular calcification is present. Mild mitral , aortic and moderate tricuspid regurgitation is present. Moderate Pulmonary hypertension with RVSP of 59mmHg. No evidence of pericardial effusion. OV TO DISCUSS      Signature      ------------------------------------------------------------------   Electronically signed by Goran Finney MD   (Interpreting physician) on 05/12/2022 at 05:23 PM   ------------------------------------------------------------------      Findings      Left Ventricle   Left ventricular function and size is normal, EF is estimated at 55-60%. Mild left ventricular hypertrophy. Normal diastolic filling pattern for age. No regional wall motion abnormalities were detected. Left Atrium   Moderately dilated left atrium. Right Atrium   Mildly dilated right atrium. PPM wiring visualized in right atrium. Right Ventricle   Mildly enlarged right ventricle cavity. PPM wiring visualized within the right ventricle. Aortic Valve   Sclerotic, but non-stenotic aortic valve. Mild aortic regurgitation; PHT: 509msec. Mitral Valve   Mitral annular calcification is present. Mild mitral regurgitation is present. Tricuspid Valve   Tricuspid valve is structurally normal.   Moderate tricuspid regurgitation; RVSP is 59 mmHg. Moderate Pulmonary hypertension . Pulmonic Valve   The pulmonic valve was not well visualized. Pericardial Effusion   No evidence of pericardial effusion. Pleural Effusion   No evidence of pleural effusion. Miscellaneous   No abnormalities were noted in the IVC, abdominal aorta, or aortic root.      M-Mode/2D Measurements & Calculations      LV Diastolic Dimension:  LV Systolic Dimension:  LA Dimension: 4.5 cmAO Root   5.26 cm                  3.61 cm                 Dimension: 3.5 cmLA Area:   LV FS:31.4 %             LV Volume Diastolic: 88 87.9 cm2   LV PW Diastolic: 1.98 cm ml   LV PW Systolic: 2.94 cm  LV Volume Systolic: 40   Septum Diastolic: 8.16   ml   cm                       LV EDV/LV EDV Index: 88 RV Diastolic Dimension:   Septum Systolic: 6.27 cm RS/65 H7JE ESV/LV ESV   4.35 cm   CO: 4.08 l/min           Index: 40 ml/20 m2   CI: 2.08 l/m*m2          EF Calculated (A4C):    LA/Aorta: 1.29                            54.6 %   LV Area Diastolic: 99.3  EF Calculated (2D):     LA volume/Index: 63 ml   cm2                      58.8 %                  /84Q9   LV Area Systolic: 82.7                           RA Dimension: 4 cm   cm2                      LV Length: 7.45 cm                               LVOT: 2 cm     Doppler Measurements & Calculations      MV Peak E-Wave: 46.9 AV Peak Velocity: 158 cm/s    LVOT Mean Velocity: 58.6   cm/s                 AV Peak Gradient: 9.99 mmHg   cm/s   MV Peak A-Wave: 78.5 AV Mean Velocity: 114 cm/s    LVOT Mean Gradient: 2   cm/s                 AV Mean Gradient: 6 mmHg      mmHg   MV E/A Ratio: 0.6    AV VTI: 31 cm                 Estimated RVSP: 59 mmHg   MV Peak Gradient:    AV Area (Continuity):2.06 cm2 Estimated RAP:15 mmHg   0.88 mmHg                        LVOT VTI: 20.3 cm   MV P1/2t: 59 msec    AV P1/2t: 509 msec            TR Velocity:373 cm/s   MVA by PHT:3.73 cm2  Estimated PASP: 70.65 mmHg    TR Gradient:55.65 mmHg      MV E' Lateral   Velocity: 8.68 cm/s   MV A' Lateral   Velocity: 10.5 cm/s   MV E/E' lateral: 5.4       Specimen Collected: 05/12/22 15:57 EDT Last Resulted: 05/12/22 00:00 EDT       Lab Flowsheet     Order Details     View Encounter     Lab and Collection Details     Routing     Result History    View Encounter Conversation        Result Care Coordination      Patient Communication       Released  Not seen Back to Top         Follow-up Encounters      5/20/2022 Telephone Back to Top          ECHO Complete 2D W Doppler W Color: Patient Communication     Released  Not seen    PACS Images     Show images for ECHO Complete 2D W Doppler W Color  Order Report     Order Details    Gayatri Blackman was evaluated from a cardiac standpoint for his surgery or procedure and based on his history, diagnosis, recent cardiac testing, he is considered a medium risk candidate for any iveth-operative cardiac complications.     Patients with known CAD with moderate or high risk should have surgical procedures done where they have access to invasive cardiology services if emergently needed.     Antiplatelet/anticoagulant therapy can be held prior to the surgery or procedure at the discretion of the surgeon to be resumed as soon as possible if held.     Please call with any further questions. Respectfully,      Genita Krabbe, APRN-CNP  Tb/bl     This cardiac clearance is good for 6 months from 10/25/2022 unless new      Neuro/Psych:   (+) depression/anxiety              ROS comment: rls  GI/Hepatic/Renal:   (+) GERD:,           Endo/Other:    (+) blood dyscrasia: anticoagulation therapy, arthritis:., .                 Abdominal:             Vascular: Other Findings:           Anesthesia Plan      general and TIVA     ASA 4       Induction: intravenous. MIPS: Postoperative opioids intended.   Anesthetic plan and risks discussed with patient. Plan discussed with CRNA.     Attending anesthesiologist reviewed and agrees with Preprocedure content                JANI Morrissey - CRNA   11/17/2022

## 2022-11-17 NOTE — PROGRESS NOTES
Notified patient of surgery time at [facility] 11/18/2022 1200 with arrival at 1000, understanding verbalized

## 2022-11-18 ENCOUNTER — APPOINTMENT (OUTPATIENT)
Dept: GENERAL RADIOLOGY | Age: 77
End: 2022-11-18
Attending: ORTHOPAEDIC SURGERY
Payer: MEDICARE

## 2022-11-18 ENCOUNTER — HOSPITAL ENCOUNTER (OUTPATIENT)
Age: 77
Setting detail: OBSERVATION
Discharge: HOME | End: 2022-11-19
Attending: ORTHOPAEDIC SURGERY | Admitting: ORTHOPAEDIC SURGERY
Payer: MEDICARE

## 2022-11-18 ENCOUNTER — ANESTHESIA (OUTPATIENT)
Dept: OPERATING ROOM | Age: 77
End: 2022-11-18
Payer: MEDICARE

## 2022-11-18 DIAGNOSIS — G89.18 ACUTE POSTOPERATIVE PAIN: Primary | ICD-10-CM

## 2022-11-18 DIAGNOSIS — E78.5 HYPERLIPEMIA: ICD-10-CM

## 2022-11-18 PROBLEM — M47.12 CERVICAL SPONDYLOSIS WITH MYELOPATHY: Status: ACTIVE | Noted: 2022-11-18

## 2022-11-18 PROCEDURE — 3700000001 HC ADD 15 MINUTES (ANESTHESIA): Performed by: ORTHOPAEDIC SURGERY

## 2022-11-18 PROCEDURE — 6360000002 HC RX W HCPCS

## 2022-11-18 PROCEDURE — 72040 X-RAY EXAM NECK SPINE 2-3 VW: CPT

## 2022-11-18 PROCEDURE — 94761 N-INVAS EAR/PLS OXIMETRY MLT: CPT

## 2022-11-18 PROCEDURE — 2580000003 HC RX 258

## 2022-11-18 PROCEDURE — C1713 ANCHOR/SCREW BN/BN,TIS/BN: HCPCS | Performed by: ORTHOPAEDIC SURGERY

## 2022-11-18 PROCEDURE — 6370000000 HC RX 637 (ALT 250 FOR IP)

## 2022-11-18 PROCEDURE — 7100000001 HC PACU RECOVERY - ADDTL 15 MIN: Performed by: ORTHOPAEDIC SURGERY

## 2022-11-18 PROCEDURE — G0378 HOSPITAL OBSERVATION PER HR: HCPCS

## 2022-11-18 PROCEDURE — 6360000002 HC RX W HCPCS: Performed by: ORTHOPAEDIC SURGERY

## 2022-11-18 PROCEDURE — 2500000003 HC RX 250 WO HCPCS

## 2022-11-18 PROCEDURE — C1762 CONN TISS, HUMAN(INC FASCIA): HCPCS | Performed by: ORTHOPAEDIC SURGERY

## 2022-11-18 PROCEDURE — 3600000004 HC SURGERY LEVEL 4 BASE: Performed by: ORTHOPAEDIC SURGERY

## 2022-11-18 PROCEDURE — 3700000000 HC ANESTHESIA ATTENDED CARE: Performed by: ORTHOPAEDIC SURGERY

## 2022-11-18 PROCEDURE — 2580000003 HC RX 258: Performed by: ORTHOPAEDIC SURGERY

## 2022-11-18 PROCEDURE — 2720000010 HC SURG SUPPLY STERILE: Performed by: ORTHOPAEDIC SURGERY

## 2022-11-18 PROCEDURE — 2700000000 HC OXYGEN THERAPY PER DAY

## 2022-11-18 PROCEDURE — 7100000000 HC PACU RECOVERY - FIRST 15 MIN: Performed by: ORTHOPAEDIC SURGERY

## 2022-11-18 PROCEDURE — 3600000014 HC SURGERY LEVEL 4 ADDTL 15MIN: Performed by: ORTHOPAEDIC SURGERY

## 2022-11-18 PROCEDURE — 2709999900 HC NON-CHARGEABLE SUPPLY: Performed by: ORTHOPAEDIC SURGERY

## 2022-11-18 PROCEDURE — 76000 FLUOROSCOPY <1 HR PHYS/QHP: CPT

## 2022-11-18 PROCEDURE — C1889 IMPLANT/INSERT DEVICE, NOC: HCPCS | Performed by: ORTHOPAEDIC SURGERY

## 2022-11-18 DEVICE — IMPLANTABLE DEVICE: Type: IMPLANTABLE DEVICE | Site: SPINE CERVICAL | Status: FUNCTIONAL

## 2022-11-18 DEVICE — SCREW SPNL SD 3.5X17 MM ANTR CERV VA ACP: Type: IMPLANTABLE DEVICE | Site: SPINE CERVICAL | Status: FUNCTIONAL

## 2022-11-18 DEVICE — GRAFT BONE M CELLULAR MTRX OSTEOCEL PRO: Type: IMPLANTABLE DEVICE | Site: SPINE CERVICAL | Status: FUNCTIONAL

## 2022-11-18 DEVICE — SCREW SPNL SD 3.5X15 MM ANTR CERV VA ACP: Type: IMPLANTABLE DEVICE | Site: SPINE CERVICAL | Status: FUNCTIONAL

## 2022-11-18 RX ORDER — ATORVASTATIN CALCIUM 10 MG/1
10 TABLET, FILM COATED ORAL NIGHTLY
Status: DISCONTINUED | OUTPATIENT
Start: 2022-11-18 | End: 2022-11-19 | Stop reason: HOSPADM

## 2022-11-18 RX ORDER — PROPOFOL 10 MG/ML
INJECTION, EMULSION INTRAVENOUS CONTINUOUS PRN
Status: DISCONTINUED | OUTPATIENT
Start: 2022-11-18 | End: 2022-11-18 | Stop reason: SDUPTHER

## 2022-11-18 RX ORDER — ESCITALOPRAM OXALATE 10 MG/1
30 TABLET ORAL DAILY
Status: DISCONTINUED | OUTPATIENT
Start: 2022-11-18 | End: 2022-11-19 | Stop reason: HOSPADM

## 2022-11-18 RX ORDER — FENTANYL CITRATE 50 UG/ML
25 INJECTION, SOLUTION INTRAMUSCULAR; INTRAVENOUS EVERY 5 MIN PRN
Status: DISCONTINUED | OUTPATIENT
Start: 2022-11-18 | End: 2022-11-18 | Stop reason: HOSPADM

## 2022-11-18 RX ORDER — PROPOFOL 10 MG/ML
INJECTION, EMULSION INTRAVENOUS PRN
Status: DISCONTINUED | OUTPATIENT
Start: 2022-11-18 | End: 2022-11-18 | Stop reason: SDUPTHER

## 2022-11-18 RX ORDER — BUDESONIDE AND FORMOTEROL FUMARATE DIHYDRATE 160; 4.5 UG/1; UG/1
1 AEROSOL RESPIRATORY (INHALATION)
Status: DISCONTINUED | OUTPATIENT
Start: 2022-11-18 | End: 2022-11-19 | Stop reason: HOSPADM

## 2022-11-18 RX ORDER — METOPROLOL SUCCINATE 100 MG/1
100 TABLET, EXTENDED RELEASE ORAL DAILY
Status: DISCONTINUED | OUTPATIENT
Start: 2022-11-19 | End: 2022-11-19 | Stop reason: HOSPADM

## 2022-11-18 RX ORDER — SODIUM CHLORIDE 9 MG/ML
INJECTION, SOLUTION INTRAVENOUS PRN
Status: DISCONTINUED | OUTPATIENT
Start: 2022-11-18 | End: 2022-11-19 | Stop reason: HOSPADM

## 2022-11-18 RX ORDER — SODIUM CHLORIDE 9 MG/ML
INJECTION, SOLUTION INTRAVENOUS PRN
Status: DISCONTINUED | OUTPATIENT
Start: 2022-11-18 | End: 2022-11-18 | Stop reason: HOSPADM

## 2022-11-18 RX ORDER — FOLIC ACID 1 MG/1
500 TABLET ORAL DAILY
Status: DISCONTINUED | OUTPATIENT
Start: 2022-11-18 | End: 2022-11-19 | Stop reason: HOSPADM

## 2022-11-18 RX ORDER — PANTOPRAZOLE SODIUM 40 MG/1
40 TABLET, DELAYED RELEASE ORAL
Status: DISCONTINUED | OUTPATIENT
Start: 2022-11-19 | End: 2022-11-19 | Stop reason: HOSPADM

## 2022-11-18 RX ORDER — DOFETILIDE 0.25 MG/1
250 CAPSULE ORAL 2 TIMES DAILY
Status: DISCONTINUED | OUTPATIENT
Start: 2022-11-18 | End: 2022-11-19 | Stop reason: HOSPADM

## 2022-11-18 RX ORDER — DEXAMETHASONE SODIUM PHOSPHATE 4 MG/ML
INJECTION, SOLUTION INTRA-ARTICULAR; INTRALESIONAL; INTRAMUSCULAR; INTRAVENOUS; SOFT TISSUE PRN
Status: DISCONTINUED | OUTPATIENT
Start: 2022-11-18 | End: 2022-11-18 | Stop reason: SDUPTHER

## 2022-11-18 RX ORDER — ONDANSETRON 2 MG/ML
4 INJECTION INTRAMUSCULAR; INTRAVENOUS
Status: DISCONTINUED | OUTPATIENT
Start: 2022-11-18 | End: 2022-11-18 | Stop reason: HOSPADM

## 2022-11-18 RX ORDER — TRAMADOL HYDROCHLORIDE 50 MG/1
50 TABLET ORAL EVERY 6 HOURS PRN
Status: DISCONTINUED | OUTPATIENT
Start: 2022-11-18 | End: 2022-11-19 | Stop reason: HOSPADM

## 2022-11-18 RX ORDER — HYDRALAZINE HYDROCHLORIDE 20 MG/ML
10 INJECTION INTRAMUSCULAR; INTRAVENOUS
Status: DISCONTINUED | OUTPATIENT
Start: 2022-11-18 | End: 2022-11-18 | Stop reason: HOSPADM

## 2022-11-18 RX ORDER — HYDROXYZINE HYDROCHLORIDE 10 MG/1
10 TABLET, FILM COATED ORAL 3 TIMES DAILY PRN
Status: DISCONTINUED | OUTPATIENT
Start: 2022-11-18 | End: 2022-11-19 | Stop reason: HOSPADM

## 2022-11-18 RX ORDER — SODIUM CHLORIDE 0.9 % (FLUSH) 0.9 %
5-40 SYRINGE (ML) INJECTION EVERY 12 HOURS SCHEDULED
Status: DISCONTINUED | OUTPATIENT
Start: 2022-11-18 | End: 2022-11-18 | Stop reason: HOSPADM

## 2022-11-18 RX ORDER — SODIUM CHLORIDE 9 MG/ML
INJECTION, SOLUTION INTRAVENOUS CONTINUOUS
Status: DISCONTINUED | OUTPATIENT
Start: 2022-11-18 | End: 2022-11-19 | Stop reason: HOSPADM

## 2022-11-18 RX ORDER — OXYCODONE HYDROCHLORIDE 10 MG/1
10 TABLET ORAL EVERY 4 HOURS PRN
Status: DISCONTINUED | OUTPATIENT
Start: 2022-11-18 | End: 2022-11-19 | Stop reason: HOSPADM

## 2022-11-18 RX ORDER — ALBUTEROL SULFATE 90 UG/1
2 AEROSOL, METERED RESPIRATORY (INHALATION) 4 TIMES DAILY PRN
Status: DISCONTINUED | OUTPATIENT
Start: 2022-11-18 | End: 2022-11-19 | Stop reason: HOSPADM

## 2022-11-18 RX ORDER — CYCLOBENZAPRINE HCL 10 MG
10 TABLET ORAL 3 TIMES DAILY
Status: DISCONTINUED | OUTPATIENT
Start: 2022-11-18 | End: 2022-11-19 | Stop reason: HOSPADM

## 2022-11-18 RX ORDER — CLONAZEPAM 0.5 MG/1
0.5 TABLET ORAL DAILY PRN
Status: DISCONTINUED | OUTPATIENT
Start: 2022-11-18 | End: 2022-11-19 | Stop reason: HOSPADM

## 2022-11-18 RX ORDER — ONDANSETRON 2 MG/ML
4 INJECTION INTRAMUSCULAR; INTRAVENOUS EVERY 6 HOURS PRN
Status: DISCONTINUED | OUTPATIENT
Start: 2022-11-18 | End: 2022-11-19 | Stop reason: HOSPADM

## 2022-11-18 RX ORDER — OXYCODONE HYDROCHLORIDE 5 MG/1
10 TABLET ORAL PRN
Status: DISCONTINUED | OUTPATIENT
Start: 2022-11-18 | End: 2022-11-18 | Stop reason: HOSPADM

## 2022-11-18 RX ORDER — SODIUM CHLORIDE 0.9 % (FLUSH) 0.9 %
5-40 SYRINGE (ML) INJECTION PRN
Status: DISCONTINUED | OUTPATIENT
Start: 2022-11-18 | End: 2022-11-19 | Stop reason: HOSPADM

## 2022-11-18 RX ORDER — LIDOCAINE HYDROCHLORIDE 20 MG/ML
INJECTION, SOLUTION INTRAVENOUS PRN
Status: DISCONTINUED | OUTPATIENT
Start: 2022-11-18 | End: 2022-11-18 | Stop reason: SDUPTHER

## 2022-11-18 RX ORDER — AMLODIPINE BESYLATE 5 MG/1
5 TABLET ORAL DAILY
Status: DISCONTINUED | OUTPATIENT
Start: 2022-11-18 | End: 2022-11-19 | Stop reason: HOSPADM

## 2022-11-18 RX ORDER — ROCURONIUM BROMIDE 10 MG/ML
INJECTION, SOLUTION INTRAVENOUS PRN
Status: DISCONTINUED | OUTPATIENT
Start: 2022-11-18 | End: 2022-11-18 | Stop reason: SDUPTHER

## 2022-11-18 RX ORDER — SODIUM CHLORIDE 0.9 % (FLUSH) 0.9 %
5-40 SYRINGE (ML) INJECTION EVERY 12 HOURS SCHEDULED
Status: DISCONTINUED | OUTPATIENT
Start: 2022-11-18 | End: 2022-11-19 | Stop reason: HOSPADM

## 2022-11-18 RX ORDER — SENNA AND DOCUSATE SODIUM 50; 8.6 MG/1; MG/1
1 TABLET, FILM COATED ORAL 2 TIMES DAILY
Status: DISCONTINUED | OUTPATIENT
Start: 2022-11-18 | End: 2022-11-19 | Stop reason: HOSPADM

## 2022-11-18 RX ORDER — SODIUM CHLORIDE 0.9 % (FLUSH) 0.9 %
5-40 SYRINGE (ML) INJECTION PRN
Status: DISCONTINUED | OUTPATIENT
Start: 2022-11-18 | End: 2022-11-18 | Stop reason: HOSPADM

## 2022-11-18 RX ORDER — SODIUM CHLORIDE 9 MG/ML
25 INJECTION, SOLUTION INTRAVENOUS PRN
Status: DISCONTINUED | OUTPATIENT
Start: 2022-11-18 | End: 2022-11-18 | Stop reason: HOSPADM

## 2022-11-18 RX ORDER — FENTANYL CITRATE 50 UG/ML
50 INJECTION, SOLUTION INTRAMUSCULAR; INTRAVENOUS EVERY 5 MIN PRN
Status: DISCONTINUED | OUTPATIENT
Start: 2022-11-18 | End: 2022-11-18 | Stop reason: HOSPADM

## 2022-11-18 RX ORDER — SODIUM CHLORIDE, SODIUM LACTATE, POTASSIUM CHLORIDE, CALCIUM CHLORIDE 600; 310; 30; 20 MG/100ML; MG/100ML; MG/100ML; MG/100ML
INJECTION, SOLUTION INTRAVENOUS CONTINUOUS
Status: DISCONTINUED | OUTPATIENT
Start: 2022-11-18 | End: 2022-11-18 | Stop reason: HOSPADM

## 2022-11-18 RX ORDER — MONTELUKAST SODIUM 10 MG/1
10 TABLET ORAL NIGHTLY
Status: DISCONTINUED | OUTPATIENT
Start: 2022-11-18 | End: 2022-11-19 | Stop reason: HOSPADM

## 2022-11-18 RX ORDER — OXYCODONE HYDROCHLORIDE 5 MG/1
5 TABLET ORAL EVERY 4 HOURS PRN
Status: DISCONTINUED | OUTPATIENT
Start: 2022-11-18 | End: 2022-11-19 | Stop reason: HOSPADM

## 2022-11-18 RX ORDER — LABETALOL HYDROCHLORIDE 5 MG/ML
10 INJECTION, SOLUTION INTRAVENOUS
Status: DISCONTINUED | OUTPATIENT
Start: 2022-11-18 | End: 2022-11-18 | Stop reason: HOSPADM

## 2022-11-18 RX ORDER — AMLODIPINE BESYLATE AND BENAZEPRIL HYDROCHLORIDE 5; 10 MG/1; MG/1
1 CAPSULE ORAL DAILY
Status: DISCONTINUED | OUTPATIENT
Start: 2022-11-18 | End: 2022-11-18

## 2022-11-18 RX ORDER — OXYCODONE HYDROCHLORIDE 5 MG/1
5 TABLET ORAL PRN
Status: DISCONTINUED | OUTPATIENT
Start: 2022-11-18 | End: 2022-11-18 | Stop reason: HOSPADM

## 2022-11-18 RX ORDER — LANOLIN ALCOHOL/MO/W.PET/CERES
500 CREAM (GRAM) TOPICAL 4 TIMES DAILY
Status: DISCONTINUED | OUTPATIENT
Start: 2022-11-18 | End: 2022-11-19 | Stop reason: HOSPADM

## 2022-11-18 RX ORDER — ONDANSETRON 4 MG/1
4 TABLET, ORALLY DISINTEGRATING ORAL EVERY 8 HOURS PRN
Status: DISCONTINUED | OUTPATIENT
Start: 2022-11-18 | End: 2022-11-19 | Stop reason: HOSPADM

## 2022-11-18 RX ORDER — ONDANSETRON 2 MG/ML
INJECTION INTRAMUSCULAR; INTRAVENOUS PRN
Status: DISCONTINUED | OUTPATIENT
Start: 2022-11-18 | End: 2022-11-18 | Stop reason: SDUPTHER

## 2022-11-18 RX ORDER — ACETAMINOPHEN 325 MG/1
650 TABLET ORAL EVERY 6 HOURS
Status: DISCONTINUED | OUTPATIENT
Start: 2022-11-18 | End: 2022-11-19 | Stop reason: HOSPADM

## 2022-11-18 RX ORDER — LISINOPRIL 5 MG/1
10 TABLET ORAL DAILY
Status: DISCONTINUED | OUTPATIENT
Start: 2022-11-18 | End: 2022-11-19 | Stop reason: HOSPADM

## 2022-11-18 RX ORDER — VITAMIN B COMPLEX
1 CAPSULE ORAL DAILY
Status: DISCONTINUED | OUTPATIENT
Start: 2022-11-18 | End: 2022-11-19 | Stop reason: HOSPADM

## 2022-11-18 RX ADMIN — SUGAMMADEX 200 MG: 100 INJECTION, SOLUTION INTRAVENOUS at 13:06

## 2022-11-18 RX ADMIN — SODIUM CHLORIDE, PRESERVATIVE FREE 10 ML: 5 INJECTION INTRAVENOUS at 20:56

## 2022-11-18 RX ADMIN — PROPOFOL 150 MCG/KG/MIN: 10 INJECTION, EMULSION INTRAVENOUS at 12:56

## 2022-11-18 RX ADMIN — DOFETILIDE 250 MCG: 0.25 CAPSULE ORAL at 20:55

## 2022-11-18 RX ADMIN — PHENYLEPHRINE HYDROCHLORIDE 100 MCG: 10 INJECTION INTRAVENOUS at 13:14

## 2022-11-18 RX ADMIN — LIDOCAINE HYDROCHLORIDE 100 MG: 20 INJECTION, SOLUTION INTRAVENOUS at 12:50

## 2022-11-18 RX ADMIN — SODIUM CHLORIDE: 9 INJECTION, SOLUTION INTRAVENOUS at 15:48

## 2022-11-18 RX ADMIN — CEFAZOLIN 2000 MG: 2 INJECTION, POWDER, FOR SOLUTION INTRAMUSCULAR; INTRAVENOUS at 12:37

## 2022-11-18 RX ADMIN — HYDROMORPHONE HYDROCHLORIDE 0.25 MG: 1 INJECTION, SOLUTION INTRAMUSCULAR; INTRAVENOUS; SUBCUTANEOUS at 14:45

## 2022-11-18 RX ADMIN — PROPOFOL 150 MG: 10 INJECTION, EMULSION INTRAVENOUS at 12:50

## 2022-11-18 RX ADMIN — SODIUM CHLORIDE, POTASSIUM CHLORIDE, SODIUM LACTATE AND CALCIUM CHLORIDE: 600; 310; 30; 20 INJECTION, SOLUTION INTRAVENOUS at 10:50

## 2022-11-18 RX ADMIN — ROCURONIUM BROMIDE 50 MG: 10 INJECTION INTRAVENOUS at 12:50

## 2022-11-18 RX ADMIN — CEFAZOLIN 2000 MG: 2 INJECTION, POWDER, FOR SOLUTION INTRAMUSCULAR; INTRAVENOUS at 21:07

## 2022-11-18 RX ADMIN — PHENYLEPHRINE HYDROCHLORIDE 200 MCG: 10 INJECTION INTRAVENOUS at 13:06

## 2022-11-18 RX ADMIN — CYCLOBENZAPRINE 10 MG: 10 TABLET, FILM COATED ORAL at 20:54

## 2022-11-18 RX ADMIN — PHENYLEPHRINE HYDROCHLORIDE 5 MCG/MIN: 10 INJECTION INTRAVENOUS at 13:25

## 2022-11-18 RX ADMIN — ONDANSETRON 4 MG: 2 INJECTION INTRAMUSCULAR; INTRAVENOUS at 15:06

## 2022-11-18 RX ADMIN — HYDROMORPHONE HYDROCHLORIDE 0.25 MG: 1 INJECTION, SOLUTION INTRAMUSCULAR; INTRAVENOUS; SUBCUTANEOUS at 15:16

## 2022-11-18 RX ADMIN — PROPOFOL 30 MG: 10 INJECTION, EMULSION INTRAVENOUS at 13:23

## 2022-11-18 RX ADMIN — DEXAMETHASONE SODIUM PHOSPHATE 4 MG: 4 INJECTION, SOLUTION INTRAMUSCULAR; INTRAVENOUS at 13:02

## 2022-11-18 RX ADMIN — OXYCODONE HYDROCHLORIDE 5 MG: 5 TABLET ORAL at 20:56

## 2022-11-18 ASSESSMENT — PAIN SCALES - GENERAL
PAINLEVEL_OUTOF10: 7
PAINLEVEL_OUTOF10: 4
PAINLEVEL_OUTOF10: 7
PAINLEVEL_OUTOF10: 7

## 2022-11-18 ASSESSMENT — PAIN DESCRIPTION - ORIENTATION
ORIENTATION: ANTERIOR
ORIENTATION: MID
ORIENTATION: ANTERIOR

## 2022-11-18 ASSESSMENT — PAIN DESCRIPTION - PAIN TYPE
TYPE: SURGICAL PAIN
TYPE: SURGICAL PAIN

## 2022-11-18 ASSESSMENT — PAIN DESCRIPTION - DESCRIPTORS
DESCRIPTORS: ACHING

## 2022-11-18 ASSESSMENT — PAIN DESCRIPTION - LOCATION
LOCATION: NECK

## 2022-11-18 ASSESSMENT — PAIN DESCRIPTION - ONSET: ONSET: ON-GOING

## 2022-11-18 ASSESSMENT — PAIN DESCRIPTION - FREQUENCY: FREQUENCY: CONTINUOUS

## 2022-11-18 ASSESSMENT — PAIN - FUNCTIONAL ASSESSMENT
PAIN_FUNCTIONAL_ASSESSMENT: PREVENTS OR INTERFERES SOME ACTIVE ACTIVITIES AND ADLS
PAIN_FUNCTIONAL_ASSESSMENT: 0-10

## 2022-11-18 NOTE — OP NOTE
Operative Note      Patient: Tigist Medina  YOB: 1945  MRN: [id number]    Date of Procedure: 11/18/2022    Pre-Op Diagnosis: Cervical radiculopathy [M54.12]    Post-Op Diagnosis: Same       Procedure(s):  C5-6, C6-7  CERVICAL DISCECTOMY FUSION ANTERIOR TWO LEVELS    Surgeon(s):  Master Desir MD    Assistant:   * No surgical staff found *    Anesthesia: General    Estimated Blood Loss (mL): Minimal    Complications: None    Specimens:   * No specimens in log *    Implants:  Implant Name Type Inv. Item Serial No.  Lot No. LRB No. Used Action   GRAFT BONE M CELLULAR MTRX OSTEOCEL PRO - [id number]  GRAFT BONE M CELLULAR MTRX OSTEOCEL PRO [id number] [employer]  N/A 1 Implanted   CAGE SPNL 10 DEG [id number] MM CERV MODULUS - [id number]  CAGE SPNL 10 DEG [id number] MM CERV MODULUS  [place] [employer] [id number] N/A 1 Implanted   CAGE SPNL 10 DEG [id number] MM CERV MODULUS - [id number]  CAGE SPNL 10 DEG [id number] MM CERV MODULUS  [place] [employer] [id number] N/A 1 Implanted   PLATE SPNL 2 LEVEL 36 MM ANTR CERV 1.6V ACP - [id number]  PLATE SPNL 2 LEVEL 36 MM ANTR CERV 1.6V ACP  [employer]  N/A 1 Implanted   SCREW SPNL SD 3.5X15 MM ANTR CERV VA ACP - [id number]  SCREW SPNL SD 3.5X15 MM ANTR CERV VA ACP  [employer]  N/A 1 Implanted   SCREW SPNL SD 3.5X17 MM ANTR CERV VA ACP - [id number]  SCREW SPNL SD 3.5X17 MM ANTR CERV VA ACP  [employer]  N/A 1 Implanted   SCREW SPNL SD 3.5X19 MM ANTR CERV VA ACP - [id number]  SCREW SPNL SD 3.5X19 MM ANTR CERV VA ACP  [employer]  N/A 1 Implanted         Drains:   Urinary Catheter 11/18/22 Blanca (Active)       Findings: Normal findings    Detailed Description of Procedure:   Patient was seen in the preoperative area marked consented and taken back to the operating room placed under general anesthesia had SCDs placed Blanca placed placed on a regular bed with a shoulder roll underneath the shoulder head were placed on a donut hands were wrapped at his side. Neuro monitoring placed very leads we prepped and draped the left side of the neck in a sterile fashion. We gave patient antibiotics prior to incision performed timeout nobody was in agreement brought in fluoroscopy in the lateral plane to localize our levels and then made an incision with a 15 blade and dissected down with Bovie cautery through the platysma level and then using appendiceal Kitners to work our way down to the prevertebral fascia. I then localized our levels via fluoroscopy in the lateral plane and elevated the longus coli bilaterally and placed retractors and then placed Adams pins at C5 and C6. I then perform annulotomy with a Kerrison rongeur and remove the anterior osteophytes with a 3 Kerrison rongeur and then perform a discectomy pituitary rongeurs and curettes and then used a 3 mm matchstick bur to remove the posterior osteophytes and a used a Kerrison to release the posterior longitudinal ligament and performed bilateral foraminotomies. I used a nerve hook to make sure the foramen were patent and then controlled all bleeding with Floseal and placed trial guide and then placed a cage packed with allograft under fluoroscopy. I then remove the Adams pin in C5 and try to place it at C7 and the bone was of poor quality for purchase department the Adams pins placed bone wax and then perform a annulotomy at C6-7 with 15 blade and then performed my discectomy pituitary rongeurs and curettes and remove the anterior osteophytes with Kerrison rongeurs. I then used a 3 mm matchstick bur to remove the posterior osteophytes and then release the posterior longitudinal ligament Kerrison rongeurs and then performed bilateral foraminotomies with Kerrison rongeurs. Used a nerve hook to make sure the bilateral foramen were patent and then placed a trial guide and then a cage packed with allograft under fluoroscopy.   I then removed all anterior osteophytes from C5-6 C6-7 disc spaces with a rongeur

## 2022-11-18 NOTE — ANESTHESIA POSTPROCEDURE EVALUATION
Department of Anesthesiology  Postprocedure Note    Patient: Effie Richardson  MRN: [id number]  YOB: 1945  Date of evaluation: 11/18/2022      Procedure Summary     Date: 11/18/22 Room / Location: [address]    Anesthesia Start: 1243 Anesthesia Stop: 1518    Procedure: C5-6, C6-7  CERVICAL DISCECTOMY FUSION ANTERIOR TWO LEVELS (Neck) Diagnosis:       Cervical radiculopathy      (Cervical radiculopathy [M54.12])    Surgeons: Faisal Rowe MD Responsible Provider: Morgan Lou DO    Anesthesia Type: TIVA, General ASA Status: 4          Anesthesia Type: TIVA, General    [place] Phase I:      [place] Phase II:        Anesthesia Post Evaluation    Patient location during evaluation: PACU  Patient participation: complete - patient participated  Level of consciousness: awake and alert  Airway patency: patent  Nausea & Vomiting: no nausea and no vomiting  Complications: no  Cardiovascular status: hemodynamically stable  Respiratory status: face mask and spontaneous ventilation  Hydration status: stable

## 2022-11-18 NOTE — PROGRESS NOTES
PROCEDURE: C5-6, C6-7 ACDF    11/18/2022 3:32 PM    Patient seen in PACU  Alert and oriented to self, place, time  Able to move all extremities at baseline  Incision intact, Dermabond and dressings in place    /80   Pulse 61   Temp 97.5 °F (36.4 °C) (Temporal)   Resp 16   SpO2 98%       Plan:  Admit for observation and pain control  Neuro checks every 4 hours  Activity as tolerated  Advance diet as tolerated  Post-operative xrays ordered  Please call our service if there are any changes in neuro exam    Electronically signed by Leisa Suazo PA-C on 11/18/2022 at 3:32 PM

## 2022-11-18 NOTE — PROGRESS NOTES
1513: Arrived to PACU from OR. Monitors applied, alarms on. Report obtained from [facility] and EJ CRNA.  2985: TEUKZA but arouses easily. Has strong equal hand grasp, able to hold both arms off bed, denies pain or numbness in either arm or neck at this time. [facility] at bedside evaluating patient. Able to move all extremities equally,  1545: Turned and repositioned in bed, warm blankets on. Denies need for pain med. Moves all extremities with equal and strong strength. Able to lift arms off bed and hold, denies numbness or tingling in arms. Equal strong push\pull with feet. Ice chips given. Wide updated. 1611: Transported to x-ray then will go to room 3009 from there.

## 2022-11-19 VITALS
RESPIRATION RATE: 20 BRPM | TEMPERATURE: 98.2 F | HEIGHT: 68 IN | HEART RATE: 61 BPM | SYSTOLIC BLOOD PRESSURE: 125 MMHG | DIASTOLIC BLOOD PRESSURE: 78 MMHG | OXYGEN SATURATION: 95 % | WEIGHT: 211.64 LBS | BODY MASS INDEX: 32.08 KG/M2

## 2022-11-19 LAB
ANION GAP SERPL CALCULATED.3IONS-SCNC: 9 MMOL/L (ref 4–16)
BUN BLDV-MCNC: 12 MG/DL (ref 6–23)
CALCIUM SERPL-MCNC: 8.6 MG/DL (ref 8.3–10.6)
CHLORIDE BLD-SCNC: 101 MMOL/L (ref 99–110)
CO2: 27 MMOL/L (ref 21–32)
CREAT SERPL-MCNC: 0.8 MG/DL (ref 0.9–1.3)
GFR SERPL CREATININE-BSD FRML MDRD: >60 ML/MIN/1.73M2
GLUCOSE BLD-MCNC: 121 MG/DL (ref 70–99)
HCT VFR BLD CALC: 40.9 % (ref 42–52)
HEMOGLOBIN: 13.8 GM/DL (ref 13.5–18)
MCH RBC QN AUTO: 29.9 PG (ref 27–31)
MCHC RBC AUTO-ENTMCNC: 33.7 % (ref 32–36)
MCV RBC AUTO: 88.5 FL (ref 78–100)
PDW BLD-RTO: 13.2 % (ref 11.7–14.9)
PLATELET # BLD: 151 K/CU MM (ref 140–440)
PMV BLD AUTO: 9.2 FL (ref 7.5–11.1)
POTASSIUM SERPL-SCNC: 3.8 MMOL/L (ref 3.5–5.1)
RBC # BLD: 4.62 M/CU MM (ref 4.6–6.2)
SODIUM BLD-SCNC: 137 MMOL/L (ref 135–145)
WBC # BLD: 11.1 K/CU MM (ref 4–10.5)

## 2022-11-19 PROCEDURE — 2700000000 HC OXYGEN THERAPY PER DAY

## 2022-11-19 PROCEDURE — 97162 PT EVAL MOD COMPLEX 30 MIN: CPT

## 2022-11-19 PROCEDURE — 2580000003 HC RX 258

## 2022-11-19 PROCEDURE — G0378 HOSPITAL OBSERVATION PER HR: HCPCS

## 2022-11-19 PROCEDURE — 97116 GAIT TRAINING THERAPY: CPT

## 2022-11-19 PROCEDURE — 99024 POSTOP FOLLOW-UP VISIT: CPT | Performed by: PHYSICIAN ASSISTANT

## 2022-11-19 PROCEDURE — 80048 BASIC METABOLIC PNL TOTAL CA: CPT

## 2022-11-19 PROCEDURE — 6370000000 HC RX 637 (ALT 250 FOR IP)

## 2022-11-19 PROCEDURE — 97530 THERAPEUTIC ACTIVITIES: CPT

## 2022-11-19 PROCEDURE — 6370000000 HC RX 637 (ALT 250 FOR IP): Performed by: INTERNAL MEDICINE

## 2022-11-19 PROCEDURE — 85027 COMPLETE CBC AUTOMATED: CPT

## 2022-11-19 PROCEDURE — 6360000002 HC RX W HCPCS

## 2022-11-19 PROCEDURE — 94761 N-INVAS EAR/PLS OXIMETRY MLT: CPT

## 2022-11-19 PROCEDURE — 36415 COLL VENOUS BLD VENIPUNCTURE: CPT

## 2022-11-19 RX ORDER — ASPIRIN 81 MG/1
81 TABLET ORAL DAILY
Qty: 30 TABLET | Refills: 3
Start: 2022-11-21

## 2022-11-19 RX ORDER — OXYCODONE HYDROCHLORIDE AND ACETAMINOPHEN 5; 325 MG/1; MG/1
1 TABLET ORAL EVERY 6 HOURS PRN
Qty: 12 TABLET | Refills: 0 | Status: SHIPPED | OUTPATIENT
Start: 2022-11-19 | End: 2022-11-22

## 2022-11-19 RX ADMIN — FOLIC ACID 500 MCG: 1 TABLET ORAL at 08:46

## 2022-11-19 RX ADMIN — LISINOPRIL 10 MG: 5 TABLET ORAL at 08:45

## 2022-11-19 RX ADMIN — PANTOPRAZOLE SODIUM 40 MG: 40 TABLET, DELAYED RELEASE ORAL at 05:49

## 2022-11-19 RX ADMIN — Medication 1 LOZENGE: at 00:00

## 2022-11-19 RX ADMIN — CYCLOBENZAPRINE 10 MG: 10 TABLET, FILM COATED ORAL at 08:45

## 2022-11-19 RX ADMIN — OXYCODONE HYDROCHLORIDE 5 MG: 5 TABLET ORAL at 05:49

## 2022-11-19 RX ADMIN — Medication 500 MCG: at 13:00

## 2022-11-19 RX ADMIN — SENNOSIDES AND DOCUSATE SODIUM 1 TABLET: 50; 8.6 TABLET ORAL at 08:45

## 2022-11-19 RX ADMIN — CEFAZOLIN 2000 MG: 2 INJECTION, POWDER, FOR SOLUTION INTRAMUSCULAR; INTRAVENOUS at 04:12

## 2022-11-19 RX ADMIN — METOPROLOL SUCCINATE 100 MG: 100 TABLET, FILM COATED, EXTENDED RELEASE ORAL at 08:46

## 2022-11-19 RX ADMIN — ESCITALOPRAM OXALATE 30 MG: 10 TABLET ORAL at 08:46

## 2022-11-19 RX ADMIN — AMLODIPINE BESYLATE 5 MG: 5 TABLET ORAL at 08:45

## 2022-11-19 RX ADMIN — SODIUM CHLORIDE, PRESERVATIVE FREE 10 ML: 5 INJECTION INTRAVENOUS at 08:44

## 2022-11-19 RX ADMIN — BISACODYL 5 MG: 5 TABLET, COATED ORAL at 08:46

## 2022-11-19 RX ADMIN — ACETAMINOPHEN 650 MG: 325 TABLET ORAL at 05:50

## 2022-11-19 RX ADMIN — Medication 500 MCG: at 08:45

## 2022-11-19 RX ADMIN — Medication 1 CAPSULE: at 08:45

## 2022-11-19 RX ADMIN — DOFETILIDE 250 MCG: 0.25 CAPSULE ORAL at 08:46

## 2022-11-19 RX ADMIN — ACETAMINOPHEN 650 MG: 325 TABLET ORAL at 13:00

## 2022-11-19 ASSESSMENT — PAIN DESCRIPTION - FREQUENCY
FREQUENCY: INTERMITTENT
FREQUENCY: INTERMITTENT

## 2022-11-19 ASSESSMENT — PAIN SCALES - GENERAL
PAINLEVEL_OUTOF10: 6
PAINLEVEL_OUTOF10: 6
PAINLEVEL_OUTOF10: 0
PAINLEVEL_OUTOF10: 0
PAINLEVEL_OUTOF10: 4
PAINLEVEL_OUTOF10: 0

## 2022-11-19 ASSESSMENT — PAIN DESCRIPTION - PAIN TYPE
TYPE: CHRONIC PAIN
TYPE: SURGICAL PAIN

## 2022-11-19 ASSESSMENT — PAIN - FUNCTIONAL ASSESSMENT
PAIN_FUNCTIONAL_ASSESSMENT: PREVENTS OR INTERFERES SOME ACTIVE ACTIVITIES AND ADLS

## 2022-11-19 ASSESSMENT — PAIN DESCRIPTION - LOCATION
LOCATION: NECK
LOCATION: NECK
LOCATION: THROAT

## 2022-11-19 ASSESSMENT — PAIN DESCRIPTION - DESCRIPTORS
DESCRIPTORS: ACHING
DESCRIPTORS: ACHING
DESCRIPTORS: BURNING

## 2022-11-19 ASSESSMENT — PAIN DESCRIPTION - ORIENTATION
ORIENTATION: LEFT
ORIENTATION: ANTERIOR

## 2022-11-19 ASSESSMENT — PAIN DESCRIPTION - ONSET
ONSET: ON-GOING
ONSET: ON-GOING

## 2022-11-19 NOTE — PROGRESS NOTES
Went through discharge instructions with pt. Pt verbalized understanding. Script was given to pt. Pt was given packet by  about what the pt can do after surgery with his incision. Pt states that packet is at home and he is aware of what he can and cannot do after surgery. Took pt down in wc with family members.

## 2022-11-19 NOTE — DISCHARGE INSTRUCTIONS
Refer to discharge instructions in folder from Dr. Solis [place] office.  Call office with questions

## 2022-11-19 NOTE — PLAN OF CARE
Problem: Chronic Conditions and Co-morbidities  Goal: Patient's chronic conditions and co-morbidity symptoms are monitored and maintained or improved  11/19/2022 1128 by Lisa Arteaga RN  Outcome: Completed  11/19/2022 1126 by Lisa Arteaga RN  Outcome: Progressing  Flowsheets (Taken 11/19/2022 0800)  Care Plan - Patient's Chronic Conditions and Co-Morbidity Symptoms are Monitored and Maintained or Improved:   Monitor and assess patient's chronic conditions and comorbid symptoms for stability, deterioration, or improvement   Collaborate with multidisciplinary team to address chronic and comorbid conditions and prevent exacerbation or deterioration   Update acute care plan with appropriate goals if chronic or comorbid symptoms are exacerbated and prevent overall improvement and discharge     Problem: Discharge Planning  Goal: Discharge to home or other facility with appropriate resources  11/19/2022 1128 by Lisa Arteaga RN  Outcome: Completed  11/19/2022 1126 by Lisa Arteaga RN  Outcome: Progressing  Flowsheets (Taken 11/19/2022 0800)  Discharge to home or other facility with appropriate resources:   Identify barriers to discharge with patient and caregiver   Arrange for needed discharge resources and transportation as appropriate   Identify discharge learning needs (meds, wound care, etc)   Arrange for interpreters to assist at discharge as needed   Refer to discharge planning if patient needs post-hospital services based on physician order or complex needs related to functional status, cognitive ability or social support system     Problem: Pain  Goal: Verbalizes/displays adequate comfort level or baseline comfort level  11/19/2022 1128 by Lisa Arteaga RN  Outcome: Completed  11/19/2022 1126 by Lisa Arteaga RN  Outcome: Progressing     Problem: ABCDS Injury Assessment  Goal: Absence of physical injury  11/19/2022 1128 by Lisa Arteaga RN  Outcome: Completed  11/19/2022 1126 by Lisa Arteaga RN  Outcome: Progressing

## 2022-11-19 NOTE — CONSULTS
[address] [id number] [facility], [address] [facility]                                  CONSULTATION    PATIENT NAME: Dayna Gracia                     :        1945  MED REC NO:   [id number]                          ROOM:       3009  ACCOUNT NO:   [de-identified]                           ADMIT DATE: 2022  PROVIDER:     Anjel Blair MD    CONSULT DATE:  2022    CONSULTING PROVIDER:  Rangel Joe MD    REASON FOR CONSULTATION:  Medical management. CHIEF COMPLAINT AND HISTORY OF PRESENTING ILLNESS:  The patient is a  44-year-old gentleman who underwent anterior cervical discectomy with  fusion at C5-C6, and C6-C7 by Dr. Tayo Knight earlier today. I have seen the  patient postoperatively. The patient is in his room. Postoperative  pain is well controlled. He denies any fever, chills, chest pain,  shortness of breath, nausea, vomiting, or abdominal pain. He does have  some sore throat and postoperative pain in the neck area. PAST MEDICAL AND SURGICAL HISTORY:  Hypertension, hyperlipidemia, atrial  fibrillation, COPD, depression, restless leg syndrome, and  gastroesophageal reflux disease. Surgeries include bilateral cataract  surgery, ulnar nerve surgery, pacemaker insertion, bilateral rotator  cuff repair surgery, previous colonoscopy and cardiac catheterization. FAMILY HISTORY:  Father had coronary artery disease. Mother also had  coronary artery disease. SOCIAL HISTORY:  He is a former smoker, quit smoking number of years  ago, denies any alcohol abuse or drug use. He is , lives at  home.     MEDICATIONS PRIOR TO ADMISSION:  Albuterol inhaler, Lotrel 5/10 mg  daily, Eliquis 5 mg b.i.d., aspirin 81 mg daily, Lipitor 10 mg daily,  vitamin B complex, Symbicort inhaler, clonazepam 0.5 mg at night time,  vitamin X60 daily, folic acid 1 mg daily, Tikosyn 250 mcg twice a day,  Lexapro 30 mg daily, Toprol  mg daily, Singulair 10 mg daily,  Prilosec 40 mg daily. ALLERGIES:  [facility]. PHYSICAL EXAMINATION:  VITAL SIGNS:  Temperature 97.8 degree Fahrenheit, blood pressure 113/97,  pulse 66, respiratory rate 16, oxygen saturation 95% on 2 L of oxygen. HEENT:  Conjunctiva is normal.  Sclerae are nonicteric. Oropharynx is  moist.  NECK:  Supple. Postoperative dressing is noted anteriorly. CHEST:  Clear to auscultation. There is no wheezing, rales, crackles or  rhonchi. HEART:  Rate and rhythm regular. Normal S1 and S2.  ABDOMEN:  Obese, soft and nontender. Positive bowel sounds. EXTREMITIES:  No edema. No calf tenderness. The patient has SCDs  bilaterally. NEUROLOGIC:  The patient is awake, alert and oriented x3. Neuro exam is  nonfocal at the time of my examination. LABORATORY STUDIES AND X-RAY FINDINGS:  Sodium 140, potassium 3.7,  chloride 103, CO2 26, BUN 14, creatinine 0.9, glucose 95, calcium 8.8. On the CBC; white count is 7.1, hemoglobin 14.1, hematocrit 41.9 and  platelet count 868,285. Differential count is normal.  Prothrombin time  is 16.5, INR 1.28, PTT 40.5. Urinalysis is normal.  Chest x-ray; no  acute cardiopulmonary process, bilateral lower lobe prominent post  inflammatory scarring from COVID infection in 12/2021. Electrocardiogram, atrial paced rhythm rate of 63 beats per minute. Echocardiogram done in 05/2022 reveals ejection fraction of 55% to 60%,  mild left ventricular hypertrophy, normal diastolic filling, no regional  wall motion abnormality. Biatrial enlargement, mild mitral and aortic  regurgitation and moderate tricuspid regurgitation, moderate pulmonary  hypertension with right ventricular systolic pressure of 59 mmHg. Pacemaker wiring was visualized within the right side of the heart. IMPRESSION:  1. Status post anterior cervical discectomy with fusion at C5-C6,  C6-C7. 2.  Hypertension. 3.  Mild coronary artery disease.   4.  Atrial fibrillation, permanent pacemaker placement. 5.  Hyperlipidemia. 6.  COPD. 7.  Depression. 8.  Restless leg syndrome. PLAN:  The patient was seen and evaluated postoperatively. The patient  is medically stable. Home medicines reviewed and reconciled. The  patient is advised for incentive spirometry every hour while awake. He  will have SCDs for DVT prophylaxis. I will continue to follow the  patient during this hospitalization.         Christopher Molina MD    D: 11/18/2022 18:22:48       T: 11/18/2022 21:34:11     [id number]  Job#: [id number]     Doc#: [id number]    CC:  Marce Vyas MD

## 2022-11-19 NOTE — PROGRESS NOTES
Neurosurgery Progress Note  11/19/2022 1:02 AM      C5-6, C6-7 ACDF  POD: 1    Assessment and Plan:   S/P cervical fusion-patient to work with PT/OT and gain recommendations for discharge. Blanca to be removed this morning. Patient can discharge if he is able to urinate without any difficulties, is passing gas, has adequate pain control, and has no swallowing difficulties. He will follow-up with Dr. Raya Camachoing office in 2 weeks for incision check. Okay for patient to resume his aspirin on 11/20. Okay to start prophylactic Lovenox on 11/20 should patient stay another night. Hypertension-on metoprolol  COPD-inhalers reordered    Supervising physician: Jina Mulligan MD.  Dr. Donta French was readily and continuously available by phone for direct consultation regarding the care of this patient. Subjective:   Admit Date: 11/18/2022  PCP: JANI Powers - NP    Patient lying in bed. Does not have any new numbness/tingling or weakness in any extremity. He states that he is doing well. Is wanting to know if he will discharge home today. Data:   Scheduled Meds:   folic acid  498 mcg Oral Daily    vitamin B-12  500 mcg Oral 4x Daily    montelukast  10 mg Oral Nightly    metoprolol succinate  100 mg Oral Daily    escitalopram  30 mg Oral Daily    atorvastatin  10 mg Oral Nightly    dofetilide  250 mcg Oral BID    pantoprazole  40 mg Oral QAM AC    b complex vitamins  1 capsule Oral Daily    sodium chloride flush  5-40 mL IntraVENous 2 times per day    acetaminophen  650 mg Oral Q6H    ceFAZolin (ANCEF) IVPB  2,000 mg IntraVENous Q8H    cyclobenzaprine  10 mg Oral TID    bisacodyl  5 mg Oral Daily    sennosides-docusate sodium  1 tablet Oral BID    amLODIPine  5 mg Oral Daily    And    lisinopril  10 mg Oral Daily         I/O last 3 completed shifts:   In: 672.3 [I.V.:672.3]  Out: 610 [Urine:600; Blood:10]  I/O this shift:  In: -   Out: 875 [Urine:875]    Intake/Output Summary (Last 24 hours) at 11/19/2022 0102  Last data filed at 11/19/2022 0003  Gross per 24 hour   Intake 672.27 ml   Output 1485 ml   Net -812.73 ml     CULTURE results: Invalid input(s): BLOOD CULTURE,  URINE CULTURE, SURGICAL CULTURE  CBC: No results for input(s): WBC, HGB, PLT in the last 72 hours. BMP:  No results for input(s): NA, K, CL, CO2, BUN, CREATININE, GLUCOSE in the last 72 hours. Hepatic: No results for input(s): AST, ALT, ALB, BILITOT, ALKPHOS in the last 72 hours. IMAGING: available xray, CT, and MRI results reviewed    Cervical x-ray 11/18/2022  Impression   Postoperative changes of ACDF from C5-C7, without hardware complication.      Objective:   Vitals: /82   Pulse 59   Temp 97.8 °F (36.6 °C) (Oral)   Resp 16   Ht 5' 7.5\" (1.715 m)   Wt 217 lb (98.4 kg)   SpO2 95%   BMI 33.49 kg/m²   General appearance: Alert, laying in bed, no distress  HEENT: Normocephalic, atraumatic, EOM intact  DERM: No significant rashes or lesions identified  Neurologic: Mental status: Alert and oriented x4, speech clear and coherent, no facial droop, follows commands briskly, sensation intact in all extremities  Extremities: Bilateral upper extremities 5/5 throughout, bilateral lower extremities 5/5 throughout  Incision: Telfa and Tegaderm in place, no swelling or erythema  Drains: none      Electronically signed by Segundo Graham PA-C on 11/19/2022 at 1:02 AM

## 2022-11-19 NOTE — DISCHARGE SUMMARY
Discharge Summary     Patient ID:  Kareem Krishnamurthy  [id number]  78 y.o.  1945    Admit date: 11/18/2022    Discharge date and time: 11/19/22    Hospital LOS: 0    Admitting Physician: Tristen Gonzales MD     Indication for Admission: Cervical radiculopathy    Admission Diagnoses: Cervical radiculopathy [M54.12]  Cervical spondylosis with myelopathy [M47.12]    Discharge Diagnoses: Cervical radiculopathy [M54.12]  Cervical spondylosis with myelopathy [M47.12]    Procedures: ACDF C5-6 C6-7    Problem List:   Patient Active Problem List    Diagnosis Date Noted    Cervical spondylosis with myelopathy 11/18/2022    Medication management 05/05/2022    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    Sick sinus syndrome ([facility]     S/P placement of cardiac pacemaker 04/01/2021    Other insomnia 05/10/2018    Other headache syndrome 05/10/2018    PAF (paroxysmal atrial fibrillation) ([facility] 04/19/2018    Chronic systolic congestive heart failure ([facility] 03/29/2018    Moderate episode of recurrent major depressive disorder ([address] 03/15/2018    Nystagmus 09/18/2017    Encounter for medication review 11/30/2015    Diverticulosis 07/21/2014    COPD (chronic obstructive pulmonary disease) ([address] 05/05/2014    Tobacco use 11/20/2013    Eczema 08/20/2013    Depression, major, in remission ([address] 08/06/2012    Obesity (BMI 30-39.9) 08/06/2012    Restless leg syndrome 08/06/2012    HTN (hypertension) 08/06/2012    Hyperlipidemia 08/06/2012       Consults: Medicine/physical therapy    Admission Condition: good    Discharged Condition: good    Hospital Course: Patient underwent ACDF C5-7 and did well postoperatively. Patient was transition to oral pain medicine was deemed stable for discharge.     Labs:  CBC:   Recent Labs     11/19/22  0557   WBC 11.1*   HGB 13.8        CMP:    Recent Labs     11/19/22  0557      K 3.8      CO2 27   BUN 12   CREATININE 0.8*   GLUCOSE 121*   CALCIUM 8.6     Troponin: No results for input(s): TROPONINI in the last 72 hours. BNP: No results for input(s): BNP in the last 72 hours. INR: No results for input(s): INR in the last 72 hours. Lipids: No results for input(s): CHOL, LDLDIRECT, TRIG, HDL, AMYLASE, LIPASE in the last 72 hours. Liver: No results for input(s): AST, ALT, ALKPHOS, PROT, LABALBU, BILITOT in the last 72 hours. Invalid input(s): BILDIR  Iron:  No results for input(s): IRONS, FERRITIN in the last 72 hours. Invalid input(s): LABIRONS    Disposition: home    Patient Instructions:      Medication List        CONTINUE taking these medications      [employer] Cane Misc  1 Device by Does not apply route as needed (for ambulation)            ASK your doctor about these medications      albuterol sulfate  (90 Base) MCG/ACT inhaler  Commonly known as: Ventolin HFA  Inhale 2 puffs into the lungs 4 times daily as needed for Wheezing or Shortness of Breath     amLODIPine-benazepril 5-10 MG per capsule  Commonly known as: LOTREL  Take 1 capsule by mouth daily     apixaban 5 MG Tabs tablet  Commonly known as: Eliquis  Take 1 tablet by mouth 2 times daily     aspirin 81 MG EC tablet     atorvastatin 10 MG tablet  Commonly known as: LIPITOR  Take 1 tablet by mouth daily     B-12 500 MCG Tabs  Take 1 tablet by mouth 4 times daily     budesonide-formoterol 160-4.5 MCG/ACT Aero  Commonly known as: SYMBICORT  USE 2 INHALATIONS ORALLY   TWICE DAILY     clonazePAM 0.5 MG tablet  Commonly known as: KLONOPIN  Take 1 tablet by mouth as needed for Anxiety for up to 90 days.      dofetilide 250 MCG capsule  Commonly known as: TIKOSYN  Take 1 capsule by mouth 2 times daily     escitalopram 20 MG tablet  Commonly known as: Lexapro  Take 1.5 tablets by mouth daily     folic acid 089 MCG tablet  Commonly known as: FOLVITE     metoprolol succinate 100 MG extended release tablet  Commonly known as: TOPROL XL  TAKE 1 TABLET DAILY     montelukast 10 MG tablet  Commonly known as: SINGULAIR  Take 1 tablet by mouth nightly     omeprazole 40 MG delayed release capsule  Commonly known as: PRILOSEC  Take 1 capsule by mouth daily     UNABLE TO FIND  Compounded Betaval cream 15g/Eucerin 135g     VITAMIN B COMPLEX PO            Activity: Avoid bending lifting or twisting, no driving while taking narcotic pain medication, walk 10-15 minutes 2-3 times daily  Diet: regular diet  Wound Care: keep wound clean and dry  Other: [address] [place] [address] office with questions or concerns      Electronically signed by Chris Germain MD on 11/19/2022 at 9:10 AM

## 2022-11-19 NOTE — PROGRESS NOTES
Patient is doing well. Patient denies any shoulder pain and states all his preoperative pain has not subsided. We will ambulate patient and send him home today and I will see him back in clinic in 2 weeks for repeat evaluation. 5/5 strength bilateral upper extremities, sensation intact light touch, and dressing clean dry and intact.

## 2022-11-19 NOTE — PLAN OF CARE
Problem: Chronic Conditions and Co-morbidities  Goal: Patient's chronic conditions and co-morbidity symptoms are monitored and maintained or improved  Outcome: Progressing  Flowsheets (Taken 11/19/2022 0800)  Care Plan - Patient's Chronic Conditions and Co-Morbidity Symptoms are Monitored and Maintained or Improved:   Monitor and assess patient's chronic conditions and comorbid symptoms for stability, deterioration, or improvement   Collaborate with multidisciplinary team to address chronic and comorbid conditions and prevent exacerbation or deterioration   Update acute care plan with appropriate goals if chronic or comorbid symptoms are exacerbated and prevent overall improvement and discharge     Problem: Discharge Planning  Goal: Discharge to home or other facility with appropriate resources  Outcome: Progressing  Flowsheets (Taken 11/19/2022 0800)  Discharge to home or other facility with appropriate resources:   Identify barriers to discharge with patient and caregiver   Arrange for needed discharge resources and transportation as appropriate   Identify discharge learning needs (meds, wound care, etc)   Arrange for interpreters to assist at discharge as needed   Refer to discharge planning if patient needs post-hospital services based on physician order or complex needs related to functional status, cognitive ability or social support system     Problem: Pain  Goal: Verbalizes/displays adequate comfort level or baseline comfort level  Outcome: Progressing     Problem: ABCDS Injury Assessment  Goal: Absence of physical injury  Outcome: Progressing

## 2022-11-20 NOTE — PROGRESS NOTES
Physical Therapy  Facility/Department: [address] [place] [address]  Physical Therapy Initial Assessment    Name: Stacy Billings  : 1945  MRN: [id number]  Date of Service: 2022    Discharge Recommendations:  Home with Home health PT, 24 hour supervision or assist      Functional Outcome Measure:    Acute Care Index of Function (ACIF):    Score: 0.94 (0.71 or greater = appropriate for home with home PT and 24 hour supervision/assist)            Assessment   Assessment: Pt is a 78 y.o. male with medical history, surgical history, co-morbidities, and personal factors including Allergic rhinitis, Asthma, Atrial fibrillation ([facility] CAD (coronary artery disease), COPD (chronic obstructive pulmonary disease) ([facility] [facility] COVID-19, Depression, Diverticulosis of colon, Family history of early CAD, Family history of valvular heart disease, GERD (gastroesophageal reflux disease), H/O 24 hour EKG monitoring, H/O echocardiogram, H/O echocardiogram, History of diverticulitis of colon, History of Holter monitoring, History of repair of left rotator cuff, Habematolel (hard of hearing), Hx of cardiac cath, Hx of cardiovascular stress test, Hyperlipidemia, Hypertension, Hypoxemia, Irregular heart rate, Palpitations, Pneumonia, Restless leg syndrome, Shortness of breath, Tricuspid valve regurgitation, Rotator cuff repair (, ); Bone Resection, Rib; Nerve Surgery; Shoulder arthroscopy (Right, 2010); Diagnostic Cardiac Cath Lab Procedure (2013); Colonoscopy (10/14/2014); Endoscopy, colon, diagnostic (10/14/2014); Rotator cuff repair (Left, 2015); Pacemaker insertion (Left, 2021); and Cataract removal (Bilateral) with admission for Cervical radiculopathy and s/p C5-6, C6-7  CERVICAL DISCECTOMY FUSION ANTERIOR. Prior to admission, pt was independent with functional mobility and ADLs. Examination of body systems reveals decreased endurance and intermittent post-operative pain which limit his overall functional mobility. Therapy Prognosis: Good  Decision Making: Medium Complexity  Clinical Presentation: evolving  Requires PT Follow-Up: Yes  Activity Tolerance  Activity Tolerance: Patient tolerated evaluation without incident     Plan   Physcial Therapy Plan  General Plan: 5-7 times per week  Current Treatment Recommendations: Strengthening, ROM, Balance training, Functional mobility training, Transfer training, ADL/Self-care training, IADL training, Endurance training, Safety education & training, Patient/Caregiver education & training, Therapeutic activities, Gait training, Stair training, Equipment evaluation, education, & procurement, Neuromuscular re-education, Pain management, Positioning, Home exercise program  Safety Devices  Type of Devices:  All fall risk precautions in place, Call light within reach, Left in chair, Chair alarm in place, Gait belt, Nurse notified     Restrictions  Restrictions/Precautions  Restrictions/Precautions: General Precautions  Required Braces or Orthoses?: Yes  Required Braces or Orthoses  Cervical: c-collar  Position Activity Restriction  Spinal Precautions: No Bending, No Lifting, No Twisting     Subjective   General  Chart Reviewed: Yes  Patient assessed for rehabilitation services?: Yes  Family / Caregiver Present: No  Follows Commands: Within Functional Limits  Subjective  Subjective: pain: denies; 0/10         Social/Functional History  Social/Functional History  Type of Home: House  ADL Assistance: Independent  Homemaking Assistance: Independent  Ambulation Assistance: Independent  Transfer Assistance: Independent    Vision/Hearing  Vision  Vision: Within Functional Limits  Hearing  Hearing: Within functional limits    Cognition   Orientation  Overall Orientation Status: Within Functional Limits  Cognition  Overall Cognitive Status: WFL     Objective           Gross Assessment  Sensation: Intact (BLEs; BUEs)        Strength RLE  Strength RLE: WFL  Strength LLE  Strength LLE: [facility] Bed mobility  Rolling to Left: Independent  Rolling to Right: Independent  Supine to Sit: Independent  Sit to Supine: Independent  Transfers  Sit to Stand: Independent  Stand to Sit: Independent  Ambulation  Surface: Level tile  Device: No Device  Assistance: Supervision;Stand by assistance  Quality of Gait: decreased gait speed, decreased step length bilaterally  Distance: 150 feet + 150 feet  Comments: with initial verbal cues for directions in order to successfully navigate hallway and return to correct room; with initial verbal cues to take rest breaks as needed throughout ambulation     Balance  Posture: Fair  Sitting - Static: Good  Sitting - Dynamic: Good  Standing - Static: Good  Standing - Dynamic: Good           Gait Training:  Cues were given for safety, sequence, device management, balance, posture, awareness, path. Therapeutic Activity Training:   Therapeutic activity training was instructed today. Pt educated on and demonstrated understanding of importance of regular OOB mobility/ambulation with nursing staff and/or therapy staff throughout remainder of hospital stay. AM-PAC Score  AM-PAC Inpatient Mobility Raw Score : 22 (11/19/22 1907)  AM-PAC Inpatient T-Scale Score : 53.28 (11/19/22 1907)  Mobility Inpatient CMS 0-100% Score: 20.91 (11/19/22 1907)  Mobility Inpatient CMS G-Code Modifier : Dinesh Hedrick (11/19/22 1907)            Goals  Long Term Goals  Time Frame for Long Term Goals : In one week:  Long Term Goal 1: Pt will ambulate 600 feet independently  Long Term Goal 2: Pt will independently ascend/descend 12 steps with a handrail  Long Term Goal 3: Pt will independently complete 3 sets of 10 reps of BLE AROM exercises in available and allowed ROM       Education  Patient Education  Education Given To: Patient  Education Provided: Role of Therapy; Energy Conservation;Plan of Care;IADL Safety; ADL Adaptive Strategies; Equipment;Transfer Training;Precautions  Education Method: Demonstration;Verbal  Education Outcome: Verbalized understanding;Demonstrated understanding;Continued education needed      Time In: 0940  Time Out: 1023  Total Treatment Time: 43  Timed Code Treatment Minutes: 559 Gordy Andrea DPT  License #: [id number]

## 2022-11-21 ENCOUNTER — CARE COORDINATION (OUTPATIENT)
Dept: CASE MANAGEMENT | Age: 77
End: 2022-11-21

## 2022-11-21 DIAGNOSIS — M47.12 CERVICAL SPONDYLOSIS WITH MYELOPATHY: Primary | ICD-10-CM

## 2022-11-21 PROCEDURE — 1111F DSCHRG MED/CURRENT MED MERGE: CPT | Performed by: INTERNAL MEDICINE

## 2022-11-21 NOTE — CARE COORDINATION
[facility] Care Transitions Initial Follow Up Call    Call within 2 business days of discharge: Yes    Care Transition Nurse contacted the patient by telephone to perform post hospital discharge assessment. Verified name and  with patient as identifiers. Provided introduction to self, and explanation of the Care Transition Nurse role. Patient: Albert Schmid Patient : 1945   MRN: [id number]  Reason for Admission: Cervical Radiculopathy/Spondylosis/Myelopathy s/p C5-C6, C6-C7 Discectomy and Fusion 22  Discharge Date: 22 RARS: Readmission Risk Score: 10 ( )  Facility: [address] [place] [address] Discharge [address]       Date Complaint Diagnosis Description Type Department Provider    22  Acute postoperative pain . .. Admission (Discharged) Melina Gibson MD          Was this an external facility discharge? No     Challenges to be reviewed by the provider   Additional needs identified to be addressed with provider: denies       Method of communication with provider: none. Phone Assessment: Wife provides the following Patient information as he is sleeping. Reports Patient doing extremely well s/p C5-C6, C6-C7 Discectomy and Fusion. Very minimal post-op discomfort and as only required prn Percocet x 1 since home. No neuro deficits noted or Patient reported. Reports dressing d&in to incision site. Patient wearing neck brace. Reports appetite, fluid intake good 2/ +bm and voiding qs. Confirmed Patient has and is adhering to post-op directions as Per folder per Dr Shaista Jasso office. Advised to contact Dr Roberts  re: any concerns, questions. Care Transition Nurse reviewed discharge instructions, medical action plan, and red flags with patient who verbalized understanding. Wife was given an opportunity to ask questions and does not have any further questions or concerns at this time. Were discharge instructions available to patient? Yes.  Reviewed appropriate site of care based on symptoms and resources available to patient including:   PCP  Specialist  Urgent care clinics  [employer]  [facility] . Advised to contact the PCP office for questions related to healthcare. Advance Care Planning:   Does patient have an Advance Directive: not on file; education provided. Healthcare Decision Maker:    Primary Decision Maker: Sandoval Snow - [phone]    Medication reconciliation was performed with  Wife , who verbalizes understanding of administration of home medications. Medications reviewed, 1111F entered: yes    Was patient discharged with a pulse oximeter? no    Non-face-to-face services provided:  Scheduled appointment with PCP-see below    Offered patient enrollment in the Remote Patient Monitoring (RPM) program for in-home monitoring: NA.    Care Transitions 24 Hour Call    Schedule Follow Up Appointment with PCP: Completed  Do you have a copy of your discharge instructions?: Yes  Do you have all of your prescriptions and are they filled?: Yes  Have you been contacted by a [facility] Pharmacist?: No  Have you scheduled your follow up appointment?: No (Comment: agreeable for CT staff to schedule)  Do you feel like you have everything you need to keep you well at home?: Yes  Care Transitions Interventions   Home Care Waiver: Declined Physical Therapy: Declined       Transportation Support: Declined    Meals on Wheels: Declined  DME Assistance: Declined     Senior Services: Declined     Occupational Therapy: Declined            Agreeable for CT staff to schedule appts. Note routed to Sandra Acharya,  for appt scheduling. Prefers late morning, early afternoon. Future Appointments   Date Time Provider Kevin Isabel   1/20/2023  1:30 PM JANI Pyle - NP [facility]   5/4/2023  9:45 AM JANI Lunsford CNP [facility]       Care Transition Nurse provided contact information.   Plan for follow-up call in 5-7 days based on severity of symptoms and risk factors.   Plan for next call: symptom management-post op sx?  follow-up appointment-confirm pcp/post-op appts scheduled    Jerome Diaz RN

## 2022-11-21 NOTE — CARE COORDINATION
Request from [address] [place] [address] Kacy, RN.,  please schedule patient for hospital f/u with     PCP-11/22 @ 2:30pm    Ortho post op  12/1 @ 11:15AM    Spoke with patient regarding above appt info.     Sofía Santiago, [address] [facility] Coordination Transition

## 2022-11-22 ENCOUNTER — CARE COORDINATION (OUTPATIENT)
Dept: CASE MANAGEMENT | Age: 77
End: 2022-11-22

## 2022-11-22 ENCOUNTER — OFFICE VISIT (OUTPATIENT)
Dept: FAMILY MEDICINE CLINIC | Age: 77
End: 2022-11-22
Payer: MEDICARE

## 2022-11-22 VITALS
HEART RATE: 68 BPM | WEIGHT: 212.6 LBS | SYSTOLIC BLOOD PRESSURE: 122 MMHG | DIASTOLIC BLOOD PRESSURE: 72 MMHG | BODY MASS INDEX: 32.81 KG/M2 | OXYGEN SATURATION: 98 % | RESPIRATION RATE: 16 BRPM | TEMPERATURE: 98.1 F

## 2022-11-22 DIAGNOSIS — M47.12 CERVICAL SPONDYLOSIS WITH MYELOPATHY: ICD-10-CM

## 2022-11-22 DIAGNOSIS — Z09 HOSPITAL DISCHARGE FOLLOW-UP: Primary | ICD-10-CM

## 2022-11-22 DIAGNOSIS — R07.89 FEELING OF CHEST TIGHTNESS: ICD-10-CM

## 2022-11-22 PROCEDURE — G8427 DOCREV CUR MEDS BY ELIG CLIN: HCPCS | Performed by: NURSE PRACTITIONER

## 2022-11-22 PROCEDURE — 93000 ELECTROCARDIOGRAM COMPLETE: CPT | Performed by: NURSE PRACTITIONER

## 2022-11-22 PROCEDURE — G8417 CALC BMI ABV UP PARAM F/U: HCPCS | Performed by: NURSE PRACTITIONER

## 2022-11-22 PROCEDURE — 1123F ACP DISCUSS/DSCN MKR DOCD: CPT | Performed by: NURSE PRACTITIONER

## 2022-11-22 PROCEDURE — 99214 OFFICE O/P EST MOD 30 MIN: CPT | Performed by: NURSE PRACTITIONER

## 2022-11-22 PROCEDURE — 4004F PT TOBACCO SCREEN RCVD TLK: CPT | Performed by: NURSE PRACTITIONER

## 2022-11-22 PROCEDURE — 3078F DIAST BP <80 MM HG: CPT | Performed by: NURSE PRACTITIONER

## 2022-11-22 PROCEDURE — G8482 FLU IMMUNIZE ORDER/ADMIN: HCPCS | Performed by: NURSE PRACTITIONER

## 2022-11-22 PROCEDURE — 3074F SYST BP LT 130 MM HG: CPT | Performed by: NURSE PRACTITIONER

## 2022-11-22 PROCEDURE — 1111F DSCHRG MED/CURRENT MED MERGE: CPT | Performed by: NURSE PRACTITIONER

## 2022-11-22 PROCEDURE — 3017F COLORECTAL CA SCREEN DOC REV: CPT | Performed by: NURSE PRACTITIONER

## 2022-11-22 NOTE — PROGRESS NOTES
Post-Discharge Transitional Care  Follow Up      Kareem Krishnamurthy   YOB: 1945    Date of Office Visit:  11/22/2022  Date of Hospital Admission: 11/18/22  Date of Hospital Discharge: 11/19/22  Risk of hospital readmission (high >=14%. Medium >=10%) :Readmission Risk Score: 10 ( )      Care management risk score Rising risk (score 2-5) and Complex Care (Scores >=6): No Risk Score On File     Non face to face  following discharge, date last encounter closed (first attempt may have been earlier): 11/21/2022    Call initiated 2 business days of discharge: Yes    ASSESSMENT/PLAN:   Hospital discharge follow-up  Cervical spondylosis with myelopathy  Feeling of chest tightness  -     EKG 12 lead    Medical Decision Making: moderate complexity  No follow-ups on file. On this date 11/22/2022 I have spent 30 minutes reviewing previous notes, test results and face to face with the patient discussing the diagnosis and importance of compliance with the treatment plan as well as documenting on the day of the visit. Subjective:   HPI:  Follow up of Hospital problems/diagnosis(es): Cervical spondylosis with myelopathy. Inpatient course: Discharge summary reviewed- see chart. Interval history/Current status: The patient is s/p ACDF C5-7 on 11/19/2022. Follow up with his surgeon is scheduled 12/01/22. Overall he is doing well. He reports a \"brief tightness\" in his left chest yesterday evening. No chest pain or tightness since then. He denies shortness of breath or edema. He has had some postnasal drainage and he just restarted Singulair last night. Pain Management : He rates current pain 2/10. He was prescribed Percocet every 6 hrs prn but hasn't had to take anything for pain since the first night home from the hospital.    He is tolerating a regular diet. He denies difficulty swallowing. He is slowly increasing daily exercise.    Wound Care: dressing is clean and dry    He is requesting a rx for Betamethasone shelley/eucerin cream be sent to [facility] for his eczema. Patient Active Problem List   Diagnosis    Depression, major, in remission ([facility] [address]    Obesity (BMI 30-39. 9)    Restless leg syndrome    HTN (hypertension)    Hyperlipidemia    Eczema    Tobacco use    COPD (chronic obstructive pulmonary disease) ([facility] [address]    Diverticulosis    Encounter for medication review    Nystagmus    Moderate episode of recurrent major depressive disorder (HCC)    Chronic systolic congestive heart failure (HCC)    PAF (paroxysmal atrial fibrillation) ([facility])    Other insomnia    Other headache syndrome    S/P placement of cardiac pacemaker    Sick sinus syndrome ([facility] [address]    Post-COVID-19 condition    Medication management    Cervical spondylosis with myelopathy       Medications listed as ordered at the time of discharge from hospital     Medication List            Accurate as of November 22, 2022  2:52 PM. If you have any questions, ask your nurse or doctor.                 START taking these medications      UNABLE TO FIND  Betamethasone Shelley/Eucerin 1:1 concentration  Apply topically BID for eczema  Started by: JANI Watts - NP            CHANGE how you take these medications      budesonide-formoterol 160-4.5 MCG/ACT Aero  Commonly known as: SYMBICORT  USE 2 INHALATIONS ORALLY   TWICE DAILY  What changed:   when to take this  reasons to take this            CONTINUE taking these medications      albuterol sulfate  (90 Base) MCG/ACT inhaler  Commonly known as: Ventolin HFA  Inhale 2 puffs into the lungs 4 times daily as needed for Wheezing or Shortness of Breath     amLODIPine-benazepril 5-10 MG per capsule  Commonly known as: LOTREL  Take 1 capsule by mouth daily     apixaban 5 MG Tabs tablet  Commonly known as: Eliquis  Take 1 tablet by mouth 2 times daily     aspirin 81 MG EC tablet  Take 1 tablet by mouth daily     atorvastatin 10 MG tablet  Commonly known as: LIPITOR  Take 1 tablet by mouth daily     B-12 500 MCG Tabs  Take 1 tablet by mouth 4 times daily     clonazePAM 0.5 MG tablet  Commonly known as: KLONOPIN  Take 1 tablet by mouth as needed for Anxiety for up to 90 days. [employer] Cane Misc  1 Device by Does not apply route as needed (for ambulation)     dofetilide 250 MCG capsule  Commonly known as: TIKOSYN  Take 1 capsule by mouth 2 times daily     escitalopram 20 MG tablet  Commonly known as: Lexapro  Take 1.5 tablets by mouth daily     folic acid 292 MCG tablet  Commonly known as: FOLVITE     metoprolol succinate 100 MG extended release tablet  Commonly known as: TOPROL XL  TAKE 1 TABLET DAILY     montelukast 10 MG tablet  Commonly known as: SINGULAIR  Take 1 tablet by mouth nightly     omeprazole 40 MG delayed release capsule  Commonly known as: PRILOSEC  Take 1 capsule by mouth daily     oxyCODONE-acetaminophen 5-325 MG per tablet  Commonly known as: Percocet  Take 1 tablet by mouth every 6 hours as needed for Pain for up to 3 days. Intended supply: 3 days. Take lowest dose possible to manage pain     UNABLE TO FIND  Compounded Betaval cream 15g/Eucerin 135g     VITAMIN B COMPLEX PO               Where to Get Your Medications        You can get these medications from any pharmacy    Bring a paper prescription for each of these medications  UNABLE TO FIND           Medications marked \"taking\" at this time  Outpatient Medications Marked as Taking for the 11/22/22 encounter (Office Visit) with JANI Elmore NP   Medication Sig Dispense Refill    UNABLE TO FIND Betamethasone Shelley/Eucerin 1:1 concentration  Apply topically BID for eczema 120 g 5    aspirin 81 MG EC tablet Take 1 tablet by mouth daily 30 tablet 3    apixaban (ELIQUIS) 5 MG TABS tablet Take 1 tablet by mouth 2 times daily 180 tablet 3    oxyCODONE-acetaminophen (PERCOCET) 5-325 MG per tablet Take 1 tablet by mouth every 6 hours as needed for Pain for up to 3 days. Intended supply: 3 days.  Take lowest dose possible to manage pain 12 tablet 0    dofetilide (TIKOSYN) 250 MCG capsule Take 1 capsule by mouth 2 times daily 180 capsule 1    omeprazole (PRILOSEC) 40 MG delayed release capsule Take 1 capsule by mouth daily 90 capsule 2    montelukast (SINGULAIR) 10 MG tablet Take 1 tablet by mouth nightly 90 tablet 3    metoprolol succinate (TOPROL XL) 100 MG extended release tablet TAKE 1 TABLET DAILY 90 tablet 2    escitalopram (LEXAPRO) 20 MG tablet Take 1.5 tablets by mouth daily 135 tablet 2    clonazePAM (KLONOPIN) 0.5 MG tablet Take 1 tablet by mouth as needed for Anxiety for up to 90 days. 90 tablet 0    budesonide-formoterol (SYMBICORT) 160-4.5 MCG/ACT AERO USE 2 INHALATIONS ORALLY   TWICE DAILY (Patient taking differently: as needed USE 2 INHALATIONS ORALLY   TWICE DAILY) 2 each 2    atorvastatin (LIPITOR) 10 MG tablet Take 1 tablet by mouth daily 90 tablet 3    amLODIPine-benazepril (LOTREL) 5-10 MG per capsule Take 1 capsule by mouth daily 90 capsule 3    albuterol sulfate HFA (VENTOLIN HFA) 108 (90 Base) MCG/ACT inhaler Inhale 2 puffs into the lungs 4 times daily as needed for Wheezing or Shortness of Breath 1 each 5    Cyanocobalamin (B-12) 500 MCG TABS Take 1 tablet by mouth 4 times daily 120 tablet 2    B Complex Vitamins (VITAMIN B COMPLEX PO) DAILY      UNABLE TO FIND Compounded Betaval cream 15g/Eucerin 135g 1 Can 5    Misc. Devices ([employer] CANE) MISC 1 Device by Does not apply route as needed (for ambulation) 1 each 0    folic acid (FOLVITE) 012 MCG tablet Take 400 mcg by mouth daily. Medications patient taking as of now reconciled against medications ordered at time of hospital discharge: Yes    A comprehensive review of systems was negative except for what was noted in the HPI.     Objective:    /72 (Site: Right Upper Arm, Position: Sitting, Cuff Size: Medium Adult)   Pulse 68   Temp 98.1 °F (36.7 °C) (Temporal)   Resp 16   Wt 212 lb 9.6 oz (96.4 kg)   SpO2 98%   BMI 32.81 kg/m²   General Appearance: alert and oriented to person, place and time, well developed and well- nourished, in no acute distress  Skin: warm and dry, no rash or erythema  Head: normocephalic and atraumatic  Eyes: pupils equal, round, and reactive to light, extraocular eye movements intact, conjunctivae normal  ENT: tympanic membrane, external ear and ear canal normal bilaterally, nose without deformity, nasal mucosa and turbinates normal without polyps, postnasal drainage noted. Neck: supple and non-tender without mass, no thyromegaly or thyroid nodules, no cervical lymphadenopathy. Dry, clean dressing noted on anterior neck. Pulmonary/Chest: clear to auscultation bilaterally- no wheezes, rales or rhonchi, normal air movement, no respiratory distress  Cardiovascular: normal rate, regular rhythm, normal S1 and S2, no murmurs, rubs, clicks, or gallops, distal pulses intact, no carotid bruits  Abdomen: soft, non-tender, non-distended, normal bowel sounds, no masses or organomegaly  Extremities: no cyanosis, clubbing or edema  Musculoskeletal: normal range of motion, no joint swelling, deformity or tenderness  Neurologic: reflexes normal and symmetric, no cranial nerve deficit, gait, coordination and speech normal      An electronic signature was used to authenticate this note.   --JANI Hall - NP

## 2022-11-29 ENCOUNTER — CARE COORDINATION (OUTPATIENT)
Dept: CASE MANAGEMENT | Age: 77
End: 2022-11-29

## 2022-11-30 NOTE — CARE COORDINATION
[facility] Care Transitions Follow Up Call    Care Transition Nurse contacted the patient by telephone to follow up after admission on 22-22. Verified name and  with patient as identifiers. Patient: K[address]  Patient : 1945   MRN: [id number]  Reason for Admission:   Cervical Radiculopathy/Spondylosis/Myelopathy s/p C5-C6, C6-C7 Discectomy and Fusion 22  Discharge Date: 22 RARS: Readmission Risk Score: 10 ( )  Facility: [facility]      Needs to be reviewed by the provider   Additional needs identified to be addressed with provider: no       Method of communication with provider: none. Phone Assessment: Patient reports doing very well s/p C5-C6, C6-C7 Discectomy and Fusion on 22. Denies  issues w/ pain, infection, ac distress. Denies reviewed neuro deficits. Reports bandage d&I, no surrounding sx of infection or fever, chills. Reports appetite, fluid intake good w/ b&b wnl. Denies resource needs including hhc, dme, rx, community assistance. Discussed follow-up appointments. 22 PCP--attended appt  Future Appointments   Date Time Provider Kevin Isabel   2023  1:30 PM JANI Loo - NP [facility]   2023  9:45 AM JANI Rg - CNP [facility]     Non-[facility] follow up appointment(s): Dr Lisa Holcomb (post-op) 22 1115am--transportation confirmed. Care Transition Nurse reviewed medical action plan and red flags with patient and discussed any barriers to care and/or understanding of plan of care after discharge. Discussed appropriate site of care based on symptoms and resources available to patient including: PCP  Specialist  Urgent care clinics  When to call Charity Vergara . The patient agrees to contact PCP office for questions related to healthcare.      Patients top risk factors for readmission: recent surgery  Interventions to address risk factors: Education of patient/family/caregiver/guardian to support self-management-. Offered patient enrollment in the Remote Patient Monitoring (RPM) program for in-home monitoring: NA.     Care Transitions Subsequent and Final Call    Schedule Follow Up Appointment with PCP: Declined  Subsequent and Final Calls  Do you have any ongoing symptoms?: No  Have your medications changed?: No  Do you have any questions related to your medications?: No  Do you currently have any active services?: No  Do you have any needs or concerns that I can assist you with?: No  Identified Barriers: Other  Care Transitions Interventions   Home Care Waiver: Declined Physical Therapy: Declined       Transportation Support: Declined    Meals on Wheels: Declined  DME Assistance: Declined     Senior Services: Declined     Occupational Therapy: Declined     Other Interventions:             Care Transition Nurse provided contact information for future needs. Plan for follow-up call in 7-10 days based on severity of symptoms and risk factors.   Plan for next call: symptom management-pain, sx of infection, bandage off?  self management-copd education/smoker?  follow-up appointment-f/u on post-op appt    Fariha Del Rio RN

## 2022-12-07 RX ORDER — DOFETILIDE 0.25 MG/1
250 CAPSULE ORAL 2 TIMES DAILY
Qty: 180 CAPSULE | Refills: 1 | OUTPATIENT
Start: 2022-12-07

## 2022-12-08 ENCOUNTER — CARE COORDINATION (OUTPATIENT)
Dept: CASE MANAGEMENT | Age: 77
End: 2022-12-08

## 2022-12-08 NOTE — CARE COORDINATION
Care Transitions Outreach Attempt    Patient: Carie Herndon Patient : 1945 MRN: [id number] Reason for Admission:   Cervical Radiculopathy/Spondylosis/Myelopathy s/p C5-C6, C6-C7 Discectomy and Fusion 22  Discharge Date: 22     RARS: Readmission Risk Score: 10 ( )  Facility: [facility]       Last Discharge  [address]       Date Complaint Diagnosis Description Type Department Provider    22  Acute postoperative pain . .. Admission (Discharged) Juanita Craven MD          Noted following upcoming appointments from discharge chart review:   [facility] follow up appointment(s):   Future Appointments   Date Time Provider Kevin Isabel   2023  1:30 PM JANI Schroeder NP [facility]   2023  9:45 AM JANI Pennington CNP [facility]     Attempt to reach for Care Transition follow up call unsuccessful. HIPAA compliant message left requesting call back.

## 2022-12-09 RX ORDER — DOFETILIDE 0.25 MG/1
250 CAPSULE ORAL 2 TIMES DAILY
Qty: 180 CAPSULE | Refills: 1 | Status: SHIPPED | OUTPATIENT
Start: 2022-12-09

## 2022-12-14 ENCOUNTER — CARE COORDINATION (OUTPATIENT)
Dept: CASE MANAGEMENT | Age: 77
End: 2022-12-14

## 2022-12-14 NOTE — CARE COORDINATION
Care Transitions Outreach Attempt    Patient: Lio Velasquez Patient : 1945 MRN: [id number]    Last Discharge [address]       Date Complaint Diagnosis Description Type Department Provider    22  Acute postoperative pain . .. Admission (Discharged) Eron Myers MD          Noted following upcoming appointments from discharge chart review:   [facility] follow up appointment(s):   Future Appointments   Date Time Provider Kevin Isabel   2023  1:30 PM Nolan Dasilva APRN - NP [facility]   2023  9:45 AM Silvia Retana APRN - CNP [facility]     2nd unsuccessful attempt to reach for Care Transition follow up call. HIPAA compliant message left requesting call back. Episode closed per protocol, no further outreach scheduled.  [address] [place] [address] [phone]

## 2022-12-22 DIAGNOSIS — G25.81 RESTLESS LEG SYNDROME: ICD-10-CM

## 2022-12-29 ENCOUNTER — TELEPHONE (OUTPATIENT)
Dept: FAMILY MEDICINE CLINIC | Age: 77
End: 2022-12-29

## 2022-12-29 RX ORDER — CLONAZEPAM 0.5 MG/1
0.5 TABLET ORAL NIGHTLY PRN
Qty: 22 TABLET | Refills: 0 | Status: SHIPPED | OUTPATIENT
Start: 2022-12-29 | End: 2023-01-20

## 2023-01-05 ENCOUNTER — TELEPHONE (OUTPATIENT)
Dept: FAMILY MEDICINE CLINIC | Age: 78
End: 2023-01-05

## 2023-01-05 NOTE — TELEPHONE ENCOUNTER
He called to let us know that [facility] [address] is permanently closed so he was not able to get his Klonopin. Can you send it to [facility] pharmacy on [address] in [facility] please.

## 2023-01-06 DIAGNOSIS — G25.81 RESTLESS LEG SYNDROME: ICD-10-CM

## 2023-01-06 RX ORDER — CLONAZEPAM 0.5 MG/1
0.5 TABLET ORAL NIGHTLY PRN
Qty: 22 TABLET | Refills: 0 | Status: SHIPPED | OUTPATIENT
Start: 2023-01-06 | End: 2023-01-28

## 2023-01-20 ENCOUNTER — OFFICE VISIT (OUTPATIENT)
Dept: FAMILY MEDICINE CLINIC | Age: 78
End: 2023-01-20
Payer: MEDICARE

## 2023-01-20 VITALS
RESPIRATION RATE: 16 BRPM | BODY MASS INDEX: 33.55 KG/M2 | DIASTOLIC BLOOD PRESSURE: 59 MMHG | TEMPERATURE: 97.9 F | SYSTOLIC BLOOD PRESSURE: 112 MMHG | HEART RATE: 65 BPM | OXYGEN SATURATION: 97 % | WEIGHT: 217.4 LBS

## 2023-01-20 DIAGNOSIS — I48.0 PAF (PAROXYSMAL ATRIAL FIBRILLATION) (HCC): ICD-10-CM

## 2023-01-20 DIAGNOSIS — F32.5 DEPRESSION, MAJOR, IN REMISSION (HCC): ICD-10-CM

## 2023-01-20 DIAGNOSIS — J44.9 CHRONIC OBSTRUCTIVE PULMONARY DISEASE, UNSPECIFIED COPD TYPE (HCC): ICD-10-CM

## 2023-01-20 DIAGNOSIS — I49.5 SICK SINUS SYNDROME (HCC): ICD-10-CM

## 2023-01-20 DIAGNOSIS — G25.81 RESTLESS LEG SYNDROME: Primary | ICD-10-CM

## 2023-01-20 DIAGNOSIS — I50.22 CHRONIC SYSTOLIC CONGESTIVE HEART FAILURE (HCC): ICD-10-CM

## 2023-01-20 PROBLEM — Z95.0 S/P PLACEMENT OF CARDIAC PACEMAKER: Status: RESOLVED | Noted: 2021-04-01 | Resolved: 2023-01-20

## 2023-01-20 PROBLEM — U09.9 POST-COVID-19 CONDITION: Status: RESOLVED | Noted: 2022-01-28 | Resolved: 2023-01-20

## 2023-01-20 PROCEDURE — G8417 CALC BMI ABV UP PARAM F/U: HCPCS | Performed by: NURSE PRACTITIONER

## 2023-01-20 PROCEDURE — 3078F DIAST BP <80 MM HG: CPT | Performed by: NURSE PRACTITIONER

## 2023-01-20 PROCEDURE — 99214 OFFICE O/P EST MOD 30 MIN: CPT | Performed by: NURSE PRACTITIONER

## 2023-01-20 PROCEDURE — G8482 FLU IMMUNIZE ORDER/ADMIN: HCPCS | Performed by: NURSE PRACTITIONER

## 2023-01-20 PROCEDURE — 4004F PT TOBACCO SCREEN RCVD TLK: CPT | Performed by: NURSE PRACTITIONER

## 2023-01-20 PROCEDURE — 1123F ACP DISCUSS/DSCN MKR DOCD: CPT | Performed by: NURSE PRACTITIONER

## 2023-01-20 PROCEDURE — G8427 DOCREV CUR MEDS BY ELIG CLIN: HCPCS | Performed by: NURSE PRACTITIONER

## 2023-01-20 PROCEDURE — 3074F SYST BP LT 130 MM HG: CPT | Performed by: NURSE PRACTITIONER

## 2023-01-20 PROCEDURE — 3023F SPIROM DOC REV: CPT | Performed by: NURSE PRACTITIONER

## 2023-01-20 PROCEDURE — 3017F COLORECTAL CA SCREEN DOC REV: CPT | Performed by: NURSE PRACTITIONER

## 2023-01-20 RX ORDER — ROPINIROLE 0.5 MG/1
0.5 TABLET, FILM COATED ORAL
Qty: 90 TABLET | Refills: 0 | Status: SHIPPED | OUTPATIENT
Start: 2023-01-20 | End: 2023-04-20

## 2023-01-20 RX ORDER — CLONAZEPAM 0.5 MG/1
0.5 TABLET ORAL NIGHTLY PRN
Qty: 90 TABLET | Refills: 0 | Status: SHIPPED | OUTPATIENT
Start: 2023-01-20 | End: 2023-04-20

## 2023-01-20 ASSESSMENT — PATIENT HEALTH QUESTIONNAIRE - PHQ9
SUM OF ALL RESPONSES TO PHQ QUESTIONS 1-9: 4
10. IF YOU CHECKED OFF ANY PROBLEMS, HOW DIFFICULT HAVE THESE PROBLEMS MADE IT FOR YOU TO DO YOUR WORK, TAKE CARE OF THINGS AT HOME, OR GET ALONG WITH OTHER PEOPLE: 0
SUM OF ALL RESPONSES TO PHQ9 QUESTIONS 1 & 2: 1
3. TROUBLE FALLING OR STAYING ASLEEP: 3
5. POOR APPETITE OR OVEREATING: 0
4. FEELING TIRED OR HAVING LITTLE ENERGY: 0
7. TROUBLE CONCENTRATING ON THINGS, SUCH AS READING THE NEWSPAPER OR WATCHING TELEVISION: 0
8. MOVING OR SPEAKING SO SLOWLY THAT OTHER PEOPLE COULD HAVE NOTICED. OR THE OPPOSITE, BEING SO FIGETY OR RESTLESS THAT YOU HAVE BEEN MOVING AROUND A LOT MORE THAN USUAL: 0
6. FEELING BAD ABOUT YOURSELF - OR THAT YOU ARE A FAILURE OR HAVE LET YOURSELF OR YOUR FAMILY DOWN: 0
SUM OF ALL RESPONSES TO PHQ QUESTIONS 1-9: 4
2. FEELING DOWN, DEPRESSED OR HOPELESS: 0
1. LITTLE INTEREST OR PLEASURE IN DOING THINGS: 1
SUM OF ALL RESPONSES TO PHQ QUESTIONS 1-9: 4
SUM OF ALL RESPONSES TO PHQ QUESTIONS 1-9: 4
9. THOUGHTS THAT YOU WOULD BE BETTER OFF DEAD, OR OF HURTING YOURSELF: 0

## 2023-01-20 ASSESSMENT — ENCOUNTER SYMPTOMS
SORE THROAT: 0
STRIDOR: 0
SHORTNESS OF BREATH: 0
COUGH: 0
CONSTIPATION: 0
VOMITING: 0
CHEST TIGHTNESS: 0
DIARRHEA: 0
ABDOMINAL PAIN: 0
WHEEZING: 0
NAUSEA: 0

## 2023-01-20 NOTE — PROGRESS NOTES
Subjective:      Chief Complaint   Patient presents with    Follow-up     Not sleeping, tired all the time         HPI:  Yuki Vyas is a 78 y.o. male who presents today for 3 month follow up. A-Fib/HTN/HLD  He follows with Dr. Starla Guan who is takes care of his Tikosyn and Eliquis. Dr. Rama Sung manages his HTN and he is scheduled to see him in May. BP is well controlled. He denies headaches, vision changes, chest pain/tightness, shortness of breath or edema. COPD  Current treatment includes short-acting beta agonist inhaler, combined beta agonist/steroid inhaler. Residual symptoms: chronic mild dyspnea with exertion. He denies any other symptoms. Depression  Has MDD and symptoms are well controlled with Lexapro. Denies AVH/SIB/SI/HI. RLS  Historically prescribed Klonopin 0.5mg nightly for symptoms associated with restless leg syndrome. He does not take it every night - only when symptoms are severe. Lately his symptoms have become worse and he is having increased difficulty sleeping at night. He has never been prescribed Requip. OARRS reviewed, Klonopin last filled  10/22/22. Past Medical History:   Diagnosis Date    Allergic rhinitis     Asthma     Atrial fibrillation (HCC)     Dr. Rama Sung & Dr. Christopher Palomino    CAD (coronary artery disease)     mild LAD - Dr. Rama Sung    COPD (chronic obstructive pulmonary disease) ([facility] [address]     COVID-19 2021    Depression     Diverticulosis of colon     Family history of early CAD     Father- MI-  age 46; a grandparent- age 46 of MI    Family history of valvular heart disease     Mother- Mitral valve disease    GERD (gastroesophageal reflux disease)     H/O 24 hour EKG monitoring 2001-Predominant rhythm is sinus rhythm. No signif ectopy noted. H/O echocardiogram 2002, 2001-EF 60%. Normal LVSF. Mild MR.  Mild TR-Dr Rama Sung;    H/O echocardiogram 2019    EF 15-34%, Grade I diastolic dysfunction, sclerotic but non stenotic aortic valve, Mild AR, Mild-Mod TR, Normal pulmonary artery pressure, no pericardial effusion     History of diverticulitis of colon     History of Holter monitoring 2018    48 hr holter - SR with PAF    History of repair of left rotator cuff 2015    Hx surgery  Dr Damaris Elizabeth, surgery L shoulder 2015 Dr Angeline Rosas     [facility] (hard of hearing)     Bilat hearing aids    Hx of cardiac cath 2013    Mild LAD disease noted. Hx of cardiovascular stress test 2018    EF 33% Pt in a fib with RVR. No infarct or ischemia. Decreased uptake inferiorly due to diaphragmatic artifact. HX OTHER MEDICAL 2013    14 DAY EVENT: APC's, short runs of sinus tachycardia. Hyperlipidemia     Hypertension     Hypoxemia 2018    Irregular heart rate 2018    Palpitations     Pneumonia     Post-COVID-19 condition 2022    Restless leg syndrome     S/P placement of cardiac pacemaker 2021    Shortness of breath 2018    Tricuspid valve regurgitation 2001    mild        Social History     Tobacco Use    Smoking status: Former     Packs/day: 1.00     Years: 20.00     Pack years: 20.00     Types: Cigarettes     Quit date: 1993     Years since quittin.5    Smokeless tobacco: Current     Types: Chew    Tobacco comments:     Reviewed    Substance Use Topics    Alcohol use: No     Comment: drinking \"1 cup decaff coffee\", also drinks decaff tea \"drink it all day long\"        Review of Systems   Constitutional:  Negative for activity change, appetite change, chills, fatigue, fever and unexpected weight change. HENT:  Negative for congestion, ear pain, hearing loss, sore throat and tinnitus. Eyes:  Negative for visual disturbance. Respiratory:  Negative for cough, chest tightness, shortness of breath, wheezing and stridor. Cardiovascular:  Negative for chest pain, palpitations and leg swelling.    Gastrointestinal:  Negative for abdominal pain, constipation, diarrhea, nausea and vomiting. Hx of GERD   Musculoskeletal:  Positive for myalgias. Negative for arthralgias and gait problem. RLS   Skin:  Negative for rash. Neurological:  Negative for dizziness, tremors, seizures, syncope, speech difficulty, weakness and headaches. Hematological:  Negative for adenopathy. Psychiatric/Behavioral:  Negative for behavioral problems, confusion, dysphoric mood, sleep disturbance and suicidal ideas. The patient is not nervous/anxious. Objective:      BP (!) 112/59 (Site: Left Upper Arm, Position: Sitting, Cuff Size: Large Adult)   Pulse 65   Temp 97.9 °F (36.6 °C) (Temporal)   Resp 16   Wt 217 lb 6.4 oz (98.6 kg)   SpO2 97%   BMI 33.55 kg/m²      Physical Exam  Vitals reviewed. Constitutional:       General: He is not in acute distress. Appearance: Normal appearance. He is obese. HENT:      Head: Normocephalic and atraumatic. Right Ear: Tympanic membrane, ear canal and external ear normal.      Left Ear: Tympanic membrane, ear canal and external ear normal.      Nose: Nose normal.      Mouth/Throat:      Lips: Pink. Mouth: Mucous membranes are moist.      Pharynx: Oropharynx is clear. Eyes:      Extraocular Movements: Extraocular movements intact. Conjunctiva/sclera: Conjunctivae normal.      Pupils: Pupils are equal, round, and reactive to light. Neck:      Thyroid: No thyromegaly. Vascular: No carotid bruit. Trachea: Trachea normal.   Cardiovascular:      Rate and Rhythm: Normal rate and regular rhythm. Pulses: Normal pulses. Heart sounds: Normal heart sounds. Pulmonary:      Effort: Pulmonary effort is normal.      Breath sounds: Normal breath sounds. Abdominal:      General: Bowel sounds are normal. There is no distension. Palpations: Abdomen is soft. There is no mass. Tenderness: There is no abdominal tenderness. Hernia: No hernia is present.    Musculoskeletal: General: Normal range of motion. Left shoulder: Normal.      Cervical back: Normal range of motion and neck supple. No rigidity or tenderness. Right lower leg: No edema. Left lower leg: No edema. Skin:     General: Skin is warm and dry. Capillary Refill: Capillary refill takes less than 2 seconds. Findings: No erythema or rash. Neurological:      General: No focal deficit present. Mental Status: He is alert and oriented to person, place, and time. Deep Tendon Reflexes: Reflexes normal.   Psychiatric:         Mood and Affect: Mood normal.         Behavior: Behavior normal.         Thought Content: Thought content normal.         Judgment: Judgment normal.          Assessment / Plan:      1. Restless leg syndrome  Will start requip and titrate to therapeutic dose. Klonopin prn for severe restless leg symptoms. A discussion regarding medication was held with the patient. Discussed the dangerous nature of narcotic/benzo medicines, including the risk of respiratory depression, death and addiction. OARRS reviewed, no concerns noted. - clonazePAM (KLONOPIN) 0.5 MG tablet; Take 1 tablet by mouth nightly as needed for Anxiety for up to 90 days. Max Daily Amount: 0.5 mg  Dispense: 90 tablet; Refill: 0  - rOPINIRole (REQUIP) 0.5 MG tablet; Take 1 tablet by mouth nightly  Dispense: 90 tablet; Refill: 0    2. Chronic systolic congestive heart failure (HCC)  Stable, follow up with cardiology as scheduled. 3. PAF (paroxysmal atrial fibrillation) ([facility])  Stable, follow up with cardiology as scheduled. 4. Sick sinus syndrome ([facility])  Stable, follow up with cardiology as scheduled. 5. Chronic obstructive pulmonary disease, unspecified COPD type ([facility] [address]  Stable,continue current inhalers. 6. Depression, major, in remission ([facility] [address]  Stable. Continue medication. Patient to call if any concerns or SI before their follow up appointment.   If he develops suicidal thoughts with a plan, he is instructed to go to emergency room immediately. The patient,Johnnie Staples,  was seen with a total time spent of 30 minutes for the visit on this date of service by the E/M provider. The time component had both face to face and non face to face time spent in determining the total time component. Counseling and education regarding diagnosis listed and options regarding those diagnoses were also included in determining the time component.             JANI Slater NP

## 2023-02-05 PROCEDURE — 93296 REM INTERROG EVL PM/IDS: CPT | Performed by: INTERNAL MEDICINE

## 2023-02-05 PROCEDURE — 93294 REM INTERROG EVL PM/LDLS PM: CPT | Performed by: INTERNAL MEDICINE

## 2023-02-07 ENCOUNTER — TELEPHONE (OUTPATIENT)
Dept: FAMILY MEDICINE CLINIC | Age: 78
End: 2023-02-07

## 2023-02-07 ENCOUNTER — PROCEDURE VISIT (OUTPATIENT)
Dept: CARDIOLOGY CLINIC | Age: 78
End: 2023-02-07
Payer: MEDICARE

## 2023-02-07 ENCOUNTER — TELEPHONE (OUTPATIENT)
Dept: CARDIOLOGY CLINIC | Age: 78
End: 2023-02-07

## 2023-02-07 DIAGNOSIS — I49.5 SINUS NODE DYSFUNCTION (HCC): ICD-10-CM

## 2023-02-07 DIAGNOSIS — I44.0 AV BLOCK, 1ST DEGREE: ICD-10-CM

## 2023-02-07 DIAGNOSIS — Z95.0 CARDIAC PACEMAKER IN SITU: ICD-10-CM

## 2023-02-07 NOTE — TELEPHONE ENCOUNTER
Pharmacy called and stated that the 0.5 mg and 1 mg of clonazepam are on back order.   Wants to know if there is another dose you would like to prescribe

## 2023-02-09 ENCOUNTER — HOSPITAL ENCOUNTER (OUTPATIENT)
Age: 78
Discharge: HOME | End: 2023-02-09
Payer: MEDICARE

## 2023-02-09 ENCOUNTER — HOSPITAL ENCOUNTER (OUTPATIENT)
Dept: GENERAL RADIOLOGY | Age: 78
Discharge: HOME | End: 2023-02-09
Payer: MEDICARE

## 2023-02-09 ENCOUNTER — OFFICE VISIT (OUTPATIENT)
Dept: CARDIOLOGY CLINIC | Age: 78
End: 2023-02-09
Payer: MEDICARE

## 2023-02-09 VITALS
HEART RATE: 65 BPM | WEIGHT: 218 LBS | SYSTOLIC BLOOD PRESSURE: 110 MMHG | HEIGHT: 68 IN | BODY MASS INDEX: 33.04 KG/M2 | DIASTOLIC BLOOD PRESSURE: 72 MMHG

## 2023-02-09 DIAGNOSIS — G25.81 RESTLESS LEG SYNDROME: ICD-10-CM

## 2023-02-09 DIAGNOSIS — Z79.899 VISIT FOR MONITORING TIKOSYN THERAPY: Primary | ICD-10-CM

## 2023-02-09 DIAGNOSIS — D68.69 HYPERCOAGULABLE STATE DUE TO PERSISTENT ATRIAL FIBRILLATION (HCC): ICD-10-CM

## 2023-02-09 DIAGNOSIS — I10 PRIMARY HYPERTENSION: Chronic | ICD-10-CM

## 2023-02-09 DIAGNOSIS — R06.02 SHORTNESS OF BREATH: ICD-10-CM

## 2023-02-09 DIAGNOSIS — I48.0 PAF (PAROXYSMAL ATRIAL FIBRILLATION) (HCC): ICD-10-CM

## 2023-02-09 DIAGNOSIS — Z51.81 VISIT FOR MONITORING TIKOSYN THERAPY: Primary | ICD-10-CM

## 2023-02-09 DIAGNOSIS — I48.19 HYPERCOAGULABLE STATE DUE TO PERSISTENT ATRIAL FIBRILLATION (HCC): ICD-10-CM

## 2023-02-09 PROCEDURE — 1123F ACP DISCUSS/DSCN MKR DOCD: CPT | Performed by: NURSE PRACTITIONER

## 2023-02-09 PROCEDURE — 93288 INTERROG EVL PM/LDLS PM IP: CPT | Performed by: NURSE PRACTITIONER

## 2023-02-09 PROCEDURE — 3078F DIAST BP <80 MM HG: CPT | Performed by: NURSE PRACTITIONER

## 2023-02-09 PROCEDURE — 99214 OFFICE O/P EST MOD 30 MIN: CPT | Performed by: NURSE PRACTITIONER

## 2023-02-09 PROCEDURE — 71046 X-RAY EXAM CHEST 2 VIEWS: CPT

## 2023-02-09 PROCEDURE — 3074F SYST BP LT 130 MM HG: CPT | Performed by: NURSE PRACTITIONER

## 2023-02-09 RX ORDER — METOPROLOL SUCCINATE 50 MG/1
50 TABLET, EXTENDED RELEASE ORAL 2 TIMES DAILY
Qty: 60 TABLET | Refills: 1 | Status: SHIPPED | OUTPATIENT
Start: 2023-02-09

## 2023-02-09 RX ORDER — CLONAZEPAM 0.5 MG/1
0.5 TABLET ORAL NIGHTLY PRN
Qty: 90 TABLET | Refills: 0 | Status: SHIPPED | OUTPATIENT
Start: 2023-02-09 | End: 2023-05-10

## 2023-02-09 NOTE — PATIENT INSTRUCTIONS
Metoprolol Succinate 100 mg tablet. Cut pill in half and take half in the morning and half in the evening. Take medication this way until current script runs out. A new prescription of Metoprolol Succinate 50 mg BID will be ready for  at [facility]. Start this medication when current Metoprolol Succinate has run out. Please be informed that if you contact our office outside of normal business hours the physician on call cannot help with any scheduling or rescheduling issues, procedure instruction questions or any type of medication issue. We advise you for any urgent/emergency that you go to the nearest emergency room! PLEASE CALL OUR OFFICE DURING NORMAL BUSINESS HOURS    Monday - Friday   8 am to 5 pm    [place]: [address]: [phone]    [place]:  [phone]    **It is YOUR responsibilty to bring medication bottles and/or updated medication list to [address] [place] [address]. This will allow us to better serve you and all your healthcare needs**    Thank you for allowing us to care for you today! We want to ensure we can follow your treatment plan and we strive to give you the best outcomes and experience possible. If you ever have a life threatening emergency and call 911 - for an ambulance (EMS)   Our providers can only care for you at:   [facility] or [facility]. Even if you have someone take you or you drive yourself we can only care for you in a [place] facility. Our providers are not setup at the other healthcare locations!

## 2023-02-09 NOTE — TELEPHONE ENCOUNTER
Patient would like for script to go to [facility] in [facility], confirmed they do have in stock.

## 2023-02-09 NOTE — TELEPHONE ENCOUNTER
I would recommend that Rupert Knox call other pharmacies to see if they have the medication in stock and I will resend the rx

## 2023-02-09 NOTE — PROGRESS NOTES
Electrophysiology FU Note        Chief complaint: 6 month follow up      Primary care physician: Aryan Nagel, APRN - NP      History of Present Illness: This visit 2/9/2023  Patient is here today for follow-up on state Tikosyn monitoring therapy and paroxysmal atrial fibrillation. Patient reports that he recently had a mechanical fall while at work and he is having pain on the right side of his chest.  He states that he will have pain with inspiration. He states he is unable to lay on his right side because of the pain. He has not followed with his PCP for this. He states that he is concerned that maybe he broke a rib. Patient was asked to follow-up in the office as he was noted to have PAF episodes on his pacemaker. Patient denies chest pain, palpitations, shortness of breath, lightheadedness, dizziness, edema or syncope      Previous visit (11/3/2021)    Chief Complaint   Patient presents with    3 Month Follow-Up     Pt denies any new cardiac symptoms since last visit . Pt doesn't drink alcohol or smoke. Pt does exercise            Previous visit: (7/30/2021)      Chief Complaint   Patient presents with    Atrial Fibrillation     Patient here for 6 month f/u. Patient has no complaints today. Patient does not drink alcohol or smoke. Patient drinks decaf coffee. Patient does exercise daily. Previous visit:(1/29/2021)      Chief Complaint   Patient presents with    6 Month Follow-Up     Pt here for 6 Month F/U. Pt denies Chest Pain, Palpitations, Dizziness, Edema. Pt does chew tobacco and does not drink alcohol. Atrial Fibrillation     Pt states he gets SOB when doing something exerting himself. Previous visit:(7/24/2020)      Chief Complaint   Patient presents with    6 Month Follow-Up     Patient being seen for 6 month, he denies any chest pain,sob,dizziness, palpitations, and edema.       Patient reports that he overall feels better and has not have an episode of dizziness or past syncope on July 2 that he remembers but he was off Tikosyn for couple of days in between as he ran out. Overall he is doing good and is taking precautions in this COVID time      Previous visit:(1/17/2019)      Chief Complaint   Patient presents with    Atrial Fibrillation     Dr Katelynn Montes De Oca patient here for 6 month follow up. Patient states Roselyn Sung has felt so good - it is scary that some thing will go wrong \" . Denies any symptoms. Patient denies alcohol. Patient reports drinking about 4 cups coffee through out daily. Will also drink decaff iced tea. Patient reports that he is sending back the oxygen also as he does not need it any more per his lung doctor. No shortness of breath. Fatigue but much better than before           Previous visit: (7/12/2019)      Chief Complaint   Patient presents with    Follow-up     Patient here today for 6 months follow up visit. States feels good and has lost 40 lbs since his last visit. Patient denies palpitations, chest pain, shortness of breath or edema. Reports some \"slight\" dizziness. Denies syncope. Other     Patient sees Pulmonologist, Dr. Monae Persaud in [place]. Wears home oxygen at night but not needing it as much now. Patient reports he is doing fine  He went to Lung doctor in [place] and he made him upset because he tried to send him to other doctor for ablation  He told him that tikosyn was not good  Patient reports he has no problems with medication and had no atrial fibrillation  Patient did not like his approach            Previous visit:(1/11/2019)      Chief Complaint   Patient presents with    Atrial Fibrillation     Dr Katelynn Montes De Oca patient here for 6 month follow up. Patient states he has felt real good since last office visit. Deneis CP, palpitations, dizziness. Does report mild swelling in both lower legs, but not as bad as has been in the past. Also reports having to use his oxygen this past week due to SOB.  He states he uses 2L O2 as needed. Also reports problems sleeping recently. Patient did not bring medications or list but states no changes. Denies alcohol, does drink decaff tea. Previous visit:(2018)      Chief Complaint   Patient presents with    Atrial Fibrillation     Ak-s/p loop . Patient denies CP, palpitations, or edema. Does report 2 episodes of dizziness and SOB since having the loop inserted. He does report a history of COPD. Patient states most recent episode was about a week ago. 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 Patient did not bring med list but states no changes. He has seen a new pulmonoligist in [place] and was recently started on Clarithromycin and doxycycline, not sure why but did have pneumonia in Feb. Patient denies any fever. He said his blood work was done and then it was decided to be placed on antibiotics           Previous visit:    Patient is a Er[age] Harkins old male with hx COPD, Afib with recent admission in hospital. Patient here for education of atrial fibrillation and management  He was placed on tikosyn in the hospital. Patient denies chest pain, shortness of breath. Denies fever or chills. Feeling better since discharge from hospital        Past medical history:   Past Medical History:   Diagnosis Date    Allergic rhinitis     Asthma     Atrial fibrillation (HCC)     Dr. Geo Marin & Dr. Hawk Molina    CAD (coronary artery disease)     mild LAD - Dr. Geo Marin    [facility]chronic obstructive [facility]) ([facility]     COVID-19 2021    Depression     Diverticulosis of colon     Family history of early CAD     Father- MI-  age 46; a grandparent- age 46 of MI    Family history of valvular heart disease     Mother- Mitral valve disease    GERD (gastroesophageal reflux disease)     H/O 24 hour EKG monitoring 2001-Predominant rhythm is sinus rhythm. No signif ectopy noted.     H/O echocardiogram 1/28/2002, 6/18/2001 1/28/2002-EF 60%. Normal LVSF. Mild MR. Mild TR-Dr Geo Marin;    H/O echocardiogram 12/05/2019    EF 74-56%, Grade I diastolic dysfunction, sclerotic but non stenotic aortic valve, Mild AR, Mild-Mod TR, Normal pulmonary artery pressure, no pericardial effusion     History of diverticulitis of colon     History of Holter monitoring 04/12/2018    48 hr holter - SR with PAF    History of repair of left rotator cuff 08/17/2015    Hx surgery 2007 Dr Rula Cedillo, surgery L shoulder 11/18/2015 Dr Michele FIGUEROA [facility] (hard of hearing)     Bilat hearing aids    Hx of cardiac cath 07/08/2013    Mild LAD disease noted. Hx of cardiovascular stress test 03/28/2018    EF 33% Pt in a fib with RVR. No infarct or ischemia. Decreased uptake inferiorly due to diaphragmatic artifact. HX OTHER MEDICAL 07/30/2013    14 DAY EVENT: APC's, short runs of sinus tachycardia.      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    Restless leg syndrome     S/P placement of cardiac pacemaker 4/1/2021    Shortness of breath 02/16/2018    Tricuspid valve regurgitation 06/2001    mild       Surgical history :   Past Surgical History:   Procedure Laterality Date    BONE RESECTION, RIB      thoracic    CATARACT REMOVAL Bilateral     CERVICAL FUSION N/A 11/18/2022    C5-6, C6-7  CERVICAL DISCECTOMY FUSION ANTERIOR TWO LEVELS performed by Sheela Yates MD at [address] [place] [address]  10/14/2014    polyp, divertics,    DIAGNOSTIC CARDIAC CATH LAB PROCEDURE  07/08/2013    EF 55%, Mild LAD Disease    ENDOSCOPY, COLON, DIAGNOSTIC  10/14/2014    normal    NERVE SURGERY      ulnar nerver transfer    PACEMAKER INSERTION Left 04/01/2021    [employer] J LUIS XT DR MCCAULEY SURESCAN PPM    ROTATOR CUFF REPAIR  2007, 2006 2006-right rotator cuff; 2007-Left Rotator cuff    ROTATOR CUFF REPAIR Left 11/18/2015 Dr. Teague    SHOULDER ARTHROSCOPY Right 12/01/2010       Family history:   Family History   Problem Relation Age of Onset    Heart Disease Mother         Mitral valve disease    Heart Attack Mother 54    Coronary Art Dis Father         MI    Heart Disease Father     Heart Attack Father 51    Heart Attack Brother 48    Heart Attack Paternal Grandfather 53       Social history :  reports that he quit smoking about 29 years ago. His smoking use included cigarettes. He has a 20.00 pack-year smoking history. His smokeless tobacco use includes chew. He reports that he does not drink alcohol and does not use drugs.    Allergies   Allergen Reactions    Lyrica [Pregabalin] Hallucinations       Current Outpatient Medications on File Prior to Visit   Medication Sig Dispense Refill    clonazePAM (KLONOPIN) 0.5 MG tablet Take 1 tablet by mouth nightly as needed for Anxiety for up to 90 days. Max Daily Amount: 0.5 mg 90 tablet 0    rOPINIRole (REQUIP) 0.5 MG tablet Take 1 tablet by mouth nightly 90 tablet 0    dofetilide (TIKOSYN) 250 MCG capsule Take 1 capsule by mouth 2 times daily 180 capsule 1    UNABLE TO FIND Betamethasone Shelley/Eucerin 1:1 concentration  Apply topically BID for eczema 120 g 5    aspirin 81 MG EC tablet Take 1 tablet by mouth daily 30 tablet 3    apixaban (ELIQUIS) 5 MG TABS tablet Take 1 tablet by mouth 2 times daily 180 tablet 3    omeprazole (PRILOSEC) 40 MG delayed release capsule Take 1 capsule by mouth daily 90 capsule 2    montelukast (SINGULAIR) 10 MG tablet Take 1 tablet by mouth nightly 90 tablet 3    metoprolol succinate (TOPROL XL) 100 MG extended release tablet TAKE 1 TABLET DAILY 90 tablet 2    escitalopram (LEXAPRO) 20 MG tablet Take 1.5 tablets by mouth daily 135 tablet 2    budesonide-formoterol (SYMBICORT) 160-4.5 MCG/ACT AERO USE 2 INHALATIONS ORALLY   TWICE DAILY (Patient taking differently: as needed USE 2 INHALATIONS ORALLY   TWICE DAILY) 2 each 2    atorvastatin (LIPITOR) 10 MG  tablet Take 1 tablet by mouth daily 90 tablet 3    amLODIPine-benazepril (LOTREL) 5-10 MG per capsule Take 1 capsule by mouth daily 90 capsule 3    albuterol sulfate HFA (VENTOLIN HFA) 108 (90 Base) MCG/ACT inhaler Inhale 2 puffs into the lungs 4 times daily as needed for Wheezing or Shortness of Breath 1 each 5    Cyanocobalamin (B-12) 500 MCG TABS Take 1 tablet by mouth 4 times daily 120 tablet 2    B Complex Vitamins (VITAMIN B COMPLEX PO) DAILY      UNABLE TO FIND Compounded Betaval cream 15g/Eucerin 135g 1 Can 5    Misc. Devices ([facility]) MISC 1 Device by Does not apply route as needed (for ambulation) 1 each 0    folic acid (FOLVITE) 450 MCG tablet Take 400 mcg by mouth daily. No current facility-administered medications on file prior to visit. Review of Systems:   Review of Systems   Constitutional: . Negative for activity change, chills and fever. HENT: Negative for congestion, ear pain and tinnitus. Eyes: Negative for photophobia, pain and visual disturbance. Respiratory:  Negative for cough, chest tightness and wheezing. Cardiovascular: Negative for chest pain, palpitations and leg swelling. Gastrointestinal: Negative for abdominal pain, blood in stool, constipation, diarrhea, nausea and vomiting. Endocrine: Negative for cold intolerance and heat intolerance. Genitourinary: Negative for dysuria, flank pain and hematuria. Musculoskeletal: Positive for arthralgias. Negative for back pain, myalgias and neck stiffness. Skin: Negative for color change and rash. Allergic/Immunologic: Negative for food allergies. Neurological: Negative for dizziness, light-headedness, numbness and headaches. Hematological: Does not bruise/bleed easily. Psychiatric/Behavioral: Negative for agitation, behavioral problems and confusion.      Physical Examination:      /72 (Site: Left Upper Arm, Position: Sitting, Cuff Size: Large Adult)   Pulse 65   Ht 5' 7.5\" (1.715 m)   Wt 218 lb (98.9 kg)   BMI 33.64 kg/m²    Wt Readings from Last 3 Encounters:   02/09/23 218 lb (98.9 kg)   01/20/23 217 lb 6.4 oz (98.6 kg)   11/22/22 212 lb 9.6 oz (96.4 kg)     Body mass index is 33.64 kg/m². Physical Exam  Constitutional:       Appearance: Normal appearance. HENT:      Head: Normocephalic and atraumatic. Right Ear: External ear normal.      Left Ear: External ear normal.      Mouth/Throat:      Mouth: Mucous membranes are moist.      Pharynx: Oropharynx is clear. Eyes:      General:         Right eye: No discharge. Left eye: No discharge. Conjunctiva/sclera: Conjunctivae normal.   Cardiovascular:      Rate and Rhythm: Normal rate and regular rhythm. Heart sounds: No murmur heard. Pulmonary:      Effort: Pulmonary effort is normal.      Breath sounds: No wheezing or rhonchi. Abdominal:      General: Abdomen is flat. Palpations: Abdomen is soft. Musculoskeletal:         General: Normal range of motion. Cervical back: Normal range of motion. Right lower leg: No edema. Left lower leg: No edema. Skin:     General: Skin is warm and dry. Neurological:      General: No focal deficit present. Mental Status: He is alert and oriented to person, place, and time. Psychiatric:         Mood and Affect: Mood normal.         Thought Content:  Thought content normal.       CBC:   Lab Results   Component Value Date/Time    WBC 11.1 11/19/2022 05:57 AM    HGB 13.8 11/19/2022 05:57 AM    HCT 40.9 11/19/2022 05:57 AM     11/19/2022 05:57 AM     Lipids:   Lab Results   Component Value Date    CHOL 140 10/27/2022    TRIG 67 10/27/2022    HDL 46 10/27/2022    LDLCALC 81 10/27/2022    LDLDIRECT 100 (H) 08/04/2021     PT/INR:   Lab Results   Component Value Date/Time    INR 1.28 11/02/2022 10:15 AM        BMP:    Lab Results   Component Value Date     11/19/2022    K 3.8 11/19/2022     11/19/2022    CO2 27 11/19/2022    BUN 12 11/19/2022     CMP: Lab Results   Component Value Date    AST 19 10/27/2022    PROT 6.8 10/27/2022    BILITOT 0.5 10/27/2022    ALKPHOS 86 10/27/2022     TSH:      EKG - sinus rhythm      IMPRESSION / RECOMMENDATIONS:       Tikosyn Monitoring Therapy  Persistent atrial fibrillation sp cardioversion and now PAF  Moderate to severe left ventricular systolic dysfunction IMPROVED WITH atrial fib control  HTN  HLD  OBESITY BMI 33  Sleep apnea  COPD    Device interrogated. Patient noted to have short PAF episodes with a 1.4% burden. Patient is asymptomatic. He states that he does not know that he is having atrial fibrillation. Discussed with patient about relation for atrial fibrillation. patient at this time does not want to proceed with procedures  EKG obtained patient noted to have sinus rhythm with few PVCs  QTc is within range to continue Tikosyn 250 mcg twice daily  Patient denies issues with bleeding. We will continue Eliquis 5 mg twice daily    Discussed with patient that I can order a chest x-ray to assess for possible broken ribs due to his mechanical fall  Discussed with patient that if there is abnormalities with his chest x-ray he will need to follow with his PCP  He voiced understanding      Pacemaker interrogated  Device Assessment:  The device is Medtronic pacemaker - Dual Chamber chamber      MRI Compatible : yes    Device interrogation was performed. Mode: AAIR --- DDDR     Sensing is normal. Impedence is normal.  Threshold is normal.     There has not been interval changes. Estimated battery life is 11.7 years     The underlying rhythm is AP,VS.  59.9 % atrial paced; 0.9 % ventricular paced. Atrial Arrhythmia : 1.4%    Non sustained VT episodes : 1 episode    Sustained VT episodes : No    Patient activity reported 3.3 hrs/day    The patient is pacemaker dependent.         Vitals:    02/09/23 0956   BP: 110/72   Pulse: 65     BP is stable- continue toprol xl 50 mg twice daily  lotrel 5-10 mg daily    Will not plan for further procedures at this time. Patient to follow-up in 6 months or sooner if needed              Thanks again for allowing me to participate in care of this patient. Please call me if you have any questions. With best regards.       JANI Harper - CNP

## 2023-02-22 PROBLEM — I48.19 HYPERCOAGULABLE STATE DUE TO PERSISTENT ATRIAL FIBRILLATION (HCC): Status: ACTIVE | Noted: 2023-02-22

## 2023-02-22 PROBLEM — R06.02 SHORTNESS OF BREATH: Status: ACTIVE | Noted: 2023-02-22

## 2023-02-22 PROBLEM — D68.69 HYPERCOAGULABLE STATE DUE TO PERSISTENT ATRIAL FIBRILLATION (HCC): Status: ACTIVE | Noted: 2023-02-22

## 2023-03-07 RX ORDER — DOFETILIDE 0.25 MG/1
250 CAPSULE ORAL 2 TIMES DAILY
Qty: 180 CAPSULE | Refills: 1 | Status: SHIPPED | OUTPATIENT
Start: 2023-03-07

## 2023-03-10 DIAGNOSIS — I10 PRIMARY HYPERTENSION: Chronic | ICD-10-CM

## 2023-03-10 RX ORDER — METOPROLOL SUCCINATE 50 MG/1
TABLET, EXTENDED RELEASE ORAL
Qty: 60 TABLET | Refills: 1 | Status: SHIPPED | OUTPATIENT
Start: 2023-03-10

## 2023-03-16 NOTE — TELEPHONE ENCOUNTER
Patient has not received the Tikosyn ordered mail order, please [facility] a [facility] day into local [facility]. He has been out for 3 days so needs asap.

## 2023-03-17 RX ORDER — DOFETILIDE 0.25 MG/1
250 CAPSULE ORAL 2 TIMES DAILY
Qty: 60 CAPSULE | Refills: 1 | Status: SHIPPED | OUTPATIENT
Start: 2023-03-17

## 2023-04-18 DIAGNOSIS — G25.81 RESTLESS LEG SYNDROME: ICD-10-CM

## 2023-04-18 RX ORDER — ROPINIROLE 0.5 MG/1
0.5 TABLET, FILM COATED ORAL
Qty: 90 TABLET | Refills: 0 | Status: SHIPPED | OUTPATIENT
Start: 2023-04-18 | End: 2023-07-17

## 2023-04-21 ENCOUNTER — TELEPHONE (OUTPATIENT)
Dept: FAMILY MEDICINE CLINIC | Age: 78
End: 2023-04-21

## 2023-04-21 NOTE — TELEPHONE ENCOUNTER
I left a voicemail letting the patient know we need to reschedule his appointment because Viktoria Stoddard's daughter had to have emergency surgery this morning.

## 2023-04-27 ENCOUNTER — OFFICE VISIT (OUTPATIENT)
Dept: FAMILY MEDICINE CLINIC | Age: 78
End: 2023-04-27
Payer: MEDICARE

## 2023-04-27 VITALS
WEIGHT: 215.8 LBS | SYSTOLIC BLOOD PRESSURE: 134 MMHG | BODY MASS INDEX: 33.8 KG/M2 | DIASTOLIC BLOOD PRESSURE: 82 MMHG | TEMPERATURE: 97.7 F | RESPIRATION RATE: 18 BRPM | OXYGEN SATURATION: 97 % | HEART RATE: 63 BPM

## 2023-04-27 DIAGNOSIS — J44.9 CHRONIC OBSTRUCTIVE PULMONARY DISEASE, UNSPECIFIED COPD TYPE (HCC): Primary | ICD-10-CM

## 2023-04-27 DIAGNOSIS — I49.5 SICK SINUS SYNDROME (HCC): ICD-10-CM

## 2023-04-27 DIAGNOSIS — G25.81 RESTLESS LEG SYNDROME: ICD-10-CM

## 2023-04-27 DIAGNOSIS — I48.0 PAF (PAROXYSMAL ATRIAL FIBRILLATION) (HCC): ICD-10-CM

## 2023-04-27 DIAGNOSIS — I50.22 CHRONIC SYSTOLIC CONGESTIVE HEART FAILURE (HCC): ICD-10-CM

## 2023-04-27 PROCEDURE — 1123F ACP DISCUSS/DSCN MKR DOCD: CPT | Performed by: NURSE PRACTITIONER

## 2023-04-27 PROCEDURE — 99214 OFFICE O/P EST MOD 30 MIN: CPT | Performed by: NURSE PRACTITIONER

## 2023-04-27 PROCEDURE — 3074F SYST BP LT 130 MM HG: CPT | Performed by: NURSE PRACTITIONER

## 2023-04-27 PROCEDURE — 3078F DIAST BP <80 MM HG: CPT | Performed by: NURSE PRACTITIONER

## 2023-04-27 RX ORDER — ALBUTEROL SULFATE 90 UG/1
2 AEROSOL, METERED RESPIRATORY (INHALATION) 4 TIMES DAILY PRN
Qty: 1 EACH | Refills: 5 | Status: SHIPPED | OUTPATIENT
Start: 2023-04-27

## 2023-04-27 RX ORDER — CLONAZEPAM 0.5 MG/1
0.5 TABLET ORAL NIGHTLY PRN
Qty: 30 TABLET | Refills: 2 | Status: SHIPPED | OUTPATIENT
Start: 2023-04-27 | End: 2023-05-27

## 2023-04-27 SDOH — ECONOMIC STABILITY: INCOME INSECURITY: HOW HARD IS IT FOR YOU TO PAY FOR THE VERY BASICS LIKE FOOD, HOUSING, MEDICAL CARE, AND HEATING?: NOT HARD AT ALL

## 2023-04-27 SDOH — ECONOMIC STABILITY: FOOD INSECURITY: WITHIN THE PAST 12 MONTHS, YOU WORRIED THAT YOUR FOOD WOULD RUN OUT BEFORE YOU GOT MONEY TO BUY MORE.: NEVER TRUE

## 2023-04-27 SDOH — ECONOMIC STABILITY: FOOD INSECURITY: WITHIN THE PAST 12 MONTHS, THE FOOD YOU BOUGHT JUST DIDN'T LAST AND YOU DIDN'T HAVE MONEY TO GET MORE.: NEVER TRUE

## 2023-04-27 SDOH — ECONOMIC STABILITY: HOUSING INSECURITY
IN THE LAST 12 MONTHS, WAS THERE A TIME WHEN YOU DID NOT HAVE A STEADY PLACE TO SLEEP OR SLEPT IN A SHELTER (INCLUDING NOW)?: NO

## 2023-05-05 DIAGNOSIS — I10 PRIMARY HYPERTENSION: Chronic | ICD-10-CM

## 2023-05-07 ASSESSMENT — ENCOUNTER SYMPTOMS
SORE THROAT: 0
ABDOMINAL PAIN: 0
NAUSEA: 0
DIARRHEA: 0
SHORTNESS OF BREATH: 0
WHEEZING: 0
COUGH: 0
STRIDOR: 0
CONSTIPATION: 0
CHEST TIGHTNESS: 0
VOMITING: 0

## 2023-05-08 RX ORDER — METOPROLOL SUCCINATE 50 MG/1
TABLET, EXTENDED RELEASE ORAL
Qty: 60 TABLET | Refills: 1 | Status: SHIPPED | OUTPATIENT
Start: 2023-05-08

## 2023-05-15 ENCOUNTER — PROCEDURE VISIT (OUTPATIENT)
Dept: CARDIOLOGY CLINIC | Age: 78
End: 2023-05-15

## 2023-05-15 DIAGNOSIS — I44.0 AV BLOCK, 1ST DEGREE: ICD-10-CM

## 2023-05-15 DIAGNOSIS — I49.5 SINUS NODE DYSFUNCTION (HCC): ICD-10-CM

## 2023-05-15 DIAGNOSIS — Z95.0 CARDIAC PACEMAKER IN SITU: Primary | ICD-10-CM

## 2023-06-02 DIAGNOSIS — I10 PRIMARY HYPERTENSION: Chronic | ICD-10-CM

## 2023-06-02 RX ORDER — METOPROLOL SUCCINATE 50 MG/1
TABLET, EXTENDED RELEASE ORAL
Qty: 60 TABLET | Refills: 1 | Status: SHIPPED | OUTPATIENT
Start: 2023-06-02

## 2023-06-07 RX ORDER — APIXABAN 5 MG/1
TABLET, FILM COATED ORAL
Qty: 180 TABLET | Refills: 3 | Status: SHIPPED | OUTPATIENT
Start: 2023-06-07

## 2023-06-19 ENCOUNTER — TELEPHONE (OUTPATIENT)
Dept: FAMILY MEDICINE CLINIC | Age: 78
End: 2023-06-19

## 2023-06-19 NOTE — TELEPHONE ENCOUNTER
ECC transferred call to me, patient reporting SOB. Patient reports SOB with activity, like when he is outside doing yard work or has to walk for long distances. Patient reports he used to be on O2 a couple years ago and would like to get a portable O2 machine. Patient states it has been several years since he has seen his pulmonologist and prefers not to go back to him. Patient states it has been several months since he has seen cardiologist.  Patient denies any other symptoms. Appt scheduled for Thursday. No further action required at this time.

## 2023-06-22 ENCOUNTER — OFFICE VISIT (OUTPATIENT)
Dept: FAMILY MEDICINE CLINIC | Age: 78
End: 2023-06-22
Payer: MEDICARE

## 2023-06-22 VITALS
SYSTOLIC BLOOD PRESSURE: 130 MMHG | HEIGHT: 67 IN | WEIGHT: 214 LBS | TEMPERATURE: 97.8 F | HEART RATE: 64 BPM | DIASTOLIC BLOOD PRESSURE: 89 MMHG | BODY MASS INDEX: 33.59 KG/M2 | RESPIRATION RATE: 16 BRPM | OXYGEN SATURATION: 89 %

## 2023-06-22 DIAGNOSIS — J44.9 CHRONIC OBSTRUCTIVE PULMONARY DISEASE, UNSPECIFIED COPD TYPE (HCC): Primary | ICD-10-CM

## 2023-06-22 DIAGNOSIS — G47.09 OTHER INSOMNIA: ICD-10-CM

## 2023-06-22 PROCEDURE — 1123F ACP DISCUSS/DSCN MKR DOCD: CPT | Performed by: NURSE PRACTITIONER

## 2023-06-22 PROCEDURE — 3079F DIAST BP 80-89 MM HG: CPT | Performed by: NURSE PRACTITIONER

## 2023-06-22 PROCEDURE — 99214 OFFICE O/P EST MOD 30 MIN: CPT | Performed by: NURSE PRACTITIONER

## 2023-06-22 PROCEDURE — 3075F SYST BP GE 130 - 139MM HG: CPT | Performed by: NURSE PRACTITIONER

## 2023-06-22 RX ORDER — MIRTAZAPINE 7.5 MG/1
7.5 TABLET, FILM COATED ORAL NIGHTLY
Qty: 90 TABLET | Refills: 1 | Status: SHIPPED | OUTPATIENT
Start: 2023-06-22

## 2023-06-22 ASSESSMENT — ENCOUNTER SYMPTOMS
CHEST TIGHTNESS: 0
NAUSEA: 0
ABDOMINAL PAIN: 0
STRIDOR: 0
DIARRHEA: 0
CONSTIPATION: 0
VOMITING: 0
COUGH: 0
SORE THROAT: 0
SHORTNESS OF BREATH: 1
CHOKING: 0
WHEEZING: 0

## 2023-06-22 NOTE — PROGRESS NOTES
Subjective:      Chief Complaint   Patient presents with    Shortness of Breath     With activity x 1.5 months, denies any other symptoms, feels like he would benefit from a portable O2 machine, is frustrated because he feels limited on what he can do     Other     Still having a difficult time falling asleep, once asleep he does stay asleep       HPI:  Genna Suarez is a 70 y.o. male who presents today for the following:     COPD:  Current treatment includes short-acting beta agonist inhaler, combined beta agonist/steroid inhaler. He is only using Symbicort prn. Residual symptoms: shortness of breath with exertion, exercise intolerance. He has to take frequent breaks. He denies cough, wheezing, chest tightness, chest pain, weight loss. He requires his rescue inhaler 2-4 time(s) most days. He is wondering about getting portable oxygen. He has followed with Dr. Cheri Rodriguez in [facility] but hasn't been seen in several years. Insomnia  He continues to have difficulty sleeping at night and reports daytime fatigue. Sleep study has been recommended in the past but he does not want to pursue that at this time. He has tried and failed tx with melatonin, trazodone. He currently taking Klonopin prn at  to help manage restless leg symptoms. Past Medical History:   Diagnosis Date    Allergic rhinitis     Asthma     Atrial fibrillation (HCC)     Dr. Sandra Simms & Dr. Kristyn Barbour    CAD (coronary artery disease)     mild LAD - Dr. Sandra Simms    COPD (chronic obstructive pulmonary disease) ([facility]     COVID-19 2021    Depression     Diverticulosis of colon     Family history of early CAD     Father- MI-  age 46; a grandparent- age 46 of MI    Family history of valvular heart disease     Mother- Mitral valve disease    GERD (gastroesophageal reflux disease)     H/O 24 hour EKG monitoring 2001-Predominant rhythm is sinus rhythm. No signif ectopy noted.     H/O echocardiogram 2002, 2001
[employer] Safety Inter[employer]

## 2023-06-29 ENCOUNTER — OFFICE VISIT (OUTPATIENT)
Dept: PULMONOLOGY | Age: 78
End: 2023-06-29
Payer: MEDICARE

## 2023-06-29 VITALS
BODY MASS INDEX: 33.59 KG/M2 | WEIGHT: 214 LBS | OXYGEN SATURATION: 93 % | HEART RATE: 85 BPM | HEIGHT: 67 IN | RESPIRATION RATE: 16 BRPM

## 2023-06-29 DIAGNOSIS — R09.02 OXYGEN DESATURATION: Primary | ICD-10-CM

## 2023-06-29 DIAGNOSIS — J44.9 CHRONIC OBSTRUCTIVE PULMONARY DISEASE, UNSPECIFIED COPD TYPE (HCC): ICD-10-CM

## 2023-06-29 PROCEDURE — 1123F ACP DISCUSS/DSCN MKR DOCD: CPT | Performed by: NURSE PRACTITIONER

## 2023-06-29 PROCEDURE — 99203 OFFICE O/P NEW LOW 30 MIN: CPT | Performed by: NURSE PRACTITIONER

## 2023-06-29 ASSESSMENT — ENCOUNTER SYMPTOMS: SHORTNESS OF BREATH: 1

## 2023-06-29 ASSESSMENT — COPD QUESTIONNAIRES: COPD: 1

## 2023-06-30 DIAGNOSIS — I10 PRIMARY HYPERTENSION: Chronic | ICD-10-CM

## 2023-07-02 RX ORDER — DOFETILIDE 0.25 MG/1
CAPSULE ORAL
Qty: 60 CAPSULE | Refills: 1 | Status: SHIPPED | OUTPATIENT
Start: 2023-07-02

## 2023-07-02 RX ORDER — METOPROLOL SUCCINATE 50 MG/1
TABLET, EXTENDED RELEASE ORAL
Qty: 60 TABLET | Refills: 1 | Status: SHIPPED | OUTPATIENT
Start: 2023-07-02

## 2023-07-07 ENCOUNTER — TELEPHONE (OUTPATIENT)
Dept: PULMONOLOGY | Age: 78
End: 2023-07-07

## 2023-07-11 NOTE — TELEPHONE ENCOUNTER
Spoke to Norbert @ [address] she states the tank the patient received has wheels on it (its a stationary and portable machine) if patient would like a different portable machine an OCD order needs to be placed and faxed over.     OCD order placed and faxed

## 2023-07-18 DIAGNOSIS — G25.81 RESTLESS LEG SYNDROME: ICD-10-CM

## 2023-07-18 RX ORDER — ROPINIROLE 0.5 MG/1
0.5 TABLET, FILM COATED ORAL
Qty: 90 TABLET | Refills: 0 | Status: SHIPPED | OUTPATIENT
Start: 2023-07-18 | End: 2023-10-16

## 2023-07-26 DIAGNOSIS — I10 PRIMARY HYPERTENSION: Chronic | ICD-10-CM

## 2023-07-26 RX ORDER — DOFETILIDE 0.25 MG/1
CAPSULE ORAL
Qty: 60 CAPSULE | Refills: 1 | Status: SHIPPED | OUTPATIENT
Start: 2023-07-26

## 2023-07-26 RX ORDER — METOPROLOL SUCCINATE 50 MG/1
TABLET, EXTENDED RELEASE ORAL
Qty: 60 TABLET | Refills: 1 | Status: SHIPPED | OUTPATIENT
Start: 2023-07-26

## 2023-08-21 ENCOUNTER — PROCEDURE VISIT (OUTPATIENT)
Dept: CARDIOLOGY CLINIC | Age: 78
End: 2023-08-21

## 2023-08-21 DIAGNOSIS — R10.13 DYSPEPSIA: ICD-10-CM

## 2023-08-21 DIAGNOSIS — I49.5 SINUS NODE DYSFUNCTION (HCC): ICD-10-CM

## 2023-08-21 DIAGNOSIS — Z95.0 CARDIAC PACEMAKER IN SITU: Primary | ICD-10-CM

## 2023-08-21 DIAGNOSIS — I44.0 AV BLOCK, 1ST DEGREE: ICD-10-CM

## 2023-08-21 RX ORDER — OMEPRAZOLE 40 MG/1
CAPSULE, DELAYED RELEASE ORAL
Qty: 90 CAPSULE | Refills: 2 | Status: SHIPPED | OUTPATIENT
Start: 2023-08-21

## 2023-08-30 DIAGNOSIS — I10 PRIMARY HYPERTENSION: Chronic | ICD-10-CM

## 2023-08-31 ENCOUNTER — OFFICE VISIT (OUTPATIENT)
Dept: FAMILY MEDICINE CLINIC | Age: 78
End: 2023-08-31
Payer: MEDICARE

## 2023-08-31 VITALS
OXYGEN SATURATION: 95 % | HEIGHT: 67 IN | RESPIRATION RATE: 16 BRPM | DIASTOLIC BLOOD PRESSURE: 84 MMHG | WEIGHT: 213.6 LBS | HEART RATE: 65 BPM | BODY MASS INDEX: 33.53 KG/M2 | SYSTOLIC BLOOD PRESSURE: 134 MMHG | TEMPERATURE: 98.1 F

## 2023-08-31 DIAGNOSIS — G25.81 RESTLESS LEG SYNDROME: Primary | ICD-10-CM

## 2023-08-31 PROCEDURE — 1123F ACP DISCUSS/DSCN MKR DOCD: CPT | Performed by: NURSE PRACTITIONER

## 2023-08-31 PROCEDURE — 99214 OFFICE O/P EST MOD 30 MIN: CPT | Performed by: NURSE PRACTITIONER

## 2023-08-31 PROCEDURE — 3075F SYST BP GE 130 - 139MM HG: CPT | Performed by: NURSE PRACTITIONER

## 2023-08-31 PROCEDURE — 3079F DIAST BP 80-89 MM HG: CPT | Performed by: NURSE PRACTITIONER

## 2023-08-31 RX ORDER — CLONAZEPAM 0.5 MG/1
0.5 TABLET ORAL NIGHTLY PRN
Qty: 30 TABLET | Refills: 2 | Status: SHIPPED | OUTPATIENT
Start: 2023-09-10 | End: 2023-12-09

## 2023-08-31 ASSESSMENT — ENCOUNTER SYMPTOMS
VOMITING: 0
DIARRHEA: 0
SHORTNESS OF BREATH: 0
COUGH: 0
NAUSEA: 0
ABDOMINAL PAIN: 0
CHEST TIGHTNESS: 0
SORE THROAT: 0
WHEEZING: 0
STRIDOR: 0
CONSTIPATION: 0

## 2023-08-31 NOTE — PROGRESS NOTES
Subjective:      Chief Complaint   Patient presents with    Follow-up     No concerns       HPI:  Leonor Wheeler is a 70 y.o. male who presents today for 3 month follow up. RLS  Historically prescribed Klonopin 0.5mg nightly for symptoms associated with restless leg syndrome. He does not take it every night - only when symptoms are severe. Lately his symptoms have become worse and he is having increased difficulty sleeping at night. He has never been prescribed Requip. OARRS reviewed, Klonopin last filled 23. Past Medical History:   Diagnosis Date    Allergic rhinitis     Asthma     Atrial fibrillation (HCC)     Dr. Marci Oliver & Dr. Josselyn Smith    CAD (coronary artery disease)     mild LAD - Dr. Marci Oliver    COPD (chronic obstructive pulmonary disease) ([address] )     COVID-19 2021    Depression     Diverticulosis of colon     Family history of early CAD     Father- MI-  age 46; a grandparent- age 46 of MI    Family history of valvular heart disease     Mother- Mitral valve disease    GERD (gastroesophageal reflux disease)     H/O 24 hour EKG monitoring 2001-Predominant rhythm is sinus rhythm. No signif ectopy noted. H/O echocardiogram 2002, 2001-EF 60%. Normal LVSF. Mild MR. Mild TR-Dr Marci Oliver;    H/O echocardiogram 2019    EF 80-87%, Grade I diastolic dysfunction, sclerotic but non stenotic aortic valve, Mild AR, Mild-Mod TR, Normal pulmonary artery pressure, no pericardial effusion     History of diverticulitis of colon     History of Holter monitoring 2018    48 hr holter - SR with PAF    History of repair of left rotator cuff 2015    Hx surgery  Dr Debra Robins, surgery L shoulder 2015 Dr Felicia Mendoza     [facility] (hard of hearing)     Bilat hearing aids    Hx of cardiac cath 2013    Mild LAD disease noted. Hx of cardiovascular stress test 2018    EF 33% Pt in a fib with RVR. No infarct or ischemia.  Decreased uptake

## 2023-09-01 RX ORDER — DOFETILIDE 0.25 MG/1
CAPSULE ORAL
Qty: 60 CAPSULE | Refills: 1 | Status: SHIPPED | OUTPATIENT
Start: 2023-09-01

## 2023-09-01 RX ORDER — METOPROLOL SUCCINATE 50 MG/1
TABLET, EXTENDED RELEASE ORAL
Qty: 60 TABLET | Refills: 1 | Status: SHIPPED | OUTPATIENT
Start: 2023-09-01

## 2023-09-25 DIAGNOSIS — I10 PRIMARY HYPERTENSION: Chronic | ICD-10-CM

## 2023-09-25 RX ORDER — METOPROLOL SUCCINATE 50 MG/1
TABLET, EXTENDED RELEASE ORAL
Qty: 60 TABLET | Refills: 1 | Status: SHIPPED | OUTPATIENT
Start: 2023-09-25

## 2023-09-25 RX ORDER — DOFETILIDE 0.25 MG/1
CAPSULE ORAL
Qty: 60 CAPSULE | Refills: 1 | Status: SHIPPED | OUTPATIENT
Start: 2023-09-25

## 2023-09-29 ENCOUNTER — OFFICE VISIT (OUTPATIENT)
Dept: PULMONOLOGY | Age: 78
End: 2023-09-29
Payer: MEDICARE

## 2023-09-29 VITALS
BODY MASS INDEX: 33.43 KG/M2 | DIASTOLIC BLOOD PRESSURE: 64 MMHG | SYSTOLIC BLOOD PRESSURE: 126 MMHG | HEIGHT: 67 IN | OXYGEN SATURATION: 91 % | WEIGHT: 213 LBS | HEART RATE: 82 BPM

## 2023-09-29 DIAGNOSIS — J44.9 CHRONIC OBSTRUCTIVE PULMONARY DISEASE WITH HYPOXIA (HCC): Primary | ICD-10-CM

## 2023-09-29 DIAGNOSIS — R09.02 OXYGEN DESATURATION: ICD-10-CM

## 2023-09-29 DIAGNOSIS — R09.02 CHRONIC OBSTRUCTIVE PULMONARY DISEASE WITH HYPOXIA (HCC): Primary | ICD-10-CM

## 2023-09-29 PROCEDURE — 99214 OFFICE O/P EST MOD 30 MIN: CPT | Performed by: NURSE PRACTITIONER

## 2023-09-29 PROCEDURE — 1123F ACP DISCUSS/DSCN MKR DOCD: CPT | Performed by: NURSE PRACTITIONER

## 2023-09-29 PROCEDURE — 3078F DIAST BP <80 MM HG: CPT | Performed by: NURSE PRACTITIONER

## 2023-09-29 PROCEDURE — 3074F SYST BP LT 130 MM HG: CPT | Performed by: NURSE PRACTITIONER

## 2023-09-29 ASSESSMENT — ENCOUNTER SYMPTOMS: SHORTNESS OF BREATH: 1

## 2023-09-29 NOTE — PROGRESS NOTES
Angle Kirby (:  1945) is a 70 y.o. male,Established patient, here for evaluation of the following chief complaint(s):  3 Month Follow-Up        Subjective   SUBJECTIVE/OBJECTIVE:  Angelica Peoples 71 yo male was referred to pulmonary clinic for shortness of breath and low oxygen saturation. Guanako Piper states that he checks his saturation regularly when he is short of breath and labored. He notices it had dropped below 80%  and sometimes into the 70s. He was checked for low saturation and qualified for home use. Guanako Piper is back for oxygen check and management of COPD. He states that he had to use oxygen Tuesday when his SpO2 dropped to 84%. He recovers on to 91 %  and up to  95% using 2 liters nasal cannula. He still works and feels that the portable is a blessing wants to keep it. Using it away from home is convenient. Quality of living has greatly improved because having oxygen allows him to stay active, fulfill his ADLs and live independently. Guanako Piper continues receiving good clinical benefit using home and portable oxygen. His breathing is more relaxed and he exhibits having more energy. Review of Systems   Respiratory:  Positive for shortness of breath. Having less shortness of breath using oxygen on 2 liters. All other systems reviewed and are negative. Objective   Physical Exam  Vitals and nursing note reviewed. Constitutional:       General: He is awake. Appearance: Normal appearance. He is well-developed and well-groomed. HENT:      Head: Normocephalic. Eyes:      Extraocular Movements: Extraocular movements intact. Conjunctiva/sclera: Conjunctivae normal.      Pupils: Pupils are equal, round, and reactive to light. Cardiovascular:      Rate and Rhythm: Normal rate and regular rhythm. Pulses: Normal pulses. Heart sounds: Normal heart sounds. Pulmonary:      Effort: Pulmonary effort is normal.      Breath sounds: Normal breath sounds.

## 2023-10-02 DIAGNOSIS — G47.09 OTHER INSOMNIA: ICD-10-CM

## 2023-10-02 DIAGNOSIS — J44.9 CHRONIC OBSTRUCTIVE PULMONARY DISEASE, UNSPECIFIED COPD TYPE (HCC): ICD-10-CM

## 2023-10-02 RX ORDER — ALBUTEROL SULFATE 90 UG/1
2 AEROSOL, METERED RESPIRATORY (INHALATION) 4 TIMES DAILY PRN
Qty: 18 EACH | Refills: 5 | Status: SHIPPED | OUTPATIENT
Start: 2023-10-02

## 2023-10-02 RX ORDER — MIRTAZAPINE 7.5 MG/1
7.5 TABLET, FILM COATED ORAL
Qty: 90 TABLET | Refills: 1 | Status: SHIPPED | OUTPATIENT
Start: 2023-10-02

## 2023-10-16 DIAGNOSIS — G25.81 RESTLESS LEG SYNDROME: ICD-10-CM

## 2023-10-16 RX ORDER — ROPINIROLE 0.5 MG/1
0.5 TABLET, FILM COATED ORAL
Qty: 90 TABLET | Refills: 0 | Status: SHIPPED | OUTPATIENT
Start: 2023-10-16

## 2023-10-19 ENCOUNTER — OFFICE VISIT (OUTPATIENT)
Dept: CARDIOLOGY CLINIC | Age: 78
End: 2023-10-19
Payer: MEDICARE

## 2023-10-19 VITALS
BODY MASS INDEX: 34.14 KG/M2 | SYSTOLIC BLOOD PRESSURE: 122 MMHG | HEART RATE: 67 BPM | DIASTOLIC BLOOD PRESSURE: 84 MMHG | WEIGHT: 218 LBS

## 2023-10-19 DIAGNOSIS — Z79.899 VISIT FOR MONITORING TIKOSYN THERAPY: Primary | ICD-10-CM

## 2023-10-19 DIAGNOSIS — I48.0 PAROXYSMAL ATRIAL FIBRILLATION (HCC): ICD-10-CM

## 2023-10-19 DIAGNOSIS — I48.19 HYPERCOAGULABLE STATE DUE TO PERSISTENT ATRIAL FIBRILLATION (HCC): ICD-10-CM

## 2023-10-19 DIAGNOSIS — D68.69 HYPERCOAGULABLE STATE DUE TO PERSISTENT ATRIAL FIBRILLATION (HCC): ICD-10-CM

## 2023-10-19 DIAGNOSIS — I10 PRIMARY HYPERTENSION: ICD-10-CM

## 2023-10-19 DIAGNOSIS — Z51.81 VISIT FOR MONITORING TIKOSYN THERAPY: Primary | ICD-10-CM

## 2023-10-19 PROCEDURE — 3074F SYST BP LT 130 MM HG: CPT | Performed by: NURSE PRACTITIONER

## 2023-10-19 PROCEDURE — 3079F DIAST BP 80-89 MM HG: CPT | Performed by: NURSE PRACTITIONER

## 2023-10-19 PROCEDURE — 1123F ACP DISCUSS/DSCN MKR DOCD: CPT | Performed by: NURSE PRACTITIONER

## 2023-10-19 PROCEDURE — 99214 OFFICE O/P EST MOD 30 MIN: CPT | Performed by: NURSE PRACTITIONER

## 2023-10-19 PROCEDURE — 93288 INTERROG EVL PM/LDLS PM IP: CPT | Performed by: NURSE PRACTITIONER

## 2023-10-19 RX ORDER — METOPROLOL SUCCINATE 25 MG/1
25 TABLET, EXTENDED RELEASE ORAL NIGHTLY
Qty: 90 TABLET | Refills: 1 | Status: SHIPPED | OUTPATIENT
Start: 2023-10-19

## 2023-10-19 NOTE — PROGRESS NOTES
Electrophysiology FU Note        Chief complaint: 6 month follow up      Primary care physician: JANI Toussaint NP      History of Present Illness: This visit 10/19/2023  Patient is here today for follow-up on Tikoysn and paroxysmal atrial fibrillation. He reports that he is feeling well. He states that he started wearing oxygen as needed and this is the best he is felt in a long time. He does have shortness of breath with exertion that will resolve with rest when he is not wearing his oxygen    he denies chest pain, palpitation, lightheadedness, dizziness, edema or syncope    Previous visit 4/13/2023  Patient is here today for follow-up on paroxysmal atrial fibrillation. Patient states that he has been feeling fatigued recently. He states he is under a lot of stress with his wife having a stroke. He denies chest pain, palpitations, shortness of breath, lightheadedness, dizziness, edema or syncope. Previous visit 2/9/2023  Patient is here today for follow-up on state Tikosyn monitoring therapy and paroxysmal atrial fibrillation. Patient reports that he recently had a mechanical fall while at work and he is having pain on the right side of his chest.  He states that he will have pain with inspiration. He states he is unable to lay on his right side because of the pain. He has not followed with his PCP for this. He states that he is concerned that maybe he broke a rib. Patient was asked to follow-up in the office as he was noted to have PAF episodes on his pacemaker. Patient denies chest pain, palpitations, shortness of breath, lightheadedness, dizziness, edema or syncope      Previous visit (11/3/2021)    Chief Complaint   Patient presents with    3 Month Follow-Up     Pt denies any new cardiac symptoms since last visit . Pt doesn't drink alcohol or smoke.  Pt does exercise            Previous visit: (7/30/2021)      Chief Complaint   Patient

## 2023-11-28 ENCOUNTER — TELEPHONE (OUTPATIENT)
Dept: CARDIOLOGY CLINIC | Age: 78
End: 2023-11-28

## 2023-11-28 ENCOUNTER — PROCEDURE VISIT (OUTPATIENT)
Dept: CARDIOLOGY CLINIC | Age: 78
End: 2023-11-28

## 2023-11-28 DIAGNOSIS — I44.0 AV BLOCK, 1ST DEGREE: ICD-10-CM

## 2023-11-28 DIAGNOSIS — I10 PRIMARY HYPERTENSION: Chronic | ICD-10-CM

## 2023-11-28 DIAGNOSIS — Z95.0 CARDIAC PACEMAKER IN SITU: Primary | ICD-10-CM

## 2023-11-28 DIAGNOSIS — I49.5 SINUS NODE DYSFUNCTION (HCC): ICD-10-CM

## 2023-11-28 RX ORDER — METOPROLOL SUCCINATE 50 MG/1
TABLET, EXTENDED RELEASE ORAL
Qty: 60 TABLET | Refills: 4 | Status: SHIPPED | OUTPATIENT
Start: 2023-11-28 | End: 2024-01-26

## 2023-11-28 RX ORDER — DOFETILIDE 0.25 MG/1
CAPSULE ORAL
Qty: 60 CAPSULE | Refills: 4 | Status: SHIPPED | OUTPATIENT
Start: 2023-11-28 | End: 2024-01-26

## 2024-01-15 DIAGNOSIS — G25.81 RESTLESS LEG SYNDROME: ICD-10-CM

## 2024-01-15 RX ORDER — ROPINIROLE 0.5 MG/1
0.5 TABLET, FILM COATED ORAL
Qty: 90 TABLET | Refills: 0 | Status: SHIPPED | OUTPATIENT
Start: 2024-01-15 | End: 2024-01-19 | Stop reason: SDUPTHER

## 2024-01-19 ENCOUNTER — OFFICE VISIT (OUTPATIENT)
Dept: FAMILY MEDICINE CLINIC | Age: 79
End: 2024-01-19
Payer: MEDICARE

## 2024-01-19 VITALS
HEART RATE: 70 BPM | HEIGHT: 67 IN | WEIGHT: 222 LBS | OXYGEN SATURATION: 98 % | BODY MASS INDEX: 34.84 KG/M2 | DIASTOLIC BLOOD PRESSURE: 74 MMHG | RESPIRATION RATE: 16 BRPM | SYSTOLIC BLOOD PRESSURE: 120 MMHG

## 2024-01-19 DIAGNOSIS — J44.9 CHRONIC OBSTRUCTIVE PULMONARY DISEASE, UNSPECIFIED COPD TYPE (HCC): ICD-10-CM

## 2024-01-19 DIAGNOSIS — R53.83 OTHER FATIGUE: ICD-10-CM

## 2024-01-19 DIAGNOSIS — I10 PRIMARY HYPERTENSION: Chronic | ICD-10-CM

## 2024-01-19 DIAGNOSIS — G25.81 RESTLESS LEG SYNDROME: ICD-10-CM

## 2024-01-19 DIAGNOSIS — R10.13 DYSPEPSIA: ICD-10-CM

## 2024-01-19 DIAGNOSIS — F32.5 DEPRESSION, MAJOR, IN REMISSION (HCC): ICD-10-CM

## 2024-01-19 DIAGNOSIS — D68.69 HYPERCOAGULABLE STATE DUE TO PERSISTENT ATRIAL FIBRILLATION (HCC): ICD-10-CM

## 2024-01-19 DIAGNOSIS — I49.5 SINUS NODE DYSFUNCTION (HCC): Primary | ICD-10-CM

## 2024-01-19 DIAGNOSIS — G47.09 OTHER INSOMNIA: ICD-10-CM

## 2024-01-19 DIAGNOSIS — I50.22 CHRONIC SYSTOLIC CONGESTIVE HEART FAILURE (HCC): ICD-10-CM

## 2024-01-19 DIAGNOSIS — I48.19 HYPERCOAGULABLE STATE DUE TO PERSISTENT ATRIAL FIBRILLATION (HCC): ICD-10-CM

## 2024-01-19 LAB
ALBUMIN SERPL-MCNC: 4.4 G/DL (ref 3.4–5)
ALBUMIN/GLOB SERPL: 1.9 {RATIO} (ref 1.1–2.2)
ALP SERPL-CCNC: 87 U/L (ref 40–129)
ALT SERPL-CCNC: 19 U/L (ref 10–40)
ANION GAP SERPL CALCULATED.3IONS-SCNC: 12 MMOL/L (ref 3–16)
AST SERPL-CCNC: 23 U/L (ref 15–37)
BASOPHILS # BLD: 0 K/UL (ref 0–0.2)
BASOPHILS NFR BLD: 0.6 %
BILIRUB SERPL-MCNC: 0.7 MG/DL (ref 0–1)
BUN SERPL-MCNC: 15 MG/DL (ref 7–20)
CALCIUM SERPL-MCNC: 8.5 MG/DL (ref 8.3–10.6)
CHLORIDE SERPL-SCNC: 105 MMOL/L (ref 99–110)
CHOLEST SERPL-MCNC: 146 MG/DL (ref 0–199)
CO2 SERPL-SCNC: 25 MMOL/L (ref 21–32)
CREAT SERPL-MCNC: 0.9 MG/DL (ref 0.8–1.3)
DEPRECATED RDW RBC AUTO: 14.3 % (ref 12.4–15.4)
EOSINOPHIL # BLD: 0.2 K/UL (ref 0–0.6)
EOSINOPHIL NFR BLD: 4 %
GFR SERPLBLD CREATININE-BSD FMLA CKD-EPI: >60 ML/MIN/{1.73_M2}
GLUCOSE P FAST SERPL-MCNC: 106 MG/DL (ref 70–99)
HCT VFR BLD AUTO: 41.2 % (ref 40.5–52.5)
HDLC SERPL-MCNC: 51 MG/DL (ref 40–60)
HGB BLD-MCNC: 13.9 G/DL (ref 13.5–17.5)
LDL CHOLESTEROL CALCULATED: 79 MG/DL
LYMPHOCYTES # BLD: 1.3 K/UL (ref 1–5.1)
LYMPHOCYTES NFR BLD: 23.8 %
MCH RBC QN AUTO: 30.8 PG (ref 26–34)
MCHC RBC AUTO-ENTMCNC: 33.8 G/DL (ref 31–36)
MCV RBC AUTO: 91.3 FL (ref 80–100)
MONOCYTES # BLD: 0.8 K/UL (ref 0–1.3)
MONOCYTES NFR BLD: 14.2 %
NEUTROPHILS # BLD: 3.2 K/UL (ref 1.7–7.7)
NEUTROPHILS NFR BLD: 57.4 %
PLATELET # BLD AUTO: 147 K/UL (ref 135–450)
PMV BLD AUTO: 9 FL (ref 5–10.5)
POTASSIUM SERPL-SCNC: 3.8 MMOL/L (ref 3.5–5.1)
PROT SERPL-MCNC: 6.7 G/DL (ref 6.4–8.2)
RBC # BLD AUTO: 4.51 M/UL (ref 4.2–5.9)
SODIUM SERPL-SCNC: 142 MMOL/L (ref 136–145)
TRIGL SERPL-MCNC: 79 MG/DL (ref 0–150)
VLDLC SERPL CALC-MCNC: 16 MG/DL
WBC # BLD AUTO: 5.6 K/UL (ref 4–11)

## 2024-01-19 PROCEDURE — 1123F ACP DISCUSS/DSCN MKR DOCD: CPT | Performed by: NURSE PRACTITIONER

## 2024-01-19 PROCEDURE — 4004F PT TOBACCO SCREEN RCVD TLK: CPT | Performed by: NURSE PRACTITIONER

## 2024-01-19 PROCEDURE — G8417 CALC BMI ABV UP PARAM F/U: HCPCS | Performed by: NURSE PRACTITIONER

## 2024-01-19 PROCEDURE — 3023F SPIROM DOC REV: CPT | Performed by: NURSE PRACTITIONER

## 2024-01-19 PROCEDURE — 99214 OFFICE O/P EST MOD 30 MIN: CPT | Performed by: NURSE PRACTITIONER

## 2024-01-19 PROCEDURE — G8427 DOCREV CUR MEDS BY ELIG CLIN: HCPCS | Performed by: NURSE PRACTITIONER

## 2024-01-19 PROCEDURE — G8484 FLU IMMUNIZE NO ADMIN: HCPCS | Performed by: NURSE PRACTITIONER

## 2024-01-19 PROCEDURE — 3074F SYST BP LT 130 MM HG: CPT | Performed by: NURSE PRACTITIONER

## 2024-01-19 PROCEDURE — 3078F DIAST BP <80 MM HG: CPT | Performed by: NURSE PRACTITIONER

## 2024-01-19 PROCEDURE — 36415 COLL VENOUS BLD VENIPUNCTURE: CPT | Performed by: NURSE PRACTITIONER

## 2024-01-19 RX ORDER — ALBUTEROL SULFATE 90 UG/1
2 AEROSOL, METERED RESPIRATORY (INHALATION) 4 TIMES DAILY PRN
Qty: 18 EACH | Refills: 5 | Status: SHIPPED | OUTPATIENT
Start: 2024-01-19

## 2024-01-19 RX ORDER — LANOLIN ALCOHOL/MO/W.PET/CERES
400 CREAM (GRAM) TOPICAL DAILY
Qty: 90 TABLET | Refills: 3 | Status: SHIPPED | OUTPATIENT
Start: 2024-01-19

## 2024-01-19 RX ORDER — OMEPRAZOLE 40 MG/1
40 CAPSULE, DELAYED RELEASE ORAL DAILY
Qty: 90 CAPSULE | Refills: 3 | Status: SHIPPED | OUTPATIENT
Start: 2024-01-19

## 2024-01-19 RX ORDER — MIRTAZAPINE 7.5 MG/1
7.5 TABLET, FILM COATED ORAL NIGHTLY
Qty: 90 TABLET | Refills: 3 | Status: SHIPPED | OUTPATIENT
Start: 2024-01-19

## 2024-01-19 RX ORDER — CLONAZEPAM 0.5 MG/1
0.5 TABLET ORAL NIGHTLY PRN
Qty: 30 TABLET | Refills: 2 | Status: SHIPPED | OUTPATIENT
Start: 2024-01-19 | End: 2024-04-18

## 2024-01-19 RX ORDER — ROPINIROLE 0.5 MG/1
0.5 TABLET, FILM COATED ORAL NIGHTLY
Qty: 90 TABLET | Refills: 3 | Status: SHIPPED | OUTPATIENT
Start: 2024-01-19

## 2024-01-19 RX ORDER — AMLODIPINE BESYLATE AND BENAZEPRIL HYDROCHLORIDE 5; 10 MG/1; MG/1
1 CAPSULE ORAL DAILY
Qty: 90 CAPSULE | Refills: 3 | Status: SHIPPED | OUTPATIENT
Start: 2024-01-19

## 2024-01-19 RX ORDER — B-COMPLEX WITH VITAMIN C
1 TABLET ORAL DAILY
Qty: 90 TABLET | Refills: 3 | Status: SHIPPED | OUTPATIENT
Start: 2024-01-19

## 2024-01-19 ASSESSMENT — ENCOUNTER SYMPTOMS
WHEEZING: 0
CONSTIPATION: 0
DIARRHEA: 0
SHORTNESS OF BREATH: 1
SORE THROAT: 0
VOMITING: 0
CHEST TIGHTNESS: 0
NAUSEA: 0
STRIDOR: 0
ABDOMINAL PAIN: 0
COUGH: 0

## 2024-01-19 NOTE — PROGRESS NOTES
Subjective:      Chief Complaint   Patient presents with    Follow-up    Medication Refill       HPI:  Johnnie Smith is a 78 y.o. male who presents today for medication follow up.    A-Fib/HTN/HLD  He follows with Dr. Jha who is takes care of his Tikosyn and Eliquis.   Dr. Aldridge manages his HTN.  BP is well controlled. He denies headaches, vision changes, chest pain/tightness, shortness of breath or edema.    COPD   He has been more short of breath the last week.  He is only using Symbicort prn.    GERD  Longstanding hx of heartburn.  Symptoms are well controlled with PPI.     Depression  Has MDD and symptoms are well controlled with Lexapro.  Denies AVH/SIB/SI/HI.      Insomnia  He continues to have difficulty sleeping at night and reports daytime fatigue. He is taking Remeron at HS as prescribed.  [facility] cardiology recommended a sleep study but he does not want to pursue that at this time.         RLS  Historically prescribed Klonopin 0.5mg nightly for symptoms associated with restless leg syndrome.  He does not take it every night - only when symptoms are severe. OARRS reviewed,medication last filled 23.      Past Medical History:   Diagnosis Date    Allergic rhinitis     Asthma     Atrial fibrillation ([facility])     Dr. Aldridge & Dr. Nix    CAD (coronary artery disease)     mild LAD - Dr. Aldridge    COPD (chronic obstructive pulmonary disease) ([facility])     COVID-19 2021    Depression     Diverticulosis of colon     Family history of early CAD     Father- MI-  age 51; a grandparent- age 52 of MI    Family history of valvular heart disease     Mother- Mitral valve disease    GERD (gastroesophageal reflux disease)     H/O 24 hour EKG monitoring 2001-Predominant rhythm is sinus rhythm. No signif ectopy noted.    H/O echocardiogram 2002, 2001-EF 60%.Normal LVSF. Mild MR. Mild TR-Dr Aldridge;    H/O echocardiogram 2019    EF 55-60%, Grade I diastolic

## 2024-01-24 DIAGNOSIS — I10 PRIMARY HYPERTENSION: Chronic | ICD-10-CM

## 2024-01-26 RX ORDER — DOFETILIDE 0.25 MG/1
CAPSULE ORAL
Qty: 180 CAPSULE | Refills: 1 | Status: SHIPPED | OUTPATIENT
Start: 2024-01-26

## 2024-01-26 RX ORDER — METOPROLOL SUCCINATE 50 MG/1
TABLET, EXTENDED RELEASE ORAL
Qty: 180 TABLET | Refills: 1 | Status: SHIPPED | OUTPATIENT
Start: 2024-01-26

## 2024-02-29 ENCOUNTER — OFFICE VISIT (OUTPATIENT)
Dept: FAMILY MEDICINE CLINIC | Age: 79
End: 2024-02-29

## 2024-02-29 VITALS
RESPIRATION RATE: 18 BRPM | SYSTOLIC BLOOD PRESSURE: 138 MMHG | HEIGHT: 68 IN | WEIGHT: 222.2 LBS | OXYGEN SATURATION: 92 % | DIASTOLIC BLOOD PRESSURE: 82 MMHG | BODY MASS INDEX: 33.68 KG/M2 | HEART RATE: 62 BPM

## 2024-02-29 DIAGNOSIS — G25.81 RESTLESS LEG SYNDROME: ICD-10-CM

## 2024-02-29 DIAGNOSIS — I48.19 HYPERCOAGULABLE STATE DUE TO PERSISTENT ATRIAL FIBRILLATION (HCC): ICD-10-CM

## 2024-02-29 DIAGNOSIS — D68.69 HYPERCOAGULABLE STATE DUE TO PERSISTENT ATRIAL FIBRILLATION (HCC): ICD-10-CM

## 2024-02-29 DIAGNOSIS — I49.5 SICK SINUS SYNDROME (HCC): ICD-10-CM

## 2024-02-29 DIAGNOSIS — Z76.89 ENCOUNTER TO ESTABLISH CARE WITH NEW DOCTOR: Primary | ICD-10-CM

## 2024-02-29 DIAGNOSIS — J44.9 CHRONIC OBSTRUCTIVE PULMONARY DISEASE WITH HYPOXIA (HCC): ICD-10-CM

## 2024-02-29 DIAGNOSIS — F32.5 DEPRESSION, MAJOR, IN REMISSION (HCC): ICD-10-CM

## 2024-02-29 DIAGNOSIS — I50.22 CHRONIC SYSTOLIC CONGESTIVE HEART FAILURE (HCC): ICD-10-CM

## 2024-02-29 DIAGNOSIS — I48.0 PAF (PAROXYSMAL ATRIAL FIBRILLATION) (HCC): ICD-10-CM

## 2024-02-29 RX ORDER — CLONAZEPAM 0.5 MG/1
0.5 TABLET ORAL NIGHTLY PRN
Qty: 30 TABLET | Refills: 2 | Status: SHIPPED | OUTPATIENT
Start: 2024-02-29 | End: 2024-05-29

## 2024-02-29 ASSESSMENT — PATIENT HEALTH QUESTIONNAIRE - PHQ9
2. FEELING DOWN, DEPRESSED OR HOPELESS: 0
4. FEELING TIRED OR HAVING LITTLE ENERGY: 0
7. TROUBLE CONCENTRATING ON THINGS, SUCH AS READING THE NEWSPAPER OR WATCHING TELEVISION: 0
SUM OF ALL RESPONSES TO PHQ QUESTIONS 1-9: 0
9. THOUGHTS THAT YOU WOULD BE BETTER OFF DEAD, OR OF HURTING YOURSELF: 0
SUM OF ALL RESPONSES TO PHQ QUESTIONS 1-9: 0
6. FEELING BAD ABOUT YOURSELF - OR THAT YOU ARE A FAILURE OR HAVE LET YOURSELF OR YOUR FAMILY DOWN: 0
1. LITTLE INTEREST OR PLEASURE IN DOING THINGS: 0
SUM OF ALL RESPONSES TO PHQ QUESTIONS 1-9: 0
SUM OF ALL RESPONSES TO PHQ QUESTIONS 1-9: 0
8. MOVING OR SPEAKING SO SLOWLY THAT OTHER PEOPLE COULD HAVE NOTICED. OR THE OPPOSITE, BEING SO FIGETY OR RESTLESS THAT YOU HAVE BEEN MOVING AROUND A LOT MORE THAN USUAL: 0
5. POOR APPETITE OR OVEREATING: 0
3. TROUBLE FALLING OR STAYING ASLEEP: 0
10. IF YOU CHECKED OFF ANY PROBLEMS, HOW DIFFICULT HAVE THESE PROBLEMS MADE IT FOR YOU TO DO YOUR WORK, TAKE CARE OF THINGS AT HOME, OR GET ALONG WITH OTHER PEOPLE: 0
SUM OF ALL RESPONSES TO PHQ9 QUESTIONS 1 & 2: 0

## 2024-02-29 NOTE — PATIENT INSTRUCTIONS
We are committed to providing you the best care possible.    If you receive a survey after visiting one of our offices, please take time to share your experience concerning your physician office visit.  These surveys are confidential and no health information about you is shared.    We are eager to improve for you and continue to give you satisfactory care, we are counting on your feedback to help make that happen.            Welcome to [facility]:    Did you know we now have a faster way for you to move through your appointment? For your convenience, we now have digital registration available. When you schedule your next appointment, you will receive a link via your email as well as a text message that will allow you to complete any paperwork digitally before your appointment.

## 2024-02-29 NOTE — PROGRESS NOTES
MG delayed release capsule Take 1 capsule by mouth daily 90 capsule 3    folic acid (FOLVITE) 400 MCG tablet Take 1 tablet by mouth daily 90 tablet 3    B Complex Vitamins (VITAMIN B COMPLEX) TABS Take 1 tablet by mouth daily 90 tablet 3    ELIQUIS 5 MG TABS tablet TAKE 1 TABLET TWICE A  tablet 3    UNABLE TO FIND Betamethasone Shelley/Eucerin 1:1 concentration  Apply topically BID for eczema 120 g 5    aspirin 81 MG EC tablet Take 1 tablet by mouth daily 30 tablet 3    montelukast (SINGULAIR) 10 MG tablet Take 1 tablet by mouth nightly 90 tablet 3    escitalopram (LEXAPRO) 20 MG tablet Take 1.5 tablets by mouth daily 135 tablet 2    budesonide-formoterol (SYMBICORT) 160-4.5 MCG/ACT AERO USE 2 INHALATIONS ORALLY   TWICE DAILY (Patient taking differently: as needed USE 2 INHALATIONS ORALLY   TWICE DAILY) 2 each 2    atorvastatin (LIPITOR) 10 MG tablet Take 1 tablet by mouth daily 90 tablet 3    Cyanocobalamin (B-12) 500 MCG TABS Take 1 tablet by mouth 4 times daily 120 tablet 2    UNABLE TO FIND Compounded Betaval cream 15g/Eucerin 135g 1 Can 5    Misc. Devices ([employer] CAN[facility]) MISC 1 Device by Does not apply route as needed (for ambulation) 1 each 0     No current facility-administered medications for this visit.       ALLERGIES  Allergies   Allergen Reactions    Lyrica [Pregabalin] Hallucinations       PHYSICAL EXAM    /82 (Site: Left Upper Arm, Position: Sitting, Cuff Size: Large Adult)   Pulse 62   Resp 18   Ht 1.715 m (5' 7.5\")   Wt 100.8 kg (222 lb 3.2 oz)   SpO2 92%   BMI 34.29 kg/m²     Physical Exam  Constitutional:       Appearance: Normal appearance. He is obese.   HENT:      Head: Normocephalic and atraumatic.      Right Ear: Tympanic membrane and external ear normal.      Left Ear: Tympanic membrane and external ear normal.      Nose: No rhinorrhea.      Mouth/Throat:      Mouth: Mucous membranes are moist.      Pharynx: Oropharynx is clear. No oropharyngeal exudate or posterior oropharyngeal

## 2024-03-04 ENCOUNTER — HOSPITAL ENCOUNTER (EMERGENCY)
Age: 79
Discharge: HOME | End: 2024-03-04
Payer: MEDICARE

## 2024-03-04 ENCOUNTER — TELEPHONE (OUTPATIENT)
Dept: PULMONOLOGY | Age: 79
End: 2024-03-04

## 2024-03-04 ENCOUNTER — PROCEDURE VISIT (OUTPATIENT)
Dept: CARDIOLOGY CLINIC | Age: 79
End: 2024-03-04

## 2024-03-04 ENCOUNTER — TELEPHONE (OUTPATIENT)
Dept: CARDIOLOGY CLINIC | Age: 79
End: 2024-03-04

## 2024-03-04 ENCOUNTER — APPOINTMENT (OUTPATIENT)
Dept: GENERAL RADIOLOGY | Age: 79
End: 2024-03-04
Payer: MEDICARE

## 2024-03-04 VITALS
SYSTOLIC BLOOD PRESSURE: 132 MMHG | HEART RATE: 70 BPM | DIASTOLIC BLOOD PRESSURE: 87 MMHG | HEIGHT: 67 IN | OXYGEN SATURATION: 94 % | RESPIRATION RATE: 18 BRPM | WEIGHT: 222 LBS | TEMPERATURE: 97.7 F | BODY MASS INDEX: 34.84 KG/M2

## 2024-03-04 DIAGNOSIS — J44.1 COPD EXACERBATION (HCC): Primary | ICD-10-CM

## 2024-03-04 DIAGNOSIS — I44.0 AV BLOCK, 1ST DEGREE: ICD-10-CM

## 2024-03-04 DIAGNOSIS — I49.5 SINUS NODE DYSFUNCTION (HCC): ICD-10-CM

## 2024-03-04 DIAGNOSIS — Z95.0 CARDIAC PACEMAKER IN SITU: Primary | ICD-10-CM

## 2024-03-04 LAB
ALBUMIN SERPL-MCNC: 3.6 GM/DL (ref 3.4–5)
ALP BLD-CCNC: 84 IU/L (ref 40–129)
ALT SERPL-CCNC: 18 U/L (ref 10–40)
ANION GAP SERPL CALCULATED.3IONS-SCNC: 8 MMOL/L (ref 7–16)
AST SERPL-CCNC: 22 IU/L (ref 15–37)
BASE EXCESS MIXED: 1.7 (ref 0–3)
BASOPHILS ABSOLUTE: 0 K/CU MM
BASOPHILS RELATIVE PERCENT: 0.5 % (ref 0–1)
BILIRUB SERPL-MCNC: 0.5 MG/DL (ref 0–1)
BUN SERPL-MCNC: 21 MG/DL (ref 6–23)
CALCIUM SERPL-MCNC: 8.4 MG/DL (ref 8.3–10.6)
CHLORIDE BLD-SCNC: 105 MMOL/L (ref 99–110)
CO2: 24 MMOL/L (ref 21–32)
COMMENT: ABNORMAL
CREAT SERPL-MCNC: 1.2 MG/DL (ref 0.9–1.3)
DIFFERENTIAL TYPE: ABNORMAL
EOSINOPHILS ABSOLUTE: 0.2 K/CU MM
EOSINOPHILS RELATIVE PERCENT: 3.8 % (ref 0–3)
GFR SERPL CREATININE-BSD FRML MDRD: >60 ML/MIN/1.73M2
GLUCOSE SERPL-MCNC: 102 MG/DL (ref 70–99)
HCO3 VENOUS: 26.6 MMOL/L (ref 22–29)
HCT VFR BLD CALC: 38.8 % (ref 42–52)
HEMOGLOBIN: 12.6 GM/DL (ref 13.5–18)
IMMATURE NEUTROPHIL %: 0.4 % (ref 0–0.43)
INFLUENZA A ANTIGEN: NOT DETECTED
INFLUENZA B ANTIGEN: NOT DETECTED
LYMPHOCYTES ABSOLUTE: 1.2 K/CU MM
LYMPHOCYTES RELATIVE PERCENT: 20.6 % (ref 24–44)
MCH RBC QN AUTO: 30.5 PG (ref 27–31)
MCHC RBC AUTO-ENTMCNC: 32.5 % (ref 32–36)
MCV RBC AUTO: 93.9 FL (ref 78–100)
MONOCYTES ABSOLUTE: 0.9 K/CU MM
MONOCYTES RELATIVE PERCENT: 15.9 % (ref 0–4)
NUCLEATED RBC %: 0 %
O2 SAT, VEN: 91.3 % (ref 50–70)
PCO2, VEN: 42 MMHG (ref 41–51)
PDW BLD-RTO: 14 % (ref 11.7–14.9)
PH VENOUS: 7.41 (ref 7.32–7.43)
PLATELET # BLD: 156 K/CU MM (ref 140–440)
PMV BLD AUTO: 10.2 FL (ref 7.5–11.1)
PO2, VEN: 74 MMHG (ref 28–48)
POTASSIUM SERPL-SCNC: 3.7 MMOL/L (ref 3.5–5.1)
PRO-BNP: 180.7 PG/ML
RBC # BLD: 4.13 M/CU MM (ref 4.6–6.2)
SARS-COV-2 RDRP RESP QL NAA+PROBE: NOT DETECTED
SEGMENTED NEUTROPHILS ABSOLUTE COUNT: 3.3 K/CU MM
SEGMENTED NEUTROPHILS RELATIVE PERCENT: 58.8 % (ref 36–66)
SODIUM BLD-SCNC: 137 MMOL/L (ref 135–145)
SOURCE: NORMAL
TOTAL IMMATURE NEUTOROPHIL: 0.02 K/CU MM
TOTAL NUCLEATED RBC: 0 K/CU MM
TOTAL PROTEIN: 6.7 GM/DL (ref 6.4–8.2)
TROPONIN, HIGH SENSITIVITY: 10 NG/L (ref 0–22)
TROPONIN, HIGH SENSITIVITY: 9 NG/L (ref 0–22)
WBC # BLD: 5.6 K/CU MM (ref 4–10.5)

## 2024-03-04 PROCEDURE — 80053 COMPREHEN METABOLIC PANEL: CPT

## 2024-03-04 PROCEDURE — 85025 COMPLETE CBC W/AUTO DIFF WBC: CPT

## 2024-03-04 PROCEDURE — 87502 INFLUENZA DNA AMP PROBE: CPT

## 2024-03-04 PROCEDURE — 82805 BLOOD GASES W/O2 SATURATION: CPT

## 2024-03-04 PROCEDURE — 94640 AIRWAY INHALATION TREATMENT: CPT

## 2024-03-04 PROCEDURE — 87635 SARS-COV-2 COVID-19 AMP PRB: CPT

## 2024-03-04 PROCEDURE — 93005 ELECTROCARDIOGRAM TRACING: CPT | Performed by: PHYSICIAN ASSISTANT

## 2024-03-04 PROCEDURE — 84484 ASSAY OF TROPONIN QUANT: CPT

## 2024-03-04 PROCEDURE — 83880 ASSAY OF NATRIURETIC PEPTIDE: CPT

## 2024-03-04 PROCEDURE — 6370000000 HC RX 637 (ALT 250 FOR IP): Performed by: PHYSICIAN ASSISTANT

## 2024-03-04 PROCEDURE — 99285 EMERGENCY DEPT VISIT HI MDM: CPT

## 2024-03-04 PROCEDURE — 71045 X-RAY EXAM CHEST 1 VIEW: CPT

## 2024-03-04 RX ORDER — PREDNISONE 20 MG/1
60 TABLET ORAL ONCE
Status: COMPLETED | OUTPATIENT
Start: 2024-03-04 | End: 2024-03-04

## 2024-03-04 RX ORDER — PREDNISONE 50 MG/1
50 TABLET ORAL DAILY
Qty: 5 TABLET | Refills: 0 | Status: SHIPPED | OUTPATIENT
Start: 2024-03-04 | End: 2024-03-09

## 2024-03-04 RX ORDER — ASPIRIN 81 MG/1
324 TABLET, CHEWABLE ORAL ONCE
Status: COMPLETED | OUTPATIENT
Start: 2024-03-04 | End: 2024-03-04

## 2024-03-04 RX ORDER — IPRATROPIUM BROMIDE AND ALBUTEROL SULFATE 2.5; .5 MG/3ML; MG/3ML
3 SOLUTION RESPIRATORY (INHALATION) ONCE
Status: COMPLETED | OUTPATIENT
Start: 2024-03-04 | End: 2024-03-04

## 2024-03-04 RX ORDER — ALBUTEROL SULFATE 90 UG/1
6 AEROSOL, METERED RESPIRATORY (INHALATION) ONCE
Status: COMPLETED | OUTPATIENT
Start: 2024-03-04 | End: 2024-03-04

## 2024-03-04 RX ADMIN — IPRATROPIUM BROMIDE AND ALBUTEROL SULFATE 3 DOSE: 2.5; .5 SOLUTION RESPIRATORY (INHALATION) at 21:25

## 2024-03-04 RX ADMIN — PREDNISONE 60 MG: 20 TABLET ORAL at 21:37

## 2024-03-04 RX ADMIN — IPRATROPIUM BROMIDE 6 PUFF: 17 AEROSOL, METERED RESPIRATORY (INHALATION) at 19:49

## 2024-03-04 RX ADMIN — ASPIRIN 324 MG: 81 TABLET, CHEWABLE ORAL at 19:05

## 2024-03-04 RX ADMIN — ALBUTEROL SULFATE 6 PUFF: 90 AEROSOL, METERED RESPIRATORY (INHALATION) at 19:40

## 2024-03-04 ASSESSMENT — PAIN - FUNCTIONAL ASSESSMENT
PAIN_FUNCTIONAL_ASSESSMENT: NONE - DENIES PAIN
PAIN_FUNCTIONAL_ASSESSMENT: NONE - DENIES PAIN

## 2024-03-04 NOTE — TELEPHONE ENCOUNTER
Returned patient's call about an appointment. Patient states that he's been light headed and dizzy for a week. He also states that when he's up moving around his o2 is dropping into the 70s even with the his home o2 on. Advised patient that he needs to go to the ER to be seen immediately as they can get any test results or imaging back faster than we can in an out patient setting. Patient hesitant to go to the ER, feels like being seen in the office would be better for him. Explained the importance of him being seen immediately to get care as quickly as possible, patient voiced agreement states he will go to the ER

## 2024-03-04 NOTE — TELEPHONE ENCOUNTER
Breathing issues, O2 levels are going up and down, lightheadedness.  Patient is at work., he gets off at 5:30pm.

## 2024-03-04 NOTE — ED PROVIDER NOTES
cream 15g/Eucerin 135g 1 Can 5    Misc. Devices ([employer] CAN[employer]) MISC 1 Device by Does not apply route as needed (for ambulation) 1 each 0     Allergies   Allergen Reactions    Lyrica [Pregabalin] Hallucinations       Nursing Notes Reviewed    Physical Exam:  ED Triage Vitals [03/04/24 1657]   Enc Vitals Group      /76      Pulse 70      Respirations 22      Temp 97.8 °F (36.6 °C)      Temp Source Oral      SpO2 90 %      Weight - Scale 100.7 kg (222 lb)      Height 1.702 m (5' 7\")      Head Circumference       Peak Flow       Pain Score       Pain Loc       Pain Edu?       Excl. in GC?      General :Patient is awake alert oriented person place and time no acute distress nontoxic appearing  HEENT: Pupils are equally round and reactive to light extraocular motors are intact conjunctivae clear sclerae white there is no injection no icterus. Nose without any rhinorrhea or epistaxis.   Oral mucosa is moist no exudate buccal mucosa shows no ulcerations. Uvula is midline    Neck: Neck is supple full range of motion trachea midline thyroid nonpalpable  Cardiac: Heart regular rate rhythm no murmurs rubs clicks or gallops  Lungs: Lungs are clear to auscultation there is no wheezing rhonchi or rales. There is no use of accessory muscles no nasal flaring identified.  Chest wall: There is no tenderness to palpation over the chest wall or over ribs  Abdomen: Abdomen is soft nontender nondistended. There is no firm or pulsatile masses no rebound rigidity or guarding negative Clement's negative McBurney, no peritoneal signs  Suprapubic:  there is no tenderness to palpation over the external bladder   Musculoskeletal: 5 out of 5 strength in all 4 extremities full flexion extension abduction and adduction supination pronation of all extremities and all digits. No obvious muscle atrophy is noted. No focal muscle deficits are appreciated  Dermatology: Skin is warm and dry there is no obvious abscesses lacerations or lesions  Anion Gap 8 7 - 16    Glucose 102 (H) 70 - 99 MG/DL    BUN 21 6 - 23 MG/DL    Creatinine 1.2 0.9 - 1.3 MG/DL    Est, Glom Filt Rate >60 >60 mL/min/1.73m2    Calcium 8.4 8.3 - 10.6 MG/DL    Total Protein 6.7 6.4 - 8.2 GM/DL    Albumin 3.6 3.4 - 5.0 GM/DL    Total Bilirubin 0.5 0.0 - 1.0 MG/DL    Alkaline Phosphatase 84 40 - 129 IU/L    ALT 18 10 - 40 U/L    AST 22 15 - 37 IU/L   Troponin   Result Value Ref Range    Troponin, High Sensitivity 10 0 - 22 ng/L   Brain Natriuretic Peptide   Result Value Ref Range    Pro-.7 <300 PG/ML   Blood Gas, Venous   Result Value Ref Range    pH, Homer 7.41 7.32 - 7.43    pCO2, Homer 42 41 - 51 mmHG    pO2, Homer 74 (H) 28 - 48 mmHG    Base Exc, Mixed 1.7 0 - 3.0    HCO3, Venous 26.6 22 - 29 MMOL/L    O2 Sat, Homer 91.3 (H) 50 - 70 %    Comment VBG    Troponin   Result Value Ref Range    Troponin, High Sensitivity 9 0 - 22 ng/L   EKG 12 Lead   Result Value Ref Range    Ventricular Rate 70 BPM    Atrial Rate 70 BPM    P-R Interval 196 ms    QRS Duration 100 ms    Q-T Interval 468 ms    QTc Calculation (Bazett) 505 ms    P Axis 60 degrees    R Axis -29 degrees    T Axis -7 degrees    Diagnosis       Normal sinus rhythm  Nonspecific T wave abnormality  Abnormal ECG  When compared with ECG of 02-NOV-2022 11:33,  Sinus rhythm has replaced Electronic atrial pacemaker  Nonspecific T wave abnormality now evident in Anterior leads  Confirmed by KADIE PATRICIO, [facility] ([id number]) on 3/5/2024 5:57:28 PM        Radiographs (if obtained):  [] The following radiograph was interpreted by myself in the absence of a radiologist:   [] Radiologist's Report Reviewed:  XR CHEST PORTABLE   Final Result   Impression:   1. No acute cardiopulmonary disease.      Electronically signed by Parmjit Onofre MD          EKG (if obtained):   Please See Note of attending physician for EKG interpretation.     Chart review shows recent radiograph(s):  XR CHEST PORTABLE    Result Date: 3/4/2024  Portable chest x-ray single

## 2024-03-04 NOTE — ED PROVIDER NOTES
ED attending EKG interpretation (I otherwise did not participate in the care of this patient)    EKG Interpretation  Interpreted by me  Compared to 10/19/2023  Rhythm: normal sinus   Rate: normal 70  Axis: normal  Ectopy: none  Conduction: normal  ST Segments: no acute change  T Waves: Chronic inversions in leads III, new inversions in V3 with new flattening in V4, V5 and V6  Clinical Impression: normal sinus rhythm, new t-wave inversions in V3 with new t-wave flattening in V4, V5 and V6     Nova Sears MD  03/04/24 5788

## 2024-03-05 ENCOUNTER — CARE COORDINATION (OUTPATIENT)
Dept: CARE COORDINATION | Age: 79
End: 2024-03-05

## 2024-03-05 LAB
EKG ATRIAL RATE: 70 BPM
EKG DIAGNOSIS: NORMAL
EKG P AXIS: 60 DEGREES
EKG P-R INTERVAL: 196 MS
EKG Q-T INTERVAL: 468 MS
EKG QRS DURATION: 100 MS
EKG QTC CALCULATION (BAZETT): 505 MS
EKG R AXIS: -29 DEGREES
EKG T AXIS: -7 DEGREES
EKG VENTRICULAR RATE: 70 BPM

## 2024-03-05 PROCEDURE — 93010 ELECTROCARDIOGRAM REPORT: CPT | Performed by: INTERNAL MEDICINE

## 2024-03-05 NOTE — ED NOTES
Ambulated patient 100 ft without oxygen o2 sats 82-84 % . Ambulated  100 ft on 3L/NC o2 sats 86-88%. Avery GRACE notified, patient also stated he did not want to be admitted.

## 2024-03-05 NOTE — CARE COORDINATION
Ambulatory Care Coordination Note     3/5/2024 4:02 PM     Patient Current Location:  [place]  This patient was received as a referral from Ambulatory Care Manager .     ACM contacted the patient by telephone. Verified name and  with patient as identifiers. Provided introduction to self, and explanation of the ACM role.   Patient accepted care management services at this time.          ACM: Lilliam Morales RN     Challenges to be reviewed by the provider   Additional needs identified to be addressed with provider No  none               Method of communication with provider: none.        Offered patient enrollment in the Remote Patient Monitoring (RPM) program for in-home monitoring: Yes, but did not enroll at this time.     Assessments Completed:   Ambulatory Care Coordination Assessment    Care Coordination Protocol  Referral from Primary Care Provider: No  Week 1 - Initial Assessment     Do you have all of your prescriptions and are they filled?: Yes     Do you have Home O2 Therapy?: Yes   Oxygen Regimen: Continuous Flow - Enter rate/FIO2: 3   Method of Delivery: Nasal Cannula, Portable conserving device      Ability to seek help/take action for Emergent Urgent situations i.e. fire, crime, inclement weather or health crisis.: Independent  Ability to ambulate to restroom: Independent  Ability to drive and/or has transportation: Independent  Is patient able to live independently?: Yes     Current Housing: Private Residence     Patient Home Equipment: Oxygen              Thinking about your patient's physical health needs, are there any symptoms or problems (risk indicators) you are unsure about that require further investigation?: Mild vague physical symptoms or problems; but do not impact on daily life or are not of concern to patient   Suggested Interventions and Community Resources   Zone Management Tools: In Process                   Care Planning:   Not completed during this call    Advance Care Planning:

## 2024-03-08 ENCOUNTER — OFFICE VISIT (OUTPATIENT)
Dept: PULMONOLOGY | Age: 79
End: 2024-03-08
Payer: MEDICARE

## 2024-03-08 VITALS
HEIGHT: 67 IN | OXYGEN SATURATION: 90 % | SYSTOLIC BLOOD PRESSURE: 130 MMHG | BODY MASS INDEX: 34.84 KG/M2 | WEIGHT: 222 LBS | RESPIRATION RATE: 16 BRPM | DIASTOLIC BLOOD PRESSURE: 78 MMHG | HEART RATE: 78 BPM

## 2024-03-08 DIAGNOSIS — Z09 HOSPITAL DISCHARGE FOLLOW-UP: ICD-10-CM

## 2024-03-08 DIAGNOSIS — J44.9 CHRONIC OBSTRUCTIVE PULMONARY DISEASE WITH HYPOXIA (HCC): Primary | ICD-10-CM

## 2024-03-08 PROCEDURE — 4004F PT TOBACCO SCREEN RCVD TLK: CPT | Performed by: NURSE PRACTITIONER

## 2024-03-08 PROCEDURE — 99214 OFFICE O/P EST MOD 30 MIN: CPT | Performed by: NURSE PRACTITIONER

## 2024-03-08 PROCEDURE — G8417 CALC BMI ABV UP PARAM F/U: HCPCS | Performed by: NURSE PRACTITIONER

## 2024-03-08 PROCEDURE — G8484 FLU IMMUNIZE NO ADMIN: HCPCS | Performed by: NURSE PRACTITIONER

## 2024-03-08 PROCEDURE — 3075F SYST BP GE 130 - 139MM HG: CPT | Performed by: NURSE PRACTITIONER

## 2024-03-08 PROCEDURE — 1123F ACP DISCUSS/DSCN MKR DOCD: CPT | Performed by: NURSE PRACTITIONER

## 2024-03-08 PROCEDURE — 3023F SPIROM DOC REV: CPT | Performed by: NURSE PRACTITIONER

## 2024-03-08 PROCEDURE — 3078F DIAST BP <80 MM HG: CPT | Performed by: NURSE PRACTITIONER

## 2024-03-08 PROCEDURE — G8427 DOCREV CUR MEDS BY ELIG CLIN: HCPCS | Performed by: NURSE PRACTITIONER

## 2024-03-08 RX ORDER — ALBUTEROL SULFATE 2.5 MG/3ML
2.5 SOLUTION RESPIRATORY (INHALATION) EVERY 6 HOURS PRN
Qty: 120 EACH | Refills: 3 | Status: SHIPPED | OUTPATIENT
Start: 2024-03-08 | End: 2024-03-12 | Stop reason: SDUPTHER

## 2024-03-08 NOTE — PROGRESS NOTES
on 2 to 3 liters with activity.  Discussed when to call 911 or go toe ED for low saturation or any changes in respiratory status.     Vital Signs  /78   Pulse 78   Resp 16   Ht 1.702 m (5' 7\")   Wt 100.7 kg (222 lb)   SpO2 90% Comment: Portable shoulder o2 on 3LPM  BMI 34.77 kg/m²      No follow-ups on file.            No data to display              Johnnie expresses understanding of information. He is agreeable with the treatment plan. He states understanding of the importance of emergency needs when oxygenation has dropped and does not recover within a reasonable amount of time on 2 to 3 liters.           An electronic signature was used to authenticate this note.    --JANI Argueta - CNP

## 2024-03-12 DIAGNOSIS — J44.9 CHRONIC OBSTRUCTIVE PULMONARY DISEASE, UNSPECIFIED COPD TYPE (HCC): Primary | ICD-10-CM

## 2024-03-12 PROBLEM — Z09 HOSPITAL DISCHARGE FOLLOW-UP: Status: ACTIVE | Noted: 2024-03-12

## 2024-03-12 RX ORDER — ALBUTEROL SULFATE 2.5 MG/3ML
2.5 SOLUTION RESPIRATORY (INHALATION) EVERY 6 HOURS PRN
Qty: 120 EACH | Refills: 0 | Status: SHIPPED | OUTPATIENT
Start: 2024-03-12

## 2024-03-12 ASSESSMENT — ENCOUNTER SYMPTOMS: SHORTNESS OF BREATH: 1

## 2024-03-12 NOTE — TELEPHONE ENCOUNTER
Rec'd fax from Pharmacy for [facility] meds needing dx code associated to it.

## 2024-03-19 ENCOUNTER — CARE COORDINATION (OUTPATIENT)
Dept: CARE COORDINATION | Age: 79
End: 2024-03-19

## 2024-03-21 ENCOUNTER — OFFICE VISIT (OUTPATIENT)
Dept: FAMILY MEDICINE CLINIC | Age: 79
End: 2024-03-21
Payer: MEDICARE

## 2024-03-21 VITALS
RESPIRATION RATE: 18 BRPM | DIASTOLIC BLOOD PRESSURE: 84 MMHG | SYSTOLIC BLOOD PRESSURE: 120 MMHG | OXYGEN SATURATION: 89 % | BODY MASS INDEX: 34.69 KG/M2 | HEART RATE: 74 BPM | HEIGHT: 67 IN | WEIGHT: 221 LBS

## 2024-03-21 DIAGNOSIS — G25.81 RESTLESS LEG SYNDROME: ICD-10-CM

## 2024-03-21 DIAGNOSIS — Z09 ENCOUNTER FOR EXAMINATION FOLLOWING TREATMENT AT HOSPITAL: ICD-10-CM

## 2024-03-21 DIAGNOSIS — I50.22 CHRONIC SYSTOLIC CONGESTIVE HEART FAILURE (HCC): ICD-10-CM

## 2024-03-21 DIAGNOSIS — J44.9 CHRONIC OBSTRUCTIVE PULMONARY DISEASE WITH HYPOXIA (HCC): Primary | ICD-10-CM

## 2024-03-21 PROCEDURE — G8417 CALC BMI ABV UP PARAM F/U: HCPCS | Performed by: PHYSICIAN ASSISTANT

## 2024-03-21 PROCEDURE — 99215 OFFICE O/P EST HI 40 MIN: CPT | Performed by: PHYSICIAN ASSISTANT

## 2024-03-21 PROCEDURE — 3079F DIAST BP 80-89 MM HG: CPT | Performed by: PHYSICIAN ASSISTANT

## 2024-03-21 PROCEDURE — G8484 FLU IMMUNIZE NO ADMIN: HCPCS | Performed by: PHYSICIAN ASSISTANT

## 2024-03-21 PROCEDURE — 3074F SYST BP LT 130 MM HG: CPT | Performed by: PHYSICIAN ASSISTANT

## 2024-03-21 PROCEDURE — 4004F PT TOBACCO SCREEN RCVD TLK: CPT | Performed by: PHYSICIAN ASSISTANT

## 2024-03-21 PROCEDURE — 1123F ACP DISCUSS/DSCN MKR DOCD: CPT | Performed by: PHYSICIAN ASSISTANT

## 2024-03-21 PROCEDURE — G8427 DOCREV CUR MEDS BY ELIG CLIN: HCPCS | Performed by: PHYSICIAN ASSISTANT

## 2024-03-21 PROCEDURE — 3023F SPIROM DOC REV: CPT | Performed by: PHYSICIAN ASSISTANT

## 2024-03-21 RX ORDER — ROPINIROLE 0.5 MG/1
TABLET, FILM COATED ORAL
Qty: 360 TABLET | Refills: 1 | Status: SHIPPED | OUTPATIENT
Start: 2024-03-21

## 2024-03-21 ASSESSMENT — PATIENT HEALTH QUESTIONNAIRE - PHQ9
2. FEELING DOWN, DEPRESSED OR HOPELESS: NOT AT ALL
SUM OF ALL RESPONSES TO PHQ QUESTIONS 1-9: 0
6. FEELING BAD ABOUT YOURSELF - OR THAT YOU ARE A FAILURE OR HAVE LET YOURSELF OR YOUR FAMILY DOWN: NOT AT ALL
SUM OF ALL RESPONSES TO PHQ QUESTIONS 1-9: 0
8. MOVING OR SPEAKING SO SLOWLY THAT OTHER PEOPLE COULD HAVE NOTICED. OR THE OPPOSITE, BEING SO FIGETY OR RESTLESS THAT YOU HAVE BEEN MOVING AROUND A LOT MORE THAN USUAL: NOT AT ALL
1. LITTLE INTEREST OR PLEASURE IN DOING THINGS: NOT AT ALL
4. FEELING TIRED OR HAVING LITTLE ENERGY: NOT AT ALL
10. IF YOU CHECKED OFF ANY PROBLEMS, HOW DIFFICULT HAVE THESE PROBLEMS MADE IT FOR YOU TO DO YOUR WORK, TAKE CARE OF THINGS AT HOME, OR GET ALONG WITH OTHER PEOPLE: NOT DIFFICULT AT ALL
1. LITTLE INTEREST OR PLEASURE IN DOING THINGS: NOT AT ALL
3. TROUBLE FALLING OR STAYING ASLEEP: NOT AT ALL
9. THOUGHTS THAT YOU WOULD BE BETTER OFF DEAD, OR OF HURTING YOURSELF: NOT AT ALL
SUM OF ALL RESPONSES TO PHQ QUESTIONS 1-9: 0
SUM OF ALL RESPONSES TO PHQ9 QUESTIONS 1 & 2: 0
5. POOR APPETITE OR OVEREATING: NOT AT ALL
SUM OF ALL RESPONSES TO PHQ QUESTIONS 1-9: 0
7. TROUBLE CONCENTRATING ON THINGS, SUCH AS READING THE NEWSPAPER OR WATCHING TELEVISION: NOT AT ALL
2. FEELING DOWN, DEPRESSED OR HOPELESS: NOT AT ALL
SUM OF ALL RESPONSES TO PHQ QUESTIONS 1-9: 0
SUM OF ALL RESPONSES TO PHQ9 QUESTIONS 1 & 2: 0
SUM OF ALL RESPONSES TO PHQ QUESTIONS 1-9: 0

## 2024-03-21 NOTE — PROGRESS NOTES
Tenderness: There is no abdominal tenderness. There is no right CVA tenderness, left CVA tenderness, guarding or rebound.   Musculoskeletal:         General: No deformity. Normal range of motion.      Cervical back: Normal range of motion and neck supple. No rigidity. No muscular tenderness.      Right lower leg: Edema present.      Left lower leg: Edema present.      Comments: Mild bilateral edema of ankles   Skin:     General: Skin is warm and dry.      Capillary Refill: Capillary refill takes less than 2 seconds.      Findings: No bruising, erythema or rash.   Neurological:      General: No focal deficit present.      Mental Status: He is alert and oriented to person, place, and time.      Coordination: Coordination normal.      Gait: Gait normal.   Psychiatric:         Mood and Affect: Mood normal.         Behavior: Behavior normal.         ASSESSMENT & PLAN    1. Restless leg syndrome  Uncontrolled, contributing to his insomnia  Discuss proper weekly increasing dose to a therapeutic level  - rOPINIRole (REQUIP) 0.5 MG tablet; Take 2 pills 1-3 hours before bed. Each week increase dose by one pill until you get relief. If you are taking more than 6 pills a night call prescriber.  Dispense: 360 tablet; Refill: 1    2. Chronic obstructive pulmonary disease with hypoxia (HCC)  Stable, currently breathing well, continue with O2 and nebs  Recommend getting a backpack for his portable oxygen to increase his mobility and activity     3. Chronic systolic congestive heart failure (HCC)  Follow with cardiology, no acute signs of worsening condition    4. Encounter for examination following treatment at hospital  Er visit 3/4    Follow up in may  43 minutes total in room, chart review and note prep on this day  No follow-ups on file.         Electronically signed by SANJAY Carranza on 3/21/2024      Comment: Please note this report has been produced using speech recognition software and may contain errors related to that

## 2024-04-10 ENCOUNTER — CARE COORDINATION (OUTPATIENT)
Dept: CARE COORDINATION | Age: 79
End: 2024-04-10

## 2024-04-11 ENCOUNTER — HOSPITAL ENCOUNTER (OUTPATIENT)
Dept: CT IMAGING | Age: 79
Discharge: HOME | End: 2024-04-11
Payer: MEDICARE

## 2024-04-11 ENCOUNTER — OFFICE VISIT (OUTPATIENT)
Dept: PULMONOLOGY | Age: 79
End: 2024-04-11
Payer: MEDICARE

## 2024-04-11 VITALS — HEART RATE: 73 BPM | HEIGHT: 68 IN | OXYGEN SATURATION: 84 % | WEIGHT: 210 LBS | BODY MASS INDEX: 31.83 KG/M2

## 2024-04-11 DIAGNOSIS — R06.00 DYSPNEA, UNSPECIFIED TYPE: Primary | ICD-10-CM

## 2024-04-11 DIAGNOSIS — R06.00 DYSPNEA, UNSPECIFIED TYPE: ICD-10-CM

## 2024-04-11 DIAGNOSIS — J44.9 COPD WITH HYPOXIA (HCC): ICD-10-CM

## 2024-04-11 DIAGNOSIS — Z72.0 TOBACCO USE: ICD-10-CM

## 2024-04-11 PROBLEM — Z09 HOSPITAL DISCHARGE FOLLOW-UP: Status: RESOLVED | Noted: 2024-03-12 | Resolved: 2024-04-11

## 2024-04-11 PROCEDURE — 71275 CT ANGIOGRAPHY CHEST: CPT

## 2024-04-11 PROCEDURE — G8427 DOCREV CUR MEDS BY ELIG CLIN: HCPCS | Performed by: NURSE PRACTITIONER

## 2024-04-11 PROCEDURE — 6360000004 HC RX CONTRAST MEDICATION: Performed by: NURSE PRACTITIONER

## 2024-04-11 PROCEDURE — 3023F SPIROM DOC REV: CPT | Performed by: NURSE PRACTITIONER

## 2024-04-11 PROCEDURE — 1123F ACP DISCUSS/DSCN MKR DOCD: CPT | Performed by: NURSE PRACTITIONER

## 2024-04-11 PROCEDURE — 2580000003 HC RX 258: Performed by: NURSE PRACTITIONER

## 2024-04-11 PROCEDURE — G8417 CALC BMI ABV UP PARAM F/U: HCPCS | Performed by: NURSE PRACTITIONER

## 2024-04-11 PROCEDURE — 4004F PT TOBACCO SCREEN RCVD TLK: CPT | Performed by: NURSE PRACTITIONER

## 2024-04-11 PROCEDURE — 99214 OFFICE O/P EST MOD 30 MIN: CPT | Performed by: NURSE PRACTITIONER

## 2024-04-11 RX ORDER — SODIUM CHLORIDE 0.9 % (FLUSH) 0.9 %
20 SYRINGE (ML) INJECTION
Status: COMPLETED | OUTPATIENT
Start: 2024-04-11 | End: 2024-04-11

## 2024-04-11 RX ADMIN — IOPAMIDOL 85 ML: 755 INJECTION, SOLUTION INTRAVENOUS at 12:48

## 2024-04-11 RX ADMIN — SODIUM CHLORIDE, PRESERVATIVE FREE 20 ML: 5 INJECTION INTRAVENOUS at 13:09

## 2024-04-11 ASSESSMENT — ENCOUNTER SYMPTOMS: SHORTNESS OF BREATH: 1

## 2024-04-11 NOTE — PROGRESS NOTES
Johnnie Smith (:  1945) is a 79 y.o. male,Established patient, here for evaluation of the following chief complaint(s):  Shortness of Breath        Subjective   SUBJECTIVE/OBJECTIVE:  Johnnie, is a 78 yo established male patient who presents with complaint of extreme dyspnea on exertion. Oxygen saturation is in mid to low 80% on RA. Oxygen tank remains in his trunk. He didn't want to bring it out in the rain. He feels like his problem with oxygen saturation dropping is caused by a blockage to his heart. He feels like something is wrong. States that he can feel it. Reports that his dad was coughing a lot weeks before he  as age 51 from a heart attack. Mother and brother  at age 54 from heart attacks, although his mother had a mitral vavle placed 8 years prior to her death, she was also asthmatic.    He was seen at ED on 2024 for similar problems and they wanted to admit him, but he refused because he was worried about his wife being left home alone after recovering from a stroke.  He has an extensive cardiac history including a pacemaker, stent placement and has been shocked twice, see below.  CTA ordered stat for acute dyspnea, unstable oxygen saturation even on oxygen  and possible PE.        Review of Systems   Constitutional:  Positive for fatigue.   Respiratory:  Positive for shortness of breath.    All other systems reviewed and are negative.         Objective   Physical Exam  Vitals and nursing note reviewed.   Constitutional:       General: He is awake.      Appearance: Normal appearance. He is well-developed and well-groomed.   HENT:      Mouth/Throat:      Mouth: Mucous membranes are moist.      Pharynx: Oropharynx is clear.   Eyes:      Extraocular Movements: Extraocular movements intact.      Conjunctiva/sclera: Conjunctivae normal.      Pupils: Pupils are equal, round, and reactive to light.   Cardiovascular:      Rate and Rhythm: Normal rate and regular rhythm.      Pulses: Normal

## 2024-04-15 ENCOUNTER — TELEPHONE (OUTPATIENT)
Dept: PULMONOLOGY | Age: 79
End: 2024-04-15

## 2024-04-17 ENCOUNTER — OFFICE VISIT (OUTPATIENT)
Dept: PULMONOLOGY | Age: 79
End: 2024-04-17
Payer: MEDICARE

## 2024-04-17 VITALS
HEART RATE: 78 BPM | HEIGHT: 68 IN | BODY MASS INDEX: 33.37 KG/M2 | OXYGEN SATURATION: 85 % | SYSTOLIC BLOOD PRESSURE: 118 MMHG | WEIGHT: 220.2 LBS | DIASTOLIC BLOOD PRESSURE: 72 MMHG

## 2024-04-17 DIAGNOSIS — G47.33 OSA (OBSTRUCTIVE SLEEP APNEA): ICD-10-CM

## 2024-04-17 DIAGNOSIS — G47.10 HYPERSOMNIA: ICD-10-CM

## 2024-04-17 DIAGNOSIS — J44.9 COPD WITH HYPOXIA (HCC): Primary | ICD-10-CM

## 2024-04-17 DIAGNOSIS — D64.9 ANEMIA, UNSPECIFIED TYPE: ICD-10-CM

## 2024-04-17 DIAGNOSIS — R06.02 SOBOE (SHORTNESS OF BREATH ON EXERTION): ICD-10-CM

## 2024-04-17 DIAGNOSIS — J84.89 NSIP (NONSPECIFIC INTERSTITIAL PNEUMONIA) (HCC): ICD-10-CM

## 2024-04-17 DIAGNOSIS — G25.81 RESTLESS LEG SYNDROME: ICD-10-CM

## 2024-04-17 DIAGNOSIS — R09.02 HYPOXIA: ICD-10-CM

## 2024-04-17 DIAGNOSIS — E66.9 OBESITY (BMI 30-39.9): ICD-10-CM

## 2024-04-17 DIAGNOSIS — Z87.891 EX-CIGARETTE SMOKER: ICD-10-CM

## 2024-04-17 PROCEDURE — 3078F DIAST BP <80 MM HG: CPT | Performed by: INTERNAL MEDICINE

## 2024-04-17 PROCEDURE — 1123F ACP DISCUSS/DSCN MKR DOCD: CPT | Performed by: INTERNAL MEDICINE

## 2024-04-17 PROCEDURE — G8417 CALC BMI ABV UP PARAM F/U: HCPCS | Performed by: INTERNAL MEDICINE

## 2024-04-17 PROCEDURE — 3074F SYST BP LT 130 MM HG: CPT | Performed by: INTERNAL MEDICINE

## 2024-04-17 PROCEDURE — 4004F PT TOBACCO SCREEN RCVD TLK: CPT | Performed by: INTERNAL MEDICINE

## 2024-04-17 PROCEDURE — 3023F SPIROM DOC REV: CPT | Performed by: INTERNAL MEDICINE

## 2024-04-17 PROCEDURE — 99205 OFFICE O/P NEW HI 60 MIN: CPT | Performed by: INTERNAL MEDICINE

## 2024-04-17 PROCEDURE — G8427 DOCREV CUR MEDS BY ELIG CLIN: HCPCS | Performed by: INTERNAL MEDICINE

## 2024-04-17 RX ORDER — GABAPENTIN 300 MG/1
300 CAPSULE ORAL NIGHTLY
Qty: 30 CAPSULE | Refills: 3 | Status: SHIPPED | OUTPATIENT
Start: 2024-04-17 | End: 2024-04-17 | Stop reason: SDUPTHER

## 2024-04-17 RX ORDER — GABAPENTIN 300 MG/1
300 CAPSULE ORAL NIGHTLY
Qty: 30 CAPSULE | Refills: 3 | Status: SHIPPED | OUTPATIENT
Start: 2024-04-17 | End: 2024-08-15

## 2024-04-17 RX ORDER — FLUTICASONE FUROATE, UMECLIDINIUM BROMIDE AND VILANTEROL TRIFENATATE 100; 62.5; 25 UG/1; UG/1; UG/1
1 POWDER RESPIRATORY (INHALATION) DAILY
Qty: 3 EACH | Refills: 3 | Status: SHIPPED | OUTPATIENT
Start: 2024-04-17 | End: 2025-04-12

## 2024-04-17 RX ORDER — PREDNISONE 10 MG/1
TABLET ORAL
Qty: 30 TABLET | Refills: 0 | Status: SHIPPED | OUTPATIENT
Start: 2024-04-17

## 2024-04-17 RX ORDER — PREDNISONE 10 MG/1
TABLET ORAL
Qty: 30 TABLET | Refills: 0 | Status: SHIPPED | OUTPATIENT
Start: 2024-04-17 | End: 2024-04-17 | Stop reason: SDUPTHER

## 2024-04-17 RX ORDER — FLUTICASONE FUROATE, UMECLIDINIUM BROMIDE AND VILANTEROL TRIFENATATE 100; 62.5; 25 UG/1; UG/1; UG/1
1 POWDER RESPIRATORY (INHALATION) DAILY
Qty: 3 EACH | Refills: 3 | Status: SHIPPED | OUTPATIENT
Start: 2024-04-17 | End: 2024-04-17 | Stop reason: SDUPTHER

## 2024-04-17 ASSESSMENT — ENCOUNTER SYMPTOMS
EYE DISCHARGE: 0
SHORTNESS OF BREATH: 1
ABDOMINAL DISTENTION: 0
EYE ITCHING: 0
ABDOMINAL PAIN: 0
COUGH: 1
BACK PAIN: 0

## 2024-04-17 NOTE — PROGRESS NOTES
crackles  Abdominal:      General: Abdomen is flat.      Palpations: Abdomen is soft.   Musculoskeletal:         General: Normal range of motion.      Cervical back: Normal range of motion and neck supple.   Skin:     General: Skin is warm and dry.   Neurological:      General: No focal deficit present.      Mental Status: He is alert and oriented to person, place, and time.   Psychiatric:         Mood and Affect: Mood normal.         Behavior: Behavior normal.         Radiology: 1. Limited evaluation of the pulmonary arteries due to motion artifact. Repeat imaging is limited due to poor bolus opacification. No definite filling defect the level of the segmental pulmonary arteries. Small segmental and subsegmental emboli cannot be   excluded.  2. Subpleural reticular opacities and linear opacities in the lower lungs compatible with interstitial lung disease. No honeycombing identified. Findings compatible with nonspecific interstitial pneumonitis.  3. Severe emphysematous changes.    Assessment and Plan     Problem List          Respiratory    COPD with hypoxia (HCC) - Primary      Advised to c/w quitting smoking  PFT  6 MWT  Get yearly flu vaccine  C/w Inhalers           Relevant Medications    montelukast (SINGULAIR) 10 MG tablet    budesonide-formoterol (SYMBICORT) 160-4.5 MCG/ACT AERO    albuterol sulfate HFA (PROVENTIL;VENTOLIN;PROAIR) 108 (90 Base) MCG/ACT inhaler    albuterol (PROVENTIL) (2.5 MG/3ML) 0.083% nebulizer solution    fluticasone-umeclidin-vilant (TRELEGY ELLIPTA) 100-62.5-25 MCG/ACT AEPB inhaler    predniSONE (DELTASONE) 10 MG tablet    Other Relevant Orders    Home O2 eval (desaturation screen)    Full PFT Study With Bronchodilator    6 Minute Walk Test    NSIP (nonspecific interstitial pneumonia) (HCC)      Await PFT  6 MWT  C/w oxygen  CTD work up  Trial of steroids         Relevant Medications    montelukast (SINGULAIR) 10 MG tablet    budesonide-formoterol (SYMBICORT) 160-4.5 MCG/ACT AERO

## 2024-04-17 NOTE — ASSESSMENT & PLAN NOTE
Appears to be sec to the Obesity. Pulmonary HTN, COPD and ILD  PFT  6 MWT  ECHO  CTD work up  PSG  Loose weight  C/w quitting smoking

## 2024-04-18 ENCOUNTER — CARE COORDINATION (OUTPATIENT)
Dept: CARE COORDINATION | Age: 79
End: 2024-04-18

## 2024-04-22 RX ORDER — METOPROLOL SUCCINATE 25 MG/1
25 TABLET, EXTENDED RELEASE ORAL
Qty: 90 TABLET | Refills: 1 | OUTPATIENT
Start: 2024-04-22

## 2024-04-23 ENCOUNTER — OFFICE VISIT (OUTPATIENT)
Dept: CARDIOLOGY CLINIC | Age: 79
End: 2024-04-23
Payer: MEDICARE

## 2024-04-23 ENCOUNTER — TELEPHONE (OUTPATIENT)
Dept: CARDIOLOGY CLINIC | Age: 79
End: 2024-04-23

## 2024-04-23 ENCOUNTER — HOSPITAL ENCOUNTER (OUTPATIENT)
Age: 79
Discharge: HOME | End: 2024-04-23
Payer: MEDICARE

## 2024-04-23 VITALS
BODY MASS INDEX: 34.44 KG/M2 | HEIGHT: 67 IN | SYSTOLIC BLOOD PRESSURE: 120 MMHG | WEIGHT: 219.4 LBS | DIASTOLIC BLOOD PRESSURE: 72 MMHG

## 2024-04-23 DIAGNOSIS — I48.19 HYPERCOAGULABLE STATE DUE TO PERSISTENT ATRIAL FIBRILLATION (HCC): ICD-10-CM

## 2024-04-23 DIAGNOSIS — I10 PRIMARY HYPERTENSION: ICD-10-CM

## 2024-04-23 DIAGNOSIS — I25.10 CAD (CORONARY ARTERY DISEASE): ICD-10-CM

## 2024-04-23 DIAGNOSIS — Z01.810 PRE-OPERATIVE CARDIOVASCULAR EXAMINATION: ICD-10-CM

## 2024-04-23 DIAGNOSIS — R06.02 SHORTNESS OF BREATH: ICD-10-CM

## 2024-04-23 DIAGNOSIS — Z51.81 VISIT FOR MONITORING TIKOSYN THERAPY: Primary | ICD-10-CM

## 2024-04-23 DIAGNOSIS — I48.91 A-FIB (HCC): ICD-10-CM

## 2024-04-23 DIAGNOSIS — Z79.899 VISIT FOR MONITORING TIKOSYN THERAPY: Primary | ICD-10-CM

## 2024-04-23 DIAGNOSIS — I48.0 PAF (PAROXYSMAL ATRIAL FIBRILLATION) (HCC): ICD-10-CM

## 2024-04-23 DIAGNOSIS — D68.69 HYPERCOAGULABLE STATE DUE TO PERSISTENT ATRIAL FIBRILLATION (HCC): ICD-10-CM

## 2024-04-23 DIAGNOSIS — Z01.810 PRE-OPERATIVE CARDIOVASCULAR EXAMINATION: Primary | ICD-10-CM

## 2024-04-23 LAB
ABO/RH: NORMAL
ANION GAP SERPL CALCULATED.3IONS-SCNC: 14 MMOL/L (ref 7–16)
ANTIBODY SCREEN: NEGATIVE
BUN SERPL-MCNC: 29 MG/DL (ref 6–23)
CALCIUM SERPL-MCNC: 8.7 MG/DL (ref 8.3–10.6)
CHLORIDE BLD-SCNC: 99 MMOL/L (ref 99–110)
CO2: 25 MMOL/L (ref 21–32)
COMMENT: NORMAL
CREAT SERPL-MCNC: 1.2 MG/DL (ref 0.9–1.3)
GFR SERPL CREATININE-BSD FRML MDRD: 62 ML/MIN/1.73M2
GLUCOSE SERPL-MCNC: 101 MG/DL (ref 70–99)
HCT VFR BLD CALC: 46.7 % (ref 42–52)
HEMOGLOBIN: 14.8 GM/DL (ref 13.5–18)
MCH RBC QN AUTO: 30.4 PG (ref 27–31)
MCHC RBC AUTO-ENTMCNC: 31.7 % (ref 32–36)
MCV RBC AUTO: 95.9 FL (ref 78–100)
PDW BLD-RTO: 14.6 % (ref 11.7–14.9)
PLATELET # BLD: 189 K/CU MM (ref 140–440)
PMV BLD AUTO: 10.6 FL (ref 7.5–11.1)
POTASSIUM SERPL-SCNC: 4 MMOL/L (ref 3.5–5.1)
RBC # BLD: 4.87 M/CU MM (ref 4.6–6.2)
SODIUM BLD-SCNC: 138 MMOL/L (ref 135–145)
WBC # BLD: 11.2 K/CU MM (ref 4–10.5)

## 2024-04-23 PROCEDURE — G8427 DOCREV CUR MEDS BY ELIG CLIN: HCPCS | Performed by: NURSE PRACTITIONER

## 2024-04-23 PROCEDURE — 80048 BASIC METABOLIC PNL TOTAL CA: CPT

## 2024-04-23 PROCEDURE — 86850 RBC ANTIBODY SCREEN: CPT

## 2024-04-23 PROCEDURE — G8417 CALC BMI ABV UP PARAM F/U: HCPCS | Performed by: NURSE PRACTITIONER

## 2024-04-23 PROCEDURE — 85027 COMPLETE CBC AUTOMATED: CPT

## 2024-04-23 PROCEDURE — 86900 BLOOD TYPING SEROLOGIC ABO: CPT

## 2024-04-23 PROCEDURE — 4004F PT TOBACCO SCREEN RCVD TLK: CPT | Performed by: NURSE PRACTITIONER

## 2024-04-23 PROCEDURE — 1123F ACP DISCUSS/DSCN MKR DOCD: CPT | Performed by: NURSE PRACTITIONER

## 2024-04-23 PROCEDURE — 93288 INTERROG EVL PM/LDLS PM IP: CPT | Performed by: NURSE PRACTITIONER

## 2024-04-23 PROCEDURE — 3078F DIAST BP <80 MM HG: CPT | Performed by: NURSE PRACTITIONER

## 2024-04-23 PROCEDURE — 86901 BLOOD TYPING SEROLOGIC RH(D): CPT

## 2024-04-23 PROCEDURE — 36415 COLL VENOUS BLD VENIPUNCTURE: CPT

## 2024-04-23 PROCEDURE — 99214 OFFICE O/P EST MOD 30 MIN: CPT | Performed by: NURSE PRACTITIONER

## 2024-04-23 PROCEDURE — 3074F SYST BP LT 130 MM HG: CPT | Performed by: NURSE PRACTITIONER

## 2024-04-23 NOTE — TELEPHONE ENCOUNTER
Test Ordered: Right & Left Heart Cath /  Insurance: [employer]  /  Authorization Status: Approved:  [facility]# [id number], Exp 6/24/24.

## 2024-04-23 NOTE — TELEPHONE ENCOUNTER
Patient was educated on R&LHC for Dx: CAD.  Procedure is scheduled for 4/26/24 @ 1:30, w/arrival @ 11:30, @ [facility]. Pre-admission orders were given to patient for labs & CXR, which are due 4/23/24 @ [facility].       Procedure and risks were explained to patient. Consent forms were signed.       Patient was notified that procedure could be delayed due to an emergency. Patient voiced understanding.

## 2024-04-23 NOTE — PROGRESS NOTES
Electrophysiology FU Note        Chief complaint: 6 month follow up      Primary care physician: Claudio Umanzor PA      History of Present Illness:     This visit 4/23/2024  Patient is here today for follow-up on Tikosyn and PAF.  He reports that he is having shortness of breath with exertion.  He states that if he sits then he will not feel short of breath.  He was in the emergency room because his oxygen saturation was 70%.  He does have oxygen as needed and states that he tried to put the oxygen on to improve his oxygen saturation however it did not.  He refused to stay in the hospital for further evaluation.  He thinks that there is something wrong with his heart.  He denies chest pain, palpitations, lightheadedness, dizziness, edema or syncope    Previous visit 10/19/2023  Patient is here today for follow-up on Tikoysn and paroxysmal atrial fibrillation.  He reports that he is feeling well.  He states that he started wearing oxygen as needed and this is the best he is felt in a long time.  He does have shortness of breath with exertion that will resolve with rest when he is not wearing his oxygen    he denies chest pain, palpitation, lightheadedness, dizziness, edema or syncope    Previous visit 4/13/2023  Patient is here today for follow-up on paroxysmal atrial fibrillation.  Patient states that he has been feeling fatigued recently.  He states he is under a lot of stress with his wife having a stroke.  He denies chest pain, palpitations, shortness of breath, lightheadedness, dizziness, edema or syncope.    Previous visit 2/9/2023  Patient is here today for follow-up on state Tikosyn monitoring therapy and paroxysmal atrial fibrillation.  Patient reports that he recently had a mechanical fall while at work and he is having pain on the right side of his chest.  He states that he will have pain with inspiration.  He states he is unable to lay on his right side because of

## 2024-04-23 NOTE — PATIENT INSTRUCTIONS
Please be informed that if you contact our office outside of normal business hours the physician on call cannot help with any scheduling or rescheduling issues, procedure instruction questions or any type of medication issue.    We advise you for any urgent/emergency that you go to the nearest emergency room!    PLEASE CALL OUR OFFICE DURING NORMAL BUSINESS HOURS    Monday - Friday   8 am to 5 pm    [place]: [phone]    [place]: [phone]    [place]:  [phone]  **It is YOUR responsibilty to bring medication bottles and/or updated medication list to EACH APPOINTMENT. This will allow us to better serve you and all your healthcare needs**  Thank you for allowing us to care for you today!   We want to ensure we can follow your treatment plan and we strive to give you the best outcomes and experience possible.   If you ever have a life threatening emergency and call 911 - for an ambulance (EMS)   Our providers can only care for you at:   [facility] or [facility].   Even if you have someone take you or you drive yourself we can only care for you in a [facility] facility. Our providers are not setup at the other healthcare locations!   We are committed to providing you the best care possible.    If you receive a survey after visiting one of our offices, please take time to share your experience concerning your physician office visit.  These surveys are confidential and no health information about you is shared.    We are eager to improve for you and we are counting on your feedback to help make that happen.

## 2024-04-23 NOTE — TELEPHONE ENCOUNTER
[facility]     Dr. Brad Aldridge     LEFT HEART CATHETERIZATION & RIGHT HEART CATHETERIZATION WITH POSSIBLE PERCUTANEOUS CORONARY INTERVENTION      Patient Name: Johnnie Smith   : 1945  MRN# [id number]    Date of Procedure: 24 Time: 1:30 Arrival Time: 11:30    The catheterization and angiogram are usually outpatient procedures, however if stenting is needed you may need to stay overnight. You will need to arrive at the hospital two hours before the procedure.  You will go to registration in the main lobby.  You will need to arrange for someone to drive you home.      HOSPITAL:  [facility] ([facility])      X   If you have received orders for blood work and or a chest x-ray, please have         them done on assigned date at [facility],           [facility], or [facility].     X Please do not have anything by mouth after midnight prior to or 8 hours before   the procedure.    X You may take your medications with a sip of water in the morning of your               procedure or take them with you to the hospital                    X If you take  Eliquis, you should hold it for 48 hours before your procedure.     X If you take Viagra (Sildenafil) or Cialis (Tadalafil) you will need to hold it for 3 days before your procedure.

## 2024-04-25 ENCOUNTER — HOSPITAL ENCOUNTER (OUTPATIENT)
Dept: NON INVASIVE DIAGNOSTICS | Age: 79
Discharge: HOME | End: 2024-04-27
Attending: INTERNAL MEDICINE
Payer: MEDICARE

## 2024-04-25 VITALS
SYSTOLIC BLOOD PRESSURE: 120 MMHG | BODY MASS INDEX: 34.37 KG/M2 | WEIGHT: 219 LBS | HEART RATE: 78 BPM | HEIGHT: 67 IN | DIASTOLIC BLOOD PRESSURE: 72 MMHG

## 2024-04-25 DIAGNOSIS — R06.02 SOBOE (SHORTNESS OF BREATH ON EXERTION): ICD-10-CM

## 2024-04-25 DIAGNOSIS — F33.1 MODERATE EPISODE OF RECURRENT MAJOR DEPRESSIVE DISORDER (HCC): ICD-10-CM

## 2024-04-25 LAB
ECHO AO ROOT DIAM: 3 CM
ECHO AO ROOT INDEX: 1.43 CM/M2
ECHO AR MAX VEL PISA: 2.4 M/S
ECHO AV AREA PEAK VELOCITY: 2.1 CM2
ECHO AV AREA VTI: 2 CM2
ECHO AV AREA/BSA PEAK VELOCITY: 1 CM2/M2
ECHO AV AREA/BSA VTI: 1 CM2/M2
ECHO AV MEAN GRADIENT: 4 MMHG
ECHO AV MEAN VELOCITY: 0.9 M/S
ECHO AV PEAK GRADIENT: 7 MMHG
ECHO AV PEAK VELOCITY: 1.4 M/S
ECHO AV REGURGITANT PHT: 527 MS
ECHO AV VELOCITY RATIO: 0.64
ECHO AV VTI: 29.1 CM
ECHO BSA: 2.17 M2
ECHO EST RA PRESSURE: 3 MMHG
ECHO IVC PROX: 2.1 CM
ECHO LA AREA 4C: 23.2 CM2
ECHO LA DIAMETER INDEX: 1.9 CM/M2
ECHO LA DIAMETER: 4 CM
ECHO LA MAJOR AXIS: 6.2 CM
ECHO LA TO AORTIC ROOT RATIO: 1.33
ECHO LA VOL MOD A4C: 69 ML (ref 18–58)
ECHO LA VOLUME INDEX MOD A4C: 33 ML/M2 (ref 16–34)
ECHO LV E' LATERAL VELOCITY: 6 CM/S
ECHO LV E' SEPTAL VELOCITY: 5 CM/S
ECHO LV EDV A4C: 66 ML
ECHO LV EDV INDEX A4C: 31 ML/M2
ECHO LV EJECTION FRACTION A4C: 61 %
ECHO LV ESV A4C: 26 ML
ECHO LV ESV INDEX A4C: 12 ML/M2
ECHO LV FRACTIONAL SHORTENING: 29 % (ref 28–44)
ECHO LV INTERNAL DIMENSION DIASTOLE INDEX: 1.95 CM/M2
ECHO LV INTERNAL DIMENSION DIASTOLIC: 4.1 CM (ref 4.2–5.9)
ECHO LV INTERNAL DIMENSION SYSTOLIC INDEX: 1.38 CM/M2
ECHO LV INTERNAL DIMENSION SYSTOLIC: 2.9 CM
ECHO LV IVSD: 1 CM (ref 0.6–1)
ECHO LV MASS 2D: 132.1 G (ref 88–224)
ECHO LV MASS INDEX 2D: 62.9 G/M2 (ref 49–115)
ECHO LV POSTERIOR WALL DIASTOLIC: 1 CM (ref 0.6–1)
ECHO LV RELATIVE WALL THICKNESS RATIO: 0.49
ECHO LVOT AREA: 3.1 CM2
ECHO LVOT AV VTI INDEX: 0.63
ECHO LVOT DIAM: 2 CM
ECHO LVOT MEAN GRADIENT: 2 MMHG
ECHO LVOT PEAK GRADIENT: 3 MMHG
ECHO LVOT PEAK VELOCITY: 0.9 M/S
ECHO LVOT STROKE VOLUME INDEX: 27.5 ML/M2
ECHO LVOT SV: 57.8 ML
ECHO LVOT VTI: 18.4 CM
ECHO MV A VELOCITY: 0.97 M/S
ECHO MV E DECELERATION TIME (DT): 282 MS
ECHO MV E VELOCITY: 0.4 M/S
ECHO MV E/A RATIO: 0.41
ECHO MV E/E' LATERAL: 6.67
ECHO MV E/E' RATIO (AVERAGED): 7.33
ECHO RIGHT VENTRICULAR SYSTOLIC PRESSURE (RVSP): 23 MMHG
ECHO RV MID DIMENSION: 2.6 CM
ECHO TV REGURGITANT MAX VELOCITY: 2.25 M/S
ECHO TV REGURGITANT PEAK GRADIENT: 20 MMHG

## 2024-04-25 PROCEDURE — 93306 TTE W/DOPPLER COMPLETE: CPT

## 2024-04-26 ENCOUNTER — HOSPITAL ENCOUNTER (OUTPATIENT)
Age: 79
Discharge: HOME | End: 2024-04-26
Attending: INTERNAL MEDICINE | Admitting: INTERNAL MEDICINE
Payer: MEDICARE

## 2024-04-26 VITALS
HEART RATE: 66 BPM | SYSTOLIC BLOOD PRESSURE: 131 MMHG | OXYGEN SATURATION: 92 % | RESPIRATION RATE: 18 BRPM | BODY MASS INDEX: 34.37 KG/M2 | DIASTOLIC BLOOD PRESSURE: 73 MMHG | TEMPERATURE: 98.1 F | HEIGHT: 67 IN | WEIGHT: 219 LBS

## 2024-04-26 DIAGNOSIS — I48.91 A-FIB (HCC): ICD-10-CM

## 2024-04-26 DIAGNOSIS — I25.10 CAD (CORONARY ARTERY DISEASE): ICD-10-CM

## 2024-04-26 DIAGNOSIS — R06.02 SHORTNESS OF BREATH: ICD-10-CM

## 2024-04-26 LAB
ECHO BSA: 2.17 M2
EKG ATRIAL RATE: 61 BPM
EKG DIAGNOSIS: NORMAL
EKG P-R INTERVAL: 198 MS
EKG Q-T INTERVAL: 446 MS
EKG QRS DURATION: 94 MS
EKG QTC CALCULATION (BAZETT): 448 MS
EKG R AXIS: -17 DEGREES
EKG T AXIS: -12 DEGREES
EKG VENTRICULAR RATE: 61 BPM

## 2024-04-26 PROCEDURE — 92972 PERQ TRLUML CORONRY LITHOTRP: CPT | Performed by: INTERNAL MEDICINE

## 2024-04-26 PROCEDURE — 2709999900 HC NON-CHARGEABLE SUPPLY: Performed by: INTERNAL MEDICINE

## 2024-04-26 PROCEDURE — 6370000000 HC RX 637 (ALT 250 FOR IP): Performed by: INTERNAL MEDICINE

## 2024-04-26 PROCEDURE — C1874 STENT, COATED/COV W/DEL SYS: HCPCS | Performed by: INTERNAL MEDICINE

## 2024-04-26 PROCEDURE — 93458 L HRT ARTERY/VENTRICLE ANGIO: CPT | Performed by: INTERNAL MEDICINE

## 2024-04-26 PROCEDURE — C1887 CATHETER, GUIDING: HCPCS | Performed by: INTERNAL MEDICINE

## 2024-04-26 PROCEDURE — C1751 CATH, INF, PER/CENT/MIDLINE: HCPCS | Performed by: INTERNAL MEDICINE

## 2024-04-26 PROCEDURE — C9600 PERC DRUG-EL COR STENT SING: HCPCS | Performed by: INTERNAL MEDICINE

## 2024-04-26 PROCEDURE — 6360000002 HC RX W HCPCS: Performed by: INTERNAL MEDICINE

## 2024-04-26 PROCEDURE — C1769 GUIDE WIRE: HCPCS | Performed by: INTERNAL MEDICINE

## 2024-04-26 PROCEDURE — 93005 ELECTROCARDIOGRAM TRACING: CPT | Performed by: INTERNAL MEDICINE

## 2024-04-26 PROCEDURE — 2500000003 HC RX 250 WO HCPCS: Performed by: INTERNAL MEDICINE

## 2024-04-26 PROCEDURE — C1725 CATH, TRANSLUMIN NON-LASER: HCPCS | Performed by: INTERNAL MEDICINE

## 2024-04-26 PROCEDURE — 6360000004 HC RX CONTRAST MEDICATION: Performed by: INTERNAL MEDICINE

## 2024-04-26 PROCEDURE — C1761 HC CATH TRANSLUM INTRAVASCULAR LITHOTRIPSY CORONARY: HCPCS | Performed by: INTERNAL MEDICINE

## 2024-04-26 PROCEDURE — C1760 CLOSURE DEV, VASC: HCPCS | Performed by: INTERNAL MEDICINE

## 2024-04-26 PROCEDURE — C1894 INTRO/SHEATH, NON-LASER: HCPCS | Performed by: INTERNAL MEDICINE

## 2024-04-26 PROCEDURE — 2580000003 HC RX 258: Performed by: INTERNAL MEDICINE

## 2024-04-26 PROCEDURE — 92928 PRQ TCAT PLMT NTRAC ST 1 LES: CPT | Performed by: INTERNAL MEDICINE

## 2024-04-26 PROCEDURE — 93010 ELECTROCARDIOGRAM REPORT: CPT | Performed by: INTERNAL MEDICINE

## 2024-04-26 PROCEDURE — 94640 AIRWAY INHALATION TREATMENT: CPT

## 2024-04-26 DEVICE — STENT ONYXNG35038UX ONYX 3.50X38RX
Type: IMPLANTABLE DEVICE | Status: FUNCTIONAL
Brand: ONYX FRONTIER™

## 2024-04-26 RX ORDER — AMLODIPINE BESYLATE 5 MG/1
5 TABLET ORAL DAILY
Status: DISCONTINUED | OUTPATIENT
Start: 2024-04-26 | End: 2024-04-26 | Stop reason: HOSPADM

## 2024-04-26 RX ORDER — AMLODIPINE BESYLATE AND BENAZEPRIL HYDROCHLORIDE 5; 10 MG/1; MG/1
1 CAPSULE ORAL DAILY
Status: DISCONTINUED | OUTPATIENT
Start: 2024-04-26 | End: 2024-04-26 | Stop reason: CLARIF

## 2024-04-26 RX ORDER — LANOLIN ALCOHOL/MO/W.PET/CERES
500 CREAM (GRAM) TOPICAL 4 TIMES DAILY
Status: DISCONTINUED | OUTPATIENT
Start: 2024-04-26 | End: 2024-04-26 | Stop reason: HOSPADM

## 2024-04-26 RX ORDER — ASPIRIN 81 MG/1
81 TABLET, CHEWABLE ORAL DAILY
Status: DISCONTINUED | OUTPATIENT
Start: 2024-04-27 | End: 2024-04-26 | Stop reason: SDUPTHER

## 2024-04-26 RX ORDER — BUDESONIDE AND FORMOTEROL FUMARATE DIHYDRATE 160; 4.5 UG/1; UG/1
1 AEROSOL RESPIRATORY (INHALATION)
Status: DISCONTINUED | OUTPATIENT
Start: 2024-04-26 | End: 2024-04-26 | Stop reason: HOSPADM

## 2024-04-26 RX ORDER — MIDAZOLAM HYDROCHLORIDE 1 MG/ML
INJECTION INTRAMUSCULAR; INTRAVENOUS PRN
Status: DISCONTINUED | OUTPATIENT
Start: 2024-04-26 | End: 2024-04-26 | Stop reason: HOSPADM

## 2024-04-26 RX ORDER — DOFETILIDE 0.25 MG/1
250 CAPSULE ORAL 2 TIMES DAILY
Status: DISCONTINUED | OUTPATIENT
Start: 2024-04-26 | End: 2024-04-26 | Stop reason: HOSPADM

## 2024-04-26 RX ORDER — METOPROLOL SUCCINATE 25 MG/1
25 TABLET, EXTENDED RELEASE ORAL DAILY
Status: DISCONTINUED | OUTPATIENT
Start: 2024-04-26 | End: 2024-04-26 | Stop reason: HOSPADM

## 2024-04-26 RX ORDER — SODIUM CHLORIDE 9 MG/ML
INJECTION, SOLUTION INTRAVENOUS CONTINUOUS
Status: DISCONTINUED | OUTPATIENT
Start: 2024-04-26 | End: 2024-04-26 | Stop reason: HOSPADM

## 2024-04-26 RX ORDER — MIRTAZAPINE 15 MG/1
7.5 TABLET, FILM COATED ORAL NIGHTLY
Status: DISCONTINUED | OUTPATIENT
Start: 2024-04-26 | End: 2024-04-26 | Stop reason: HOSPADM

## 2024-04-26 RX ORDER — HEPARIN SODIUM 1000 [USP'U]/ML
INJECTION, SOLUTION INTRAVENOUS; SUBCUTANEOUS PRN
Status: DISCONTINUED | OUTPATIENT
Start: 2024-04-26 | End: 2024-04-26 | Stop reason: HOSPADM

## 2024-04-26 RX ORDER — MONTELUKAST SODIUM 10 MG/1
10 TABLET ORAL NIGHTLY
Status: DISCONTINUED | OUTPATIENT
Start: 2024-04-26 | End: 2024-04-26 | Stop reason: HOSPADM

## 2024-04-26 RX ORDER — DIAZEPAM 5 MG/1
5 TABLET ORAL ONCE
Status: COMPLETED | OUTPATIENT
Start: 2024-04-26 | End: 2024-04-26

## 2024-04-26 RX ORDER — ATORVASTATIN CALCIUM 10 MG/1
10 TABLET, FILM COATED ORAL DAILY
Status: DISCONTINUED | OUTPATIENT
Start: 2024-04-26 | End: 2024-04-26 | Stop reason: HOSPADM

## 2024-04-26 RX ORDER — LISINOPRIL 5 MG/1
10 TABLET ORAL DAILY
Status: DISCONTINUED | OUTPATIENT
Start: 2024-04-26 | End: 2024-04-26 | Stop reason: HOSPADM

## 2024-04-26 RX ORDER — CLOPIDOGREL BISULFATE 75 MG/1
75 TABLET ORAL DAILY
Status: DISCONTINUED | OUTPATIENT
Start: 2024-04-27 | End: 2024-04-26 | Stop reason: SDUPTHER

## 2024-04-26 RX ORDER — NITROGLYCERIN 0.4 MG/1
0.4 TABLET SUBLINGUAL EVERY 5 MIN PRN
Qty: 25 TABLET | Refills: 3 | Status: SHIPPED | OUTPATIENT
Start: 2024-04-26

## 2024-04-26 RX ORDER — DIPHENHYDRAMINE HCL 25 MG
25 TABLET ORAL ONCE
Status: COMPLETED | OUTPATIENT
Start: 2024-04-26 | End: 2024-04-26

## 2024-04-26 RX ORDER — CLONAZEPAM 0.5 MG/1
0.5 TABLET ORAL NIGHTLY PRN
Status: DISCONTINUED | OUTPATIENT
Start: 2024-04-26 | End: 2024-04-26 | Stop reason: HOSPADM

## 2024-04-26 RX ORDER — ESCITALOPRAM OXALATE 20 MG/1
30 TABLET ORAL DAILY
Qty: 135 TABLET | Refills: 2 | Status: SHIPPED | OUTPATIENT
Start: 2024-04-26 | End: 2025-01-21

## 2024-04-26 RX ORDER — SODIUM CHLORIDE 0.9 % (FLUSH) 0.9 %
5-40 SYRINGE (ML) INJECTION EVERY 12 HOURS SCHEDULED
Status: DISCONTINUED | OUTPATIENT
Start: 2024-04-26 | End: 2024-04-26 | Stop reason: HOSPADM

## 2024-04-26 RX ORDER — ALBUTEROL SULFATE 90 UG/1
2 AEROSOL, METERED RESPIRATORY (INHALATION) 4 TIMES DAILY PRN
Status: DISCONTINUED | OUTPATIENT
Start: 2024-04-26 | End: 2024-04-26 | Stop reason: HOSPADM

## 2024-04-26 RX ORDER — FOLIC ACID 1 MG/1
500 TABLET ORAL DAILY
Status: DISCONTINUED | OUTPATIENT
Start: 2024-04-26 | End: 2024-04-26 | Stop reason: HOSPADM

## 2024-04-26 RX ORDER — VITAMIN B COMPLEX
1 CAPSULE ORAL DAILY
Status: DISCONTINUED | OUTPATIENT
Start: 2024-04-26 | End: 2024-04-26 | Stop reason: HOSPADM

## 2024-04-26 RX ORDER — GABAPENTIN 300 MG/1
300 CAPSULE ORAL NIGHTLY
Status: DISCONTINUED | OUTPATIENT
Start: 2024-04-26 | End: 2024-04-26 | Stop reason: HOSPADM

## 2024-04-26 RX ORDER — ALBUTEROL SULFATE 2.5 MG/3ML
2.5 SOLUTION RESPIRATORY (INHALATION) EVERY 6 HOURS PRN
Status: DISCONTINUED | OUTPATIENT
Start: 2024-04-26 | End: 2024-04-26 | Stop reason: HOSPADM

## 2024-04-26 RX ORDER — ASPIRIN 81 MG/1
81 TABLET, CHEWABLE ORAL DAILY
Status: DISCONTINUED | OUTPATIENT
Start: 2024-04-26 | End: 2024-04-26 | Stop reason: HOSPADM

## 2024-04-26 RX ORDER — ESCITALOPRAM OXALATE 10 MG/1
30 TABLET ORAL DAILY
Status: DISCONTINUED | OUTPATIENT
Start: 2024-04-26 | End: 2024-04-26 | Stop reason: HOSPADM

## 2024-04-26 RX ORDER — CLOPIDOGREL BISULFATE 75 MG/1
75 TABLET ORAL DAILY
Status: DISCONTINUED | OUTPATIENT
Start: 2024-04-27 | End: 2024-04-26 | Stop reason: HOSPADM

## 2024-04-26 RX ORDER — CLOPIDOGREL BISULFATE 75 MG/1
75 TABLET ORAL DAILY
Qty: 30 TABLET | Refills: 3 | Status: SHIPPED | OUTPATIENT
Start: 2024-04-26

## 2024-04-26 RX ORDER — ACETAMINOPHEN 325 MG/1
650 TABLET ORAL EVERY 4 HOURS PRN
Status: DISCONTINUED | OUTPATIENT
Start: 2024-04-26 | End: 2024-04-26 | Stop reason: HOSPADM

## 2024-04-26 RX ORDER — SODIUM CHLORIDE 9 MG/ML
INJECTION, SOLUTION INTRAVENOUS PRN
Status: DISCONTINUED | OUTPATIENT
Start: 2024-04-26 | End: 2024-04-26 | Stop reason: HOSPADM

## 2024-04-26 RX ORDER — SODIUM CHLORIDE 0.9 % (FLUSH) 0.9 %
5-40 SYRINGE (ML) INJECTION PRN
Status: DISCONTINUED | OUTPATIENT
Start: 2024-04-26 | End: 2024-04-26 | Stop reason: HOSPADM

## 2024-04-26 RX ADMIN — ATORVASTATIN CALCIUM 10 MG: 10 TABLET, FILM COATED ORAL at 15:20

## 2024-04-26 RX ADMIN — Medication 500 MCG: at 15:20

## 2024-04-26 RX ADMIN — ESCITALOPRAM OXALATE 30 MG: 10 TABLET ORAL at 15:20

## 2024-04-26 RX ADMIN — DIPHENHYDRAMINE HYDROCHLORIDE 25 MG: 25 TABLET ORAL at 11:43

## 2024-04-26 RX ADMIN — METOPROLOL SUCCINATE 25 MG: 25 TABLET, EXTENDED RELEASE ORAL at 15:20

## 2024-04-26 RX ADMIN — FOLIC ACID 500 MCG: 1 TABLET ORAL at 15:20

## 2024-04-26 RX ADMIN — ACETAMINOPHEN 650 MG: 325 TABLET ORAL at 15:20

## 2024-04-26 RX ADMIN — DIAZEPAM 5 MG: 5 TABLET ORAL at 11:43

## 2024-04-26 RX ADMIN — SODIUM CHLORIDE: 9 INJECTION, SOLUTION INTRAVENOUS at 14:32

## 2024-04-26 RX ADMIN — DOFETILIDE 250 MCG: 0.25 CAPSULE ORAL at 15:19

## 2024-04-26 RX ADMIN — Medication 1 CAPSULE: at 15:19

## 2024-04-26 RX ADMIN — TIOTROPIUM BROMIDE INHALATION SPRAY 2 PUFF: 3.12 SPRAY, METERED RESPIRATORY (INHALATION) at 15:47

## 2024-04-26 ASSESSMENT — PAIN DESCRIPTION - LOCATION: LOCATION: CHEST

## 2024-04-26 ASSESSMENT — PAIN SCALES - GENERAL
PAINLEVEL_OUTOF10: 2
PAINLEVEL_OUTOF10: 6

## 2024-04-26 NOTE — PROGRESS NOTES
Outpatient Pharmacy Progress Note for Meds-to-Beds    Total number of Prescriptions Filled: 2    Additional Documentation:  Patient's family member picked-up the medication(s) in the OP Pharmacy      Thank you for letting us serve your patients.  [address] [address]    Phone: [phone]    Fax: [phone]

## 2024-04-26 NOTE — H&P
History of Present Illness:   Patient has been having extreme shortness of breath will proceed with cardiac catheterization was seen by EP recently in the office     This visit 4/23/2024  Patient is here today for follow-up on Tikosyn and PAF.  He reports that he is having shortness of breath with exertion.  He states that if he sits then he will not feel short of breath.  He was in the emergency room because his oxygen saturation was 70%.  He does have oxygen as needed and states that he tried to put the oxygen on to improve his oxygen saturation however it did not.  He refused to stay in the hospital for further evaluation.  He thinks that there is something wrong with his heart.  He denies chest pain, palpitations, lightheadedness, dizziness, edema or syncope     Previous visit 10/19/2023  Patient is here today for follow-up on Tikoysn and paroxysmal atrial fibrillation.  He reports that he is feeling well.  He states that he started wearing oxygen as needed and this is the best he is felt in a long time.  He does have shortness of breath with exertion that will resolve with rest when he is not wearing his oxygen     he denies chest pain, palpitation, lightheadedness, dizziness, edema or syncope     Previous visit 4/13/2023  Patient is here today for follow-up on paroxysmal atrial fibrillation.  Patient states that he has been feeling fatigued recently.  He states he is under a lot of stress with his wife having a stroke.  He denies chest pain, palpitations, shortness of breath, lightheadedness, dizziness, edema or syncope.     Previous visit 2/9/2023  Patient is here today for follow-up on state Tikosyn monitoring therapy and paroxysmal atrial fibrillation.  Patient reports that he recently had a mechanical fall while at work and he is having pain on the right side of his chest.  He states that he will have pain with inspiration.  He states he is unable to lay on his right side because of the pain.  He has not

## 2024-04-27 LAB
EKG ATRIAL RATE: 61 BPM
EKG DIAGNOSIS: NORMAL
EKG P-R INTERVAL: 168 MS
EKG Q-T INTERVAL: 452 MS
EKG QRS DURATION: 94 MS
EKG QTC CALCULATION (BAZETT): 455 MS
EKG R AXIS: -21 DEGREES
EKG T AXIS: 3 DEGREES
EKG VENTRICULAR RATE: 61 BPM

## 2024-04-29 PROCEDURE — 93010 ELECTROCARDIOGRAM REPORT: CPT | Performed by: INTERNAL MEDICINE

## 2024-04-29 NOTE — PROGRESS NOTES
Cardiac Rehab will follow up with patient and provide further education.  Phase 1 and 2 CRR was ordered. Phase 2 order, as well as the patient's history, tests and medications were electronically sent to the OP CR program.

## 2024-05-01 ENCOUNTER — TELEPHONE (OUTPATIENT)
Dept: FAMILY MEDICINE CLINIC | Age: 79
End: 2024-05-01

## 2024-05-01 NOTE — TELEPHONE ENCOUNTER
Care Transitions Initial Follow Up Call    Outreach made within 2 business days of discharge: No    Patient: Johnnie Smith Patient : 1945   MRN: [id number]  Reason for Admission: There are no discharge diagnoses documented for the most recent discharge.  Discharge Date: 24       Spoke with: Maisha Smith (spouse)    Discharge department/facility: [facility] Interactive Patient Contact:  Was patient able to fill all prescriptions: Yes  Was patient instructed to bring all medications to the follow-up visit: Yes  Is patient taking all medications as directed in the discharge summary? Yes  Does patient understand their discharge instructions: Yes  Does patient have questions or concerns that need addressed prior to 7-14 day follow up office visit: no    Scheduled appointment with PCP within 7-14 days    Follow Up  Future Appointments   Date Time Provider Department [place]   2024  8:40 AM Claudio Umanzor PA [facility]   2024  4:50 PM Brad Aldridge MD [facility]   2024  1:15 PM Petey Gonzalez MD [facility] PULM [facility]   2024  8:40 AM Claudio Umanzor PA [facility]   2024  9:00 AM [facility] PFT [facility] PFT [place]   10/25/2024 11:20 AM Aster Schmidt APRN - CNP [facility]   2024 10:00 AM Haritha Cornelius APRN - CNP [facility] PULM [facility]       Bria Rene LPN

## 2024-05-03 RX ORDER — PREDNISONE 10 MG/1
TABLET ORAL
Qty: 30 TABLET | Refills: 0 | OUTPATIENT
Start: 2024-05-03

## 2024-05-03 RX ORDER — APIXABAN 5 MG/1
TABLET, FILM COATED ORAL
Qty: 180 TABLET | Refills: 2 | Status: SHIPPED | OUTPATIENT
Start: 2024-05-03

## 2024-05-07 ENCOUNTER — TELEPHONE (OUTPATIENT)
Dept: PULMONOLOGY | Age: 79
End: 2024-05-07

## 2024-05-07 NOTE — TELEPHONE ENCOUNTER
Spoke to patient, he states he already told Dr. Gonzalez he was declining to complete the sleep study at this time. Order for sleep study will be closed.

## 2024-05-07 NOTE — TELEPHONE ENCOUNTER
----- Message from Kitty Patrick sent at 4/24/2024 10:25 AM EDT -----  Regarding: Missing items  Hello,    The patient's Neck measurement and ESS are missing.    Thank you,    Kitty

## 2024-05-09 ENCOUNTER — OFFICE VISIT (OUTPATIENT)
Dept: FAMILY MEDICINE CLINIC | Age: 79
End: 2024-05-09
Payer: MEDICARE

## 2024-05-09 VITALS
WEIGHT: 215.4 LBS | HEIGHT: 67 IN | SYSTOLIC BLOOD PRESSURE: 124 MMHG | RESPIRATION RATE: 18 BRPM | HEART RATE: 43 BPM | BODY MASS INDEX: 33.81 KG/M2 | DIASTOLIC BLOOD PRESSURE: 74 MMHG | OXYGEN SATURATION: 88 %

## 2024-05-09 DIAGNOSIS — I25.10 CORONARY ARTERY DISEASE INVOLVING NATIVE CORONARY ARTERY OF NATIVE HEART WITHOUT ANGINA PECTORIS: ICD-10-CM

## 2024-05-09 DIAGNOSIS — Z95.820 S/P ANGIOPLASTY WITH STENT: ICD-10-CM

## 2024-05-09 DIAGNOSIS — Z09 HOSPITAL DISCHARGE FOLLOW-UP: Primary | ICD-10-CM

## 2024-05-09 PROBLEM — I25.119 ATHEROSCLEROTIC HEART DISEASE OF NATIVE CORONARY ARTERY WITH UNSPECIFIED ANGINA PECTORIS (HCC): Status: ACTIVE | Noted: 2024-05-09

## 2024-05-09 PROCEDURE — G8427 DOCREV CUR MEDS BY ELIG CLIN: HCPCS | Performed by: PHYSICIAN ASSISTANT

## 2024-05-09 PROCEDURE — 4004F PT TOBACCO SCREEN RCVD TLK: CPT | Performed by: PHYSICIAN ASSISTANT

## 2024-05-09 PROCEDURE — 3074F SYST BP LT 130 MM HG: CPT | Performed by: PHYSICIAN ASSISTANT

## 2024-05-09 PROCEDURE — 1123F ACP DISCUSS/DSCN MKR DOCD: CPT | Performed by: PHYSICIAN ASSISTANT

## 2024-05-09 PROCEDURE — 99214 OFFICE O/P EST MOD 30 MIN: CPT | Performed by: PHYSICIAN ASSISTANT

## 2024-05-09 PROCEDURE — 1111F DSCHRG MED/CURRENT MED MERGE: CPT | Performed by: PHYSICIAN ASSISTANT

## 2024-05-09 PROCEDURE — G8417 CALC BMI ABV UP PARAM F/U: HCPCS | Performed by: PHYSICIAN ASSISTANT

## 2024-05-09 PROCEDURE — 3078F DIAST BP <80 MM HG: CPT | Performed by: PHYSICIAN ASSISTANT

## 2024-05-09 SDOH — ECONOMIC STABILITY: FOOD INSECURITY: WITHIN THE PAST 12 MONTHS, YOU WORRIED THAT YOUR FOOD WOULD RUN OUT BEFORE YOU GOT MONEY TO BUY MORE.: NEVER TRUE

## 2024-05-09 SDOH — ECONOMIC STABILITY: INCOME INSECURITY: HOW HARD IS IT FOR YOU TO PAY FOR THE VERY BASICS LIKE FOOD, HOUSING, MEDICAL CARE, AND HEATING?: NOT HARD AT ALL

## 2024-05-09 SDOH — ECONOMIC STABILITY: FOOD INSECURITY: WITHIN THE PAST 12 MONTHS, THE FOOD YOU BOUGHT JUST DIDN'T LAST AND YOU DIDN'T HAVE MONEY TO GET MORE.: NEVER TRUE

## 2024-05-09 NOTE — PATIENT INSTRUCTIONS
Welcome to [facility]:    Did you know we now have a faster way for you to move through your appointment? For your convenience, we now have digital registration available. When you schedule your next appointment, you will receive a link via your email as well as a text message that will allow you to complete any paperwork digitally before your appointment. We are committed to providing you the best care possible.    If you receive a survey after visiting one of our offices, please take time to share your experience concerning your physician office visit.  These surveys are confidential and no health information about you is shared.    We are eager to improve for you and continue to give you satisfactory care, we are counting on your feedback to help make that happen.

## 2024-05-09 NOTE — PROGRESS NOTES
Post-Discharge Transitional Care  Follow Up      Johnnie Smith   YOB: 1945    Date of Office Visit:  5/9/2024  Date of Hospital Admission: 4/26/24  Date of Hospital Discharge: 4/26/24  Risk of hospital readmission (high >=14%. Medium >=10%) :Readmission Risk Score: 8.5      Care management risk score Rising risk (score 2-5) and Complex Care (Scores >=6): No Risk Score On File     Non face to face  following discharge, date last encounter closed (first attempt may have been earlier): 05/01/2024    Call initiated 2 business days of discharge: No    ASSESSMENT/PLAN:   Hospital discharge follow-up  -     OK DISCHARGE MEDS RECONCILED W/ CURRENT OUTPATIENT MED LIST  S/P angioplasty with stent  Coronary artery disease involving native coronary artery of native heart without angina pectoris    Medical Decision Making: moderate complexity  Return if symptoms worsen or fail to improve.           Subjective:   HPI:  Follow up of Hospital problems/diagnosis(es): 90% blockage LAD, s/p stent(long stent), lung fibrosis and emphysema.    Inpatient course: Discharge summary reviewed- see chart.    Interval history/Current status: he can breathe much better than before, still using O2 as needed.     Is getting increased bruising with plavix with the eliquis    [place] follow up 5/28, pulm f/u 5/30    Patient Active Problem List   Diagnosis    Depression, major, in remission (HCC)    Obesity (BMI 30-39.9)    Restless leg syndrome    HTN (hypertension)    Hyperlipidemia    Eczema    Tobacco use    COPD with hypoxia (HCC)    Dyspnea    Diverticulosis    Nystagmus    Oxygen desaturation    Chronic systolic congestive heart failure (HCC)    PAF (paroxysmal atrial fibrillation) ([facility])    Other insomnia    Other headache syndrome    Sick sinus syndrome ([facility])    Visit for monitoring Tikosyn therapy    Cervical spondylosis with myelopathy    Hypercoagulable state due to persistent atrial fibrillation (HCC)    Ex-cigarette

## 2024-05-28 ENCOUNTER — OFFICE VISIT (OUTPATIENT)
Dept: CARDIOLOGY CLINIC | Age: 79
End: 2024-05-28
Payer: MEDICARE

## 2024-05-28 VITALS
SYSTOLIC BLOOD PRESSURE: 74 MMHG | HEART RATE: 73 BPM | BODY MASS INDEX: 33.74 KG/M2 | DIASTOLIC BLOOD PRESSURE: 40 MMHG | WEIGHT: 215 LBS | HEIGHT: 67 IN

## 2024-05-28 DIAGNOSIS — I25.119 ATHEROSCLEROSIS OF NATIVE CORONARY ARTERY OF NATIVE HEART WITH ANGINA PECTORIS (HCC): ICD-10-CM

## 2024-05-28 DIAGNOSIS — R06.02 SHORTNESS OF BREATH: ICD-10-CM

## 2024-05-28 DIAGNOSIS — R06.02 SOBOE (SHORTNESS OF BREATH ON EXERTION): Primary | ICD-10-CM

## 2024-05-28 DIAGNOSIS — E78.2 MIXED HYPERLIPIDEMIA: ICD-10-CM

## 2024-05-28 PROCEDURE — 3074F SYST BP LT 130 MM HG: CPT | Performed by: INTERNAL MEDICINE

## 2024-05-28 PROCEDURE — 3078F DIAST BP <80 MM HG: CPT | Performed by: INTERNAL MEDICINE

## 2024-05-28 PROCEDURE — G8417 CALC BMI ABV UP PARAM F/U: HCPCS | Performed by: INTERNAL MEDICINE

## 2024-05-28 PROCEDURE — G8427 DOCREV CUR MEDS BY ELIG CLIN: HCPCS | Performed by: INTERNAL MEDICINE

## 2024-05-28 PROCEDURE — 99214 OFFICE O/P EST MOD 30 MIN: CPT | Performed by: INTERNAL MEDICINE

## 2024-05-28 PROCEDURE — 93000 ELECTROCARDIOGRAM COMPLETE: CPT | Performed by: INTERNAL MEDICINE

## 2024-05-28 PROCEDURE — 4004F PT TOBACCO SCREEN RCVD TLK: CPT | Performed by: INTERNAL MEDICINE

## 2024-05-28 PROCEDURE — 1123F ACP DISCUSS/DSCN MKR DOCD: CPT | Performed by: INTERNAL MEDICINE

## 2024-05-28 RX ORDER — ATORVASTATIN CALCIUM 10 MG/1
10 TABLET, FILM COATED ORAL DAILY
Qty: 90 TABLET | Refills: 3 | Status: SHIPPED | OUTPATIENT
Start: 2024-05-28

## 2024-05-28 NOTE — PROGRESS NOTES
50    Heart Attack Paternal Grandfather 53     Social History     Tobacco Use    Smoking status: Former     Current packs/day: 0.00     Average packs/day: 1 pack/day for 20.0 years (20.0 ttl pk-yrs)     Types: Cigarettes     Start date: 1973     Quit date: 1993     Years since quittin.9    Smokeless tobacco: Current     Types: Chew    Tobacco comments:     Reviewed    Substance Use Topics    Alcohol use: No     Comment: drinking \"1 cup decaff coffee\", also drinks decaff tea \"drink it all day long\"        Objective:    Vitals:    24 1639 24 1648   BP: (!) 80/60 (!) 74/40   Site: Left Upper Arm Left Upper Arm   Position: Sitting Sitting   Cuff Size: Large Adult Large Adult   Pulse: 73    Weight: 97.5 kg (215 lb)    Height: 1.702 m (5' 7\")      BP (!) 74/40 (Site: Left Upper Arm, Position: Sitting, Cuff Size: Large Adult)   Pulse 73   Ht 1.702 m (5' 7\")   Wt 97.5 kg (215 lb)   BMI 33.67 kg/m²         2020     8:10 AM   Patient-Reported Vitals   Patient-Reported Systolic 124 mmHg   Patient-Reported Diastolic 80 mmHg   Patient-Reported Pulse 50        Wt Readings from Last 3 Encounters:   24 97.5 kg (215 lb)   24 97.7 kg (215 lb 6.4 oz)   24 99.3 kg (219 lb)     Body mass index is 33.67 kg/m².  GENERAL - Alert, oriented, pleasant, in no apparent distress.  EYES: No jaundice, no conjunctival pallor.  SKIN: It is warm & dry. No rashes. No Echhymosis    HEENT - No clinically significant abnormalities seen.  Neck - Supple.  No jugular venous distention noted. No carotid bruits.   Cardiovascular - Normal S1 and S2 without obvious murmur or gallop.    Extremities - No cyanosis, clubbing, or significant edema.    Pulmonary - No respiratory distress.  No wheezes or rales.    Abdomen - No masses, tenderness, or organomegaly.  Musculoskeletal - No significant edema. No joint deformities. No muscle wasting.  Neurologic - Cranial nerves II through XII are grossly intact.  There

## 2024-05-29 ENCOUNTER — TELEPHONE (OUTPATIENT)
Dept: CARDIOLOGY CLINIC | Age: 79
End: 2024-05-29

## 2024-06-06 ENCOUNTER — OFFICE VISIT (OUTPATIENT)
Dept: PULMONOLOGY | Age: 79
End: 2024-06-06
Payer: MEDICARE

## 2024-06-06 ENCOUNTER — HOSPITAL ENCOUNTER (OUTPATIENT)
Age: 79
Discharge: HOME | End: 2024-06-06
Payer: MEDICARE

## 2024-06-06 VITALS
OXYGEN SATURATION: 89 % | DIASTOLIC BLOOD PRESSURE: 68 MMHG | HEART RATE: 79 BPM | WEIGHT: 212 LBS | BODY MASS INDEX: 33.2 KG/M2 | SYSTOLIC BLOOD PRESSURE: 132 MMHG

## 2024-06-06 DIAGNOSIS — R04.2 HEMOPTYSIS: Primary | ICD-10-CM

## 2024-06-06 DIAGNOSIS — G47.33 OSA (OBSTRUCTIVE SLEEP APNEA): ICD-10-CM

## 2024-06-06 DIAGNOSIS — Z87.891 EX-CIGARETTE SMOKER: ICD-10-CM

## 2024-06-06 DIAGNOSIS — G47.10 HYPERSOMNIA: ICD-10-CM

## 2024-06-06 DIAGNOSIS — D64.9 ANEMIA, UNSPECIFIED TYPE: ICD-10-CM

## 2024-06-06 DIAGNOSIS — R09.02 HYPOXIA: ICD-10-CM

## 2024-06-06 DIAGNOSIS — E66.9 OBESITY (BMI 30-39.9): ICD-10-CM

## 2024-06-06 DIAGNOSIS — R04.2 HEMOPTYSIS: ICD-10-CM

## 2024-06-06 LAB
APTT: 35.4 SECONDS (ref 25.1–37.1)
BASOPHILS ABSOLUTE: 0 K/CU MM
BASOPHILS RELATIVE PERCENT: 0.4 % (ref 0–1)
DIFFERENTIAL TYPE: ABNORMAL
EOSINOPHILS ABSOLUTE: 0.2 K/CU MM
EOSINOPHILS RELATIVE PERCENT: 2.5 % (ref 0–3)
FERRITIN: 87 NG/ML (ref 30–400)
FOLATE SERPL-MCNC: >20 NG/ML (ref 3.1–17.5)
HCT VFR BLD CALC: 42.2 % (ref 42–52)
HEMOGLOBIN: 13.3 GM/DL (ref 13.5–18)
IMMATURE NEUTROPHIL %: 0.7 % (ref 0–0.43)
INR BLD: 1.2 INDEX
LYMPHOCYTES ABSOLUTE: 1.1 K/CU MM
LYMPHOCYTES RELATIVE PERCENT: 14.8 % (ref 24–44)
MCH RBC QN AUTO: 30.2 PG (ref 27–31)
MCHC RBC AUTO-ENTMCNC: 31.5 % (ref 32–36)
MCV RBC AUTO: 95.7 FL (ref 78–100)
MONOCYTES ABSOLUTE: 0.9 K/CU MM
MONOCYTES RELATIVE PERCENT: 11.5 % (ref 0–4)
NEUTROPHILS ABSOLUTE: 5.3 K/CU MM
NEUTROPHILS RELATIVE PERCENT: 70.1 % (ref 36–66)
NUCLEATED RBC %: 0 %
PDW BLD-RTO: 15.4 % (ref 11.7–14.9)
PLATELET # BLD: 213 K/CU MM (ref 140–440)
PMV BLD AUTO: 9.6 FL (ref 7.5–11.1)
PROTHROMBIN TIME: 16 SECONDS (ref 11.7–14.5)
RBC # BLD: 4.41 M/CU MM (ref 4.6–6.2)
TOTAL IMMATURE NEUTOROPHIL: 0.05 K/CU MM
TOTAL NUCLEATED RBC: 0 K/CU MM
TRANSFERRIN SERPL-MCNC: 262.6 MG/DL (ref 200–360)
TSH SERPL DL<=0.005 MIU/L-ACNC: 1.98 UIU/ML (ref 0.27–4.2)
VITAMIN B-12: 706.2 PG/ML (ref 211–911)
WBC # BLD: 7.6 K/CU MM (ref 4–10.5)

## 2024-06-06 PROCEDURE — 82728 ASSAY OF FERRITIN: CPT

## 2024-06-06 PROCEDURE — 84443 ASSAY THYROID STIM HORMONE: CPT

## 2024-06-06 PROCEDURE — 1123F ACP DISCUSS/DSCN MKR DOCD: CPT | Performed by: INTERNAL MEDICINE

## 2024-06-06 PROCEDURE — 3078F DIAST BP <80 MM HG: CPT | Performed by: INTERNAL MEDICINE

## 2024-06-06 PROCEDURE — G8417 CALC BMI ABV UP PARAM F/U: HCPCS | Performed by: INTERNAL MEDICINE

## 2024-06-06 PROCEDURE — 85730 THROMBOPLASTIN TIME PARTIAL: CPT

## 2024-06-06 PROCEDURE — 84466 ASSAY OF TRANSFERRIN: CPT

## 2024-06-06 PROCEDURE — 99215 OFFICE O/P EST HI 40 MIN: CPT | Performed by: INTERNAL MEDICINE

## 2024-06-06 PROCEDURE — G8427 DOCREV CUR MEDS BY ELIG CLIN: HCPCS | Performed by: INTERNAL MEDICINE

## 2024-06-06 PROCEDURE — 36415 COLL VENOUS BLD VENIPUNCTURE: CPT

## 2024-06-06 PROCEDURE — 82746 ASSAY OF FOLIC ACID SERUM: CPT

## 2024-06-06 PROCEDURE — 85610 PROTHROMBIN TIME: CPT

## 2024-06-06 PROCEDURE — 85025 COMPLETE CBC W/AUTO DIFF WBC: CPT

## 2024-06-06 PROCEDURE — 82607 VITAMIN B-12: CPT

## 2024-06-06 PROCEDURE — 4004F PT TOBACCO SCREEN RCVD TLK: CPT | Performed by: INTERNAL MEDICINE

## 2024-06-06 PROCEDURE — 3075F SYST BP GE 130 - 139MM HG: CPT | Performed by: INTERNAL MEDICINE

## 2024-06-06 ASSESSMENT — ENCOUNTER SYMPTOMS
COUGH: 0
BACK PAIN: 0
ABDOMINAL DISTENTION: 0
ABDOMINAL PAIN: 0
SHORTNESS OF BREATH: 1
EYE ITCHING: 0
EYE DISCHARGE: 0

## 2024-06-06 NOTE — PROGRESS NOTES
Johnnie Smith  1945  Referring Provider: Claudio Umanzor PAPCP - General     Subjective:     Chief Complaint   Patient presents with    Follow-up     Patient thinks he may have caught pneumonia from his wife, has had productive cough, some of the sputum has blood in it. Has not gotten his new stationary or portable concentrator        HPI  Johnnie is a 79 y.o. male has come back as a follow up. He has a 45 pk yr smoking quit 40 yrs ago. He is on 3 L.min of oxygen. He has coughing up blood, He is on Eliquis.He has phlegm, no fever, no chest pain, no loss of weight. He has Afib on Eliquis. He has SOBOE. He has ILD on HRCT of chest done on 04/11/24. He has Moderate Pulmonary HTN. He had no PFT. He has no dizziness or low BP.     He had RLS. He has no blood work for the RLS    He has symptoms of BORA, But he is not willing to do the PSG.    Current Outpatient Medications   Medication Sig Dispense Refill    atorvastatin (LIPITOR) 10 MG tablet Take 1 tablet by mouth daily 90 tablet 3    apixaban (ELIQUIS) 5 MG TABS tablet TAKE 1 TABLET TWICE A  tablet 2    escitalopram (LEXAPRO) 20 MG tablet Take 1.5 tablets by mouth daily 135 tablet 2    clopidogrel (PLAVIX) 75 MG tablet Take 1 tablet by mouth daily 30 tablet 3    nitroGLYCERIN (NITROSTAT) 0.4 MG SL tablet Place 1 tablet under the tongue every 5 minutes as needed for Chest pain 25 tablet 3    fluticasone-umeclidin-vilant (TRELEGY ELLIPTA) 100-62.5-25 MCG/ACT AEPB inhaler Inhale 1 puff into the lungs daily 3 each 3    gabapentin (NEURONTIN) 300 MG capsule Take 1 capsule by mouth nightly for 120 days. 30 capsule 3    rOPINIRole (REQUIP) 0.5 MG tablet Take 2 pills 1-3 hours before bed. Each week increase dose by one pill until you get relief. If you are taking more than 6 pills a night call prescriber. 360 tablet 1    albuterol (PROVENTIL) (2.5 MG/3ML) 0.083% nebulizer solution Take 3 mLs by nebulization every 6 hours as needed for Wheezing 120 each 0

## 2024-06-06 NOTE — ASSESSMENT & PLAN NOTE
It appears to be sec to the [place]  But since he has a 45 pk yr smoking even though he quit 40 yrs ago  I'll do a CT chest with contrast

## 2024-06-07 ENCOUNTER — APPOINTMENT (OUTPATIENT)
Dept: CT IMAGING | Age: 79
End: 2024-06-07
Payer: MEDICARE

## 2024-06-07 ENCOUNTER — APPOINTMENT (OUTPATIENT)
Dept: GENERAL RADIOLOGY | Age: 79
End: 2024-06-07
Payer: MEDICARE

## 2024-06-07 ENCOUNTER — HOSPITAL ENCOUNTER (EMERGENCY)
Age: 79
Discharge: HOME | End: 2024-06-07
Attending: EMERGENCY MEDICINE
Payer: MEDICARE

## 2024-06-07 VITALS
SYSTOLIC BLOOD PRESSURE: 155 MMHG | HEIGHT: 67 IN | RESPIRATION RATE: 14 BRPM | WEIGHT: 212 LBS | HEART RATE: 60 BPM | OXYGEN SATURATION: 96 % | TEMPERATURE: 98.3 F | DIASTOLIC BLOOD PRESSURE: 98 MMHG | BODY MASS INDEX: 33.27 KG/M2

## 2024-06-07 DIAGNOSIS — Z99.81 OXYGEN DEPENDENT: ICD-10-CM

## 2024-06-07 DIAGNOSIS — J12.3 HUMAN METAPNEUMOVIRUS (HMPV) PNEUMONIA: Primary | ICD-10-CM

## 2024-06-07 DIAGNOSIS — R79.89 ELEVATED BRAIN NATRIURETIC PEPTIDE (BNP) LEVEL: ICD-10-CM

## 2024-06-07 DIAGNOSIS — J44.1 COPD EXACERBATION (HCC): ICD-10-CM

## 2024-06-07 LAB
ABO/RH: NORMAL
ALBUMIN SERPL-MCNC: 3.9 GM/DL (ref 3.4–5)
ALP BLD-CCNC: 85 IU/L (ref 40–128)
ALT SERPL-CCNC: 23 U/L (ref 10–40)
ANION GAP SERPL CALCULATED.3IONS-SCNC: 13 MMOL/L (ref 7–16)
ANTIBODY SCREEN: NEGATIVE
AST SERPL-CCNC: 27 IU/L (ref 15–37)
B PARAP IS1001 DNA NPH QL NAA+NON-PROBE: NOT DETECTED
B PERT.PT PRMT NPH QL NAA+NON-PROBE: NOT DETECTED
BASOPHILS ABSOLUTE: 0 K/CU MM
BASOPHILS RELATIVE PERCENT: 0.3 % (ref 0–1)
BILIRUB SERPL-MCNC: 0.7 MG/DL (ref 0–1)
BILIRUBIN, URINE: NEGATIVE MG/DL
BLOOD, URINE: NEGATIVE
BUN SERPL-MCNC: 16 MG/DL (ref 6–23)
C PNEUM DNA NPH QL NAA+NON-PROBE: NOT DETECTED
CALCIUM SERPL-MCNC: 8.6 MG/DL (ref 8.3–10.6)
CHLORIDE BLD-SCNC: 104 MMOL/L (ref 99–110)
CLARITY: CLEAR
CO2: 22 MMOL/L (ref 21–32)
COLOR: YELLOW
COMMENT UA: NORMAL
CREAT SERPL-MCNC: 1.1 MG/DL (ref 0.9–1.3)
DIFFERENTIAL TYPE: ABNORMAL
EKG ATRIAL RATE: 62 BPM
EKG DIAGNOSIS: NORMAL
EKG P AXIS: -28 DEGREES
EKG P-R INTERVAL: 228 MS
EKG Q-T INTERVAL: 498 MS
EKG QRS DURATION: 98 MS
EKG QTC CALCULATION (BAZETT): 505 MS
EKG R AXIS: -22 DEGREES
EKG T AXIS: -15 DEGREES
EKG VENTRICULAR RATE: 62 BPM
EOSINOPHILS ABSOLUTE: 0.1 K/CU MM
EOSINOPHILS RELATIVE PERCENT: 2 % (ref 0–3)
FLUAV H1 2009 PAN RNA NPH NAA+NON-PROBE: NOT DETECTED
FLUAV H1 RNA NPH QL NAA+NON-PROBE: NOT DETECTED
FLUAV H3 RNA NPH QL NAA+NON-PROBE: NOT DETECTED
FLUAV RNA NPH QL NAA+NON-PROBE: NOT DETECTED
FLUBV RNA NPH QL NAA+NON-PROBE: NOT DETECTED
GFR, ESTIMATED: 69 ML/MIN/1.73M2
GLUCOSE SERPL-MCNC: 105 MG/DL (ref 70–99)
GLUCOSE URINE: NEGATIVE MG/DL
HADV DNA NPH QL NAA+NON-PROBE: NOT DETECTED
HCOV 229E RNA NPH QL NAA+NON-PROBE: NOT DETECTED
HCOV HKU1 RNA NPH QL NAA+NON-PROBE: NOT DETECTED
HCOV NL63 RNA NPH QL NAA+NON-PROBE: NOT DETECTED
HCOV OC43 RNA NPH QL NAA+NON-PROBE: NOT DETECTED
HCT VFR BLD CALC: 40.1 % (ref 42–52)
HEMOGLOBIN: 13.1 GM/DL (ref 13.5–18)
HMPV RNA NPH QL NAA+NON-PROBE: ABNORMAL
HPIV1 RNA NPH QL NAA+NON-PROBE: NOT DETECTED
HPIV2 RNA NPH QL NAA+NON-PROBE: NOT DETECTED
HPIV3 RNA NPH QL NAA+NON-PROBE: NOT DETECTED
HPIV4 RNA NPH QL NAA+NON-PROBE: NOT DETECTED
IMMATURE NEUTROPHIL %: 0.5 % (ref 0–0.43)
KETONES, URINE: NEGATIVE MG/DL
LACTIC ACID, SEPSIS: 1.4 MMOL/L (ref 0.4–2)
LEUKOCYTE ESTERASE, URINE: NEGATIVE
LYMPHOCYTES ABSOLUTE: 0.7 K/CU MM
LYMPHOCYTES RELATIVE PERCENT: 10.5 % (ref 24–44)
M PNEUMO DNA NPH QL NAA+NON-PROBE: NOT DETECTED
MCH RBC QN AUTO: 30.5 PG (ref 27–31)
MCHC RBC AUTO-ENTMCNC: 32.7 % (ref 32–36)
MCV RBC AUTO: 93.5 FL (ref 78–100)
MONOCYTES ABSOLUTE: 0.6 K/CU MM
MONOCYTES RELATIVE PERCENT: 9.6 % (ref 0–4)
NEUTROPHILS ABSOLUTE: 5.1 K/CU MM
NEUTROPHILS RELATIVE PERCENT: 77.1 % (ref 36–66)
NITRITE URINE, QUANTITATIVE: NEGATIVE
NUCLEATED RBC %: 0 %
PDW BLD-RTO: 15.4 % (ref 11.7–14.9)
PH, URINE: 6 (ref 5–8)
PLATELET # BLD: 195 K/CU MM (ref 140–440)
PMV BLD AUTO: 9.4 FL (ref 7.5–11.1)
POTASSIUM SERPL-SCNC: 3.6 MMOL/L (ref 3.5–5.1)
PRO-BNP: 1657 PG/ML
PROCALCITONIN SERPL-MCNC: 0.05 NG/ML
PROTEIN UA: NEGATIVE MG/DL
RBC # BLD: 4.29 M/CU MM (ref 4.6–6.2)
RSV RNA NPH QL NAA+NON-PROBE: NOT DETECTED
RV+EV RNA NPH QL NAA+NON-PROBE: NOT DETECTED
SARS-COV-2 RNA NPH QL NAA+NON-PROBE: NOT DETECTED
SODIUM BLD-SCNC: 139 MMOL/L (ref 135–145)
SPECIFIC GRAVITY UA: 1.01 (ref 1–1.03)
TOTAL IMMATURE NEUTOROPHIL: 0.03 K/CU MM
TOTAL NUCLEATED RBC: 0 K/CU MM
TOTAL PROTEIN: 7.4 GM/DL (ref 6.4–8.2)
TROPONIN, HIGH SENSITIVITY: 18 NG/L (ref 0–22)
TROPONIN, HIGH SENSITIVITY: 20 NG/L (ref 0–22)
UROBILINOGEN, URINE: 1 MG/DL (ref 0.2–1)
WBC # BLD: 6.6 K/CU MM (ref 4–10.5)

## 2024-06-07 PROCEDURE — 6370000000 HC RX 637 (ALT 250 FOR IP): Performed by: EMERGENCY MEDICINE

## 2024-06-07 PROCEDURE — 85025 COMPLETE CBC W/AUTO DIFF WBC: CPT

## 2024-06-07 PROCEDURE — 6360000002 HC RX W HCPCS: Performed by: EMERGENCY MEDICINE

## 2024-06-07 PROCEDURE — 0202U NFCT DS 22 TRGT SARS-COV-2: CPT

## 2024-06-07 PROCEDURE — 99285 EMERGENCY DEPT VISIT HI MDM: CPT

## 2024-06-07 PROCEDURE — 6360000004 HC RX CONTRAST MEDICATION: Performed by: EMERGENCY MEDICINE

## 2024-06-07 PROCEDURE — 2580000003 HC RX 258: Performed by: EMERGENCY MEDICINE

## 2024-06-07 PROCEDURE — 83880 ASSAY OF NATRIURETIC PEPTIDE: CPT

## 2024-06-07 PROCEDURE — 86901 BLOOD TYPING SEROLOGIC RH(D): CPT

## 2024-06-07 PROCEDURE — 81003 URINALYSIS AUTO W/O SCOPE: CPT

## 2024-06-07 PROCEDURE — 71260 CT THORAX DX C+: CPT

## 2024-06-07 PROCEDURE — 86900 BLOOD TYPING SEROLOGIC ABO: CPT

## 2024-06-07 PROCEDURE — 80053 COMPREHEN METABOLIC PANEL: CPT

## 2024-06-07 PROCEDURE — 86850 RBC ANTIBODY SCREEN: CPT

## 2024-06-07 PROCEDURE — 84484 ASSAY OF TROPONIN QUANT: CPT

## 2024-06-07 PROCEDURE — 87150 DNA/RNA AMPLIFIED PROBE: CPT

## 2024-06-07 PROCEDURE — 93005 ELECTROCARDIOGRAM TRACING: CPT | Performed by: EMERGENCY MEDICINE

## 2024-06-07 PROCEDURE — 83605 ASSAY OF LACTIC ACID: CPT

## 2024-06-07 PROCEDURE — 84145 PROCALCITONIN (PCT): CPT

## 2024-06-07 PROCEDURE — 87040 BLOOD CULTURE FOR BACTERIA: CPT

## 2024-06-07 PROCEDURE — 96374 THER/PROPH/DIAG INJ IV PUSH: CPT

## 2024-06-07 RX ORDER — DOXYCYCLINE HYCLATE 100 MG
100 TABLET ORAL 2 TIMES DAILY
Qty: 20 TABLET | Refills: 0 | Status: SHIPPED | OUTPATIENT
Start: 2024-06-07 | End: 2024-06-17

## 2024-06-07 RX ORDER — METHYLPREDNISOLONE 4 MG/1
TABLET ORAL
Qty: 1 KIT | Refills: 0 | Status: SHIPPED | OUTPATIENT
Start: 2024-06-07 | End: 2024-06-13

## 2024-06-07 RX ORDER — DOXYCYCLINE HYCLATE 100 MG
100 TABLET ORAL ONCE
Status: COMPLETED | OUTPATIENT
Start: 2024-06-07 | End: 2024-06-07

## 2024-06-07 RX ADMIN — DOXYCYCLINE HYCLATE 100 MG: 100 TABLET, COATED ORAL at 20:04

## 2024-06-07 RX ADMIN — WATER 125 MG: 1 INJECTION INTRAMUSCULAR; INTRAVENOUS; SUBCUTANEOUS at 20:05

## 2024-06-07 RX ADMIN — IOPAMIDOL 80 ML: 755 INJECTION, SOLUTION INTRAVENOUS at 13:41

## 2024-06-07 ASSESSMENT — PAIN - FUNCTIONAL ASSESSMENT: PAIN_FUNCTIONAL_ASSESSMENT: 0-10

## 2024-06-07 ASSESSMENT — LIFESTYLE VARIABLES
HOW MANY STANDARD DRINKS CONTAINING ALCOHOL DO YOU HAVE ON A TYPICAL DAY: PATIENT DOES NOT DRINK
HOW OFTEN DO YOU HAVE A DRINK CONTAINING ALCOHOL: NEVER

## 2024-06-07 ASSESSMENT — PAIN SCALES - GENERAL: PAINLEVEL_OUTOF10: 0

## 2024-06-07 NOTE — ED TRIAGE NOTES
Pt states he is coughing up blood and was seen yesterday by his provider who said if it gets worse or keeps happening he needs to be seen in the ED. He states his baseline is 7L supplemental O2 [place].

## 2024-06-07 NOTE — ED PROVIDER NOTES
Emergency Department Encounter    Patient: Johnnie Smith  MRN: [id number]  : 1945  Date of Evaluation: 2024  ED Provider:  Alyssia Hughes DO    Triage Chief Complaint:   Hemoptysis and Shortness of Breath    [place]:  Johnnie Smith is a 79 y.o. male that presents shortness of breath and hemoptysis.   heart cath 90% blocked in left ventricle.  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  He is prescribed oxygen and uses it as needed.  He has one at home and portable.  Supposed to get a new portable one that goes up to 7 liters.  His home O2 is in high 80's.  With oxygen on 5L it goes to 90.      His doctor was going to order a CT but told him to come to the ED if his symptoms worsened.  He is taking Eliquis and 81mg Aspirin for years.    No prior hx of hemoptysis.  No fever.      Wife had pneumonia 3-4 weeks ago.  He felt like he had pneumonia.    Both parents and grandparents in 50's, brother  at 48 of heart attacks.    ROS - see HPI, below listed is current ROS at time of my eval:   systems reviewed and negative except as above.     Past Medical History:   Diagnosis Date    Allergic rhinitis     Asthma     Atrial fibrillation (HCC)     Dr. Aldridge & Dr. Nix    CAD (coronary artery disease)     mild LAD - Dr. Aldridge    COPD (chronic obstructive pulmonary disease) ([facility])     COVID-19 2021    Depression     Diverticulosis of colon     Family history of early CAD     Father- MI-  age 51; a grandparent- age 52 of MI    Family history of valvular heart disease     Mother- Mitral valve disease    GERD (gastroesophageal reflux disease)     H/O 24 hour EKG monitoring 2001-Predominant rhythm is sinus rhythm. No signif ectopy noted.    H/O echocardiogram 2002, 2001-EF 60%.Normal LVSF. Mild MR. Mild TR-Dr Aldridge;    H/O echocardiogram 2019    EF 55-60%, Grade I diastolic

## 2024-06-08 LAB
CULTURE: ABNORMAL
CULTURE: ABNORMAL
Lab: ABNORMAL
SPECIMEN: ABNORMAL

## 2024-06-08 NOTE — ED NOTES
Pt connected to home o2 concentrator, ambulated without assistance to vehicle.    What Type Of Note Output Would You Prefer (Optional)?: Standard Output Hpi Title: Evaluation of Skin Lesions How Severe Are Your Spot(S)?: mild Have Your Spot(S) Been Treated In The Past?: has not been treated

## 2024-06-09 LAB
CULTURE: NORMAL
Lab: NORMAL
SPECIMEN: NORMAL

## 2024-06-10 ENCOUNTER — TELEPHONE (OUTPATIENT)
Dept: PHARMACY | Age: 79
End: 2024-06-10

## 2024-06-10 ENCOUNTER — PROCEDURE VISIT (OUTPATIENT)
Dept: CARDIOLOGY CLINIC | Age: 79
End: 2024-06-10

## 2024-06-10 DIAGNOSIS — Z95.0 CARDIAC PACEMAKER IN SITU: Primary | ICD-10-CM

## 2024-06-10 DIAGNOSIS — I49.5 SINUS NODE DYSFUNCTION (HCC): ICD-10-CM

## 2024-06-10 DIAGNOSIS — I44.0 AV BLOCK, 1ST DEGREE: ICD-10-CM

## 2024-06-10 LAB
CULTURE: ABNORMAL
CULTURE: ABNORMAL
EKG ATRIAL RATE: 62 BPM
EKG DIAGNOSIS: NORMAL
EKG P AXIS: -28 DEGREES
EKG P-R INTERVAL: 228 MS
EKG Q-T INTERVAL: 498 MS
EKG QRS DURATION: 98 MS
EKG QTC CALCULATION (BAZETT): 505 MS
EKG R AXIS: -22 DEGREES
EKG T AXIS: -15 DEGREES
EKG VENTRICULAR RATE: 62 BPM
Lab: ABNORMAL
SPECIMEN: ABNORMAL

## 2024-06-10 NOTE — PROGRESS NOTES
Pharmacy Note  ED Culture Follow-up    Johnnie Smith is a 79 y.o. male.     Allergies: Lyrica [pregabalin]     Labs:  Lab Results   Component Value Date    BUN 16 06/07/2024    CREATININE 1.1 06/07/2024    WBC 6.6 06/07/2024     Estimated Creatinine Clearance: 62 mL/min (based on SCr of 1.1 mg/dL).    Current antimicrobials:   Doxycycline    ASSESSMENT:  Micro results:   Blood culture: positive for 1/4 staphylococcus coagulase-negative     PLAN:  Need for intervention: Yes  Discussed with: Dr. Xiong  Chosen treatment:    Patient reports significant improvement since ED visit and no fevers/chills/signs of systemic infection. Informed patient of blood culture result and that it likely represents contamination. Strict return precautions discussed.     Patient response:   Called and spoke with patient.    Counseled patient on following up with PCP    Called/sent in prescription to: Not applicable    Please call with any questions. Ext. [phone]    Keyanna Luis RPH, PharmD 3:18 PM 6/10/2024

## 2024-06-10 NOTE — TELEPHONE ENCOUNTER
Pharmacy Note  ED Culture Follow-up    Johnnie Smith is a 79 y.o. male.     Allergies: Lyrica [pregabalin]     Labs:  Lab Results   Component Value Date    BUN 16 06/07/2024    CREATININE 1.1 06/07/2024    WBC 6.6 06/07/2024     Estimated Creatinine Clearance: 62 mL/min (based on SCr of 1.1 mg/dL).    Current antimicrobials:   Doxycycline    ASSESSMENT:  Micro results:   Blood culture: positive for 1/4 staphylococcus coagulase-negative     PLAN:  Need for intervention: Yes  Discussed with: Dr. Xiong  Chosen treatment:    Patient reports significant improvement since ED visit and no fevers/chills/signs of systemic infection. Informed patient of blood culture result and that it likely represents contamination. Strict return precautions discussed.     Patient response:   Called and spoke with patient.   Counseled patient on following up with PCP    Called/sent in prescription to: Not applicable    Please call with any questions. Ext. [phone]    Keyanna Luis RPH, PharmD 3:18 PM 6/10/2024

## 2024-06-12 LAB
CULTURE: NORMAL
Lab: NORMAL
SPECIMEN: NORMAL

## 2024-06-24 ENCOUNTER — OFFICE VISIT (OUTPATIENT)
Dept: CARDIOLOGY CLINIC | Age: 79
End: 2024-06-24
Payer: MEDICARE

## 2024-06-24 VITALS
BODY MASS INDEX: 33.27 KG/M2 | SYSTOLIC BLOOD PRESSURE: 128 MMHG | WEIGHT: 212 LBS | OXYGEN SATURATION: 85 % | HEIGHT: 67 IN | DIASTOLIC BLOOD PRESSURE: 84 MMHG | HEART RATE: 74 BPM

## 2024-06-24 DIAGNOSIS — I25.119 ATHEROSCLEROSIS OF NATIVE CORONARY ARTERY OF NATIVE HEART WITH ANGINA PECTORIS (HCC): Primary | ICD-10-CM

## 2024-06-24 DIAGNOSIS — I10 PRIMARY HYPERTENSION: Chronic | ICD-10-CM

## 2024-06-24 PROCEDURE — 4004F PT TOBACCO SCREEN RCVD TLK: CPT | Performed by: INTERNAL MEDICINE

## 2024-06-24 PROCEDURE — 3074F SYST BP LT 130 MM HG: CPT | Performed by: INTERNAL MEDICINE

## 2024-06-24 PROCEDURE — 99214 OFFICE O/P EST MOD 30 MIN: CPT | Performed by: INTERNAL MEDICINE

## 2024-06-24 PROCEDURE — 3079F DIAST BP 80-89 MM HG: CPT | Performed by: INTERNAL MEDICINE

## 2024-06-24 PROCEDURE — G8417 CALC BMI ABV UP PARAM F/U: HCPCS | Performed by: INTERNAL MEDICINE

## 2024-06-24 PROCEDURE — 1123F ACP DISCUSS/DSCN MKR DOCD: CPT | Performed by: INTERNAL MEDICINE

## 2024-06-24 PROCEDURE — G8427 DOCREV CUR MEDS BY ELIG CLIN: HCPCS | Performed by: INTERNAL MEDICINE

## 2024-06-24 RX ORDER — METOPROLOL SUCCINATE 50 MG/1
25 TABLET, EXTENDED RELEASE ORAL DAILY
Qty: 180 TABLET | Refills: 1
Start: 2024-06-24

## 2024-06-24 NOTE — PATIENT INSTRUCTIONS
We are committed to providing you the best care possible.    If you receive a survey after visiting one of our offices, please take time to share your experience concerning your physician office visit.  These surveys are confidential and no health information about you is shared.    We are eager to improve for you and we are counting on your feedback to help make that happen.      **It is YOUR responsibilty to bring medication bottles and/or updated medication list to EACH APPOINTMENT. This will allow us to better serve you and all your healthcare needs**  Thank you for allowing us to care for you today!   We want to ensure we can follow your treatment plan and we strive to give you the best outcomes and experience possible.   If you ever have a life threatening emergency and call 911 - for an ambulance (EMS)   Our providers can only care for you at:   [facility] or [facility].   Even if you have someone take you or you drive yourself we can only care for you in a [facility] facility. Our providers are not setup at the other healthcare locations!   Please be informed that if you contact our office outside of normal business hours the physician on call cannot help with any scheduling or rescheduling issues, procedure instruction questions or any type of medication issue.    We advise you for any urgent/emergency that you go to the nearest emergency room!    PLEASE CALL OUR OFFICE DURING NORMAL BUSINESS HOURS    Monday - Friday   8 am to 5 pm    [place]: [phone]    [place]: [phone]    [place]:  [phone]

## 2024-06-27 ENCOUNTER — HOSPITAL ENCOUNTER (INPATIENT)
Age: 79
LOS: 2 days | Discharge: HOME | DRG: 683 | End: 2024-06-29
Attending: EMERGENCY MEDICINE
Payer: MEDICARE

## 2024-06-27 ENCOUNTER — APPOINTMENT (OUTPATIENT)
Dept: GENERAL RADIOLOGY | Age: 79
DRG: 683 | End: 2024-06-27
Payer: MEDICARE

## 2024-06-27 ENCOUNTER — HOSPITAL ENCOUNTER (OUTPATIENT)
Dept: PULMONOLOGY | Age: 79
Discharge: HOME | End: 2024-06-27
Attending: INTERNAL MEDICINE
Payer: MEDICARE

## 2024-06-27 DIAGNOSIS — R55 NEAR SYNCOPE: Primary | ICD-10-CM

## 2024-06-27 DIAGNOSIS — J44.9 COPD WITH HYPOXIA (HCC): ICD-10-CM

## 2024-06-27 DIAGNOSIS — I48.0 PAF (PAROXYSMAL ATRIAL FIBRILLATION) (HCC): ICD-10-CM

## 2024-06-27 DIAGNOSIS — N17.9 AKI (ACUTE KIDNEY INJURY) (HCC): ICD-10-CM

## 2024-06-27 LAB
ALBUMIN SERPL-MCNC: 3.9 GM/DL (ref 3.4–5)
ALP BLD-CCNC: 89 IU/L (ref 40–128)
ALT SERPL-CCNC: 28 U/L (ref 10–40)
ANION GAP SERPL CALCULATED.3IONS-SCNC: 15 MMOL/L (ref 7–16)
AST SERPL-CCNC: 31 IU/L (ref 15–37)
BASOPHILS ABSOLUTE: 0.1 K/CU MM
BASOPHILS RELATIVE PERCENT: 0.6 % (ref 0–1)
BILIRUB SERPL-MCNC: 0.9 MG/DL (ref 0–1)
BILIRUBIN, URINE: NEGATIVE MG/DL
BLOOD, URINE: NEGATIVE
BUN SERPL-MCNC: 19 MG/DL (ref 6–23)
CALCIUM SERPL-MCNC: 8.8 MG/DL (ref 8.3–10.6)
CHLORIDE BLD-SCNC: 104 MMOL/L (ref 99–110)
CLARITY, UA: CLEAR
CO2: 22 MMOL/L (ref 21–32)
COLOR, UA: YELLOW
COMMENT UA: NORMAL
CREAT SERPL-MCNC: 1.5 MG/DL (ref 0.9–1.3)
DIFFERENTIAL TYPE: ABNORMAL
DISTANCE WALKED: 565 FT
DLCO %PRED: 34 %
DLCO PRED: NORMAL
DLCO/VA %PRED: NORMAL
DLCO/VA PRED: NORMAL
DLCO/VA: NORMAL
DLCO: NORMAL
EOSINOPHILS ABSOLUTE: 0.2 K/CU MM
EOSINOPHILS RELATIVE PERCENT: 3 % (ref 0–3)
EXPIRATORY TIME-POST: NORMAL
EXPIRATORY TIME: NORMAL
FEF 25-75 %CHNG: NORMAL
FEF 25-75 POST %PRED: 38 %
FEF 25-75% %PRED-PRE: 43 L/SEC
FEF 25-75% PRED: NORMAL
FEF 25-75-POST: NORMAL
FEF 25-75-PRE: NORMAL
FEV1 %PRED-POST: 79 %
FEV1 %PRED-PRE: 82 %
FEV1 PRED: NORMAL
FEV1-POST: NORMAL
FEV1-PRE: NORMAL
FEV1/FVC %PRED-POST: 73 %
FEV1/FVC %PRED-PRE: 77 %
FEV1/FVC PRED: NORMAL
FEV1/FVC-POST: NORMAL
FEV1/FVC-PRE: NORMAL
FVC %PRED-POST: 107 L
FVC %PRED-PRE: 105 %
FVC PRED: NORMAL
FVC-POST: NORMAL
FVC-PRE: NORMAL
GAW %PRED: NORMAL
GAW PRED: NORMAL
GAW: NORMAL
GFR, ESTIMATED: 48 ML/MIN/1.73M2
GLUCOSE SERPL-MCNC: 110 MG/DL (ref 70–99)
GLUCOSE URINE: NEGATIVE MG/DL
HCT VFR BLD CALC: 44.2 % (ref 42–52)
HEMOGLOBIN: 14.2 GM/DL (ref 13.5–18)
IC PRE %PRED: NORMAL
IC PRED: NORMAL
IC: NORMAL
IMMATURE NEUTROPHIL %: 0.3 % (ref 0–0.43)
KETONES, URINE: NEGATIVE MG/DL
LEUKOCYTE ESTERASE, URINE: NEGATIVE
LYMPHOCYTES ABSOLUTE: 1.1 K/CU MM
LYMPHOCYTES RELATIVE PERCENT: 13.3 % (ref 24–44)
MCH RBC QN AUTO: 30.5 PG (ref 27–31)
MCHC RBC AUTO-ENTMCNC: 32.1 % (ref 32–36)
MCV RBC AUTO: 94.8 FL (ref 78–100)
MEP: NORMAL
MIP: NORMAL
MONOCYTES ABSOLUTE: 0.9 K/CU MM
MONOCYTES RELATIVE PERCENT: 11.2 % (ref 0–4)
MVV %PRED-PRE: NORMAL
MVV PRED: NORMAL
MVV-PRE: NORMAL
NEUTROPHILS ABSOLUTE: 5.7 K/CU MM
NEUTROPHILS RELATIVE PERCENT: 71.6 % (ref 36–66)
NITRITE URINE, QUANTITATIVE: NEGATIVE
NUCLEATED RBC %: 0 %
PDW BLD-RTO: 15.9 % (ref 11.7–14.9)
PEF %PRED-POST: NORMAL
PEF %PRED-PRE: NORMAL
PEF PRED: NORMAL
PEF%CHNG: NORMAL
PEF-POST: NORMAL
PEF-PRE: NORMAL
PH, URINE: 6.5 (ref 5–8)
PLATELET # BLD: 175 K/CU MM (ref 140–440)
PMV BLD AUTO: 10 FL (ref 7.5–11.1)
POTASSIUM SERPL-SCNC: 3.7 MMOL/L (ref 3.5–5.1)
PRO-BNP: 3707 PG/ML
PROTEIN UA: NEGATIVE MG/DL
RAW %PRED: NORMAL
RAW PRED: NORMAL
RAW: NORMAL
RBC # BLD: 4.66 M/CU MM (ref 4.6–6.2)
RV PRE %PRED: NORMAL
RV PRED: NORMAL
RV: NORMAL
SODIUM BLD-SCNC: 141 MMOL/L (ref 135–145)
SPECIFIC GRAVITY UA: 1.01 (ref 1–1.03)
SPO2: NORMAL %
SVC %PRED: NORMAL
SVC PRED: NORMAL
SVC: NORMAL
TLC PRE %PRED: 99 %
TLC PRED: NORMAL
TLC: NORMAL
TOTAL IMMATURE NEUTOROPHIL: 0.02 K/CU MM
TOTAL NUCLEATED RBC: 0 K/CU MM
TOTAL PROTEIN: 7.8 GM/DL (ref 6.4–8.2)
TROPONIN, HIGH SENSITIVITY: 31 NG/L (ref 0–22)
UROBILINOGEN, URINE: 1 MG/DL (ref 0.2–1)
VA %PRED: NORMAL
VA PRED: NORMAL
VA: NORMAL
VTG %PRED: NORMAL
VTG PRED: NORMAL
VTG: NORMAL
WBC # BLD: 8 K/CU MM (ref 4–10.5)

## 2024-06-27 PROCEDURE — 85025 COMPLETE CBC W/AUTO DIFF WBC: CPT

## 2024-06-27 PROCEDURE — 94729 DIFFUSING CAPACITY: CPT

## 2024-06-27 PROCEDURE — 99285 EMERGENCY DEPT VISIT HI MDM: CPT

## 2024-06-27 PROCEDURE — 6370000000 HC RX 637 (ALT 250 FOR IP): Performed by: STUDENT IN AN ORGANIZED HEALTH CARE EDUCATION/TRAINING PROGRAM

## 2024-06-27 PROCEDURE — 94060 EVALUATION OF WHEEZING: CPT

## 2024-06-27 PROCEDURE — 94618 PULMONARY STRESS TESTING: CPT

## 2024-06-27 PROCEDURE — 84484 ASSAY OF TROPONIN QUANT: CPT

## 2024-06-27 PROCEDURE — 80053 COMPREHEN METABOLIC PANEL: CPT

## 2024-06-27 PROCEDURE — 2580000003 HC RX 258: Performed by: STUDENT IN AN ORGANIZED HEALTH CARE EDUCATION/TRAINING PROGRAM

## 2024-06-27 PROCEDURE — 93005 ELECTROCARDIOGRAM TRACING: CPT | Performed by: EMERGENCY MEDICINE

## 2024-06-27 PROCEDURE — 1200000000 HC SEMI PRIVATE

## 2024-06-27 PROCEDURE — 83880 ASSAY OF NATRIURETIC PEPTIDE: CPT

## 2024-06-27 PROCEDURE — 71045 X-RAY EXAM CHEST 1 VIEW: CPT

## 2024-06-27 PROCEDURE — 2580000003 HC RX 258: Performed by: EMERGENCY MEDICINE

## 2024-06-27 PROCEDURE — 94726 PLETHYSMOGRAPHY LUNG VOLUMES: CPT

## 2024-06-27 PROCEDURE — 81003 URINALYSIS AUTO W/O SCOPE: CPT

## 2024-06-27 RX ORDER — SODIUM CHLORIDE 9 MG/ML
INJECTION, SOLUTION INTRAVENOUS PRN
Status: DISCONTINUED | OUTPATIENT
Start: 2024-06-27 | End: 2024-06-29 | Stop reason: HOSPADM

## 2024-06-27 RX ORDER — ONDANSETRON 2 MG/ML
4 INJECTION INTRAMUSCULAR; INTRAVENOUS EVERY 6 HOURS PRN
Status: DISCONTINUED | OUTPATIENT
Start: 2024-06-27 | End: 2024-06-27

## 2024-06-27 RX ORDER — ACETAMINOPHEN 325 MG/1
650 TABLET ORAL EVERY 6 HOURS PRN
Status: DISCONTINUED | OUTPATIENT
Start: 2024-06-27 | End: 2024-06-29 | Stop reason: HOSPADM

## 2024-06-27 RX ORDER — BUDESONIDE AND FORMOTEROL FUMARATE DIHYDRATE 160; 4.5 UG/1; UG/1
2 AEROSOL RESPIRATORY (INHALATION)
Status: DISCONTINUED | OUTPATIENT
Start: 2024-06-27 | End: 2024-06-29 | Stop reason: HOSPADM

## 2024-06-27 RX ORDER — ATORVASTATIN CALCIUM 10 MG/1
10 TABLET, FILM COATED ORAL NIGHTLY
Status: DISCONTINUED | OUTPATIENT
Start: 2024-06-27 | End: 2024-06-29 | Stop reason: HOSPADM

## 2024-06-27 RX ORDER — SODIUM CHLORIDE 0.9 % (FLUSH) 0.9 %
5-40 SYRINGE (ML) INJECTION PRN
Status: DISCONTINUED | OUTPATIENT
Start: 2024-06-27 | End: 2024-06-29 | Stop reason: HOSPADM

## 2024-06-27 RX ORDER — FOLIC ACID 1 MG/1
500 TABLET ORAL DAILY
Status: DISCONTINUED | OUTPATIENT
Start: 2024-06-28 | End: 2024-06-29 | Stop reason: HOSPADM

## 2024-06-27 RX ORDER — SODIUM CHLORIDE 9 MG/ML
INJECTION, SOLUTION INTRAVENOUS CONTINUOUS
Status: DISPENSED | OUTPATIENT
Start: 2024-06-27 | End: 2024-06-28

## 2024-06-27 RX ORDER — DOFETILIDE 0.25 MG/1
250 CAPSULE ORAL 2 TIMES DAILY
Status: DISCONTINUED | OUTPATIENT
Start: 2024-06-27 | End: 2024-06-29

## 2024-06-27 RX ORDER — ROPINIROLE 1 MG/1
1 TABLET, FILM COATED ORAL NIGHTLY PRN
Status: DISCONTINUED | OUTPATIENT
Start: 2024-06-27 | End: 2024-06-29 | Stop reason: HOSPADM

## 2024-06-27 RX ORDER — MIRTAZAPINE 15 MG/1
7.5 TABLET, FILM COATED ORAL NIGHTLY
Status: DISCONTINUED | OUTPATIENT
Start: 2024-06-27 | End: 2024-06-29 | Stop reason: HOSPADM

## 2024-06-27 RX ORDER — PANTOPRAZOLE SODIUM 40 MG/1
40 TABLET, DELAYED RELEASE ORAL
Status: DISCONTINUED | OUTPATIENT
Start: 2024-06-28 | End: 2024-06-29 | Stop reason: HOSPADM

## 2024-06-27 RX ORDER — POTASSIUM CHLORIDE 7.45 MG/ML
10 INJECTION INTRAVENOUS PRN
Status: DISCONTINUED | OUTPATIENT
Start: 2024-06-27 | End: 2024-06-29 | Stop reason: HOSPADM

## 2024-06-27 RX ORDER — LANOLIN ALCOHOL/MO/W.PET/CERES
500 CREAM (GRAM) TOPICAL 4 TIMES DAILY
Status: DISCONTINUED | OUTPATIENT
Start: 2024-06-27 | End: 2024-06-29 | Stop reason: HOSPADM

## 2024-06-27 RX ORDER — SODIUM CHLORIDE 0.9 % (FLUSH) 0.9 %
5-40 SYRINGE (ML) INJECTION EVERY 12 HOURS SCHEDULED
Status: DISCONTINUED | OUTPATIENT
Start: 2024-06-27 | End: 2024-06-29 | Stop reason: HOSPADM

## 2024-06-27 RX ORDER — POTASSIUM CHLORIDE 20 MEQ/1
40 TABLET, EXTENDED RELEASE ORAL PRN
Status: DISCONTINUED | OUTPATIENT
Start: 2024-06-27 | End: 2024-06-29 | Stop reason: HOSPADM

## 2024-06-27 RX ORDER — POLYETHYLENE GLYCOL 3350 17 G/17G
17 POWDER, FOR SOLUTION ORAL DAILY PRN
Status: DISCONTINUED | OUTPATIENT
Start: 2024-06-27 | End: 2024-06-29 | Stop reason: HOSPADM

## 2024-06-27 RX ORDER — MONTELUKAST SODIUM 10 MG/1
10 TABLET ORAL NIGHTLY
Status: DISCONTINUED | OUTPATIENT
Start: 2024-06-27 | End: 2024-06-29 | Stop reason: HOSPADM

## 2024-06-27 RX ORDER — CLOPIDOGREL BISULFATE 75 MG/1
75 TABLET ORAL DAILY
Status: DISCONTINUED | OUTPATIENT
Start: 2024-06-28 | End: 2024-06-29 | Stop reason: HOSPADM

## 2024-06-27 RX ORDER — ACETAMINOPHEN 650 MG/1
650 SUPPOSITORY RECTAL EVERY 6 HOURS PRN
Status: DISCONTINUED | OUTPATIENT
Start: 2024-06-27 | End: 2024-06-29 | Stop reason: HOSPADM

## 2024-06-27 RX ORDER — ONDANSETRON 4 MG/1
4 TABLET, ORALLY DISINTEGRATING ORAL EVERY 8 HOURS PRN
Status: DISCONTINUED | OUTPATIENT
Start: 2024-06-27 | End: 2024-06-27

## 2024-06-27 RX ORDER — ESCITALOPRAM OXALATE 10 MG/1
30 TABLET ORAL DAILY
Status: DISCONTINUED | OUTPATIENT
Start: 2024-06-28 | End: 2024-06-29 | Stop reason: HOSPADM

## 2024-06-27 RX ORDER — ENOXAPARIN SODIUM 100 MG/ML
40 INJECTION SUBCUTANEOUS DAILY
Status: DISCONTINUED | OUTPATIENT
Start: 2024-06-27 | End: 2024-06-27

## 2024-06-27 RX ORDER — METOPROLOL SUCCINATE 25 MG/1
25 TABLET, EXTENDED RELEASE ORAL DAILY
Status: DISCONTINUED | OUTPATIENT
Start: 2024-06-28 | End: 2024-06-29

## 2024-06-27 RX ORDER — MAGNESIUM SULFATE IN WATER 40 MG/ML
2000 INJECTION, SOLUTION INTRAVENOUS PRN
Status: DISCONTINUED | OUTPATIENT
Start: 2024-06-27 | End: 2024-06-29 | Stop reason: HOSPADM

## 2024-06-27 RX ORDER — ASPIRIN 81 MG/1
81 TABLET, CHEWABLE ORAL DAILY
Status: DISCONTINUED | OUTPATIENT
Start: 2024-06-28 | End: 2024-06-28

## 2024-06-27 RX ORDER — ALBUTEROL SULFATE 90 UG/1
2 AEROSOL, METERED RESPIRATORY (INHALATION) 4 TIMES DAILY PRN
Status: DISCONTINUED | OUTPATIENT
Start: 2024-06-27 | End: 2024-06-29 | Stop reason: HOSPADM

## 2024-06-27 RX ORDER — PROCHLORPERAZINE EDISYLATE 5 MG/ML
5 INJECTION INTRAMUSCULAR; INTRAVENOUS EVERY 6 HOURS PRN
Status: DISCONTINUED | OUTPATIENT
Start: 2024-06-27 | End: 2024-06-29 | Stop reason: HOSPADM

## 2024-06-27 RX ORDER — 0.9 % SODIUM CHLORIDE 0.9 %
500 INTRAVENOUS SOLUTION INTRAVENOUS ONCE
Status: COMPLETED | OUTPATIENT
Start: 2024-06-27 | End: 2024-06-27

## 2024-06-27 RX ADMIN — SODIUM CHLORIDE: 9 INJECTION, SOLUTION INTRAVENOUS at 23:01

## 2024-06-27 RX ADMIN — CYANOCOBALAMIN TAB 1000 MCG 500 MCG: 1000 TAB at 21:01

## 2024-06-27 RX ADMIN — SODIUM CHLORIDE, PRESERVATIVE FREE 10 ML: 5 INJECTION INTRAVENOUS at 21:03

## 2024-06-27 RX ADMIN — MIRTAZAPINE 7.5 MG: 15 TABLET, FILM COATED ORAL at 21:02

## 2024-06-27 RX ADMIN — MONTELUKAST 10 MG: 10 TABLET, FILM COATED ORAL at 21:02

## 2024-06-27 RX ADMIN — APIXABAN 5 MG: 5 TABLET, FILM COATED ORAL at 21:01

## 2024-06-27 RX ADMIN — SODIUM CHLORIDE 500 ML: 9 INJECTION, SOLUTION INTRAVENOUS at 15:18

## 2024-06-27 RX ADMIN — ROPINIROLE HYDROCHLORIDE 1 MG: 1 TABLET, FILM COATED ORAL at 23:01

## 2024-06-27 RX ADMIN — ATORVASTATIN CALCIUM 10 MG: 10 TABLET, FILM COATED ORAL at 21:01

## 2024-06-27 ASSESSMENT — PULMONARY FUNCTION TESTS
FVC_PERCENT_PREDICTED_POST: 107
FVC_PERCENT_PREDICTED_PRE: 105
FEV1/FVC_PERCENT_PREDICTED_POST: 73
FEV1_PERCENT_PREDICTED_PRE: 82
FEV1/FVC_PERCENT_PREDICTED_PRE: 77
FEV1_PERCENT_PREDICTED_POST: 79

## 2024-06-27 ASSESSMENT — PAIN - FUNCTIONAL ASSESSMENT: PAIN_FUNCTIONAL_ASSESSMENT: NONE - DENIES PAIN

## 2024-06-27 NOTE — H&P
to Pay for Housing in the Last Year: No     Number of Places Lived in the Last Year: 1     Unstable Housing in the Last Year: No       Medications:   Medications:    sodium chloride flush  5-40 mL IntraVENous 2 times per day    enoxaparin  40 mg SubCUTAneous Daily      Infusions:    sodium chloride       PRN Meds: sodium chloride flush, 5-40 mL, PRN  sodium chloride, , PRN  potassium chloride, 40 mEq, PRN   Or  potassium alternative oral replacement, 40 mEq, PRN   Or  potassium chloride, 10 mEq, PRN  magnesium sulfate, 2,000 mg, PRN  polyethylene glycol, 17 g, Daily PRN  acetaminophen, 650 mg, Q6H PRN   Or  acetaminophen, 650 mg, Q6H PRN  prochlorperazine, 5 mg, Q6H PRN        Labs      CBC:   Recent Labs     06/27/24  1326   WBC 8.0   HGB 14.2        BMP:    Recent Labs     06/27/24  1326      K 3.7      CO2 22   BUN 19   CREATININE 1.5*   GLUCOSE 110*     Hepatic:   Recent Labs     06/27/24  1326   AST 31   ALT 28   BILITOT 0.9   ALKPHOS 89     Lipids:   Lab Results   Component Value Date/Time    CHOL 140 10/27/2022 08:58 AM    CHOL 152 03/23/2015 08:52 AM    HDL 51 01/19/2024 08:30 AM    TRIG 67 10/27/2022 08:58 AM     Hemoglobin A1C:   Lab Results   Component Value Date/Time    LABA1C 5.2 11/02/2022 10:15 AM     TSH: No results found for: \"TSH\"  Troponin:   Lab Results   Component Value Date/Time    TROPONINT <0.010 12/31/2021 10:30 AM    TROPONINT <0.010 12/30/2021 04:02 PM    TROPONINT <0.010 10/17/2020 02:46 PM     Lactic Acid: No results for input(s): \"LACTA\" in the last 72 hours.  BNP:   Recent Labs     06/27/24  1457   PROBNP 3,707*     UA:  Lab Results   Component Value Date/Time    NITRU NEGATIVE 06/27/2024 03:31 PM    COLORU YELLOW 06/27/2024 03:31 PM    PHUR 6.5 06/27/2024 03:31 PM    WBCUA <1 10/17/2020 05:59 PM    RBCUA NONE SEEN 10/17/2020 05:59 PM    TRICHOMONAS NONE SEEN 10/17/2020 05:59 PM    BACTERIA NEGATIVE 10/17/2020 05:59 PM    CLARITYU CLEAR 06/27/2024 03:31 PM     LEUKOCYTESUR NEGATIVE 06/27/2024 03:31 PM    UROBILINOGEN 1.0 06/27/2024 03:31 PM    BILIRUBINUR NEGATIVE 06/27/2024 03:31 PM    BLOODU NEGATIVE 06/27/2024 03:31 PM    GLUCOSEU NEGATIVE 06/27/2024 03:31 PM    KETUA NEGATIVE 06/27/2024 03:31 PM     Urine Cultures: No results found for: \"LABURIN\"  Blood Cultures: No results found for: \"BC\"  No results found for: \"BLOODCULT2\"  Organism: No results found for: \"ORG\"    Imaging/Diagnostics Last 24 Hours   XR CHEST PORTABLE    Result Date: 6/27/2024  Chest X-ray INDICATION: Chest Pain COMPARISON: 3/4/2024 TECHNIQUE: AP/PA view of the chest was obtained. FINDINGS: The cardiac silhouette is unremarkable. Stable left-sided pacemaker. No consolidation, pleural effusion, or pneumothorax is seen. Chronic interstitial changes in both lungs. The bony structures are intact.     No evidence of acute cardiopulmonary disease. Electronically signed by Malik Ho        Electronically signed by Meche Kelley MD on 6/27/2024 at 7:44 PM

## 2024-06-27 NOTE — PROGRESS NOTES
[facility] Pulmonary Function Lab - Six Minute Walk  Test Performed on: Room Air____ Oxygen at _2 - 8_ lpm via N/C  Assist Device Used During Test:    None____ Cane____ Walker_X__   Shortness of Breath - Kailyn's Scale  0 Nothing at all 5 Severe    0.5 Very very slight 6    1 Very slight 7 Very severe   2 Slight 8     3 Moderate 9 Very very severe   4 Somewhat severe 10 Maximal      Time SpO2 Heart Rate Respiratory Rate Modified Kailyn’s Scale Other      Baseline   93 / 85        %     69  16         5 lpm O2 / 4 lpm      1 minute               91              % 79   2 5 lpm   Pulse Dose      2 minutes         88/88/91      %  80  3 5L / 6L / 7L      3 minutes          92              %  83     3 7 LPM      4 minutes       88 / 91         %  76      3 7L / 8L       5 minutes    91              %  87      3 8 L      6 minutes        89             %  86 18 3 8 lpm O2   Recovery   x 1 Min           89               %  86 18      8 L   Recovery   x 2 Min             91              %  83             (Used Pt's home O2 unit)     Number of Laps_3_ X 170 feet _510__+ _55_ additional feet = Total _565_feet  Stopped or paused before 6 minutes? No____ Yes _X____   Pre Blood Pressure: __128 / 88___    Post Blood Pressure:_ 132 _/_98__    Interpretation:

## 2024-06-27 NOTE — ED NOTES
Patient reports he was seen today for out patient pulmonary function test that went well. Reports when he was leaving \" it hit me\" pt was seen at [facility] and sent by [facility] for evaluation

## 2024-06-27 NOTE — ED PROVIDER NOTES
Emergency Department Encounter  Location: [address]    Patient: Johnnie Smith  MRN: [id number]  : 1945  Date of evaluation: 2024  ED Provider: Pamela Zee DO    Chief Complaint:    Shortness of Breath (8L at home baseline most of time ' its not on  \") and Dizziness    [place]:  Johnnie Smith is a 79 y.o. male that presents to the emergency department with concern for shortness of breath. Describes that he's been having intermittent episodes of feeling lightheaded and dyspnea. These episodes occur with exertion. Not associated with chest pain, celeste syncope or palpitations. These episodes have been occurring since cath at the end of April. Has seen cardiology in follow up. Meds have been adjusted without any improvement in his symptoms. No fever, chills, cough, or congestion. No new lower extremity edema.       Past Medical History:   Diagnosis Date    Allergic rhinitis     Asthma     Atrial fibrillation ([facility])     Dr. Aldridge & Dr. Nix    CAD (coronary artery disease)     mild LAD - Dr. Aldridge    COPD (chronic obstructive pulmonary disease) ([facility])     COVID-19 2021    Depression     Diverticulosis of colon     Family history of early CAD     Father- MI-  age 51; a grandparent- age 52 of MI    Family history of valvular heart disease     Mother- Mitral valve disease    GERD (gastroesophageal reflux disease)     H/O 24 hour EKG monitoring 2001-Predominant rhythm is sinus rhythm. No signif ectopy noted.    H/O echocardiogram 2002, 2001-EF 60%.Normal LVSF. Mild MR. Mild TR-Dr Aldridge;    H/O echocardiogram 2019    EF 55-60%, Grade I diastolic dysfunction, sclerotic but non stenotic aortic valve, Mild AR, Mild-Mod TR, Normal pulmonary artery pressure, no pericardial effusion     History of diverticulitis of colon     History of Holter monitoring 2018    48 hr holter - SR with PAF    History of repair of left rotator cuff 2015    Hx    acetaminophen (TYLENOL) tablet 650 mg (has no administration in time range)     Or   acetaminophen (TYLENOL) suppository 650 mg (has no administration in time range)   prochlorperazine (COMPAZINE) injection 5 mg (has no administration in time range)   apixaban (ELIQUIS) tablet 5 mg (has no administration in time range)   aspirin chewable tablet 81 mg (has no administration in time range)   atorvastatin (LIPITOR) tablet 10 mg (has no administration in time range)   budesonide-formoterol (SYMBICORT) 160-4.5 MCG/ACT inhaler 2 puff (has no administration in time range)   vitamin B-12 (CYANOCOBALAMIN) tablet 500 mcg (has no administration in time range)   dofetilide (TIKOSYN) capsule 250 mcg ( Oral Automatically Held 6/30/24 2100)   folic acid (FOLVITE) tablet 500 mcg (has no administration in time range)   metoprolol succinate (TOPROL XL) extended release tablet 25 mg (has no administration in time range)   mirtazapine (REMERON) tablet 7.5 mg (has no administration in time range)   montelukast (SINGULAIR) tablet 10 mg (has no administration in time range)   pantoprazole (PROTONIX) tablet 40 mg (has no administration in time range)   rOPINIRole (REQUIP) tablet 1 mg (has no administration in time range)   escitalopram (LEXAPRO) tablet 30 mg (has no administration in time range)   clopidogrel (PLAVIX) tablet 75 mg (has no administration in time range)   albuterol sulfate HFA (PROVENTIL;VENTOLIN;PROAIR) 108 (90 Base) MCG/ACT inhaler 2 puff (has no administration in time range)   0.9 % sodium chloride infusion (has no administration in time range)   sodium chloride 0.9 % bolus 500 mL (0 mLs IntraVENous Stopped 6/27/24 1740)       EKG (if obtained): (All EKG's are interpreted by myself in the absence of a cardiologist) sinus rhythm at 70.  Left axis with poor R wave progression.  Nonspecific ST changes in the precordial leads without elevation.  This is changed when compared to prior tracing .    Chronic conditions

## 2024-06-27 NOTE — PROGRESS NOTES
Patient came to floor from ED on 2L O2.  Patient O2 sat 82.  Patient currenly on 4L sat 94. No SOB on labored breathing.

## 2024-06-27 NOTE — ED NOTES
Patient presents to Ed through triage with complaints of \"burning\" to left side of chest, reports no pain, also complaints of \" blood rush to head\" pt states that it is intermittent, reports when he gets this feeling be feels as if he is going to pass out, reports \" happens when he walks a distance. Pt had episode during triage and became pale

## 2024-06-27 NOTE — ED NOTES
ED TO INPATIENT SBAR HANDOFF    Patient Name: Marly Smith   :  1945  79 y.o.   Preferred Name  Marly   Family/Caregiver Present yes   Restraints no   C-SSRS: Risk of Suicide: No Risk  Sitter no   Sepsis Risk Score        Situation  Chief Complaint   Patient presents with    Shortness of Breath     8L at home baseline most of time ' its not on  \"    Dizziness     Brief Description of Patient's Condition: pt presents to Ed from [facility] for complaints of chest pain states feels like a burning sensation, reports feels as if there is a blood rush to his head. States feels as if he is going to pass out and become dizziness   Mental Status: alert  Arrived from: home    Imaging:   XR CHEST PORTABLE   Final Result      No evidence of acute cardiopulmonary disease.      Electronically signed by Malik Ho        Abnormal labs:   Abnormal Labs Reviewed   CBC WITH AUTO DIFFERENTIAL - Abnormal; Notable for the following components:       Result Value    RDW 15.9 (*)     Neutrophils % 71.6 (*)     Lymphocytes % 13.3 (*)     Monocytes % 11.2 (*)     All other components within normal limits   COMPREHENSIVE METABOLIC PANEL W/ REFLEX TO MG FOR LOW K - Abnormal; Notable for the following components:    Glucose 110 (*)     Creatinine 1.5 (*)     Est, Glom Filt Rate 48 (*)     All other components within normal limits   TROPONIN - Abnormal; Notable for the following components:    Troponin, High Sensitivity 31 (*)     All other components within normal limits   BRAIN NATRIURETIC PEPTIDE - Abnormal; Notable for the following components:    Pro-BNP 3,707 (*)     All other components within normal limits        Background  History:   Past Medical History:   Diagnosis Date    Allergic rhinitis     Asthma     Atrial fibrillation ([facility])     Dr. Aldridge & Dr. Nix    CAD (coronary artery disease)     mild LAD - Dr. Aldridge    COPD (chronic obstructive pulmonary disease) ([facility])     COVID-19 2021    Depression        Last documented pain medication administered: none  NIH Score: NIH     Active LDA's:   Peripheral IV 06/27/24 Left Antecubital (Active)       Pertinent or High Risk Medications/Drips: no   If Yes, please provide details:   Blood Product Administration: no  If Yes, please provide details:     Recommendation    Incomplete orders   Additional Comments:    If any further questions, please call Sending RN at [phone]   Debi       Electronically signed by: Electronically signed by Violet Shah RN on 6/27/2024 at 5:54 PM

## 2024-06-27 NOTE — PROGRESS NOTES
4 Eyes Skin Assessment     NAME:  Johnnie Smith  YOB: 1945  MEDICAL RECORD NUMBER:  [id number]    The patient is being assessed for  Admission    I agree that at least one RN has performed a thorough Head to Toe Skin Assessment on the patient. ALL assessment sites listed below have been assessed.      Areas assessed by both nurses:    Head, Face, Ears, Shoulders, Back, Chest, Arms, Elbows, Hands, Sacrum. Buttock, Coccyx, Ischium, Legs. Feet and Heels, and Under Medical Devices         Does the Patient have a Wound? No noted wound(s)       [place] Prevention initiated by RN: No  Wound Care Orders initiated by RN: No    Pressure Injury (Stage 3,4, Unstageable, DTI, NWPT, and Complex wounds) if present, place Wound referral order by RN under : No    New Ostomies, if present place, Ostomy referral order under : No     Nurse 1 eSignature: Electronically signed by Heidi Shah RN on 6/27/24 at 6:49 PM EDT    **SHARE this note so that the co-signing nurse can place an eSignature**    Nurse 2 eSignature: Electronically signed by Sarah Damian RN on 6/27/24 at 7:03 PM EDT

## 2024-06-28 ENCOUNTER — APPOINTMENT (OUTPATIENT)
Dept: ULTRASOUND IMAGING | Age: 79
DRG: 683 | End: 2024-06-28
Payer: MEDICARE

## 2024-06-28 ENCOUNTER — APPOINTMENT (OUTPATIENT)
Dept: CT IMAGING | Age: 79
DRG: 683 | End: 2024-06-28
Payer: MEDICARE

## 2024-06-28 DIAGNOSIS — R55 POSTURAL DIZZINESS WITH PRESYNCOPE: Primary | ICD-10-CM

## 2024-06-28 DIAGNOSIS — R42 POSTURAL DIZZINESS WITH PRESYNCOPE: Primary | ICD-10-CM

## 2024-06-28 DIAGNOSIS — R55 PRE-SYNCOPE: ICD-10-CM

## 2024-06-28 LAB
ALBUMIN SERPL-MCNC: 3.2 GM/DL (ref 3.4–5)
ALP BLD-CCNC: 71 IU/L (ref 40–129)
ALT SERPL-CCNC: 21 U/L (ref 10–40)
ANION GAP SERPL CALCULATED.3IONS-SCNC: 11 MMOL/L (ref 7–16)
AST SERPL-CCNC: 20 IU/L (ref 15–37)
BASOPHILS ABSOLUTE: 0 K/CU MM
BASOPHILS RELATIVE PERCENT: 0.3 % (ref 0–1)
BILIRUB SERPL-MCNC: 0.9 MG/DL (ref 0–1)
BUN SERPL-MCNC: 18 MG/DL (ref 6–23)
CALCIUM SERPL-MCNC: 8 MG/DL (ref 8.3–10.6)
CHLORIDE BLD-SCNC: 107 MMOL/L (ref 99–110)
CO2: 25 MMOL/L (ref 21–32)
CREAT SERPL-MCNC: 1.1 MG/DL (ref 0.9–1.3)
DIFFERENTIAL TYPE: ABNORMAL
EKG ATRIAL RATE: 60 BPM
EKG ATRIAL RATE: 70 BPM
EKG DIAGNOSIS: NORMAL
EKG DIAGNOSIS: NORMAL
EKG P AXIS: 35 DEGREES
EKG P AXIS: 43 DEGREES
EKG P-R INTERVAL: 178 MS
EKG P-R INTERVAL: 218 MS
EKG Q-T INTERVAL: 482 MS
EKG Q-T INTERVAL: 524 MS
EKG QRS DURATION: 96 MS
EKG QRS DURATION: 98 MS
EKG QTC CALCULATION (BAZETT): 520 MS
EKG QTC CALCULATION (BAZETT): 524 MS
EKG R AXIS: -27 DEGREES
EKG R AXIS: -44 DEGREES
EKG T AXIS: -17 DEGREES
EKG T AXIS: -20 DEGREES
EKG VENTRICULAR RATE: 60 BPM
EKG VENTRICULAR RATE: 70 BPM
EOSINOPHILS ABSOLUTE: 0.3 K/CU MM
EOSINOPHILS RELATIVE PERCENT: 4.4 % (ref 0–3)
GFR, ESTIMATED: 69 ML/MIN/1.73M2
GLUCOSE SERPL-MCNC: 85 MG/DL (ref 70–99)
HCT VFR BLD CALC: 35.9 % (ref 42–52)
HEMOGLOBIN: 11.5 GM/DL (ref 13.5–18)
IMMATURE NEUTROPHIL %: 0.5 % (ref 0–0.43)
LYMPHOCYTES ABSOLUTE: 0.8 K/CU MM
LYMPHOCYTES RELATIVE PERCENT: 13.6 % (ref 24–44)
MAGNESIUM: 2 MG/DL (ref 1.8–2.4)
MCH RBC QN AUTO: 30.4 PG (ref 27–31)
MCHC RBC AUTO-ENTMCNC: 32 % (ref 32–36)
MCV RBC AUTO: 95 FL (ref 78–100)
MONOCYTES ABSOLUTE: 0.7 K/CU MM
MONOCYTES RELATIVE PERCENT: 10.9 % (ref 0–4)
NEUTROPHILS ABSOLUTE: 4.2 K/CU MM
NEUTROPHILS RELATIVE PERCENT: 70.3 % (ref 36–66)
NUCLEATED RBC %: 0 %
PDW BLD-RTO: 15.5 % (ref 11.7–14.9)
PLATELET # BLD: 129 K/CU MM (ref 140–440)
PMV BLD AUTO: 10 FL (ref 7.5–11.1)
POTASSIUM SERPL-SCNC: 3.5 MMOL/L (ref 3.5–5.1)
RBC # BLD: 3.78 M/CU MM (ref 4.6–6.2)
SODIUM BLD-SCNC: 143 MMOL/L (ref 135–145)
TOTAL IMMATURE NEUTOROPHIL: 0.03 K/CU MM
TOTAL NUCLEATED RBC: 0 K/CU MM
TOTAL PROTEIN: 5.4 GM/DL (ref 6.4–8.2)
TROPONIN, HIGH SENSITIVITY: 22 NG/L (ref 0–22)
TROPONIN, HIGH SENSITIVITY: 25 NG/L (ref 0–22)
WBC # BLD: 6 K/CU MM (ref 4–10.5)

## 2024-06-28 PROCEDURE — 1200000000 HC SEMI PRIVATE

## 2024-06-28 PROCEDURE — 94640 AIRWAY INHALATION TREATMENT: CPT

## 2024-06-28 PROCEDURE — 85025 COMPLETE CBC W/AUTO DIFF WBC: CPT

## 2024-06-28 PROCEDURE — 93010 ELECTROCARDIOGRAM REPORT: CPT | Performed by: INTERNAL MEDICINE

## 2024-06-28 PROCEDURE — 6360000002 HC RX W HCPCS: Performed by: FAMILY MEDICINE

## 2024-06-28 PROCEDURE — 6370000000 HC RX 637 (ALT 250 FOR IP): Performed by: STUDENT IN AN ORGANIZED HEALTH CARE EDUCATION/TRAINING PROGRAM

## 2024-06-28 PROCEDURE — 2580000003 HC RX 258: Performed by: STUDENT IN AN ORGANIZED HEALTH CARE EDUCATION/TRAINING PROGRAM

## 2024-06-28 PROCEDURE — 36415 COLL VENOUS BLD VENIPUNCTURE: CPT

## 2024-06-28 PROCEDURE — APPNB30 APP NON BILLABLE TIME 0-30 MINS

## 2024-06-28 PROCEDURE — 93005 ELECTROCARDIOGRAM TRACING: CPT

## 2024-06-28 PROCEDURE — 83735 ASSAY OF MAGNESIUM: CPT

## 2024-06-28 PROCEDURE — 70450 CT HEAD/BRAIN W/O DYE: CPT

## 2024-06-28 PROCEDURE — 80053 COMPREHEN METABOLIC PANEL: CPT

## 2024-06-28 PROCEDURE — 93880 EXTRACRANIAL BILAT STUDY: CPT

## 2024-06-28 PROCEDURE — 84484 ASSAY OF TROPONIN QUANT: CPT

## 2024-06-28 RX ORDER — HYDRALAZINE HYDROCHLORIDE 20 MG/ML
10 INJECTION INTRAMUSCULAR; INTRAVENOUS ONCE
Status: COMPLETED | OUTPATIENT
Start: 2024-06-28 | End: 2024-06-28

## 2024-06-28 RX ADMIN — APIXABAN 5 MG: 5 TABLET, FILM COATED ORAL at 11:35

## 2024-06-28 RX ADMIN — SODIUM CHLORIDE, PRESERVATIVE FREE 10 ML: 5 INJECTION INTRAVENOUS at 21:24

## 2024-06-28 RX ADMIN — MIRTAZAPINE 7.5 MG: 15 TABLET, FILM COATED ORAL at 21:04

## 2024-06-28 RX ADMIN — ATORVASTATIN CALCIUM 10 MG: 10 TABLET, FILM COATED ORAL at 21:05

## 2024-06-28 RX ADMIN — CYANOCOBALAMIN TAB 1000 MCG 500 MCG: 1000 TAB at 18:02

## 2024-06-28 RX ADMIN — SODIUM CHLORIDE, PRESERVATIVE FREE 10 ML: 5 INJECTION INTRAVENOUS at 11:35

## 2024-06-28 RX ADMIN — HYDRALAZINE HYDROCHLORIDE 10 MG: 20 INJECTION INTRAMUSCULAR; INTRAVENOUS at 22:14

## 2024-06-28 RX ADMIN — FOLIC ACID 500 MCG: 1 TABLET ORAL at 11:34

## 2024-06-28 RX ADMIN — ESCITALOPRAM OXALATE 30 MG: 10 TABLET ORAL at 11:34

## 2024-06-28 RX ADMIN — CYANOCOBALAMIN TAB 1000 MCG 500 MCG: 1000 TAB at 21:05

## 2024-06-28 RX ADMIN — MONTELUKAST 10 MG: 10 TABLET, FILM COATED ORAL at 21:05

## 2024-06-28 RX ADMIN — CLOPIDOGREL BISULFATE 75 MG: 75 TABLET ORAL at 11:35

## 2024-06-28 RX ADMIN — ALBUTEROL SULFATE 2 PUFF: 90 AEROSOL, METERED RESPIRATORY (INHALATION) at 23:12

## 2024-06-28 RX ADMIN — PANTOPRAZOLE SODIUM 40 MG: 40 TABLET, DELAYED RELEASE ORAL at 06:04

## 2024-06-28 RX ADMIN — APIXABAN 5 MG: 5 TABLET, FILM COATED ORAL at 21:05

## 2024-06-28 RX ADMIN — METOPROLOL SUCCINATE 25 MG: 25 TABLET, EXTENDED RELEASE ORAL at 11:34

## 2024-06-28 RX ADMIN — CYANOCOBALAMIN TAB 1000 MCG 500 MCG: 1000 TAB at 11:34

## 2024-06-28 ASSESSMENT — ENCOUNTER SYMPTOMS
NAUSEA: 0
CHEST TIGHTNESS: 0
COUGH: 0
VOMITING: 0
SHORTNESS OF BREATH: 1
ABDOMINAL PAIN: 0

## 2024-06-28 NOTE — PLAN OF CARE
Problem: Discharge Planning  Goal: Discharge to home or other facility with appropriate resources  6/28/2024 0112 by Gladis Turk LPN  Outcome: Progressing  6/27/2024 1848 by Heidi Shah RN  Outcome: Progressing     Problem: ABCDS Injury Assessment  Goal: Absence of physical injury  6/28/2024 0112 by Gladis Turk LPN  Outcome: Progressing  6/27/2024 1848 by Heidi Shah RN  Outcome: Progressing     Problem: Safety - Adult  Goal: Free from fall injury  6/28/2024 0112 by Gladis Turk LPN  Outcome: Progressing  6/27/2024 1848 by Heidi Shah RN  Outcome: Progressing

## 2024-06-28 NOTE — CONSULTS
[place] [address]  Phone: [phone]    Fax [phone]                  Brad Aldridge MD, [facility]       Ernesto Clark MD, [facility]  Eduardo Fink MD, [facility]    MD Ramirez Orosco MD Tariq Rizvi, MD Bilal Alam, MD Melissa Kellis, JANI Craig, JANI Schmidt, APRN    Marcell Fuchs, APRN  Arya Chery PA-C    CARDIOLOGY CONSULT NOTE     Reason for consultation: Presyncope    Referring physician:  No admitting provider for patient encounter.     Primary care physician: Claudio Umanzor PA      Thank you for the consult.    Chief Complaints :  Chief Complaint   Patient presents with    Shortness of Breath     8L at home baseline most of time ' its not on 24/7 \"    Dizziness        History of present illness:Johnnie is a 79 y.o.year old  male with a past medical history of recent PCI of LAD in April, COPD, pulmonary fibrosis, paroxysmal atrial fibrillation, s/p permanent pacemaker, hypertension, hyperlipidemia. Patient presents with worsening lightheadedness and dizziness.    Patient states that over the last few months he has been having some lightheadedness and dizziness but is increasingly grown worse over the last month.  Usually his prodromal symptoms are associated with exertion but can also be associated with head tilt and standing.  Patient stated that his symptoms are especially bad when he goes from bending over to standing upright.  Orthostatic blood pressure readings while he is here in the hospital have been within normal limits.  Usually does not have any dizziness at rest.    Patient has also been complaining of shortness of breath which is chronic in nature.  Patient does have supplemental oxygen at baseline due to his COPD/pulmonary fibrosis.  Does feel like his shortness of breath has slightly worsened recently.    Patient was recently seen by Dr. Aldridge in clinic on the 24th.  While there  stenosis  Right coronary artery is a nondominant vessel is no significant stenosis  LV gram shows normal EF     Assessment and plan  Successful PCI of the LAD with lesion reduction from 90% to 0% which is heavily calcified  LIOR flow was 3 before and after the PCI  Will continue with DAPT patient is on Eliquis as well after 30 days we can DC the aspirin continue Plavix and NOAC    Chest Xray:  XR CHEST PORTABLE    Result Date: 6/27/2024  Chest X-ray INDICATION: Chest Pain COMPARISON: 3/4/2024 TECHNIQUE: AP/PA view of the chest was obtained. FINDINGS: The cardiac silhouette is unremarkable. Stable left-sided pacemaker. No consolidation, pleural effusion, or pneumothorax is seen. Chronic interstitial changes in both lungs. The bony structures are intact.     No evidence of acute cardiopulmonary disease. Electronically signed by Malik Ho    CT CHEST PULMONARY EMBOLISM W CONTRAST    Result Date: 6/7/2024  EXAMINATION: CT CHEST PULMONARY EMBOLISM W CONTRAST 6/7/2024 1:47 PM ACCESSION NUMBER: [id number] COMPARISON: April 11 2024 INDICATION: hemoptysis TECHNIQUE: Multiple contiguous 2D axial CT images of the chest were obtained from the lung apices to the lung bases after the intravenous administration of 100mL Iso-ezequiel 370 per pulmonary angiography protocol.  Coronal reconstructions were performed. Radiation dose reduction techniques were used for this study. Our CT scanners use one or all of the following: Automated exposure control, adjustment of the mA and/or kV according to patient size, iterative reconstruction. FINDINGS: STUDY QUALITY: The exam study quality is good. AIRWAYS: The central airways are patent. LUNGS: The lungs shows diffuse centrilobular emphysema. PLEURA: No pleural effusion or pneumothorax. HEART: The heart is not enlarged. No calcified coronary atherosclerosis.  No pericardial effusion. THORACIC AORTA: The aorta is normal in caliber. PULMONARY ARTERY: No pulmonary embolus to the segmental level. The  Eliquis  Hypertension  -Blood pressure mildly elevated in the 140s to 150s.  -Lotrel recently discontinued by Dr. Aldridge  COPD/pulmonary fibrosis  -Hypoxia could also be contributing to presyncope  Patient needs to be ruled out for obstructive sleep apnea        Thank you  much for consult and giving us the opportunity in contributing in the care of this patient. Please feel free to call me for any questions.       Arya Chery PA-C, 6/28/2024 12:16 PM

## 2024-06-28 NOTE — PROGRESS NOTES
Xarelto  [] Coumadin   Peptic ulcer prophylaxis -   Code Status Full Code   Disposition From / Current living situation : home  Expected Disposition: home  Estimated Date of Discharge: 1-2 days  Patient requires continued admission due to presyncope   Surrogate Decision Maker/ SRIRAM Arreaga       Personally reviewed Lab Studies and Imaging       Subjective:     Chief Complaint: Shortness of Breath (8L at home baseline most of time ' its not on 24/ \") and Dizziness       -seen and evaluated at baseline         Review of System     Review of Systems  Negative unless mentioned above    I have reviewed all pertinent PMHx, PSHx, FamHx, SocialHx, medications, and allergies and updated history as appropriate.    Physical Exam   VITAL SIGNS:  BP (!) 154/90   Pulse 79   Temp (P) 98 °F (36.7 °C) (Oral)   Resp 18   Ht 1.702 m (5' 7\")   Wt 96.2 kg (212 lb)   SpO2 93%   BMI 33.20 kg/m²   Tmax over 24 hours:  Temp (24hrs), Av.9 °F (36.6 °C), Min:97.7 °F (36.5 °C), Max:98 °F (36.7 °C)      Patient Vitals for the past 6 hrs:   Temp Temp src SpO2   24 1131 (P) 98 °F (36.7 °C) (P) Oral --   24 1113 -- -- 93 %       No intake or output data in the 24 hours ending 24 1132  Wt Readings from Last 2 Encounters:   24 96.2 kg (212 lb)   24 96.2 kg (212 lb)     Body mass index is 33.2 kg/m².      Physical Exam  Constitutional:       General: He is not in acute distress.     Appearance: Normal appearance. He is normal weight. He is not ill-appearing, toxic-appearing or diaphoretic.   HENT:      Head: Normocephalic and atraumatic.      Right Ear: Tympanic membrane, ear canal and external ear normal. There is no impacted cerumen.      Left Ear: Tympanic membrane, ear canal and external ear normal. There is no impacted cerumen.      Nose: Nose normal. No congestion or rhinorrhea.      Mouth/Throat:      Mouth: Mucous membranes are moist.      Pharynx: No oropharyngeal exudate or posterior  11.7 - 14.9 %    Platelets 175 140 - 440 K/CU MM    MPV 10.0 7.5 - 11.1 FL    Differential Type AUTOMATED DIFFERENTIAL     Neutrophils % 71.6 (H) 36 - 66 %    Lymphocytes % 13.3 (L) 24 - 44 %    Monocytes % 11.2 (H) 0 - 4 %    Eosinophils % 3.0 0 - 3 %    Basophils % 0.6 0 - 1 %    Neutrophils Absolute 5.7 K/CU MM    Lymphocytes Absolute 1.1 K/CU MM    Monocytes Absolute 0.9 K/CU MM    Eosinophils Absolute 0.2 K/CU MM    Basophils Absolute 0.1 K/CU MM    Nucleated RBC % 0.0 %    Total Nucleated RBC 0.0 K/CU MM    Total Immature Neutrophil 0.02 K/CU MM    Immature Neutrophil % 0.3 0 - 0.43 %   Comprehensive Metabolic Panel w/ Reflex to MG    Collection Time: 06/27/24  1:26 PM   Result Value Ref Range    Sodium 141 135 - 145 MMOL/L    Potassium 3.7 3.5 - 5.1 MMOL/L    Chloride 104 99 - 110 mMol/L    CO2 22 21 - 32 MMOL/L    Anion Gap 15 7 - 16    Glucose 110 (H) 70 - 99 MG/DL    BUN 19 6 - 23 MG/DL    Creatinine 1.5 (H) 0.9 - 1.3 MG/DL    Est, Glom Filt Rate 48 (L) >60 mL/min/1.73m2    Calcium 8.8 8.3 - 10.6 MG/DL    Total Protein 7.8 6.4 - 8.2 GM/DL    Albumin 3.9 3.4 - 5.0 GM/DL    Total Bilirubin 0.9 0.0 - 1.0 MG/DL    Alkaline Phosphatase 89 40 - 128 IU/L    ALT 28 10 - 40 U/L    AST 31 15 - 37 IU/L   Troponin    Collection Time: 06/27/24  1:26 PM   Result Value Ref Range    Troponin, High Sensitivity 31 (H) 0 - 22 ng/L   Brain Natriuretic Peptide    Collection Time: 06/27/24  2:57 PM   Result Value Ref Range    Pro-BNP 3,707 (H) <300 PG/ML   Urinalysis    Collection Time: 06/27/24  3:31 PM   Result Value Ref Range    Color, UA YELLOW YELLOW    Clarity, UA CLEAR CLEAR    Glucose, Ur NEGATIVE NEGATIVE MG/DL    Bilirubin, Urine NEGATIVE NEGATIVE MG/DL    Ketones, Urine NEGATIVE NEGATIVE MG/DL    Specific Gravity, UA 1.015 1.001 - 1.035    Blood, Urine NEGATIVE NEGATIVE    pH, Urine 6.5 5.0 - 8.0    Protein, UA NEGATIVE NEGATIVE MG/DL    Urobilinogen, Urine 1.0 0.2 - 1.0 MG/DL    Nitrite Urine, Quantitative NEGATIVE

## 2024-06-28 NOTE — CARE COORDINATION
Attempted to see pt in room. Pt was asleep and did not respond to name. CM to follow up again later as able.     1701 - Was unable to see pt today. Placed on weekend list if weekend CM is able to see pt.

## 2024-06-28 NOTE — PROGRESS NOTES
Discussed with pt about why we use a bed alarm.  Pt is aware and is willing to keep the bed alarm on to help keep him safe.  Will update pt nurse Sarah ROMERO.  No waiver was signed.

## 2024-06-29 VITALS
HEART RATE: 72 BPM | BODY MASS INDEX: 33.27 KG/M2 | DIASTOLIC BLOOD PRESSURE: 93 MMHG | WEIGHT: 212 LBS | RESPIRATION RATE: 21 BRPM | SYSTOLIC BLOOD PRESSURE: 127 MMHG | HEIGHT: 67 IN | TEMPERATURE: 98.6 F | OXYGEN SATURATION: 93 %

## 2024-06-29 PROBLEM — N17.9 AKI (ACUTE KIDNEY INJURY) (HCC): Status: RESOLVED | Noted: 2024-06-27 | Resolved: 2024-06-29

## 2024-06-29 PROCEDURE — 2580000003 HC RX 258: Performed by: STUDENT IN AN ORGANIZED HEALTH CARE EDUCATION/TRAINING PROGRAM

## 2024-06-29 PROCEDURE — 93005 ELECTROCARDIOGRAM TRACING: CPT | Performed by: INTERNAL MEDICINE

## 2024-06-29 PROCEDURE — 6370000000 HC RX 637 (ALT 250 FOR IP): Performed by: STUDENT IN AN ORGANIZED HEALTH CARE EDUCATION/TRAINING PROGRAM

## 2024-06-29 PROCEDURE — 94640 AIRWAY INHALATION TREATMENT: CPT

## 2024-06-29 PROCEDURE — 94761 N-INVAS EAR/PLS OXIMETRY MLT: CPT

## 2024-06-29 PROCEDURE — 2700000000 HC OXYGEN THERAPY PER DAY

## 2024-06-29 PROCEDURE — 99223 1ST HOSP IP/OBS HIGH 75: CPT | Performed by: INTERNAL MEDICINE

## 2024-06-29 PROCEDURE — APPNB180 APP NON BILLABLE TIME > 60 MINS: Performed by: NURSE PRACTITIONER

## 2024-06-29 PROCEDURE — 6370000000 HC RX 637 (ALT 250 FOR IP): Performed by: INTERNAL MEDICINE

## 2024-06-29 PROCEDURE — 6360000002 HC RX W HCPCS: Performed by: INTERNAL MEDICINE

## 2024-06-29 PROCEDURE — 6370000000 HC RX 637 (ALT 250 FOR IP): Performed by: NURSE PRACTITIONER

## 2024-06-29 RX ORDER — METOPROLOL SUCCINATE 25 MG/1
25 TABLET, EXTENDED RELEASE ORAL 2 TIMES DAILY
Status: DISCONTINUED | OUTPATIENT
Start: 2024-06-29 | End: 2024-06-29

## 2024-06-29 RX ORDER — METOPROLOL SUCCINATE 25 MG/1
25 TABLET, EXTENDED RELEASE ORAL DAILY
Status: DISCONTINUED | OUTPATIENT
Start: 2024-06-30 | End: 2024-06-29 | Stop reason: HOSPADM

## 2024-06-29 RX ORDER — DOFETILIDE 0.25 MG/1
250 CAPSULE ORAL 2 TIMES DAILY
Status: DISCONTINUED | OUTPATIENT
Start: 2024-06-29 | End: 2024-06-29 | Stop reason: HOSPADM

## 2024-06-29 RX ORDER — DILTIAZEM HYDROCHLORIDE 120 MG/1
120 CAPSULE, COATED, EXTENDED RELEASE ORAL DAILY
Status: DISCONTINUED | OUTPATIENT
Start: 2024-06-29 | End: 2024-06-29 | Stop reason: HOSPADM

## 2024-06-29 RX ORDER — DILTIAZEM HYDROCHLORIDE 120 MG/1
120 CAPSULE, COATED, EXTENDED RELEASE ORAL DAILY
Qty: 30 CAPSULE | Refills: 3 | Status: SHIPPED | OUTPATIENT
Start: 2024-06-30

## 2024-06-29 RX ORDER — DILTIAZEM HYDROCHLORIDE 120 MG/1
120 CAPSULE, COATED, EXTENDED RELEASE ORAL DAILY
Qty: 30 CAPSULE | Refills: 3 | Status: SHIPPED | OUTPATIENT
Start: 2024-06-30 | End: 2024-06-29

## 2024-06-29 RX ORDER — FUROSEMIDE 10 MG/ML
40 INJECTION INTRAMUSCULAR; INTRAVENOUS 2 TIMES DAILY
Status: DISCONTINUED | OUTPATIENT
Start: 2024-06-29 | End: 2024-06-29 | Stop reason: HOSPADM

## 2024-06-29 RX ADMIN — DOFETILIDE 250 MCG: 0.25 CAPSULE ORAL at 13:34

## 2024-06-29 RX ADMIN — APIXABAN 5 MG: 5 TABLET, FILM COATED ORAL at 09:54

## 2024-06-29 RX ADMIN — ACETAMINOPHEN 650 MG: 325 TABLET ORAL at 13:38

## 2024-06-29 RX ADMIN — PANTOPRAZOLE SODIUM 40 MG: 40 TABLET, DELAYED RELEASE ORAL at 05:48

## 2024-06-29 RX ADMIN — ESCITALOPRAM OXALATE 30 MG: 10 TABLET ORAL at 09:54

## 2024-06-29 RX ADMIN — CYANOCOBALAMIN TAB 1000 MCG 500 MCG: 1000 TAB at 12:04

## 2024-06-29 RX ADMIN — SODIUM CHLORIDE, PRESERVATIVE FREE 10 ML: 5 INJECTION INTRAVENOUS at 09:53

## 2024-06-29 RX ADMIN — CLOPIDOGREL BISULFATE 75 MG: 75 TABLET ORAL at 09:54

## 2024-06-29 RX ADMIN — BUDESONIDE AND FORMOTEROL FUMARATE DIHYDRATE 2 PUFF: 160; 4.5 AEROSOL RESPIRATORY (INHALATION) at 07:47

## 2024-06-29 RX ADMIN — FUROSEMIDE 40 MG: 10 INJECTION, SOLUTION INTRAMUSCULAR; INTRAVENOUS at 18:02

## 2024-06-29 RX ADMIN — FOLIC ACID 500 MCG: 1 TABLET ORAL at 09:54

## 2024-06-29 RX ADMIN — FUROSEMIDE 40 MG: 10 INJECTION, SOLUTION INTRAMUSCULAR; INTRAVENOUS at 13:34

## 2024-06-29 RX ADMIN — DILTIAZEM HYDROCHLORIDE 120 MG: 120 CAPSULE, EXTENDED RELEASE ORAL at 12:04

## 2024-06-29 RX ADMIN — CYANOCOBALAMIN TAB 1000 MCG 500 MCG: 1000 TAB at 18:02

## 2024-06-29 RX ADMIN — CYANOCOBALAMIN TAB 1000 MCG 500 MCG: 1000 TAB at 09:54

## 2024-06-29 ASSESSMENT — PAIN SCALES - GENERAL
PAINLEVEL_OUTOF10: 3
PAINLEVEL_OUTOF10: 0
PAINLEVEL_OUTOF10: 0

## 2024-06-29 ASSESSMENT — PAIN - FUNCTIONAL ASSESSMENT: PAIN_FUNCTIONAL_ASSESSMENT: ACTIVITIES ARE NOT PREVENTED

## 2024-06-29 ASSESSMENT — PAIN DESCRIPTION - DESCRIPTORS: DESCRIPTORS: ACHING

## 2024-06-29 ASSESSMENT — PAIN DESCRIPTION - LOCATION: LOCATION: HEAD

## 2024-06-29 NOTE — PROGRESS NOTES
Notified primary nurse of blood pressure reading of 127/93. Primary nurse acknowledged.     Joaquina Salter     PCA

## 2024-06-29 NOTE — PLAN OF CARE
Problem: Discharge Planning  Goal: Discharge to home or other facility with appropriate resources  6/29/2024 1006 by Adelia Ledesma RN  Outcome: Progressing  Flowsheets (Taken 6/29/2024 1003)  Discharge to home or other facility with appropriate resources:   Identify barriers to discharge with patient and caregiver   Arrange for needed discharge resources and transportation as appropriate  6/28/2024 2127 by Gladis Turk LPN  Outcome: Progressing     Problem: ABCDS Injury Assessment  Goal: Absence of physical injury  6/29/2024 1006 by Adelia Ledesma RN  Outcome: Progressing  6/28/2024 2127 by Gladis Turk LPN  Outcome: Progressing     Problem: Safety - Adult  Goal: Free from fall injury  6/29/2024 1006 by Adelia Ledesma RN  Outcome: Progressing  6/28/2024 2127 by Gladis Turk LPN  Outcome: Progressing

## 2024-06-29 NOTE — PLAN OF CARE
Problem: Discharge Planning  Goal: Discharge to home or other facility with appropriate resources  6/29/2024 1834 by Adelia Ledesma RN  Outcome: Adequate for Discharge  6/29/2024 1006 by Adelia Ledesma RN  Outcome: Progressing  Flowsheets (Taken 6/29/2024 1003)  Discharge to home or other facility with appropriate resources:   Identify barriers to discharge with patient and caregiver   Arrange for needed discharge resources and transportation as appropriate     Problem: ABCDS Injury Assessment  Goal: Absence of physical injury  6/29/2024 1834 by Adelia Ledesma RN  Outcome: Adequate for Discharge  6/29/2024 1006 by Adelia Ledesma RN  Outcome: Progressing     Problem: Safety - Adult  Goal: Free from fall injury  6/29/2024 1834 by Adelia Ledesma RN  Outcome: Adequate for Discharge  6/29/2024 1006 by Adelia Ledesma RN  Outcome: Progressing

## 2024-06-29 NOTE — PLAN OF CARE
Problem: Discharge Planning  Goal: Discharge to home or other facility with appropriate resources  6/28/2024 2127 by Gladis Turk LPN  Outcome: Progressing  6/28/2024 1536 by Sarah Damian, RN  Outcome: Progressing     Problem: ABCDS Injury Assessment  Goal: Absence of physical injury  6/28/2024 2127 by Gladis Turk LPN  Outcome: Progressing  6/28/2024 1536 by Sarah Damian RN  Outcome: Progressing     Problem: Safety - Adult  Goal: Free from fall injury  6/28/2024 2127 by Gladis Turk LPN  Outcome: Progressing  6/28/2024 1536 by Sarah Damian RN  Outcome: Progressing

## 2024-06-29 NOTE — PROGRESS NOTES
Cardiology follow up     Consulted for Pre syncope    Subjective: he states that he is doing well. Denies chest pain or increase in shortness of breath currently on 4 L NC. He has not been OOB this morning.     Plan:   Pre syncope: patient states stared after PCI. He reports presyncope occurs after placing oxygen on after walking for a while. He states that if he walks more than 100 feet he has to use the oxygen, but does not feel short of breath for short walks. He states that his portable machine is set on 8 L per min. He states that he did not know how to adjust his machine. He has received a lot of education in hospital and understands that it may have been too much oxygen. He states that his oxygen is typically mid 80's when he puts his portable oxygen on.   He is not orthostatic.   US carotids are normal.   CT head normal  ? Over oxygenation as cause of symptoms  Encouraged to follow up with pulmonary to get specific guidelines of saturation and supplemental oxygen use.   Given Pulmonary fibrosis may benefit from VQ scan- defer to pulmonary  PAF is not significantly worse than prior to PCI- therefore I do not think Rate related  Encouraged to ambulate with Oxygen and staff      CAD: Hx of PCI of LAD. Last C 4/26/2024. Last stress test 2018. Continue ASA/ plavix/ effient/ brilinta, metoprolol/ toprol/ coreg. Patient is a former smoker, and has significant family hx of early CAD.        Atrial fibrillation: Paroxysmal. Last DCCV 4/4/2018. He has not had an Ablation. Goal is rhythm control. Pacer interrogation shows that he is still having PAF with tikosyn therapy. QTc on admission 505. Tikosyn is being held. Given ALBERT, will monitor on tele and evaluate with EPS for antiarrythmic treatment. Continue metoprolol, coreg, sotalol, tikosyn, rhythmol, multaq, amiodarone. Continue Eliquis Recommend keeping potassium 4-4.5 and magnesium 2-2.2 in patients with atrial fibrillation.         Sick sinus syndrome: SP  pacemaker 4/2021. Interrogation 6/28/2029 is stable. Afib 5.4% 5/2024-6/2024 period of review    ALBERT: improved creatine 1.1 6/28/2024    HTN: goal BP < 130/80. Continue Toprol 25 mg Daily , start low dose cardizem 120 mg daily    Changes today:  Pacer interrogation   Ambulate  Keep on telemetry while in hospital   As long as not having Afib RVR will hold tikosyn for now  Start cardizem 120 mg Daily      Electronically signed by Sherly Craig, APRN - CNP on 6/29/2024 at 9:14 AM     Echo 4/25/2024- Dr. Waddell    Technically difficult study due to lung interface.    Left Ventricle: Normal left ventricular systolic function with a visually estimated EF of 55%. Left ventricle size is normal. Normal wall thickness. Normal wall motion. Indeterminate diastolic function.    Pacer wire noted in right heart with mild tricuspid regurgitation; RVSP: 23 mmHg.    Aortic Valve: Sclerotic, but non-stenotic aortic valve. Mild regurgitation. AV PHT is 527.0 ms.    Pericardium: There is evidence of epicardial fat. No pericardial effusion.    [facility] 4/26/2024- Dr. Aldridge   findings  The left main is a large tubular vessel has no significant stenosis  Left and descending artery is a medium size caliber vessel and has heavily calcified stenosis in its proximal segment which was angioplastied and had lithotripsy done and then we put a 3.5 x 38 stent with excellent results patient remained clinically and hemodynamically stable circumflex is a dominant vessel has no significant stenosis  Right coronary artery is a nondominant vessel is no significant stenosis  LV gram shows normal EF

## 2024-06-29 NOTE — DISCHARGE SUMMARY
Discharge Summary    Name:  Johnnie Smith /Age/Sex: 1945  (79 y.o. male)   MRN & CSN:  [id number] & [id number] Admission Date/Time: 2024  1:24 PM   Attending:  Bert Peñaloza MD Discharging Physician: Bert Peñaloza MD     Discharge diagnosis and plan:    Near syncopal episode  Patient with recurrent near syncopal episodes, recently had his lotrel discontinued by cardiology given hypotensive episode. Monitored on telemetry. Orthostatic vitals were stable. Cardiology team onboard. Cardiology team reviewed pacemaker interrogation.      ALBERT, resolved  At baseline creatinine is 0.9-1.2. On presentation creatinine 1.5, patient did receive a fluid bolus in the ED. Gave gentle hydration overnight - ALBERT resolved     ILD  Chronic respiratory failure on 8 L at baseline  -- Patient remains on his baseline O2 of 4-8 L  -- He follows with Dr. Patrick     CAD  History of complex PCI to the LAD  -- Continue DAPT, metoprolol and Lipitor     A-fib  Patient on Tikosyn, metoprolol and Eliquis. Tikosyn resumed. Cardiology team cleared patient for discharge. EKG in 2 hour to be checked prior to discharge. Nursing team advised to check with cardiology if Qtc > 540.     HLD  -- Continue statin     HTN  -- started on Cardizem. continued     GERD  -- Continue PPI     Obesity           Discharge Exam  BP (!) 127/93   Pulse 72   Temp 98.6 °F (37 °C) (Oral)   Resp 21   Ht 1.702 m (5' 7\")   Wt 96.2 kg (212 lb)   SpO2 93%   BMI 33.20 kg/m²     Physical Exam  Constitutional:       General: He is not in acute distress.     Appearance: Normal appearance. He is normal weight. He is not ill-appearing, toxic-appearing or diaphoretic.   HENT:      Head: Normocephalic and atraumatic.      Right Ear: Tympanic membrane, ear canal and external ear normal. There is no impacted cerumen.      Left Ear: Tympanic membrane, ear canal and external ear normal. There is no impacted cerumen.      Nose: Nose normal. No congestion or rhinorrhea.       Mouth/Throat:      Mouth: Mucous membranes are moist.      Pharynx: No oropharyngeal exudate or posterior oropharyngeal erythema.   Eyes:      General: No scleral icterus.        Right eye: No discharge.         Left eye: No discharge.      Extraocular Movements: Extraocular movements intact.      Conjunctiva/sclera: Conjunctivae normal.      Pupils: Pupils are equal, round, and reactive to light.   Neck:      Vascular: No carotid bruit.   Cardiovascular:      Rate and Rhythm: Normal rate and regular rhythm.      Pulses: Normal pulses.      Heart sounds: Normal heart sounds. No murmur heard.     No friction rub. No gallop.   Pulmonary:      Effort: Pulmonary effort is normal. No respiratory distress.      Breath sounds: Normal breath sounds. No stridor. No wheezing or rhonchi.   Abdominal:      General: Abdomen is flat. Bowel sounds are normal. There is no distension.      Palpations: Abdomen is soft.      Tenderness: There is no abdominal tenderness.   Musculoskeletal:         General: No swelling, tenderness, deformity or signs of injury. Normal range of motion.      Cervical back: Normal range of motion and neck supple. No rigidity or tenderness.      Right lower leg: No edema.      Left lower leg: No edema.   Lymphadenopathy:      Cervical: No cervical adenopathy.   Skin:     General: Skin is warm.      Capillary Refill: Capillary refill takes less than 2 seconds.      Coloration: Skin is not jaundiced or pale.      Findings: No bruising or erythema.   Neurological:      General: No focal deficit present.      Mental Status: He is alert and oriented to person, place, and time.      Cranial Nerves: No cranial nerve deficit.      Sensory: No sensory deficit.      Motor: No weakness.      Coordination: Coordination normal.      Gait: Gait normal.      Deep Tendon Reflexes: Reflexes normal.   Psychiatric:         Mood and Affect: Mood normal.         Behavior: Behavior normal.         Thought Content: Thought

## 2024-06-29 NOTE — CONSULTS
CARDIOLOGY CONSULT NOTE   Reason for consultation:  pre syncope    Referring physician:  No admitting provider for patient encounter.     Primary care physician: Claudio Umanzor PA      Dear  Dr. Cortez admitting provider for patient encounter.   Thanks for the consult.    Chief Complaints :  Chief Complaint   Patient presents with    Shortness of Breath     8L at home baseline most of time ' its not on 24/7 \"    Dizziness        History of present illness:Johnnie is a 79 y.o.year old who presents with concerns for getting lightheaded dizzy had rash dizziness he feels like he gets heart palpitations and rash and has had intermittently he feels that his oxygen supplier is present providing too much of oxygen portable oxygen.  Came in for further evaluation he does have chronic breathing issues and pulmonary fibrosis has history of coronary artery disease actually underwent PCI to LAD few months back echo shows preserved EF pacemaker interrogation shows that he is paroxysmal A-fib he has been on Tikosyn which is currently on hold due to QTc of 505, troponins are normal echo shows preserved BNP was slightly elevated on admission but chest x-ray does not show pulmonary  He is orthostatic on my evaluation with blood pressure dropping by 10-15.7 standing up    Past medical history:    has a past medical history of Allergic rhinitis, Asthma, Atrial fibrillation (HCC), CAD (coronary artery disease), COPD (chronic obstructive pulmonary disease) (HCC), COVID-19, Depression, Diverticulosis of colon, Family history of early CAD, Family history of valvular heart disease, GERD (gastroesophageal reflux disease), H/O 24 hour EKG monitoring, H/O echocardiogram, H/O echocardiogram, History of diverticulitis of colon, History of Holter monitoring, History of repair of left rotator cuff, [place] (hard of hearing), Hx of cardiac cath, Hx of cardiovascular stress test, HX OTHER MEDICAL, Hyperlipidemia, Hypertension, Hypoxemia, Irregular heart  rate, Palpitations, Pneumonia, Post-COVID-19 condition, Restless leg syndrome, S/P placement of cardiac pacemaker, Shortness of breath, and Tricuspid valve regurgitation.  Past surgical history:   has a past surgical history that includes Rotator cuff repair (2007, 2006); Bone Resection, Rib; Nerve Surgery; Shoulder arthroscopy (Right, 12/01/2010); Diagnostic Cardiac Cath Lab Procedure (07/08/2013); Colonoscopy (10/14/2014); Endoscopy, colon, diagnostic (10/14/2014); Rotator cuff repair (Left, 11/18/2015); Pacemaker insertion (Left, 04/01/2021); Cataract removal (Bilateral); cervical fusion (N/A, 11/18/2022); Cardiac procedure (N/A, 4/26/2024); and Cardiac procedure (N/A, 4/26/2024).  Social History:   reports that he quit smoking about 31 years ago. His smoking use included cigarettes. He started smoking about 51 years ago. He has a 20.0 pack-year smoking history. His smokeless tobacco use includes chew. He reports that he does not drink alcohol and does not use drugs.  Family history:   no family history of CAD, STROKE of DM at early age    Allergies   Allergen Reactions    Lyrica [Pregabalin] Hallucinations       [START ON 6/30/2024] metoprolol succinate (TOPROL XL) extended release tablet 25 mg, Daily  dilTIAZem (CARDIZEM CD) extended release capsule 120 mg, Daily  sodium chloride flush 0.9 % injection 5-40 mL, 2 times per day  sodium chloride flush 0.9 % injection 5-40 mL, PRN  0.9 % sodium chloride infusion, PRN  potassium chloride (KLOR-CON M) extended release tablet 40 mEq, PRN   Or  potassium bicarb-citric acid (EFFER-K) effervescent tablet 40 mEq, PRN   Or  potassium chloride 10 mEq/100 mL IVPB (Peripheral Line), PRN  magnesium sulfate 2000 mg in 50 mL IVPB premix, PRN  polyethylene glycol (GLYCOLAX) packet 17 g, Daily PRN  acetaminophen (TYLENOL) tablet 650 mg, Q6H PRN   Or  acetaminophen (TYLENOL) suppository 650 mg, Q6H PRN  prochlorperazine (COMPAZINE) injection 5 mg, Q6H PRN  apixaban (ELIQUIS) tablet

## 2024-06-29 NOTE — PROGRESS NOTES
In-Patient Progress Note    Patient:  Johnnie Smith 79 y.o. male MRN: [id number]     Date of Service: 6/29/2024    Hospital Day: 3      Chief complaint: had concerns including Shortness of Breath (8L at home baseline most of time ' its not on 24/7 \") and Dizziness.      Assessment and Plan   Johnnie Smith, a 79 y.o. male, with a history of  CAD, ILD on 8 L at baseline, HLD, hypertension and A-fib on Eliquis, was admitted on 6/27/2024 with complaints of had concerns including Shortness of Breath (8L at home baseline most of time ' its not on 24/7 \") and Dizziness.    Assessment and plan    Near syncopal episode  Patient with recurrent near syncopal episodes, recently had his lotrel discontinued by cardiology given hypotensive episode  -- Monitor on telemetry  -- Orthostatic vitals are stable  -- cardiology team onboard  -- interrogate pacemaker  -- resuming dofetilide per cardiology- monitor ekg     ALBERT, resolved  At baseline creatinine is 0.9-1.2  -- On presentation creatinine 1.5, patient did receive a fluid bolus in the ED  -- gave gentle hydration overnight - ALBERT resolved  -- Avoid nephrotoxins     ILD  Chronic respiratory failure on 8 L at baseline  -- Patient remains on his baseline O2 of 4-8 L  -- He follows with Dr. Patrick     CAD  History of complex PCI to the LAD  -- Continue DAPT, metoprolol and Lipitor     A-fib  Patient on Tikosyn, metoprolol and Eliquis  -- tikosyn resumed - check EKG 2hrs after 1st dose     HLD  -- Continue statin     HTN  -- Given that he recently had Lotrel discontinued, will continue his metoprolol with holding parameters  --Monitor blood pressure trends     GERD  -- Continue PPI     Obesity     Patient at this time would like to remain full code and his surrogate decision maker is as below              Diet ADULT DIET; Regular   DVT Prophylaxis [] Lovenox, []  Heparin, [] SCDs, [] Ambulation,  [] Eliquis, [] Xarelto  [] Coumadin   Peptic ulcer prophylaxis -   Code Status Full  dilTIAZem  120 mg Oral Daily    furosemide  40 mg IntraVENous BID    dofetilide  250 mcg Oral BID    sodium chloride flush  5-40 mL IntraVENous 2 times per day    apixaban  5 mg Oral BID    atorvastatin  10 mg Oral Nightly    budesonide-formoterol  2 puff Inhalation BID RT    vitamin B-12  500 mcg Oral 4x Daily    folic acid  500 mcg Oral Daily    mirtazapine  7.5 mg Oral Nightly    montelukast  10 mg Oral Nightly    pantoprazole  40 mg Oral QAM AC    escitalopram  30 mg Oral Daily    clopidogrel  75 mg Oral Daily         Labs and Imaging Studies   Laboratory findings:  XR CHEST PORTABLE    Result Date: 6/27/2024  Chest X-ray INDICATION: Chest Pain COMPARISON: 3/4/2024 TECHNIQUE: AP/PA view of the chest was obtained. FINDINGS: The cardiac silhouette is unremarkable. Stable left-sided pacemaker. No consolidation, pleural effusion, or pneumothorax is seen. Chronic interstitial changes in both lungs. The bony structures are intact.     No evidence of acute cardiopulmonary disease. Electronically signed by Malik Ho      Recent Results (from the past 24 hour(s))   Troponin    Collection Time: 06/28/24  1:29 PM   Result Value Ref Range    Troponin, High Sensitivity 22 0 - 22 ng/L   EKG 12 Lead    Collection Time: 06/28/24  2:56 PM   Result Value Ref Range    Ventricular Rate 60 BPM    Atrial Rate 60 BPM    P-R Interval 218 ms    QRS Duration 96 ms    Q-T Interval 524 ms    QTc Calculation (Bazett) 524 ms    P Axis 43 degrees    R Axis -27 degrees    T Axis -20 degrees    Diagnosis       Atrial-paced rhythm with prolonged AV conduction  ST & T wave abnormality, consider anterior ischemia  Prolonged QT  Abnormal ECG  When compared with ECG of 27-JUN-2024 13:09,  Electronic atrial pacemaker has replaced Sinus rhythm  Confirmed by PATSY FRIAS ([id number]) on 6/28/2024 7:53:51 PM     EKG 12 Lead    Collection Time: 06/29/24 12:43 PM   Result Value Ref Range    Ventricular Rate 73 BPM    Atrial Rate 73 BPM    P-R Interval 198

## 2024-06-30 LAB
EKG ATRIAL RATE: 72 BPM
EKG ATRIAL RATE: 73 BPM
EKG DIAGNOSIS: NORMAL
EKG DIAGNOSIS: NORMAL
EKG P AXIS: 37 DEGREES
EKG P AXIS: 76 DEGREES
EKG P-R INTERVAL: 178 MS
EKG P-R INTERVAL: 202 MS
EKG Q-T INTERVAL: 450 MS
EKG Q-T INTERVAL: 458 MS
EKG QRS DURATION: 94 MS
EKG QRS DURATION: 94 MS
EKG QTC CALCULATION (BAZETT): 495 MS
EKG QTC CALCULATION (BAZETT): 501 MS
EKG R AXIS: -17 DEGREES
EKG R AXIS: -21 DEGREES
EKG T AXIS: 14 DEGREES
EKG T AXIS: 5 DEGREES
EKG VENTRICULAR RATE: 72 BPM
EKG VENTRICULAR RATE: 73 BPM

## 2024-06-30 PROCEDURE — 93010 ELECTROCARDIOGRAM REPORT: CPT | Performed by: INTERNAL MEDICINE

## 2024-07-01 ENCOUNTER — CARE COORDINATION (OUTPATIENT)
Dept: CASE MANAGEMENT | Age: 79
End: 2024-07-01

## 2024-07-01 ENCOUNTER — OFFICE VISIT (OUTPATIENT)
Dept: PULMONOLOGY | Age: 79
End: 2024-07-01
Payer: MEDICARE

## 2024-07-01 VITALS
OXYGEN SATURATION: 93 % | SYSTOLIC BLOOD PRESSURE: 116 MMHG | DIASTOLIC BLOOD PRESSURE: 76 MMHG | HEART RATE: 69 BPM | BODY MASS INDEX: 32.68 KG/M2 | WEIGHT: 208.2 LBS | HEIGHT: 67 IN

## 2024-07-01 DIAGNOSIS — G25.81 RESTLESS LEG SYNDROME: ICD-10-CM

## 2024-07-01 DIAGNOSIS — J84.89 NSIP (NONSPECIFIC INTERSTITIAL PNEUMONIA) (HCC): Primary | ICD-10-CM

## 2024-07-01 DIAGNOSIS — R09.02 HYPOXIA: ICD-10-CM

## 2024-07-01 DIAGNOSIS — R06.02 SOBOE (SHORTNESS OF BREATH ON EXERTION): ICD-10-CM

## 2024-07-01 DIAGNOSIS — G47.10 HYPERSOMNIA: ICD-10-CM

## 2024-07-01 DIAGNOSIS — J44.9 COPD WITH HYPOXIA (HCC): ICD-10-CM

## 2024-07-01 DIAGNOSIS — E66.9 OBESITY (BMI 30-39.9): ICD-10-CM

## 2024-07-01 DIAGNOSIS — Z87.891 EX-CIGARETTE SMOKER: ICD-10-CM

## 2024-07-01 PROCEDURE — 3078F DIAST BP <80 MM HG: CPT | Performed by: INTERNAL MEDICINE

## 2024-07-01 PROCEDURE — 4004F PT TOBACCO SCREEN RCVD TLK: CPT | Performed by: INTERNAL MEDICINE

## 2024-07-01 PROCEDURE — 3074F SYST BP LT 130 MM HG: CPT | Performed by: INTERNAL MEDICINE

## 2024-07-01 PROCEDURE — 1111F DSCHRG MED/CURRENT MED MERGE: CPT | Performed by: INTERNAL MEDICINE

## 2024-07-01 PROCEDURE — 3023F SPIROM DOC REV: CPT | Performed by: INTERNAL MEDICINE

## 2024-07-01 PROCEDURE — G8427 DOCREV CUR MEDS BY ELIG CLIN: HCPCS | Performed by: INTERNAL MEDICINE

## 2024-07-01 PROCEDURE — 99215 OFFICE O/P EST HI 40 MIN: CPT | Performed by: INTERNAL MEDICINE

## 2024-07-01 PROCEDURE — G8417 CALC BMI ABV UP PARAM F/U: HCPCS | Performed by: INTERNAL MEDICINE

## 2024-07-01 PROCEDURE — 1123F ACP DISCUSS/DSCN MKR DOCD: CPT | Performed by: INTERNAL MEDICINE

## 2024-07-01 RX ORDER — SULFAMETHOXAZOLE AND TRIMETHOPRIM 400; 80 MG/1; MG/1
TABLET ORAL
Qty: 12 TABLET | Refills: 5 | Status: SHIPPED | OUTPATIENT
Start: 2024-07-01

## 2024-07-01 RX ORDER — SULFAMETHOXAZOLE AND TRIMETHOPRIM 400; 80 MG/1; MG/1
TABLET ORAL
Qty: 12 TABLET | Refills: 5 | Status: SHIPPED | OUTPATIENT
Start: 2024-07-01 | End: 2024-07-01 | Stop reason: SDUPTHER

## 2024-07-01 RX ORDER — NINTEDANIB 150 MG/1
150 CAPSULE ORAL 2 TIMES DAILY
Qty: 60 CAPSULE | Refills: 4 | Status: SHIPPED | OUTPATIENT
Start: 2024-07-01

## 2024-07-01 RX ORDER — PREDNISONE 20 MG/1
TABLET ORAL
Qty: 60 TABLET | Refills: 0 | Status: SHIPPED | OUTPATIENT
Start: 2024-07-01

## 2024-07-01 ASSESSMENT — ENCOUNTER SYMPTOMS
COUGH: 1
SHORTNESS OF BREATH: 1
EYE DISCHARGE: 0
ABDOMINAL PAIN: 0
EYE ITCHING: 0
BACK PAIN: 0
ABDOMINAL DISTENTION: 0

## 2024-07-01 NOTE — PROGRESS NOTES
No data to display              Mallampati 3    Physical Exam  Vitals reviewed.   Constitutional:       Appearance: Normal appearance.   HENT:      Head: Normocephalic and atraumatic.      Nose: Nose normal.      Mouth/Throat:      Mouth: Mucous membranes are moist.   Eyes:      Extraocular Movements: Extraocular movements intact.      Pupils: Pupils are equal, round, and reactive to light.   Cardiovascular:      Rate and Rhythm: Normal rate and regular rhythm.      Pulses: Normal pulses.      Heart sounds: Normal heart sounds.   Pulmonary:      Effort: Pulmonary effort is normal.      Comments: Basal coarse crackles  Abdominal:      General: Abdomen is flat.      Palpations: Abdomen is soft.   Musculoskeletal:         General: Normal range of motion.      Cervical back: Normal range of motion and neck supple.   Skin:     General: Skin is warm and dry.   Neurological:      General: No focal deficit present.      Mental Status: He is alert and oriented to person, place, and time.   Psychiatric:         Mood and Affect: Mood normal.         Behavior: Behavior normal.         Radiology: Reviewed    Assessment and Plan     Problem List          Respiratory    COPD with hypoxia ([facility])       Advised to c/w quitting smoking  PFT  6 MWT  Get yearly flu vaccine  C/w Inhalers         Relevant Medications    montelukast (SINGULAIR) 10 MG tablet    budesonide-formoterol (SYMBICORT) 160-4.5 MCG/ACT AERO    albuterol sulfate HFA (PROVENTIL;VENTOLIN;PROAIR) 108 (90 Base) MCG/ACT inhaler    albuterol (PROVENTIL) (2.5 MG/3ML) 0.083% nebulizer solution    fluticasone-umeclidin-vilant (TRELEGY ELLIPTA) 100-62.5-25 MCG/ACT AEPB inhaler    predniSONE (DELTASONE) 10 MG tablet    predniSONE (DELTASONE) 20 MG tablet    Nintedanib Esylate (OFEV) 150 MG CAPS    NSIP (nonspecific interstitial pneumonia) (HCC) - Primary      He has worsening of his symptoms  He is now on 8 L.min of oxygen while walking from 3 L.min he was on before  I'll start

## 2024-07-01 NOTE — ASSESSMENT & PLAN NOTE
Appears to be sec to the Obesity. Pulmonary HTN, COPD and ILD  PFT  6 MWT in 6 months  Steroids and OFEV

## 2024-07-01 NOTE — ASSESSMENT & PLAN NOTE
He has worsening of his symptoms  He is now on 8 L.min of oxygen while walking from 3 L.min he was on before  I'll start him on Prednisone 40 mg /day  Bactrim DS three times per week for PCP prophylaxis  I'll start him on OFEV  LFT's now and once a month x 3 months and then once every 3 months till he is on OFEV  PFT and 6 MWT in 6 months

## 2024-07-01 NOTE — CARE COORDINATION
Care Transitions Note    Initial Call - Call within 2 business days of discharge: Yes    1st attempt to reach for Care Transition discharge call unsuccessful. HIPAA compliant messages x2 left requesting call back. UTR letter sent via [employer].     Outreach Attempts:   HIPAA compliant voicemail left for patient, wife.   [email or web address] message sent.     Patient: Johnnie Smith    Patient : 1945   MRN: [id number]    Reason for Admission: Near Syncope, ALBERT   Discharge Date: 24  RURS: Readmission Risk Score: 15.6  Facility: [facility] 24-24    Last Discharge Facility       Date Complaint Diagnosis Description Type Department Provider    24 Shortness of Breath; Dizziness Near syncope ... ED to Hosp-Admission (Discharged) (ADMITTED) [facility] [address] Bert Peñaloza MD; Pamela Zee..     Future Appointments         Provider Specialty Dept Phone    2024 2:45 PM Petey Gonzalez MD Pulmonology [phone]    2024 10:40 AM Brad Aldridge MD Cardiology [phone]    10/25/2024 11:20 AM Aster Schmidt APRN - CNP Cardiology [phone]    2024 10:00 AM Haritha Cornelius APRN - CNP Pulmonology [phone]              Challenges to be reviewed by the provider   Reason for Admission: Near Syncope, ALBERT   Facility: [facility] 24-24    Please contact for scheduling of 7 day hospital follow up /TCM appt due by 24         Method of communication with provider : chart routing PCP office      Plan for follow-up on next business day. 2nd attempt     Amara Piper RN

## 2024-07-02 ENCOUNTER — CARE COORDINATION (OUTPATIENT)
Dept: CASE MANAGEMENT | Age: 79
End: 2024-07-02

## 2024-07-02 NOTE — CARE COORDINATION
Care Transitions Note    Initial Call - Call within 2 business days of discharge: Yes    Patient Current Location:  [place]    Care Transition Nurse contacted Maisha, Wife,  by telephone to perform post hospital discharge assessment, verified Patient name and  as identifiers. Provided introduction to self, and explanation of the Care Transition Nurse role.     Patient: Johnnie Smith    Patient : 1945   MRN: [id number]    Reason for Admission: Near Syncope, ALBERT   Discharge Date: 24  RURS: Readmission Risk Score: 15.6  Facility: [facility] 24-24     Last Discharge Facility       Date Complaint Diagnosis Description Type Department Provider    24 Shortness of Breath; Dizziness Near syncope ... ED to Hosp-Admission (Discharged) (ADMITTED) [facility] Bert Peñaloza MD; Pamela Zee..       Additional needs identified to be addressed with provider   No needs identified         Method of communication with provider: none.    Patients top risk factors for readmission: medical condition-near syncope, albert, copd    Interventions to address risk factors:   Review of patient management of conditions/medications: AVS review    Care Summary Note: Wife provides the following Patient information as Patient not home. Reports \"he's doing better\". Denies further episode of syncope/near syncope. Denies noted or Patient reported dizziness, lightheadedness, ac distress. Advised proper hydration, compression stocking, squeezing/releasing fists and calf muscles prior to standing, rise slowly, avoid sudden head movements. Encouraged routine monitoring of bp/hr, keeping written list for MD review. Confirmed equipment. Encouraged cardiology appt.  Reports Patient taking Cardizem (ne rx upon discharge). Med ed provided, v/u.     Was seen by Dr Rola De Leon yesterday. Patient w/ hx of COPD, Asthma. Noted to have new rx for Prednisone, Bactrim, Ofev. Med ed provided, v/u. Advised to not skip doses and complete entire course

## 2024-07-05 ENCOUNTER — CARE COORDINATION (OUTPATIENT)
Dept: CASE MANAGEMENT | Age: 79
End: 2024-07-05

## 2024-07-05 ENCOUNTER — OFFICE VISIT (OUTPATIENT)
Dept: FAMILY MEDICINE CLINIC | Age: 79
End: 2024-07-05

## 2024-07-05 VITALS
HEART RATE: 61 BPM | BODY MASS INDEX: 33.37 KG/M2 | HEIGHT: 67 IN | OXYGEN SATURATION: 90 % | RESPIRATION RATE: 18 BRPM | WEIGHT: 212.6 LBS | SYSTOLIC BLOOD PRESSURE: 152 MMHG | DIASTOLIC BLOOD PRESSURE: 100 MMHG

## 2024-07-05 DIAGNOSIS — J44.9 CHRONIC OBSTRUCTIVE PULMONARY DISEASE WITH HYPOXIA (HCC): ICD-10-CM

## 2024-07-05 DIAGNOSIS — Z09 HOSPITAL DISCHARGE FOLLOW-UP: ICD-10-CM

## 2024-07-05 DIAGNOSIS — I50.22 CHRONIC SYSTOLIC CONGESTIVE HEART FAILURE (HCC): ICD-10-CM

## 2024-07-05 DIAGNOSIS — I48.0 PAF (PAROXYSMAL ATRIAL FIBRILLATION) (HCC): Primary | ICD-10-CM

## 2024-07-05 NOTE — PATIENT INSTRUCTIONS
Welcome to [facility] :    Did you know we now have a faster way for you to move through your appointment? For your convenience, we now have digital registration available. When you schedule your next appointment, you will receive a link via your email as well as a text message that will allow you to complete any paperwork digitally before your appointment. We are committed to providing you the best care possible.    If you receive a survey after visiting one of our offices, please take time to share your experience concerning your physician office visit.  These surveys are confidential and no health information about you is shared.    We are eager to improve for you and continue to give you satisfactory care, we are counting on your feedback to help make that happen.

## 2024-07-05 NOTE — PROGRESS NOTES
Post-Discharge Transitional Care  Follow Up      Johnnie Smith   YOB: 1945    Date of Office Visit:  7/5/2024  Date of Hospital Admission: 6/27/24  Date of Hospital Discharge: 6/29/24  Risk of hospital readmission (high >=14%. Medium >=10%) :Readmission Risk Score: 15.6      Care management risk score Rising risk (score 2-5) and Complex Care (Scores >=6): No Risk Score On File     Non face to face  following discharge, date last encounter closed (first attempt may have been earlier): 07/02/2024    Call initiated 2 business days of discharge: Yes    ASSESSMENT/PLAN:   PAF (paroxysmal atrial fibrillation) (HCC)  -     Ambulatory Referral to Care Management  Chronic systolic congestive heart failure (HCC)  -     Ambulatory Referral to Care Management  Chronic obstructive pulmonary disease with hypoxia (HCC)  -     Ambulatory Referral to Care Management  Hospital discharge follow-up  -     AK DISCHARGE MEDS RECONCILED W/ CURRENT OUTPATIENT MED LIST    Medical Decision Making: moderate complexity  Return in 1 month (on 8/5/2024).           Subjective:   HPI:  Follow up of Hospital problems/diagnosis(es): head rush, near syncope, hypotension, sydnee    Inpatient course: Discharge summary reviewed- see chart.    Interval history/Current status: is starting a new med nentedanib for is lungs, twice a day, needs to monitor his liver,  with test in August and lung follow up in August    No near syncope since then  had metoprolol decreased  Was started ON cardizem  Is also on bactrim    Oxygen was too high, they want him on 1.5 L at rest, his oxygen level is low 90s, with any exertion it goes into the 80s.  They will let him increase to 4 if he is shopping.     He is going back to work at [employer] Monday. Only walks 50 feet.    Patient Active Problem List   Diagnosis    Depression, major, in remission (HCC)    Obesity (BMI 30-39.9)    Restless leg syndrome    HTN (hypertension)    Hyperlipidemia    Eczema

## 2024-07-05 NOTE — CARE COORDINATION
Care Transitions Note    Follow Up Call     Reason for Admission: Near Syncope, ALBERT     Patient Current Location:  [facility] Care Coordinator contacted the patient by telephone. Verified name and  as identifiers.    Additional needs identified to be addressed with provider   No needs identified                 Method of communication with provider: none.    Care Summary Note: Spoke with Johnnie Smith who reported that he was feeling really good. Patient reported that he continuous on O2 at 1.5 liters at rest and 3 liters with activity. Patient stated that his SPO2 runs from 91 -92%. Patient reported that he continue on Bactrim and prednisone COPD and Asthma. Patient stated that he uses his inhalers as ordered. Patient reported no syncope episodes since being home. Patient denied cp, sob(baseline), cough, dizziness, headache, n/v, diarrhea, abdominal pains, fever, or chills. Patient report that appetite and fluid intake is good and denied any problems with bowel or bladder. Patient reported that he is taking all medications as ordered. Patient denied any other needs at this time. Patient instructed to continue to monitor s/s, reporting any that may present to MD immediately for early intervention.  Patient is agreeable to f/u calls.     Plan of care updates since last contact:  none       Advance Care Planning   The patient has the following advanced directives on file:  Advance Directives       Power of  Living Will ACP-Advance Directive ACP-Power of     Not on File Not on File Not on File Not on File            The patient has appointed the following active healthcare agents:    Primary Decision Maker: Maisha Smith - Spouse - [phone]    The Patient has the following current code status:    Code Status: Prior        Medication Review:  No changes since last call.     Remote Patient Monitoring:  Offered patient enrollment in the Remote Patient Monitoring (RPM) program for in-home monitoring:

## 2024-07-11 ENCOUNTER — CARE COORDINATION (OUTPATIENT)
Dept: CASE MANAGEMENT | Age: 79
End: 2024-07-11

## 2024-07-11 NOTE — CARE COORDINATION
Care Transitions Note    Follow Up Call     Attempted to reach patient for transitions of care follow up.  Unable to reach patient.      Outreach Attempts:   HIPAA compliant voicemail left for patient.     Care Summary Note: Attempted to contact patient for follow up transition call. Left voicemail message to return call with an update on condition since discharge. Contact information provided. Will continue to follow up.    Follow Up Appointment:   Future Appointments         Provider Specialty Dept Phone    8/15/2024 2:45 PM Petey Gonzalez MD Pulmonology [phone]    9/16/2024 10:40 AM Brad Aldridge MD Cardiology [phone]    10/3/2024 8:20 AM Claudio Umanzor PA Family Medicine [phone]    10/25/2024 11:20 AM Aster Schmidt APRN - CNP Cardiology [phone]    12/5/2024 10:00 AM Haritha Cornelius APRN - CNP Pulmonology [phone]            Plan for follow-up call in 2-5 days based on severity of symptoms and risk factors. Plan for next call: symptom management-   self management-     Annie Javed LPN

## 2024-07-11 NOTE — PROGRESS NOTES
Physician Progress Note      PATIENT:               NICKOLAS DAVIS  [facility] #:                  [id number]  :                       1945  ADMIT DATE:       2024 1:24 PM  DISCH DATE:        2024 7:03 PM  RESPONDING  PROVIDER #:        Bert Ramos MD          QUERY TEXT:    Patient admitted with ALBERT.  Noted documentation of Acute Kidney Injury in H&P.    Documentation states baseline creatinine is 0.9-1.2.   Presented with   creatinine of 1.5.  In order to support the diagnosis of ALBERT, please include   additional KDIGO clinical indicators in your documentation.? Or please   document if the diagnosis of ALBERT has been ruled out after further study.    The medical record reflects the following:  Risk Factors: 79 y.o. male with hx of HTN, CAD, ILD on 8L baseline, HLD, A-fib   on Eliquis  Clinical Indicators: Documentation of baseline 0.9-1.3.  Presented with   creatinine of 1.5  Treatment: IVF, monitor daily labs    Defined by Kidney Disease Improving Global Outcomes (KDIGO) clinical practice   guideline for acute kidney injury:  -Increase in SCr by greater than or equal to 0.3 mg/dl within 48 hours; or  -Increase or decrease in SCr to greater than or equal to 1.5 times baseline,   which is known or presumed to have occurred within the prior 7 days; or  -Urine volume < 0.5ml/kg/h for 6 hours.  Options provided:  -- Acute kidney injury ruled out after study  -- Acute kidney injury evidenced by, Please document KDIGO evidence.  -- Other - I will add my own diagnosis  -- Disagree - Not applicable / Not valid  -- Disagree - Clinically unable to determine / Unknown  -- Refer to Clinical Documentation Reviewer    PROVIDER RESPONSE TEXT:    This patient has acute kidney injury as evidenced by increase if SCr by   greater than 0.3 from baseline on 1.2    Query created by: Goran Enciso on 2024 3:27 PM      Electronically signed by:  Bert Ramos MD 2024 7:46 AM

## 2024-07-16 ENCOUNTER — CARE COORDINATION (OUTPATIENT)
Dept: CASE MANAGEMENT | Age: 79
End: 2024-07-16

## 2024-07-16 NOTE — CARE COORDINATION
Care Transitions Note    Follow Up Call     Attempted to reach patient for transitions of care follow up.  Unable to reach patient.      Outreach Attempts:   HIPAA compliant voicemail left for patient.     Care Summary Note: Care Transitions program closed.    Follow Up Appointment:   Future Appointments         Provider Specialty Dept Phone    8/15/2024 2:45 PM Petey Gonzalez MD Pulmonology [phone]    9/16/2024 10:40 AM Brad Aldridge MD Cardiology [phone]    10/3/2024 8:20 AM Claudio Umanzor PA Family Medicine [phone]    10/25/2024 11:20 AM Aster Schmidt APRN - CNP Cardiology [phone]    12/5/2024 10:00 AM Haritha Cornelius APRN - CNP Pulmonology [phone]            Patient referred back to GLENNY Mabry RN for continued management. based on severity of symptoms and risk factors. Plan for next call: symptom management-COPD sx  self management-COPD management    Lacy Murphy LPN

## 2024-07-18 ENCOUNTER — CARE COORDINATION (OUTPATIENT)
Dept: CARE COORDINATION | Age: 79
End: 2024-07-18

## 2024-07-22 ENCOUNTER — CARE COORDINATION (OUTPATIENT)
Dept: CARE COORDINATION | Age: 79
End: 2024-07-22

## 2024-07-24 ENCOUNTER — CARE COORDINATION (OUTPATIENT)
Dept: CARE COORDINATION | Age: 79
End: 2024-07-24

## 2024-07-24 NOTE — CARE COORDINATION
Ambulatory Care Coordination Note     7/24/2024 3:27 PM     patient outreach attempt by this ACM today to offer care management services. AC was unable to reach the patient, spouse/partner  by telephone today; Datam message sent requesting patient to contact this ACM.     No further Ambulatory Care Manager follow up scheduled.

## 2024-07-30 ENCOUNTER — TELEPHONE (OUTPATIENT)
Dept: PULMONOLOGY | Age: 79
End: 2024-07-30

## 2024-07-30 NOTE — TELEPHONE ENCOUNTER
Rec'd refill request for patient's prednisone. Dr. Gonzalez would like to see the patient before he approves the refill. Called patient to schedule, left message.

## 2024-07-31 RX ORDER — PREDNISONE 20 MG/1
TABLET ORAL
Qty: 60 TABLET | Refills: 0 | OUTPATIENT
Start: 2024-07-31

## 2024-08-01 ENCOUNTER — HOSPITAL ENCOUNTER (OUTPATIENT)
Age: 79
Discharge: HOME | End: 2024-08-01
Payer: MEDICARE

## 2024-08-01 DIAGNOSIS — J84.89 NSIP (NONSPECIFIC INTERSTITIAL PNEUMONIA) (HCC): ICD-10-CM

## 2024-08-01 LAB
ALBUMIN SERPL-MCNC: 3.9 GM/DL (ref 3.4–5)
ALP BLD-CCNC: 67 IU/L (ref 40–129)
ALT SERPL-CCNC: 25 U/L (ref 10–40)
AST SERPL-CCNC: 23 IU/L (ref 15–37)
BILIRUB SERPL-MCNC: 1 MG/DL (ref 0–1)
BILIRUBIN DIRECT: 0.2 MG/DL (ref 0–0.3)
BILIRUBIN, INDIRECT: 0.8 MG/DL (ref 0–0.7)
TOTAL PROTEIN: 6.6 GM/DL (ref 6.4–8.2)

## 2024-08-01 PROCEDURE — 36415 COLL VENOUS BLD VENIPUNCTURE: CPT

## 2024-08-01 PROCEDURE — 80076 HEPATIC FUNCTION PANEL: CPT

## 2024-08-15 ENCOUNTER — OFFICE VISIT (OUTPATIENT)
Dept: PULMONOLOGY | Age: 79
End: 2024-08-15

## 2024-08-15 VITALS
OXYGEN SATURATION: 84 % | WEIGHT: 209.2 LBS | DIASTOLIC BLOOD PRESSURE: 88 MMHG | HEIGHT: 67 IN | SYSTOLIC BLOOD PRESSURE: 128 MMHG | HEART RATE: 87 BPM | BODY MASS INDEX: 32.83 KG/M2

## 2024-08-15 DIAGNOSIS — G25.81 RESTLESS LEG SYNDROME: ICD-10-CM

## 2024-08-15 DIAGNOSIS — E66.9 OBESITY (BMI 30-39.9): ICD-10-CM

## 2024-08-15 DIAGNOSIS — Z87.891 EX-CIGARETTE SMOKER: ICD-10-CM

## 2024-08-15 DIAGNOSIS — G47.33 OSA (OBSTRUCTIVE SLEEP APNEA): ICD-10-CM

## 2024-08-15 DIAGNOSIS — Z72.0 TOBACCO USE: ICD-10-CM

## 2024-08-15 DIAGNOSIS — R09.02 HYPOXIA: ICD-10-CM

## 2024-08-15 DIAGNOSIS — G47.10 HYPERSOMNIA: ICD-10-CM

## 2024-08-15 DIAGNOSIS — R06.02 SOBOE (SHORTNESS OF BREATH ON EXERTION): ICD-10-CM

## 2024-08-15 DIAGNOSIS — J84.89 NSIP (NONSPECIFIC INTERSTITIAL PNEUMONIA) (HCC): Primary | ICD-10-CM

## 2024-08-15 DIAGNOSIS — J44.9 COPD WITH HYPOXIA (HCC): ICD-10-CM

## 2024-08-15 ASSESSMENT — ENCOUNTER SYMPTOMS
EYE ITCHING: 0
ABDOMINAL PAIN: 0
SHORTNESS OF BREATH: 1
COUGH: 1
ABDOMINAL DISTENTION: 0
EYE DISCHARGE: 0
BACK PAIN: 0

## 2024-08-15 NOTE — PROGRESS NOTES
Johnnie Smith  1945  Referring Provider: Claudio Umanzor PAPCP - General     Subjective:   No chief complaint on file.      CHRISTOPHE Blair is a 79 y.o. male has come back as a follow up. He has a 45 pk yr smoking quit 40 yrs ago.He has no cough, no phlegm, SOBOE is better, no fever, no chest pain, no loss of weight. He has Afib on Eliquis. He has SOBOE. He has ILD on HRCT of chest done on 04/11/24. He has Moderate Pulmonary HTN. His PFT done on 06/24/24 showed that he has severe decrease in DLCO of 34%.His 6 MWT showed 4 L.min of oxygen at rest and 8 L.min of oxygen on ambulation.     He was started on Prednisone 40 mg/day with Bactrim. He is also on OFEV BID. He is tolerating it. His LFT's  is normal.    He has RLS 3 to 4 times a week. He is on Gabapentin and it is helping him.     Current Outpatient Medications   Medication Sig Dispense Refill    OXYGEN Inhale into the lungs      predniSONE (DELTASONE) 20 MG tablet Take 2 tabs  once per day in the morning 60 tablet 0    Nintedanib Esylate (OFEV) 150 MG CAPS Take 150 mg by mouth in the morning and at bedtime (Patient taking differently: Take 150 mg by mouth in the morning and at bedtime Has not started taking Rx) 60 capsule 4    sulfamethoxazole-trimethoprim (BACTRIM) 400-80 MG per tablet Take 1 tablet 3 times a week Monday, Wednesday and Friday 12 tablet 5    dilTIAZem (CARDIZEM CD) 120 MG extended release capsule Take 1 capsule by mouth daily 30 capsule 3    metoprolol succinate (TOPROL XL) 50 MG extended release tablet Take 0.5 tablets by mouth daily 180 tablet 1    atorvastatin (LIPITOR) 10 MG tablet Take 1 tablet by mouth daily 90 tablet 3    apixaban (ELIQUIS) 5 MG TABS tablet TAKE 1 TABLET TWICE A  tablet 2    escitalopram (LEXAPRO) 20 MG tablet Take 1.5 tablets by mouth daily 135 tablet 2    clopidogrel (PLAVIX) 75 MG tablet Take 1 tablet by mouth daily 30 tablet 3    nitroGLYCERIN (NITROSTAT) 0.4 MG SL tablet Place 1 tablet under the tongue every

## 2024-08-15 NOTE — ASSESSMENT & PLAN NOTE
Appears to be sec to the Obesity. Pulmonary HTN, COPD and ILD  PFT  6 MWT in 6 months  Steroids and OFEV and he is tolerating

## 2024-08-15 NOTE — ASSESSMENT & PLAN NOTE
C/w Prednisone 40 mg /day x 1 more months  C/w OFEV will taper later  Bactrim DS  Get yearly flu vaccine  LFT's once a month x 2 more months and then once every 3 months  Get yearly flu vaccine  Home O2 eval now  PFT, 6 MWT in 6 months

## 2024-08-17 DIAGNOSIS — I10 PRIMARY HYPERTENSION: Chronic | ICD-10-CM

## 2024-08-22 RX ORDER — DOFETILIDE 0.25 MG/1
250 CAPSULE ORAL 2 TIMES DAILY
Qty: 180 CAPSULE | Refills: 1 | Status: SHIPPED | OUTPATIENT
Start: 2024-08-22

## 2024-08-28 RX ORDER — METOPROLOL SUCCINATE 50 MG/1
25 TABLET, EXTENDED RELEASE ORAL DAILY
Qty: 90 TABLET | Refills: 3 | Status: SHIPPED | OUTPATIENT
Start: 2024-08-28

## 2024-09-04 RX ORDER — PREDNISONE 20 MG/1
TABLET ORAL
Qty: 60 TABLET | Refills: 0 | Status: SHIPPED | OUTPATIENT
Start: 2024-09-04 | End: 2024-10-03

## 2024-09-06 ENCOUNTER — HOSPITAL ENCOUNTER (OUTPATIENT)
Age: 79
Discharge: HOME | End: 2024-09-06
Payer: MEDICARE

## 2024-09-06 DIAGNOSIS — J84.89 NSIP (NONSPECIFIC INTERSTITIAL PNEUMONIA) (HCC): ICD-10-CM

## 2024-09-06 PROCEDURE — 83516 IMMUNOASSAY NONANTIBODY: CPT

## 2024-09-06 PROCEDURE — 86430 RHEUMATOID FACTOR TEST QUAL: CPT

## 2024-09-06 PROCEDURE — 86038 ANTINUCLEAR ANTIBODIES: CPT

## 2024-09-06 PROCEDURE — 36415 COLL VENOUS BLD VENIPUNCTURE: CPT

## 2024-09-08 LAB — RHEUMATOID FACTOR: 38 IU/ML (ref 0–14)

## 2024-09-09 LAB
MYELOPEROXIDASE AB SER-ACNC: 0 AU/ML (ref 0–19)
NUCLEAR IGG SER QL IA: NORMAL
PROTEINASE3 AB SER-ACNC: 0 AU/ML (ref 0–19)

## 2024-09-12 ENCOUNTER — OFFICE VISIT (OUTPATIENT)
Dept: PULMONOLOGY | Age: 79
End: 2024-09-12
Payer: MEDICARE

## 2024-09-12 VITALS
WEIGHT: 209 LBS | HEIGHT: 68 IN | SYSTOLIC BLOOD PRESSURE: 122 MMHG | HEART RATE: 88 BPM | BODY MASS INDEX: 31.67 KG/M2 | OXYGEN SATURATION: 92 % | DIASTOLIC BLOOD PRESSURE: 80 MMHG

## 2024-09-12 DIAGNOSIS — Z87.891 EX-CIGARETTE SMOKER: ICD-10-CM

## 2024-09-12 DIAGNOSIS — R09.02 HYPOXIA: ICD-10-CM

## 2024-09-12 DIAGNOSIS — J84.89 NSIP (NONSPECIFIC INTERSTITIAL PNEUMONIA) (HCC): Primary | ICD-10-CM

## 2024-09-12 DIAGNOSIS — J44.9 COPD WITH HYPOXIA (HCC): ICD-10-CM

## 2024-09-12 PROCEDURE — 3023F SPIROM DOC REV: CPT | Performed by: INTERNAL MEDICINE

## 2024-09-12 PROCEDURE — 4004F PT TOBACCO SCREEN RCVD TLK: CPT | Performed by: INTERNAL MEDICINE

## 2024-09-12 PROCEDURE — 1123F ACP DISCUSS/DSCN MKR DOCD: CPT | Performed by: INTERNAL MEDICINE

## 2024-09-12 PROCEDURE — 99215 OFFICE O/P EST HI 40 MIN: CPT | Performed by: INTERNAL MEDICINE

## 2024-09-12 PROCEDURE — 3074F SYST BP LT 130 MM HG: CPT | Performed by: INTERNAL MEDICINE

## 2024-09-12 PROCEDURE — 3079F DIAST BP 80-89 MM HG: CPT | Performed by: INTERNAL MEDICINE

## 2024-09-12 PROCEDURE — G8417 CALC BMI ABV UP PARAM F/U: HCPCS | Performed by: INTERNAL MEDICINE

## 2024-09-12 PROCEDURE — G8428 CUR MEDS NOT DOCUMENT: HCPCS | Performed by: INTERNAL MEDICINE

## 2024-09-12 RX ORDER — SULFAMETHOXAZOLE AND TRIMETHOPRIM 400; 80 MG/1; MG/1
TABLET ORAL
Qty: 12 TABLET | Refills: 5 | Status: SHIPPED | OUTPATIENT
Start: 2024-09-12

## 2024-09-12 RX ORDER — PIRFENIDONE 267 MG/1
CAPSULE ORAL
Qty: 90 CAPSULE | Refills: 3 | Status: SHIPPED | OUTPATIENT
Start: 2024-09-12

## 2024-09-12 ASSESSMENT — ENCOUNTER SYMPTOMS
BACK PAIN: 0
ABDOMINAL DISTENTION: 0
EYE ITCHING: 0
ABDOMINAL PAIN: 0
COUGH: 0
SHORTNESS OF BREATH: 1
EYE DISCHARGE: 0

## 2024-09-16 ENCOUNTER — OFFICE VISIT (OUTPATIENT)
Dept: CARDIOLOGY CLINIC | Age: 79
End: 2024-09-16
Payer: MEDICARE

## 2024-09-16 VITALS
HEART RATE: 82 BPM | WEIGHT: 216 LBS | BODY MASS INDEX: 33.9 KG/M2 | HEIGHT: 67 IN | OXYGEN SATURATION: 91 % | DIASTOLIC BLOOD PRESSURE: 90 MMHG | SYSTOLIC BLOOD PRESSURE: 140 MMHG

## 2024-09-16 DIAGNOSIS — I48.0 PAROXYSMAL ATRIAL FIBRILLATION (HCC): Primary | ICD-10-CM

## 2024-09-16 PROCEDURE — G8417 CALC BMI ABV UP PARAM F/U: HCPCS | Performed by: INTERNAL MEDICINE

## 2024-09-16 PROCEDURE — 3077F SYST BP >= 140 MM HG: CPT | Performed by: INTERNAL MEDICINE

## 2024-09-16 PROCEDURE — 99214 OFFICE O/P EST MOD 30 MIN: CPT | Performed by: INTERNAL MEDICINE

## 2024-09-16 PROCEDURE — 4004F PT TOBACCO SCREEN RCVD TLK: CPT | Performed by: INTERNAL MEDICINE

## 2024-09-16 PROCEDURE — G8427 DOCREV CUR MEDS BY ELIG CLIN: HCPCS | Performed by: INTERNAL MEDICINE

## 2024-09-16 PROCEDURE — 1123F ACP DISCUSS/DSCN MKR DOCD: CPT | Performed by: INTERNAL MEDICINE

## 2024-09-16 PROCEDURE — 3080F DIAST BP >= 90 MM HG: CPT | Performed by: INTERNAL MEDICINE

## 2024-09-16 RX ORDER — PIRFENIDONE 267 MG/1
CAPSULE ORAL
Qty: 30 CAPSULE | Refills: 3 | Status: SHIPPED | OUTPATIENT
Start: 2024-09-16

## 2024-09-19 ENCOUNTER — HOSPITAL ENCOUNTER (OUTPATIENT)
Dept: CT IMAGING | Age: 79
Discharge: HOME | End: 2024-09-19
Attending: INTERNAL MEDICINE
Payer: MEDICARE

## 2024-09-19 DIAGNOSIS — J84.89 NSIP (NONSPECIFIC INTERSTITIAL PNEUMONIA) (HCC): ICD-10-CM

## 2024-09-19 PROCEDURE — 71250 CT THORAX DX C-: CPT

## 2024-09-23 PROCEDURE — 93296 REM INTERROG EVL PM/IDS: CPT | Performed by: INTERNAL MEDICINE

## 2024-09-23 PROCEDURE — 93294 REM INTERROG EVL PM/LDLS PM: CPT | Performed by: INTERNAL MEDICINE

## 2024-10-03 ENCOUNTER — ENROLLMENT (OUTPATIENT)
Dept: CARE COORDINATION | Age: 79
End: 2024-10-03

## 2024-10-03 ENCOUNTER — OFFICE VISIT (OUTPATIENT)
Dept: FAMILY MEDICINE CLINIC | Age: 79
End: 2024-10-03

## 2024-10-03 ENCOUNTER — CARE COORDINATION (OUTPATIENT)
Dept: CARE COORDINATION | Age: 79
End: 2024-10-03

## 2024-10-03 VITALS
DIASTOLIC BLOOD PRESSURE: 88 MMHG | HEART RATE: 62 BPM | SYSTOLIC BLOOD PRESSURE: 138 MMHG | RESPIRATION RATE: 18 BRPM | OXYGEN SATURATION: 95 % | WEIGHT: 217.2 LBS | HEIGHT: 67 IN | BODY MASS INDEX: 34.09 KG/M2

## 2024-10-03 DIAGNOSIS — I50.22 CHRONIC SYSTOLIC CONGESTIVE HEART FAILURE (HCC): ICD-10-CM

## 2024-10-03 DIAGNOSIS — G25.81 RESTLESS LEG SYNDROME: ICD-10-CM

## 2024-10-03 DIAGNOSIS — I48.0 PAF (PAROXYSMAL ATRIAL FIBRILLATION) (HCC): ICD-10-CM

## 2024-10-03 DIAGNOSIS — Z00.00 MEDICARE ANNUAL WELLNESS VISIT, SUBSEQUENT: Primary | ICD-10-CM

## 2024-10-03 DIAGNOSIS — J44.9 COPD WITH HYPOXIA (HCC): ICD-10-CM

## 2024-10-03 DIAGNOSIS — E78.2 MIXED HYPERLIPIDEMIA: ICD-10-CM

## 2024-10-03 DIAGNOSIS — J84.89 NSIP (NONSPECIFIC INTERSTITIAL PNEUMONIA) (HCC): ICD-10-CM

## 2024-10-03 DIAGNOSIS — I10 PRIMARY HYPERTENSION: Primary | Chronic | ICD-10-CM

## 2024-10-03 RX ORDER — PREDNISONE 20 MG/1
TABLET ORAL
Qty: 60 TABLET | Refills: 0 | Status: SHIPPED | OUTPATIENT
Start: 2024-10-03

## 2024-10-03 ASSESSMENT — PATIENT HEALTH QUESTIONNAIRE - PHQ9
8. MOVING OR SPEAKING SO SLOWLY THAT OTHER PEOPLE COULD HAVE NOTICED. OR THE OPPOSITE, BEING SO FIGETY OR RESTLESS THAT YOU HAVE BEEN MOVING AROUND A LOT MORE THAN USUAL: NOT AT ALL
SUM OF ALL RESPONSES TO PHQ QUESTIONS 1-9: 0
9. THOUGHTS THAT YOU WOULD BE BETTER OFF DEAD, OR OF HURTING YOURSELF: NOT AT ALL
1. LITTLE INTEREST OR PLEASURE IN DOING THINGS: NOT AT ALL
4. FEELING TIRED OR HAVING LITTLE ENERGY: NOT AT ALL
7. TROUBLE CONCENTRATING ON THINGS, SUCH AS READING THE NEWSPAPER OR WATCHING TELEVISION: NOT AT ALL
5. POOR APPETITE OR OVEREATING: NOT AT ALL
2. FEELING DOWN, DEPRESSED OR HOPELESS: NOT AT ALL
10. IF YOU CHECKED OFF ANY PROBLEMS, HOW DIFFICULT HAVE THESE PROBLEMS MADE IT FOR YOU TO DO YOUR WORK, TAKE CARE OF THINGS AT HOME, OR GET ALONG WITH OTHER PEOPLE: NOT DIFFICULT AT ALL
3. TROUBLE FALLING OR STAYING ASLEEP: NOT AT ALL
6. FEELING BAD ABOUT YOURSELF - OR THAT YOU ARE A FAILURE OR HAVE LET YOURSELF OR YOUR FAMILY DOWN: NOT AT ALL
SUM OF ALL RESPONSES TO PHQ QUESTIONS 1-9: 0
SUM OF ALL RESPONSES TO PHQ QUESTIONS 1-9: 0
SUM OF ALL RESPONSES TO PHQ9 QUESTIONS 1 & 2: 0
SUM OF ALL RESPONSES TO PHQ QUESTIONS 1-9: 0

## 2024-10-03 ASSESSMENT — LIFESTYLE VARIABLES
HOW OFTEN DO YOU HAVE A DRINK CONTAINING ALCOHOL: NEVER
HOW MANY STANDARD DRINKS CONTAINING ALCOHOL DO YOU HAVE ON A TYPICAL DAY: PATIENT DOES NOT DRINK

## 2024-10-03 NOTE — PROGRESS NOTES
10/3/2024    Johnnie Smith    Chief Complaint   Patient presents with    Medicare AWV     No questions or concerns     3 Month Follow-Up       HPI  History was obtained from pt.  Johnnie is a 79 y.o. male with a PMHx as listed below who presents today for 3 month follow up    Has been following with pulmonology - they took him off OFEV due to side effects of diarrhea, face numbness and loss of taste, so they put him on pirfenidone, hasn't started taking it yet. It costs the insurance $16,000. He is worried about side effects, also he doesn't trust the pulmonologist.     Otherwise he is doing well, feeling really good, just can't exert himself.     1. Medicare annual wellness visit, subsequent    2. PAF (paroxysmal atrial fibrillation) ([facility])    3. Chronic systolic congestive heart failure ([facility])    4. NSIP (nonspecific interstitial pneumonia) ([facility])    5. Restless leg syndrome    6. Mixed hyperlipidemia             REVIEW OF SYMPTOMS    Review of Systems    PAST MEDICAL HISTORY  Past Medical History:   Diagnosis Date    Allergic rhinitis     Asthma     Atrial fibrillation ([facility])     Dr. Aldridge & Dr. Nix    CAD (coronary artery disease)     mild LAD - Dr. Aldridge    COPD (chronic obstructive pulmonary disease) ([facility])     COVID-19 2021    Depression     Diverticulosis of colon     Family history of early CAD     Father- MI-  age 51; a grandparent- age 52 of MI    Family history of valvular heart disease     Mother- Mitral valve disease    GERD (gastroesophageal reflux disease)     H/O 24 hour EKG monitoring 2001-Predominant rhythm is sinus rhythm. No signif ectopy noted.    H/O echocardiogram 2002, 2001-EF 60%.Normal LVSF. Mild MR. Mild TR-Dr Aldridge;    H/O echocardiogram 2019    EF 55-60%, Grade I diastolic dysfunction, sclerotic but non stenotic aortic valve, Mild AR, Mild-Mod TR, Normal pulmonary artery pressure, no pericardial effusion     History of

## 2024-10-03 NOTE — PATIENT INSTRUCTIONS
doctor recommends aspirin, take the amount directed each day. Make sure you take aspirin and not another kind of pain reliever, such as acetaminophen (Tylenol).   When should you call for help?   Call 911 if you have symptoms of a heart attack. These may include:    Chest pain or pressure, or a strange feeling in the chest.     Sweating.     Shortness of breath.     Pain, pressure, or a strange feeling in the back, neck, jaw, or upper belly or in one or both shoulders or arms.     Lightheadedness or sudden weakness.     A fast or irregular heartbeat.   After you call 911, the  may tell you to chew 1 adult-strength or 2 to 4 low-dose aspirin. Wait for an ambulance. Do not try to drive yourself.  Watch closely for changes in your health, and be sure to contact your doctor if you have any problems.  Where can you learn more?  Go to https://www.[employer].net/patientEd and enter F075 to learn more about \"A Healthy Heart: Care Instructions.\"  Current as of: June 24, 2023  Content Version: 14.2  © 2024 [employer].   Care instructions adapted under license by [employer]. If you have questions about a medical condition or this instruction, always ask your healthcare professional. [employer] disclaims any warranty or liability for your use of this information.      Personalized Preventive Plan for Johnnie Smith - 10/3/2024  Medicare offers a range of preventive health benefits. Some of the tests and screenings are paid in full while other may be subject to a deductible, co-insurance, and/or copay.    Some of these benefits include a comprehensive review of your medical history including lifestyle, illnesses that may run in your family, and various assessments and screenings as appropriate.    After reviewing your medical record and screening and assessments performed today your provider may have ordered immunizations, labs, imaging, and/or referrals for you.  A list of these orders (if

## 2024-10-03 NOTE — CARE COORDINATION
Ambulatory Care Coordination Note     10/3/2024 1:32 PM     Patient Current Location:  Home: [address] [place] [address]     This patient was received as a referral from Provider.    ACM contacted the patient by telephone. Verified name and  with patient as identifiers. Provided introduction to self, and explanation of the ACM role.   Patient accepted care management services at this time.          ACM: Nancy Umanzor RN     Challenges to be reviewed by the provider   Additional needs identified to be addressed with provider No  none               Method of communication with provider: chart routing.    Has the patient been seen in the ED since your last call? no    Care Summary Note: ACM outreach to offer care management to patient.  Patient have no s/sx of concern at this time. Patient is ambulatory and still works 3 days a week M/Tues/Wed.  Patient uses oxygen PRN at home on 6 L/min. With a oxygen concentrator, has a portable tank he uses when going out and uses 7 L/min.  Patient checked SPO2 this Am was 96% on room air.  Patient has all medications at this time. Patient states all disease process stable no SOB, coughing, swelling, HA.  Patient advised to continue to monitor s/sx and report any changes to appropriate site of care.  Patient made aware of the red flag symptoms and when to go to the PCP vs the ED. Patient had appointment today with PCP, all questions answered with no new concerns.  Patient was given contact information for any non urgent concerns and advised to leave a voice mail.  Scheduled FU to complete enrollment at a later date.     Offered patient enrollment in the Remote Patient Monitoring (RPM) program for in-home monitoring: Yes, patient enrolled today:     Remote Patient Monitoring Enrollment Note    Date/Time:  10/3/2024 1:45 PM    Offered patient enrollment in the [facility] Remote Patient Monitoring (RPM) program for in home monitoring for CHF; condition

## 2024-10-03 NOTE — PROGRESS NOTES
Medicare Annual Wellness Visit    Johnnie Smith is here for Medicare AWV (No questions or concerns ) and 3 Month Follow-Up    Assessment & Plan   Medicare annual wellness visit, subsequent  Recommendations for Preventive Services Due: see orders and patient instructions/AVS.  Recommended screening schedule for the next 5-10 years is provided to the patient in written form: see Patient Instructions/AVS.     No follow-ups on file.     Subjective   Pt also here for 3 month follow up    Patient's complete Health Risk Assessment and screening values have been reviewed and are found in Flowsheets. The following problems were reviewed today and where indicated follow up appointments were made and/or referrals ordered.    Positive Risk Factor Screenings with Interventions:                Inactivity:  On average, how many days per week do you engage in moderate to strenuous exercise (like a brisk walk)?: 0 days (!) Abnormal  On average, how many minutes do you engage in exercise at this level?: 0 min  Interventions:  Patient comments: I'm as active as I want to be     Abnormal BMI (obese):  Body mass index is 34.02 kg/m². (!) Abnormal  Interventions:  Patient comments: pt has been losing weight recently                           Objective   Vitals:    10/03/24 0824   BP: 138/88   Site: Left Upper Arm   Position: Sitting   Cuff Size: Large Adult   Pulse: 62   Resp: 18   SpO2: 95%   Weight: 98.5 kg (217 lb 3.2 oz)   Height: 1.702 m (5' 7\")      Body mass index is 34.02 kg/m².         Guag, clock normal           Allergies   Allergen Reactions    Lyrica [Pregabalin] Hallucinations     Prior to Visit Medications    Medication Sig Taking? Authorizing Provider   Pirfenidone 267 MG CAPS Days 1 to 7: Oral: 267 mg 3 times daily; total daily dose: 801 mg/day. Days 8 to 14: Oral: 534 mg 3 times daily; total daily dose: 1,602 mg/day. Day 15 and thereafter: Oral: 801 mg 3 times daily; maximum daily dose: 2,403 mg/day.  Patient taking

## 2024-10-03 NOTE — PROGRESS NOTES
Remote Patient Monitoring Treatment Plan    Received request from ACM/Nancy Hinojosa RN to order remote patient monitoring for in home monitoring of CHF and HTN; condition managed by Dr. Aldridge. COPD; condition managed by Dr. Gonzalez and order completed. Patient uses home O2 at 6L    Patient will be monitoring blood pressure   pulse ox   weight  survey questions  disease specific education modules .      Patient will engage in Remote Patient Monitoring each day to develop the skills necessary for self management.       RPM Care Team Responsibilities:   Alerts will be reviewed daily and addressed within 2-4 hours during operational hours (Monday -Friday 9 am-4 pm)  Alert response and intervention documented in patient medical record  Alert response escalated to PCP per protocol and documented in patient medical record  Patient monitored over approximately  days  Discharge from program based on self-management readiness    See care coordination encounters for additional details.

## 2024-10-04 ENCOUNTER — CARE COORDINATION (OUTPATIENT)
Dept: PRIMARY CARE CLINIC | Age: 79
End: 2024-10-04

## 2024-10-04 LAB
ALBUMIN SERPL-MCNC: 4.1 G/DL (ref 3.4–5)
ALBUMIN/GLOB SERPL: 2 {RATIO} (ref 1.1–2.2)
ALP SERPL-CCNC: 58 U/L (ref 40–129)
ALT SERPL-CCNC: 25 U/L (ref 10–40)
ANION GAP SERPL CALCULATED.3IONS-SCNC: 13 MMOL/L (ref 3–16)
AST SERPL-CCNC: 20 U/L (ref 15–37)
BASOPHILS # BLD: 0 K/UL (ref 0–0.2)
BASOPHILS NFR BLD: 0.2 %
BILIRUB SERPL-MCNC: 0.8 MG/DL (ref 0–1)
BUN SERPL-MCNC: 26 MG/DL (ref 7–20)
CALCIUM SERPL-MCNC: 8.7 MG/DL (ref 8.3–10.6)
CHLORIDE SERPL-SCNC: 101 MMOL/L (ref 99–110)
CHOLEST SERPL-MCNC: 188 MG/DL (ref 0–199)
CO2 SERPL-SCNC: 25 MMOL/L (ref 21–32)
CREAT SERPL-MCNC: 1.1 MG/DL (ref 0.8–1.3)
DEPRECATED RDW RBC AUTO: 18.3 % (ref 12.4–15.4)
EOSINOPHIL # BLD: 0.1 K/UL (ref 0–0.6)
EOSINOPHIL NFR BLD: 1.1 %
GFR SERPLBLD CREATININE-BSD FMLA CKD-EPI: 69 ML/MIN/{1.73_M2}
GLUCOSE SERPL-MCNC: 93 MG/DL (ref 70–99)
HCT VFR BLD AUTO: 42.8 % (ref 40.5–52.5)
HDLC SERPL-MCNC: 94 MG/DL (ref 40–60)
HGB BLD-MCNC: 14.3 G/DL (ref 13.5–17.5)
LDL CHOLESTEROL: 81 MG/DL
LYMPHOCYTES # BLD: 1 K/UL (ref 1–5.1)
LYMPHOCYTES NFR BLD: 10.6 %
MCH RBC QN AUTO: 31.9 PG (ref 26–34)
MCHC RBC AUTO-ENTMCNC: 33.4 G/DL (ref 31–36)
MCV RBC AUTO: 95.8 FL (ref 80–100)
MONOCYTES # BLD: 0.6 K/UL (ref 0–1.3)
MONOCYTES NFR BLD: 6.7 %
NEUTROPHILS # BLD: 7.6 K/UL (ref 1.7–7.7)
NEUTROPHILS NFR BLD: 81.4 %
PLATELET # BLD AUTO: 131 K/UL (ref 135–450)
PMV BLD AUTO: 8.8 FL (ref 5–10.5)
POTASSIUM SERPL-SCNC: 3.9 MMOL/L (ref 3.5–5.1)
PROT SERPL-MCNC: 6.2 G/DL (ref 6.4–8.2)
RBC # BLD AUTO: 4.46 M/UL (ref 4.2–5.9)
SODIUM SERPL-SCNC: 139 MMOL/L (ref 136–145)
TRIGL SERPL-MCNC: 65 MG/DL (ref 0–150)
VLDLC SERPL CALC-MCNC: 13 MG/DL
WBC # BLD AUTO: 9.4 K/UL (ref 4–11)

## 2024-10-04 NOTE — CARE COORDINATION
Remote Patient Kit Ordering Note      Date/Time:  10/4/2024 10:52 AM      [facility] patient/family today to notify of RPM kit order; patient/family was available; discussed the following topics below and all questions answered.    [x] [facility] shipping address  [x] Patient will receive package over the next 1-3 business days. Someone 21 years or older must be present to sign for UPS delivery.  [x] HRS will contact patient within 24 hours, an HRS  will call the patient directly: If the patient does not answer, HRS will follow up with the clinical team notifying them about the unsuccessful attempt to contact the patient. HRS will make three call attempts to the patient.Provide patient with [facility] Virtual install number is: [phone].  [x] LPN will contact patient once equipment is active to welcome them to the program.                                                         [x] Hours of RPM monitoring - Monday-Friday 2545-5061; encourage patient to get vitals entered by Noon each day to have the alert addressed same day.  [x][facility] mailed RPM Patient flyer to patient.                      LPN made aware the RPM kit has been ordered.

## 2024-10-07 ENCOUNTER — CARE COORDINATION (OUTPATIENT)
Dept: CARE COORDINATION | Age: 79
End: 2024-10-07

## 2024-10-10 ENCOUNTER — CARE COORDINATION (OUTPATIENT)
Dept: CARE COORDINATION | Age: 79
End: 2024-10-10

## 2024-10-10 NOTE — CARE COORDINATION
Ambulatory Care Coordination Note     10/10/2024 1:37 PM     ACM outreach attempt by this ACM today to perform care management follow up . ACM was unable to reach the patient by telephone today; left voice message requesting a return phone call to this ACM.     ACM: Nancy Umanzor RN     Care Summary Note: ACM outreach for FU Care Management.     PCP/Specialist follow up:   Future Appointments         Provider Specialty Dept Phone    10/24/2024 1:00 PM Petey Gonzalez MD Pulmonology [phone]    10/25/2024 11:20 AM Aster Schmidt APRN - CNP Cardiology [phone]    12/5/2024 10:00 AM Haritha Cornelius APRN - CNP Pulmonology [phone]    3/31/2025 10:40 AM Brad Aldridge MD Cardiology [phone]            Follow Up:   Plan for next ACM outreach in approximately 2 weeks to complete:  - CC Protocol assessments  - disease specific assessments  - SDOH assessments  - medication review  - advance care planning  - goal progression  - education   - RPM.

## 2024-10-17 ENCOUNTER — CARE COORDINATION (OUTPATIENT)
Dept: CARE COORDINATION | Age: 79
End: 2024-10-17

## 2024-10-17 SDOH — ECONOMIC STABILITY: TRANSPORTATION INSECURITY
IN THE PAST 12 MONTHS, HAS THE LACK OF TRANSPORTATION KEPT YOU FROM MEDICAL APPOINTMENTS OR FROM GETTING MEDICATIONS?: NO

## 2024-10-17 SDOH — SOCIAL STABILITY: SOCIAL NETWORK
DO YOU BELONG TO ANY CLUBS OR ORGANIZATIONS SUCH AS CHURCH GROUPS UNIONS, FRATERNAL OR ATHLETIC GROUPS, OR SCHOOL GROUPS?: NO

## 2024-10-17 SDOH — SOCIAL STABILITY: SOCIAL NETWORK: ARE YOU MARRIED, WIDOWED, DIVORCED, SEPARATED, NEVER MARRIED, OR LIVING WITH A PARTNER?: MARRIED

## 2024-10-17 SDOH — ECONOMIC STABILITY: FOOD INSECURITY: WITHIN THE PAST 12 MONTHS, YOU WORRIED THAT YOUR FOOD WOULD RUN OUT BEFORE YOU GOT MONEY TO BUY MORE.: NEVER TRUE

## 2024-10-17 SDOH — ECONOMIC STABILITY: INCOME INSECURITY: IN THE LAST 12 MONTHS, WAS THERE A TIME WHEN YOU WERE NOT ABLE TO PAY THE MORTGAGE OR RENT ON TIME?: NO

## 2024-10-17 SDOH — SOCIAL STABILITY: SOCIAL NETWORK
IN A TYPICAL WEEK, HOW MANY TIMES DO YOU TALK ON THE PHONE WITH FAMILY, FRIENDS, OR NEIGHBORS?: MORE THAN THREE TIMES A WEEK

## 2024-10-17 SDOH — SOCIAL STABILITY: SOCIAL NETWORK: HOW OFTEN DO YOU ATTEND CHURCH OR RELIGIOUS SERVICES?: NEVER

## 2024-10-17 SDOH — SOCIAL STABILITY: SOCIAL NETWORK: HOW OFTEN DO YOU ATTENT MEETINGS OF THE CLUB OR ORGANIZATION YOU BELONG TO?: NEVER

## 2024-10-17 SDOH — ECONOMIC STABILITY: FOOD INSECURITY: WITHIN THE PAST 12 MONTHS, THE FOOD YOU BOUGHT JUST DIDN'T LAST AND YOU DIDN'T HAVE MONEY TO GET MORE.: NEVER TRUE

## 2024-10-17 SDOH — SOCIAL STABILITY: SOCIAL NETWORK: HOW OFTEN DO YOU GET TOGETHER WITH FRIENDS OR RELATIVES?: MORE THAN THREE TIMES A WEEK

## 2024-10-17 SDOH — HEALTH STABILITY: MENTAL HEALTH
STRESS IS WHEN SOMEONE FEELS TENSE, NERVOUS, ANXIOUS, OR CAN'T SLEEP AT NIGHT BECAUSE THEIR MIND IS TROUBLED. HOW STRESSED ARE YOU?: TO SOME EXTENT

## 2024-10-17 SDOH — HEALTH STABILITY: PHYSICAL HEALTH: ON AVERAGE, HOW MANY MINUTES DO YOU ENGAGE IN EXERCISE AT THIS LEVEL?: 40 MIN

## 2024-10-17 SDOH — HEALTH STABILITY: PHYSICAL HEALTH: ON AVERAGE, HOW MANY DAYS PER WEEK DO YOU ENGAGE IN MODERATE TO STRENUOUS EXERCISE (LIKE A BRISK WALK)?: 3 DAYS

## 2024-10-17 SDOH — ECONOMIC STABILITY: TRANSPORTATION INSECURITY
IN THE PAST 12 MONTHS, HAS LACK OF TRANSPORTATION KEPT YOU FROM MEETINGS, WORK, OR FROM GETTING THINGS NEEDED FOR DAILY LIVING?: NO

## 2024-10-17 SDOH — ECONOMIC STABILITY: INCOME INSECURITY: HOW HARD IS IT FOR YOU TO PAY FOR THE VERY BASICS LIKE FOOD, HOUSING, MEDICAL CARE, AND HEATING?: NOT HARD AT ALL

## 2024-10-17 ASSESSMENT — ENCOUNTER SYMPTOMS: DYSPNEA ASSOCIATED WITH: EXERTION

## 2024-10-17 NOTE — CARE COORDINATION
Ambulatory Care Coordination Note     10/17/2024 4:44 PM     Patient Current Location:  Home: [address] [place] [address]     ACM contacted the patient by telephone. Verified name and  with patient as identifiers.         ACM: Nancy Umanzor RN     Has the patient been seen in the ED since your last call? no    Care Summary Note: ACM outreach to patient he is having some SOB, but under control.  Frequently checks his SPO2 and does not go under 90%. 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  Patient stated his HTN, CHF and COPD is stable with no issues not swelling, increased coughing or s/sx of HTN noted per patient.  ACM advised patient to monitor red flag s/x increased HA, blurred vision, slurred speech,  CP, numbness/tingling to go to the ED per squad.  Patient has the equipment for RPM but is contemplating using it due to he does self monitor already.  ACM told the patient to think about it for another week and if he does not want to do the RPM program he just lets me know and he can send the equipment back per directions.     Offered patient enrollment in the Remote Patient Monitoring (RPM) program for in-home monitoring: Yes, patient enrolled; current status is preactivatedbut patient is contemplating using the equipment .     Assessments Completed:       10/17/2024     4:24 PM   Amb Fall Risk Assessment and TUG Test   Do you feel unsteady or are you worried about falling?  no   2 or more falls in past year? no   Fall with injury in past year? no    ,   Ambulatory Care Coordination Assessment    Care Coordination Protocol  Referral from Primary Care Provider: Yes  Week 1 - Initial Assessment     Do you have all of your prescriptions and are they filled?: Yes  Barriers to medication adherence: None  Are you able to afford your medications?: Yes  How often do you have trouble taking your medications the way you have been told to take them?: I always take

## 2024-10-24 ENCOUNTER — OFFICE VISIT (OUTPATIENT)
Dept: PULMONOLOGY | Age: 79
End: 2024-10-24

## 2024-10-24 VITALS
HEART RATE: 86 BPM | SYSTOLIC BLOOD PRESSURE: 110 MMHG | DIASTOLIC BLOOD PRESSURE: 72 MMHG | BODY MASS INDEX: 34.34 KG/M2 | WEIGHT: 218.8 LBS | OXYGEN SATURATION: 97 % | HEIGHT: 67 IN

## 2024-10-24 DIAGNOSIS — R06.02 SOBOE (SHORTNESS OF BREATH ON EXERTION): ICD-10-CM

## 2024-10-24 DIAGNOSIS — Z87.891 EX-CIGARETTE SMOKER: ICD-10-CM

## 2024-10-24 DIAGNOSIS — E66.9 OBESITY (BMI 30-39.9): ICD-10-CM

## 2024-10-24 DIAGNOSIS — J84.89 NSIP (NONSPECIFIC INTERSTITIAL PNEUMONIA) (HCC): ICD-10-CM

## 2024-10-24 DIAGNOSIS — J84.112 UIP (USUAL INTERSTITIAL PNEUMONITIS) (HCC): Primary | ICD-10-CM

## 2024-10-24 DIAGNOSIS — J44.9 COPD WITH HYPOXIA (HCC): ICD-10-CM

## 2024-10-24 DIAGNOSIS — R09.02 HYPOXIA: ICD-10-CM

## 2024-10-24 RX ORDER — SULFAMETHOXAZOLE AND TRIMETHOPRIM 800; 160 MG/1; MG/1
TABLET ORAL
Qty: 48 TABLET | Refills: 0 | Status: SHIPPED | OUTPATIENT
Start: 2024-10-24 | End: 2024-10-24 | Stop reason: SDUPTHER

## 2024-10-24 RX ORDER — PREDNISONE 20 MG/1
TABLET ORAL
Qty: 60 TABLET | Refills: 2 | Status: SHIPPED | OUTPATIENT
Start: 2024-10-24

## 2024-10-24 ASSESSMENT — ENCOUNTER SYMPTOMS
EYE DISCHARGE: 0
COUGH: 0
EYE ITCHING: 0
ABDOMINAL PAIN: 0
ABDOMINAL DISTENTION: 0
SHORTNESS OF BREATH: 1
BACK PAIN: 0

## 2024-10-24 NOTE — ASSESSMENT & PLAN NOTE
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

## 2024-10-24 NOTE — ASSESSMENT & PLAN NOTE
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

## 2024-10-24 NOTE — ASSESSMENT & PLAN NOTE
Appears to be sec to the Obesity. Pulmonary HTN, COPD and ILD  PFT  6 MWT in 6 months  Steroids and Pirfenidone

## 2024-10-24 NOTE — ASSESSMENT & PLAN NOTE
Advised to c/w quitting smoking  PFT  6 MWT in 3 months  Get yearly flu vaccine  C/w Inhalers  Home O2 eval now

## 2024-10-24 NOTE — PROGRESS NOTES
Johnnie Smith  1945  Referring Provider: Claudio Umanzor PAPCP - General     Subjective:     Chief Complaint   Patient presents with    Results     CT       HPI  Johnnie is a 79 y.o. male has come back as a follow up. He has ILD on the CT chest done on 04/11/24. He is on 6 L.min of oxygen. He has no symptoms. He has SOBOE. He is on Trelegy and Albuterol prn. He has a 45 pk yr smoking quit 45 yrs ago. He was not able to tolerate OFEV. I was given Pirfenidone, but he wanted to know more about the new CT chest before considering it. He had a repeat HRCT of chest done on 09/19/24 showed:    The trachea and mainstem bronchi patent bilaterally.     No suspicious mediastinal or hilar adenopathy seen within the constraints of a  noncontrast enhanced CT. Calcified anterior pretracheal lymph node is seen.     Thoracic aorta is nonaneurysmal.     No pleural effusion or pneumothorax.     Atelectasis or scarring is seen adjacent to the left major fissure. Moderate  upper lobe predominant centrilobular emphysema. Peripheral groundglass opacities  are seen. Mild bronchiectasis is seen bilaterally. Minimal honeycombing is noted  in the left lung bases.    He is on Prednisone 40 mg per/day and patient says that it is helping him. He is not on Pirfenidone yet.       Current Outpatient Medications   Medication Sig Dispense Refill    predniSONE (DELTASONE) 20 MG tablet TAKE 1 and 1/2  TABLETS BY MOUTH ONCE PER DAY IN THE MORNING 60 tablet 2    sulfamethoxazole-trimethoprim (BACTRIM DS;SEPTRA DS) 800-160 MG per tablet Take 1 tablet orally 3 times per week  M-W-F 48 tablet 0    Pirfenidone 267 MG CAPS Days 1 to 7: Oral: 267 mg 3 times daily; total daily dose: 801 mg/day. Days 8 to 14: Oral: 534 mg 3 times daily; total daily dose: 1,602 mg/day. Day 15 and thereafter: Oral: 801 mg 3 times daily; maximum daily dose: 2,403 mg/day. (Patient taking differently: Days 1 to 7: Oral: 267 mg 3 times daily; total daily dose: 801 mg/day. Days 8

## 2024-10-25 ENCOUNTER — HOSPITAL ENCOUNTER (EMERGENCY)
Age: 79
Discharge: HOME | End: 2024-10-25
Attending: STUDENT IN AN ORGANIZED HEALTH CARE EDUCATION/TRAINING PROGRAM
Payer: MEDICARE

## 2024-10-25 ENCOUNTER — APPOINTMENT (OUTPATIENT)
Dept: NON INVASIVE DIAGNOSTICS | Age: 79
End: 2024-10-25
Payer: MEDICARE

## 2024-10-25 ENCOUNTER — OFFICE VISIT (OUTPATIENT)
Dept: CARDIOLOGY CLINIC | Age: 79
End: 2024-10-25

## 2024-10-25 VITALS
WEIGHT: 219.8 LBS | BODY MASS INDEX: 34.5 KG/M2 | SYSTOLIC BLOOD PRESSURE: 118 MMHG | HEART RATE: 141 BPM | HEIGHT: 67 IN | DIASTOLIC BLOOD PRESSURE: 64 MMHG

## 2024-10-25 VITALS
DIASTOLIC BLOOD PRESSURE: 105 MMHG | HEART RATE: 62 BPM | HEIGHT: 67 IN | WEIGHT: 218 LBS | TEMPERATURE: 97.5 F | RESPIRATION RATE: 14 BRPM | BODY MASS INDEX: 34.21 KG/M2 | OXYGEN SATURATION: 95 % | SYSTOLIC BLOOD PRESSURE: 151 MMHG

## 2024-10-25 DIAGNOSIS — Z79.899 VISIT FOR MONITORING TIKOSYN THERAPY: ICD-10-CM

## 2024-10-25 DIAGNOSIS — I10 PRIMARY HYPERTENSION: Chronic | ICD-10-CM

## 2024-10-25 DIAGNOSIS — I48.0 PAROXYSMAL ATRIAL FIBRILLATION (HCC): Primary | ICD-10-CM

## 2024-10-25 DIAGNOSIS — I48.19 HYPERCOAGULABLE STATE DUE TO PERSISTENT ATRIAL FIBRILLATION (HCC): ICD-10-CM

## 2024-10-25 DIAGNOSIS — D68.69 HYPERCOAGULABLE STATE DUE TO PERSISTENT ATRIAL FIBRILLATION (HCC): ICD-10-CM

## 2024-10-25 DIAGNOSIS — Z51.81 VISIT FOR MONITORING TIKOSYN THERAPY: ICD-10-CM

## 2024-10-25 DIAGNOSIS — I48.19 PERSISTENT ATRIAL FIBRILLATION (HCC): Primary | ICD-10-CM

## 2024-10-25 LAB
ALBUMIN SERPL-MCNC: 3.8 G/DL (ref 3.4–5)
ALBUMIN/GLOB SERPL: 2.2 {RATIO} (ref 1.1–2.2)
ALP SERPL-CCNC: 55 U/L (ref 40–129)
ALT SERPL-CCNC: 42 U/L (ref 10–40)
ANION GAP SERPL CALCULATED.3IONS-SCNC: 10 MMOL/L (ref 9–17)
AST SERPL-CCNC: 35 U/L (ref 15–37)
BASOPHILS # BLD: 0.02 K/UL
BASOPHILS NFR BLD: 0 % (ref 0–1)
BILIRUB SERPL-MCNC: 0.9 MG/DL (ref 0–1)
BUN SERPL-MCNC: 31 MG/DL (ref 7–20)
CALCIUM SERPL-MCNC: 8.5 MG/DL (ref 8.3–10.6)
CHLORIDE SERPL-SCNC: 103 MMOL/L (ref 99–110)
CO2 SERPL-SCNC: 26 MMOL/L (ref 21–32)
CREAT SERPL-MCNC: 1.2 MG/DL (ref 0.8–1.3)
ECHO BSA: 2.16 M2
EOSINOPHIL # BLD: 0.01 K/UL
EOSINOPHILS RELATIVE PERCENT: 0 % (ref 0–3)
ERYTHROCYTE [DISTWIDTH] IN BLOOD BY AUTOMATED COUNT: 15.8 % (ref 11.7–14.9)
GFR, ESTIMATED: 59 ML/MIN/1.73M2
GLUCOSE SERPL-MCNC: 136 MG/DL (ref 74–99)
HCT VFR BLD AUTO: 42.4 % (ref 42–52)
HGB BLD-MCNC: 14 G/DL (ref 13.5–18)
IMM GRANULOCYTES # BLD AUTO: 0.17 K/UL
IMM GRANULOCYTES NFR BLD: 2 %
INR PPP: 1.4
LYMPHOCYTES NFR BLD: 0.66 K/UL
LYMPHOCYTES RELATIVE PERCENT: 6 % (ref 24–44)
MAGNESIUM SERPL-MCNC: 2.2 MG/DL (ref 1.8–2.4)
MCH RBC QN AUTO: 32.1 PG (ref 27–31)
MCHC RBC AUTO-ENTMCNC: 33 G/DL (ref 32–36)
MCV RBC AUTO: 97.2 FL (ref 78–100)
MONOCYTES NFR BLD: 0.45 K/UL
MONOCYTES NFR BLD: 4 % (ref 0–4)
NEUTROPHILS NFR BLD: 87 % (ref 36–66)
NEUTS SEG NFR BLD: 8.95 K/UL
PARTIAL THROMBOPLASTIN TIME: 28.2 SEC (ref 25.1–37.1)
PLATELET # BLD AUTO: 164 K/UL (ref 140–440)
PMV BLD AUTO: 10.2 FL (ref 7.5–11.1)
POTASSIUM SERPL-SCNC: 3.8 MMOL/L (ref 3.5–5.1)
PROT SERPL-MCNC: 5.6 G/DL (ref 6.4–8.2)
PROTHROMBIN TIME: 17.5 SEC (ref 11.7–14.5)
RBC # BLD AUTO: 4.36 M/UL (ref 4.6–6.2)
SODIUM SERPL-SCNC: 140 MMOL/L (ref 136–145)
WBC OTHER # BLD: 10.3 K/UL (ref 4–10.5)

## 2024-10-25 PROCEDURE — 2500000003 HC RX 250 WO HCPCS: Performed by: STUDENT IN AN ORGANIZED HEALTH CARE EDUCATION/TRAINING PROGRAM

## 2024-10-25 PROCEDURE — 85025 COMPLETE CBC W/AUTO DIFF WBC: CPT

## 2024-10-25 PROCEDURE — 85610 PROTHROMBIN TIME: CPT

## 2024-10-25 PROCEDURE — 93005 ELECTROCARDIOGRAM TRACING: CPT | Performed by: STUDENT IN AN ORGANIZED HEALTH CARE EDUCATION/TRAINING PROGRAM

## 2024-10-25 PROCEDURE — 93320 DOPPLER ECHO COMPLETE: CPT | Performed by: INTERNAL MEDICINE

## 2024-10-25 PROCEDURE — 92960 CARDIOVERSION ELECTRIC EXT: CPT | Performed by: INTERNAL MEDICINE

## 2024-10-25 PROCEDURE — 96375 TX/PRO/DX INJ NEW DRUG ADDON: CPT

## 2024-10-25 PROCEDURE — 93312 ECHO TRANSESOPHAGEAL: CPT

## 2024-10-25 PROCEDURE — 99152 MOD SED SAME PHYS/QHP 5/>YRS: CPT | Performed by: INTERNAL MEDICINE

## 2024-10-25 PROCEDURE — 93312 ECHO TRANSESOPHAGEAL: CPT | Performed by: INTERNAL MEDICINE

## 2024-10-25 PROCEDURE — 7100000010 HC PHASE II RECOVERY - FIRST 15 MIN: Performed by: INTERNAL MEDICINE

## 2024-10-25 PROCEDURE — 7100000011 HC PHASE II RECOVERY - ADDTL 15 MIN: Performed by: INTERNAL MEDICINE

## 2024-10-25 PROCEDURE — 83735 ASSAY OF MAGNESIUM: CPT

## 2024-10-25 PROCEDURE — 85730 THROMBOPLASTIN TIME PARTIAL: CPT

## 2024-10-25 PROCEDURE — 80053 COMPREHEN METABOLIC PANEL: CPT

## 2024-10-25 PROCEDURE — 99284 EMERGENCY DEPT VISIT MOD MDM: CPT

## 2024-10-25 PROCEDURE — 99283 EMERGENCY DEPT VISIT LOW MDM: CPT | Performed by: INTERNAL MEDICINE

## 2024-10-25 PROCEDURE — 6370000000 HC RX 637 (ALT 250 FOR IP): Performed by: INTERNAL MEDICINE

## 2024-10-25 PROCEDURE — 93325 DOPPLER ECHO COLOR FLOW MAPG: CPT | Performed by: INTERNAL MEDICINE

## 2024-10-25 PROCEDURE — 96374 THER/PROPH/DIAG INJ IV PUSH: CPT

## 2024-10-25 PROCEDURE — 93005 ELECTROCARDIOGRAM TRACING: CPT | Performed by: INTERNAL MEDICINE

## 2024-10-25 PROCEDURE — 6360000002 HC RX W HCPCS: Performed by: INTERNAL MEDICINE

## 2024-10-25 RX ORDER — DILTIAZEM HYDROCHLORIDE 5 MG/ML
20 INJECTION INTRAVENOUS ONCE
Status: COMPLETED | OUTPATIENT
Start: 2024-10-25 | End: 2024-10-25

## 2024-10-25 RX ORDER — MIDAZOLAM HYDROCHLORIDE 1 MG/ML
INJECTION, SOLUTION INTRAMUSCULAR; INTRAVENOUS PRN
Status: COMPLETED | OUTPATIENT
Start: 2024-10-25 | End: 2024-10-25

## 2024-10-25 RX ORDER — LIDOCAINE HYDROCHLORIDE 20 MG/ML
SOLUTION OROPHARYNGEAL PRN
Status: COMPLETED | OUTPATIENT
Start: 2024-10-25 | End: 2024-10-25

## 2024-10-25 RX ORDER — SULFAMETHOXAZOLE AND TRIMETHOPRIM 800; 160 MG/1; MG/1
TABLET ORAL
Qty: 48 TABLET | Refills: 0 | Status: SHIPPED | OUTPATIENT
Start: 2024-10-25

## 2024-10-25 RX ORDER — FENTANYL CITRATE 50 UG/ML
INJECTION, SOLUTION INTRAMUSCULAR; INTRAVENOUS PRN
Status: COMPLETED | OUTPATIENT
Start: 2024-10-25 | End: 2024-10-25

## 2024-10-25 RX ADMIN — MIDAZOLAM 2 MG: 1 INJECTION INTRAMUSCULAR; INTRAVENOUS at 15:48

## 2024-10-25 RX ADMIN — FENTANYL CITRATE 25 MCG: 50 INJECTION, SOLUTION INTRAMUSCULAR; INTRAVENOUS at 15:48

## 2024-10-25 RX ADMIN — LIDOCAINE HYDROCHLORIDE 15 ML: 20 SOLUTION ORAL at 15:44

## 2024-10-25 RX ADMIN — DILTIAZEM HYDROCHLORIDE 20 MG: 5 INJECTION, SOLUTION INTRAVENOUS at 13:23

## 2024-10-25 ASSESSMENT — PAIN SCALES - GENERAL: PAINLEVEL_OUTOF10: 0

## 2024-10-25 ASSESSMENT — PAIN - FUNCTIONAL ASSESSMENT
PAIN_FUNCTIONAL_ASSESSMENT: NONE - DENIES PAIN
PAIN_FUNCTIONAL_ASSESSMENT: NONE - DENIES PAIN

## 2024-10-25 NOTE — PROCEDURES
PROCEDURE NOTE  Date: 10/25/2024   Name: Johnnie Smith  YOB: 1945    Procedures        Procedures moderate sedation    Start time   End time     Sedation used: 2 mg versed and 25mg fentanyl, continous monitoring of airway, oxygen saturation, blood pressure and vitals done through out the case       Sedation Plan  ASA: class 3 - patient with severe systemic disease     Mallampati class: III - soft palate, base of uvula visible.    Sedation plan: moderate (conscious sedation)

## 2024-10-25 NOTE — PROGRESS NOTES
effusion     History of diverticulitis of colon     History of Holter monitoring 04/12/2018    48 hr holter - SR with PAF    History of repair of left rotator cuff 08/17/2015    Hx surgery 2007 Roberto Victor, surgery L shoulder 11/18/2015 Dr Scott     [facility] (hard of hearing)     Bilat hearing aids    Hx of cardiac cath 07/08/2013    Mild LAD disease noted.    Hx of cardiovascular stress test 03/28/2018    EF 33% Pt in a fib with RVR. No infarct or ischemia. Decreased uptake inferiorly due to diaphragmatic artifact.     HX OTHER MEDICAL 07/30/2013    14 DAY EVENT: APC's, short runs of sinus tachycardia.     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    Restless leg syndrome     S/P placement of cardiac pacemaker 4/1/2021    Shortness of breath 02/16/2018    Tricuspid valve regurgitation 06/2001    mild       Surgical history :   Past Surgical History:   Procedure Laterality Date    BONE RESECTION, RIB      thoracic    CARDIAC PROCEDURE N/A 4/26/2024    Angioplasty coronary performed by Brad Aldridge MD at [facility] CARDIAC CATH LAB    CARDIAC PROCEDURE N/A 4/26/2024    Left heart cath performed by Brad Aldridge MD at [facility] CARDIAC CATH LAB    CATARACT REMOVAL Bilateral     CERVICAL FUSION N/A 11/18/2022    C5-6, C6-7  CERVICAL DISCECTOMY FUSION ANTERIOR TWO LEVELS performed by Man Bauman MD at [facility] OR    COLONOSCOPY  10/14/2014    polyp, divertics,    DIAGNOSTIC CARDIAC CATH LAB PROCEDURE  07/08/2013    EF 55%, Mild LAD Disease    ENDOSCOPY, COLON, DIAGNOSTIC  10/14/2014    normal    NERVE SURGERY      ulnar nerver transfer    PACEMAKER INSERTION Left 04/01/2021    [employer] J LUIS XT DR MCCAULEY SURESCAN PPM    ROTATOR CUFF REPAIR  2007, 2006    2006-right rotator cuff; 2007-Left Rotator cuff    ROTATOR CUFF REPAIR Left 11/18/2015    Dr. Teague    SHOULDER ARTHROSCOPY Right 12/01/2010

## 2024-10-25 NOTE — ED NOTES
Pt's Pacemaker interrogated with [employer] pacemaker interrogator per Dr. Oliva's request to do so. Informed Dr. Will that Pt's pacemaker was interrogated.

## 2024-10-25 NOTE — PROCEDURES
SYNCHRONIZED  CARDIOVERSION     After sedation/BURTON and anesthesia with  d/c synchronized cardioversion was performed with 200J.    Impression: Patient is successfully cardioverted into NSR.    PLAN: continue anticoagulation, BB and anticoagulation

## 2024-10-25 NOTE — ED NOTES
Administered ordered Cardizem 20 mg IVP to Pt per Dr. Will's order- please see Pt's MAR. Dr. Will was in the room when the Cardizem was administered to Pt and his heart rate went down from 110's-130's. Pt states he is pain free and denies chest pain and shortness of breath. Dr. Oliva was also at the bedside to see Pt and he was given Pt's EKG to review.

## 2024-10-25 NOTE — ED NOTES
Medication History  [facility]    Patient Name: Johnnie Smith 1945     Medication history has been completed by: Sarah Leo [facility]    Source(s) of information: patient and insurance claims     Primary Care Physician: Claudio Umanzor PA     Pharmacy: [facility]    Allergies as of 10/25/2024 - Fully Reviewed 10/25/2024   Allergen Reaction Noted    Lyrica [pregabalin] Hallucinations 10/21/2022        Prior to Admission medications    Medication Sig Start Date End Date Taking? Authorizing Provider   predniSONE (DELTASONE) 20 MG tablet Take 2 tablets by mouth daily 10/24/24  Yes Petey Gonzalez MD   metoprolol succinate (TOPROL XL) 50 MG extended release tablet Take 0.5 tablets by mouth daily 8/28/24  Yes Brad Aldridge MD   dofetilide (TIKOSYN) 250 MCG capsule Take 1 capsule by mouth 2 times daily 8/22/24  Yes Brad Aldridge MD   OXYGEN Inhale into the lungs   Yes Provider, MD Geoffrey   dilTIAZem (CARDIZEM CD) 120 MG extended release capsule Take 1 capsule by mouth daily 6/30/24  Yes Bert Peñaloza MD   atorvastatin (LIPITOR) 10 MG tablet Take 1 tablet by mouth daily 5/28/24  Yes Brad Aldridge MD   apixaban (ELIQUIS) 5 MG TABS tablet TAKE 1 TABLET TWICE A DAY 5/3/24  Yes Aster Schmidt, APRN - CNP   escitalopram (LEXAPRO) 20 MG tablet  Take 1 tablet by mouth daily   10/25/24 Prescription noted for 1 & 1/2 tablet daily patient reports only taking 1 tablet daily. 4/26/24 1/21/25 Yes Claudio Umanzor PA   fluticasone-umeclidin-vilant (TRELEGY ELLIPTA) 100-62.5-25 MCG/ACT AEPB inhaler Inhale 1 puff into the lungs daily 4/17/24 4/12/25 Yes Petey Gonzalez MD   gabapentin (NEURONTIN) 300 MG capsule Take 1 capsule by mouth nightly for 120 days. 4/17/24 10/25/24 Yes Petey Gonzalez MD   mirtazapine (REMERON) 7.5 MG tablet Take 1 tablet by mouth nightly 1/19/24  Yes Alexandra Garza APRN - NP   omeprazole (PRILOSEC) 40 MG delayed release capsule Take 1 capsule by mouth daily

## 2024-10-25 NOTE — ED NOTES
Repeat EKG completed on Pt after he received ordered Cardizem IVP and was given to Dr. Will to review. Pt states he is pain free. He is resting in bed and appears to be calm, comfortable, and in no acute distress. His respirations are easy & unlabored on room air and his skin is warm and dry. He currently denies any needs and/or concerns. Will continue to monitor him closely.

## 2024-10-25 NOTE — ED NOTES
Discharge instructions reviewed with patient and all questions addressed. Patient alert and oriented x4 at time of discharge. Patient ambulatory and steady gait. IV removed and dressing applied. Patient picked up by son at this time.

## 2024-10-25 NOTE — CONSULTS
CARDIOLOGY CONSULT NOTE   Reason for consultation:  AFIB    Referring physician:  No admitting provider for patient encounter.     Primary care physician: Claudio Umanzor PA      Dear   Thanks for the consult.    History of present illness:Johnnie is a 79 y.o.year old who  presents with AFIB RVR, patient asymptomatic denies any chest pain, he is unsure about his medical history all information obtained after review medical record discussion with the staff and nurse arjun Cantrell who sent her from the [facility].  Patient supposed to be on Toprol-XL and Tikosyn and Eliquis  Chief Complaint   Patient presents with    Fatigue     Pt states he was just at the [facility] for an appointment and was sent here from there for A-Fib and for Dr. Oliva to see him and \"shock my heart back into rhythm\". He currently denies chest pain, SOB, diaphoresis, nausea, vomiting, cough, and heart palpitations.      Blood pressure, cholesterol, blood glucose and weight are well controlled.    Past medical history:    has a past medical history of Allergic rhinitis, Asthma, Atrial fibrillation (HCC), CAD (coronary artery disease), COPD (chronic obstructive pulmonary disease) (HCC), COVID-19, Depression, Diverticulosis of colon, Family history of early CAD, Family history of valvular heart disease, GERD (gastroesophageal reflux disease), H/O 24 hour EKG monitoring, H/O echocardiogram, H/O echocardiogram, History of diverticulitis of colon, History of Holter monitoring, History of repair of left rotator cuff, [place] (hard of hearing), Hx of cardiac cath, Hx of cardiovascular stress test, HX OTHER MEDICAL, Hyperlipidemia, Hypertension, Hypoxemia, Irregular heart rate, Palpitations, Pneumonia, Post-COVID-19 condition, Restless leg syndrome, S/P placement of cardiac pacemaker, Shortness of breath, and Tricuspid valve regurgitation.  Past surgical history:   has a past surgical history that includes Rotator cuff repair (2007, 2006); Bone  Normal heart rate, Normal rhythm, No murmurs, No rubs, No gallops. Carotid arteries pulse and amplitude are normal no bruit, no abdominal bruit noted ( normal abdominal aorta ausculation), femoral arteries pulse and amplitude are normal no bruit, pedal pulses are normal  GI:  Bowel sounds normal, Soft, No tenderness, No masses, No pulsatile masses, no hepatosplenomegally, no bruits  : External genitalia appear normal, No masses or lesions. No discharge. No CVA tenderness.   Musculoskeletal:  Intact distal pulses, No edema, No tenderness, No cyanosis, No clubbing. Good range of motion in all major joints. No tenderness to palpation or major deformities noted. Back- No tenderness.   Integument:  Warm, Dry, No erythema, No rash.   Skin: no rash, no ulcers  Lymphatic:  No lymphadenopathy noted.   Neurologic:  Alert & oriented x 3, Normal motor function, Normal sensory function, No focal deficits noted.   Psychiatric:  Affect normal, Judgment normal, Mood normal.   Lab Review   Recent Labs     10/25/24  1233   WBC 10.3   HGB 14.0   HCT 42.4         Recent Labs     10/25/24  1233      K 3.8      CO2 26   BUN 31*   CREATININE 1.2     Recent Labs     10/25/24  1233   AST 35   ALT 42*   BILITOT 0.9   ALKPHOS 55     No results for input(s): \"TROPONINI\" in the last 72 hours.  No results found for: \"BNP\"  Lab Results   Component Value Date    INR 1.4 10/25/2024    PROTIME 17.5 (H) 10/25/2024         EKG:AFIB    Chest Xray:    ECHO:  Labs, echo, meds reviewed  Assessment: 79 y.o.year old with PMH of  has a past medical history of Allergic rhinitis, Asthma, Atrial fibrillation (HCC), CAD (coronary artery disease), COPD (chronic obstructive pulmonary disease) (HCC), COVID-19, Depression, Diverticulosis of colon, Family history of early CAD, Family history of valvular heart disease, GERD (gastroesophageal reflux disease), H/O 24 hour EKG monitoring, H/O echocardiogram, H/O echocardiogram, History of

## 2024-10-25 NOTE — ED PROVIDER NOTES
Emergency Department Encounter  Location: [facility] EMERGENCY DEPARTMENT    Patient: Johnnie Smith  MRN: [id number]  : 1945  Date of evaluation: 10/25/2024  ED Provider: Arya Covington DO    Johnnie Smith was checked out to me by Dr. Isabel. Please see his/her initial documentation for details of the patient's initial ED presentation, physical exam and completed studies.    In brief, Johnnie Smith is a 79 y.o. male that presented to the emergency department today for fatigue.  Was sent in by cardiology office for A-fib.  At time of signout patient is in cardiology lab getting cardioverted.    I have reviewed and interpreted all of the currently available lab results and diagnostics from this visit:  Results for orders placed or performed during the hospital encounter of 10/25/24   CBC with Auto Differential   Result Value Ref Range    WBC 10.3 4.0 - 10.5 k/uL    RBC 4.36 (L) 4.60 - 6.20 m/uL    Hemoglobin 14.0 13.5 - 18.0 g/dL    Hematocrit 42.4 42.0 - 52.0 %    MCV 97.2 78.0 - 100.0 fL    MCH 32.1 (H) 27.0 - 31.0 pg    MCHC 33.0 32.0 - 36.0 g/dL    RDW 15.8 (H) 11.7 - 14.9 %    Platelets 164 140 - 440 k/uL    MPV 10.2 7.5 - 11.1 fL    Neutrophils % 87 (H) 36 - 66 %    Lymphocytes % 6 (L) 24 - 44 %    Monocytes % 4 0 - 4 %    Eosinophils % 0 0.0 - 3.0 %    Basophils % 0 0 - 1 %    Immature Granulocytes % 2 (H) 0 %    Neutrophils Absolute 8.95 k/uL    Lymphocytes Absolute 0.66 k/uL    Monocytes Absolute 0.45 k/uL    Eosinophils Absolute 0.01 k/uL    Basophils Absolute 0.02 k/uL    Immature Granulocytes Absolute 0.17 k/uL   Comprehensive Metabolic Panel   Result Value Ref Range    Sodium 140 136 - 145 mmol/L    Potassium 3.8 3.5 - 5.1 mmol/L    Chloride 103 99 - 110 mmol/L    CO2 26 21 - 32 mmol/L    Anion Gap 10 9 - 17 mmol/L    Glucose 136 (H) 74 - 99 mg/dL    BUN 31 (H) 7 - 20 mg/dL    Creatinine 1.2 0.8 - 1.3 mg/dL    Est, Glom Filt Rate 59 (L) >60

## 2024-10-25 NOTE — ED NOTES
Pt transferred to the Cardiac unit to have a BURTON and Cardioversion done per ER stretcher per Cardiac unit RN's assist.

## 2024-10-25 NOTE — ED PROVIDER NOTES
Emergency Department Encounter    Patient: Johnnie Smith  MRN: [id number]  : 1945  Date of Evaluation: 10/25/2024  ED Provider:  Suman Aranda MD    Triage Chief Complaint:   Fatigue (Pt states he was just at the [facility] for an appointment and was sent here from there for A-Fib and for Dr. Oliva to see him and \"shock my heart back into rhythm\". He currently denies chest pain, SOB, diaphoresis, nausea, vomiting, cough, and heart palpitations. )    [place]:  Johnnie Smith is a 79 y.o. male with history of A-fib, CAD, COPD, depression, hypertension, hyperlipidemia, that presents for 3 weeks of fatigue.  He was seen at the cardiology clinic before arrival, and found to be in A-fib with RVR, reason why he was sent to the emergency department.  He complains from the insidious onset of severe fatigue, something that is constant, moderate to severe, does not have modifying factors, and is associated with occasional palpitations.  He denies actual chest pain, significant shortness of breath, episodes of profuse diaphoresis, syncope or presyncope, lower extremity edema.  Has not had any nausea or emesis.  Has been compliant with his medications, at this time not taking any calcium channel blocker or beta-blocker.  Denies any recent fever or chills.  No urinary abnormalities.  Compliant with anticoagulation.    He was sent here from the cardiology clinic with plans for cardioversion by the cardiology service, and the cardiologist on-call was already notified.    ROS - see HPI, below listed is current ROS at time of my eval:  Systems reviewed and negative except as above.     Past Medical History:   Diagnosis Date    Allergic rhinitis     Asthma     Atrial fibrillation (HCC)     Dr. Aldridge & Dr. Nix    CAD (coronary artery disease)     mild LAD - Dr. Aldridge    COPD (chronic obstructive pulmonary disease) (HCC)     COVID-19 2021    Depression     Diverticulosis of colon     Family history of early

## 2024-10-26 LAB
EKG ATRIAL RATE: 147 BPM
EKG ATRIAL RATE: 375 BPM
EKG ATRIAL RATE: 80 BPM
EKG DIAGNOSIS: NORMAL
EKG P AXIS: -32 DEGREES
EKG P-R INTERVAL: 204 MS
EKG Q-T INTERVAL: 352 MS
EKG Q-T INTERVAL: 434 MS
EKG Q-T INTERVAL: 436 MS
EKG QRS DURATION: 84 MS
EKG QRS DURATION: 88 MS
EKG QRS DURATION: 92 MS
EKG QTC CALCULATION (BAZETT): 486 MS
EKG QTC CALCULATION (BAZETT): 500 MS
EKG QTC CALCULATION (BAZETT): 519 MS
EKG R AXIS: -12 DEGREES
EKG R AXIS: -12 DEGREES
EKG R AXIS: -49 DEGREES
EKG T AXIS: -10 DEGREES
EKG T AXIS: 12 DEGREES
EKG T AXIS: 88 DEGREES
EKG VENTRICULAR RATE: 131 BPM
EKG VENTRICULAR RATE: 75 BPM
EKG VENTRICULAR RATE: 80 BPM

## 2024-10-26 PROCEDURE — 93010 ELECTROCARDIOGRAM REPORT: CPT | Performed by: INTERNAL MEDICINE

## 2024-10-28 ENCOUNTER — CARE COORDINATION (OUTPATIENT)
Dept: CARE COORDINATION | Age: 79
End: 2024-10-28

## 2024-10-28 NOTE — CARE COORDINATION
Ambulatory Care Coordination Note     10/28/2024 9:02 AM     ACM outreach attempt by this ACM today to perform care management follow up . ACM was unable to reach the patient by telephone today; left voice message requesting a return phone call to this ACM.     ACM: Nancy Umanzor RN     Care Summary Note: ACM outreach to patient for FU Care Management after ED visit.     PCP/Specialist follow up:   Future Appointments         Provider Specialty Dept Phone    12/3/2024 2:00 PM Petey Gonzalez MD Pulmonology [phone]    3/31/2025 10:40 AM Brad Aldridge MD Cardiology [phone]            Follow Up:   Plan for next ACM outreach in approximately 1 week to complete:  - disease specific assessments  - advance care planning  - goal progression  - education   - RPM.

## 2024-11-04 ENCOUNTER — CARE COORDINATION (OUTPATIENT)
Dept: CARE COORDINATION | Age: 79
End: 2024-11-04

## 2024-11-04 NOTE — CARE COORDINATION
Ambulatory Care Coordination Note     11/4/2024 2:28 PM     ACM outreach attempt by this ACM today to perform care management follow up . ACM was unable to reach the patient by telephone today; left voice message requesting a return phone call to this ACM.     ACM: Nancy Umanzor RN     Care Summary Note: ACM outreach to patient for FU Care Management.     PCP/Specialist follow up:   Future Appointments         Provider Specialty Dept Phone    12/3/2024 2:00 PM Petey Gonzalez MD Pulmonology [phone]    12/16/2024 10:10 AM Brad Aldridge MD Cardiology [phone]    3/31/2025 10:40 AM Brad Aldridge MD Cardiology [phone]            Follow Up:   Plan for next ACM outreach in approximately 2 weeks to complete:  - disease specific assessments  - advance care planning  - goal progression  - education   - RPM.

## 2024-11-07 RX ORDER — PREDNISONE 20 MG/1
TABLET ORAL
Qty: 60 TABLET | Refills: 2 | Status: SHIPPED | OUTPATIENT
Start: 2024-11-07

## 2024-11-08 ENCOUNTER — CARE COORDINATION (OUTPATIENT)
Dept: PRIMARY CARE CLINIC | Age: 79
End: 2024-11-08

## 2024-11-08 ENCOUNTER — CARE COORDINATION (OUTPATIENT)
Dept: CARE COORDINATION | Age: 79
End: 2024-11-08

## 2024-11-08 DIAGNOSIS — I50.22 CHRONIC SYSTOLIC CONGESTIVE HEART FAILURE (HCC): ICD-10-CM

## 2024-11-08 DIAGNOSIS — I10 HYPERTENSION, UNSPECIFIED TYPE: Primary | Chronic | ICD-10-CM

## 2024-11-08 DIAGNOSIS — J44.9 COPD WITH HYPOXIA (HCC): ICD-10-CM

## 2024-11-08 ASSESSMENT — ENCOUNTER SYMPTOMS: DYSPNEA ASSOCIATED WITH: EXERTION

## 2024-11-08 NOTE — CARE COORDINATION
Ambulatory Care Coordination Note     2024 3:35 PM     Patient Current Location:  Home: [address] [place] [address]     ACM contacted the patient by telephone. Verified name and  with patient as identifiers.         ACM: Nancy Umanzor RN    Utilization: Has the patient been seen in the ED since your last call? Yes,   Discharge Date: 10/25/24   Discharge Facility: [facility]  Reason for ED Visit: Cardiologist sent to hospital for AFib  Visit Diagnosis: AFib     Number of ED visits in the last 6 months: 2      Do you have any ongoing symptoms? No  Did you call your PCP prior to going to the ED? Yes, advised to go to the ED.    Review of Discharge Instructions:   [x] AVS discharge instructions  [x] Right Care, Right Place, Right Time document  [x] Medication changes  [x] Follow up appointments  [x] Referral follow up   [] N/A       Care Summary Note: ACM outreach to patient for Care Manager. Patient is doing well after heart defib for AFib. No complaints. Patient stated he does not have any s/sx of HTN, no swelling in extremities, increases cough, or sputum. Patient is not using the RPM and will be sending it back. ACM advised the patient to continue to monitor for any changes and report them to the appropriate site of care. 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     Offered patient enrollment in the Remote Patient Monitoring (RPM) program for in-home monitoring: Patient being discharged from remote patient monitoring program today.     Assessments Completed:   Congestive Heart Failure Assessment    Are you currently restricting fluids?: No Restriction  Do you understand a low sodium diet?: Yes  Do you understand how to read food labels?: Yes  How many restaurant meals do you eat per week?: 0  Do you salt your food before tasting it?: No     No patient-reported symptoms      Symptoms:  None: Yes      Symptom course:

## 2024-11-08 NOTE — CARE COORDINATION
Patient Johnnie Smith  11/08/24     Care Coordination  placed call to patient to arrange RPM kit  through [employer]. Left HIPAA Compliant Message     provided return and how to pack equipment in original packing via the patients voicemail if available and provided call back number should patient have questions.    Patient made aware [employer] will  equipment in 2-4 days.

## 2024-11-08 NOTE — PROGRESS NOTES
Remote Patient Order Discontinued    Received request from Nancy Umanzor RN   to discontinue order for remote patient monitoring of CHF, COPD, and HTN and order completed.

## 2024-11-14 ENCOUNTER — TELEPHONE (OUTPATIENT)
Dept: FAMILY MEDICINE CLINIC | Age: 79
End: 2024-11-14

## 2024-11-15 ENCOUNTER — HOSPITAL ENCOUNTER (OUTPATIENT)
Age: 79
Discharge: HOME | End: 2024-11-15
Payer: MEDICARE

## 2024-11-15 ENCOUNTER — OFFICE VISIT (OUTPATIENT)
Dept: FAMILY MEDICINE CLINIC | Age: 79
End: 2024-11-15

## 2024-11-15 VITALS
RESPIRATION RATE: 18 BRPM | DIASTOLIC BLOOD PRESSURE: 88 MMHG | WEIGHT: 218 LBS | SYSTOLIC BLOOD PRESSURE: 132 MMHG | HEART RATE: 74 BPM | OXYGEN SATURATION: 93 % | BODY MASS INDEX: 34.21 KG/M2 | HEIGHT: 67 IN

## 2024-11-15 DIAGNOSIS — I48.91 ATRIAL FIBRILLATION WITH RVR (HCC): ICD-10-CM

## 2024-11-15 DIAGNOSIS — I48.0 PAF (PAROXYSMAL ATRIAL FIBRILLATION) (HCC): ICD-10-CM

## 2024-11-15 DIAGNOSIS — G47.09 OTHER INSOMNIA: ICD-10-CM

## 2024-11-15 DIAGNOSIS — J84.112 UIP (USUAL INTERSTITIAL PNEUMONITIS) (HCC): ICD-10-CM

## 2024-11-15 DIAGNOSIS — Z09 ENCOUNTER FOR EXAMINATION FOLLOWING TREATMENT AT HOSPITAL: Primary | ICD-10-CM

## 2024-11-15 DIAGNOSIS — R68.89 CUSHINGOID FACIES: ICD-10-CM

## 2024-11-15 DIAGNOSIS — J84.89 NSIP (NONSPECIFIC INTERSTITIAL PNEUMONIA) (HCC): ICD-10-CM

## 2024-11-15 LAB
ALBUMIN SERPL-MCNC: 4 G/DL (ref 3.4–5)
ALBUMIN/GLOB SERPL: 2 {RATIO} (ref 1.1–2.2)
ALP SERPL-CCNC: 58 U/L (ref 40–129)
ALT SERPL-CCNC: 33 U/L (ref 10–40)
AST SERPL-CCNC: 21 U/L (ref 15–37)
BILIRUB DIRECT SERPL-MCNC: 0.3 MG/DL (ref 0–0.3)
BILIRUB INDIRECT SERPL-MCNC: 0.3 MG/DL (ref 0–0.7)
BILIRUB SERPL-MCNC: 0.5 MG/DL (ref 0–1)
PROT SERPL-MCNC: 6.1 G/DL (ref 6.4–8.2)

## 2024-11-15 PROCEDURE — 80076 HEPATIC FUNCTION PANEL: CPT

## 2024-11-15 PROCEDURE — 36415 COLL VENOUS BLD VENIPUNCTURE: CPT

## 2024-11-15 RX ORDER — MIRTAZAPINE 7.5 MG/1
7.5 TABLET, FILM COATED ORAL NIGHTLY
Qty: 90 TABLET | Refills: 3 | Status: SHIPPED | OUTPATIENT
Start: 2024-11-15

## 2024-11-15 ASSESSMENT — PATIENT HEALTH QUESTIONNAIRE - PHQ9
SUM OF ALL RESPONSES TO PHQ9 QUESTIONS 1 & 2: 0
SUM OF ALL RESPONSES TO PHQ QUESTIONS 1-9: 0
1. LITTLE INTEREST OR PLEASURE IN DOING THINGS: NOT AT ALL
SUM OF ALL RESPONSES TO PHQ QUESTIONS 1-9: 0
2. FEELING DOWN, DEPRESSED OR HOPELESS: NOT AT ALL
SUM OF ALL RESPONSES TO PHQ QUESTIONS 1-9: 0
SUM OF ALL RESPONSES TO PHQ QUESTIONS 1-9: 0

## 2024-11-15 NOTE — PATIENT INSTRUCTIONS
Welcome to [facility] :    Did you know we now have a faster way for you to move through your appointment? For your convenience, we now have digital registration available. When you schedule your next appointment, you will receive a link via your email as well as a text message that will allow you to complete any paperwork digitally before your appointment. We are committed to providing you the best care possible.    If you receive a survey after visiting one of our offices, please take time to share your experience concerning your physician office visit.  These surveys are confidential and no health information about you is shared.    We are eager to improve for you and continue to give you satisfactory care, we are counting on your feedback to help make that happen.

## 2024-11-15 NOTE — PROGRESS NOTES
11/15/2024    Johnnie Smith    Chief Complaint   Patient presents with    Follow-up     Here for ED f/u. Seen on 10/25/24.     Discuss Medications     Thinks that he is on too many pills. Also thinks that he is on too much Prednisone     Facial Swelling    Shaking       HPI  History was obtained from pt.  Johnnie is a 79 y.o. male with a PMHx as listed below who presents today for ER follow up - was feeling exhausted for about 3 weeks before his visit to cardiology they did an EKG he was in A-fib with RVR and sent to the ER    Went in for afib w RVR    No conversion with iv cardizem  Went to [facility] cardioversion, no clot, 200joules successful cardioversion  Pacemaker interrogated,     Continue statins, asa, beta blockers    Has been on pirfenidone for about 3 weeks.  Has not really noticed much difference  They are tapering him off of his prednisone from 40 to 30 mg    He says he has noticed that his cheeks look bigger  Pulmonology wants to do a lung biopsy but patient is not sure if he will go through with it    1. Encounter for examination following treatment at hospital    2. Other insomnia    3. PAF (paroxysmal atrial fibrillation) ([facility])    4. Atrial fibrillation with RVR ([facility])    5. NSIP (nonspecific interstitial pneumonia) ([facility])    6. Cushingoid facies         REVIEW OF SYMPTOMS    Review of Systems    PAST MEDICAL HISTORY  Past Medical History:   Diagnosis Date    Allergic rhinitis     Asthma     Atrial fibrillation (HCC)     Dr. Aldridge & Dr. Nix    CAD (coronary artery disease)     mild LAD - Dr. Aldridge    COPD (chronic obstructive pulmonary disease) ([facility])     COVID-19 2021    Depression     Diverticulosis of colon     Family history of early CAD     Father- MI-  age 51; a grandparent- age 52 of MI    Family history of valvular heart disease     Mother- Mitral valve disease    GERD (gastroesophageal reflux disease)     H/O 24 hour EKG monitoring 2001-Predominant rhythm is sinus

## 2024-12-11 ENCOUNTER — CARE COORDINATION (OUTPATIENT)
Dept: CARE COORDINATION | Age: 79
End: 2024-12-11

## 2024-12-11 NOTE — CARE COORDINATION
Ambulatory Care Coordination Note     12/11/2024 10:42 AM      outreach attempt by this ACM today to perform care management follow up . ACM was unable to reach the patient by telephone today;   left voice message requesting a return phone call to this ACM.     ACM: Carol Burgos    PCP/Specialist follow up:   Future Appointments         Provider Specialty Dept Phone    12/16/2024 10:10 AM Brad Aldridge MD Cardiology [phone]    3/31/2025 10:40 AM Brad Aldridge MD Cardiology [phone]            Follow Up:   Plan for next ACM outreach in approximately 1 week

## 2024-12-16 ENCOUNTER — OFFICE VISIT (OUTPATIENT)
Dept: CARDIOLOGY CLINIC | Age: 79
End: 2024-12-16
Payer: MEDICARE

## 2024-12-16 VITALS
SYSTOLIC BLOOD PRESSURE: 120 MMHG | HEIGHT: 67 IN | BODY MASS INDEX: 34.94 KG/M2 | DIASTOLIC BLOOD PRESSURE: 80 MMHG | WEIGHT: 222.6 LBS | HEART RATE: 76 BPM

## 2024-12-16 DIAGNOSIS — Z09 HOSPITAL DISCHARGE FOLLOW-UP: Primary | ICD-10-CM

## 2024-12-16 DIAGNOSIS — I48.0 PAF (PAROXYSMAL ATRIAL FIBRILLATION) (HCC): ICD-10-CM

## 2024-12-16 PROCEDURE — 1160F RVW MEDS BY RX/DR IN RCRD: CPT | Performed by: INTERNAL MEDICINE

## 2024-12-16 PROCEDURE — 1111F DSCHRG MED/CURRENT MED MERGE: CPT | Performed by: INTERNAL MEDICINE

## 2024-12-16 PROCEDURE — G8484 FLU IMMUNIZE NO ADMIN: HCPCS | Performed by: INTERNAL MEDICINE

## 2024-12-16 PROCEDURE — 1159F MED LIST DOCD IN RCRD: CPT | Performed by: INTERNAL MEDICINE

## 2024-12-16 PROCEDURE — 4004F PT TOBACCO SCREEN RCVD TLK: CPT | Performed by: INTERNAL MEDICINE

## 2024-12-16 PROCEDURE — 3079F DIAST BP 80-89 MM HG: CPT | Performed by: INTERNAL MEDICINE

## 2024-12-16 PROCEDURE — 99214 OFFICE O/P EST MOD 30 MIN: CPT | Performed by: INTERNAL MEDICINE

## 2024-12-16 PROCEDURE — G8417 CALC BMI ABV UP PARAM F/U: HCPCS | Performed by: INTERNAL MEDICINE

## 2024-12-16 PROCEDURE — G8427 DOCREV CUR MEDS BY ELIG CLIN: HCPCS | Performed by: INTERNAL MEDICINE

## 2024-12-16 PROCEDURE — 1123F ACP DISCUSS/DSCN MKR DOCD: CPT | Performed by: INTERNAL MEDICINE

## 2024-12-16 PROCEDURE — 93000 ELECTROCARDIOGRAM COMPLETE: CPT | Performed by: INTERNAL MEDICINE

## 2024-12-16 PROCEDURE — 3074F SYST BP LT 130 MM HG: CPT | Performed by: INTERNAL MEDICINE

## 2024-12-16 NOTE — PROGRESS NOTES
Take 1 tablet by mouth nightly 90 tablet 3    predniSONE (DELTASONE) 20 MG tablet TAKE 2 TABLETS BY MOUTH ONCE PER DAY IN THE MORNING 60 tablet 2    Pirfenidone 267 MG CAPS Days 1 to 7: Oral: 267 mg 3 times daily; total daily dose: 801 mg/day. Days 8 to 14: Oral: 534 mg 3 times daily; total daily dose: 1,602 mg/day. Day 15 and thereafter: Oral: 801 mg 3 times daily; maximum daily dose: 2,403 mg/day. 30 capsule 3    metoprolol succinate (TOPROL XL) 50 MG extended release tablet Take 0.5 tablets by mouth daily 90 tablet 3    OXYGEN Inhale into the lungs      dilTIAZem (CARDIZEM CD) 120 MG extended release capsule Take 1 capsule by mouth daily 30 capsule 3    atorvastatin (LIPITOR) 10 MG tablet Take 1 tablet by mouth daily 90 tablet 3    apixaban (ELIQUIS) 5 MG TABS tablet TAKE 1 TABLET TWICE A  tablet 2    escitalopram (LEXAPRO) 20 MG tablet Take 1.5 tablets by mouth daily (Patient taking differently: Take 1 tablet by mouth daily 10/25/24 Prescription noted for 1 & 1/2 tablet daily patient reports only taking 1 tablet daily.) 135 tablet 2    clopidogrel (PLAVIX) 75 MG tablet Take 1 tablet by mouth daily 30 tablet 3    nitroGLYCERIN (NITROSTAT) 0.4 MG SL tablet Place 1 tablet under the tongue every 5 minutes as needed for Chest pain 25 tablet 3    fluticasone-umeclidin-vilant (TRELEGY ELLIPTA) 100-62.5-25 MCG/ACT AEPB inhaler Inhale 1 puff into the lungs daily 3 each 3    albuterol (PROVENTIL) (2.5 MG/3ML) 0.083% nebulizer solution Take 3 mLs by nebulization every 6 hours as needed for Wheezing 120 each 0    albuterol sulfate HFA (PROVENTIL;VENTOLIN;PROAIR) 108 (90 Base) MCG/ACT inhaler Inhale 2 puffs into the lungs 4 times daily as needed for Wheezing or Shortness of Breath 18 each 5    omeprazole (PRILOSEC) 40 MG delayed release capsule Take 1 capsule by mouth daily 90 capsule 3    folic acid (FOLVITE) 400 MCG tablet Take 1 tablet by mouth daily 90 tablet 3    B Complex Vitamins (VITAMIN B COMPLEX) TABS Take 1

## 2024-12-19 ENCOUNTER — CARE COORDINATION (OUTPATIENT)
Dept: CARE COORDINATION | Age: 79
End: 2024-12-19

## 2024-12-19 NOTE — CARE COORDINATION
Ambulatory Care Coordination Note     12/19/2024 8:53 AM     ACM outreach attempt by this ACM today to perform care management follow up . ACM was unable to reach the patient by telephone today;   left voice message requesting a return phone call to this ACM.     ACM: Nancy Umanzor RN     Care Summary Note: ACM outreach to patient for Care Management.     PCP/Specialist follow up:   Future Appointments         Provider Specialty Dept Phone    6/23/2025 9:20 AM Brad Aldridge MD Cardiology [phone]            Follow Up:   Plan for next ACM outreach in approximately 2 weeks to complete:  - disease specific assessments  - goal progression  - education .

## 2024-12-31 ENCOUNTER — CARE COORDINATION (OUTPATIENT)
Dept: CARE COORDINATION | Age: 79
End: 2024-12-31

## 2024-12-31 ASSESSMENT — ENCOUNTER SYMPTOMS: DYSPNEA ASSOCIATED WITH: MINIMAL EXERTION

## 2024-12-31 NOTE — CARE COORDINATION
taught by Nancy Umanzor RN at 12/31/2024  3:06 PM.  Learner: Patient  Readiness: Acceptance  Method: Explanation  Response: Verbalizes Understanding, Demonstrated Understanding    Educate effective coping behavior, taught by Nancy Umanzor RN at 12/31/2024  3:06 PM.  Learner: Patient  Readiness: Acceptance  Method: Explanation  Response: Verbalizes Understanding, Demonstrated Understanding    Educate physical activity cessation, taught by Nancy Umanzor RN at 12/31/2024  3:06 PM.  Learner: Patient  Readiness: Acceptance  Method: Explanation  Response: Verbalizes Understanding, Demonstrated Understanding    Handout: Low Salt Diet, taught by Nancy Umanzor RN at 12/31/2024  3:06 PM.  Learner: Patient  Readiness: Acceptance  Method: Explanation  Response: Verbalizes Understanding, Demonstrated Understanding    Diet, taught by Nancy Umanzor RN at 12/31/2024  3:06 PM.  Learner: Patient  Readiness: Acceptance  Method: Explanation  Response: Verbalizes Understanding, Demonstrated Understanding    Identify Meds, Dose, and Schedule, taught by Nancy Umanzor RN at 12/31/2024  3:06 PM.  Learner: Patient  Readiness: Acceptance  Method: Explanation  Response: Verbalizes Understanding, Demonstrated Understanding    General medication information, taught by Nancy Umanzor RN at 12/31/2024  3:06 PM.  Learner: Patient  Readiness: Acceptance  Method: Explanation  Response: Verbalizes Understanding, Demonstrated Understanding    Diuretics, taught by Nancy Umanzor RN at 12/31/2024  3:06 PM.  Learner: Patient  Readiness: Acceptance  Method: Explanation  Response: Verbalizes Understanding, Demonstrated Understanding    WHEN TO CALL THE DOCTOR CHF, taught by Nancy Umanzor RN at 12/31/2024  3:06 PM.  Learner: Patient  Readiness: Acceptance  Method: Explanation  Response: Verbalizes Understanding, Demonstrated Understanding    Monitoring Weight, taught by Nancy Umanzor RN at 12/31/2024  3:06

## 2025-01-01 PROCEDURE — 93296 REM INTERROG EVL PM/IDS: CPT | Performed by: INTERNAL MEDICINE

## 2025-01-01 PROCEDURE — 93294 REM INTERROG EVL PM/LDLS PM: CPT | Performed by: INTERNAL MEDICINE

## 2025-01-03 ENCOUNTER — TELEPHONE (OUTPATIENT)
Dept: PULMONOLOGY | Age: 80
End: 2025-01-03

## 2025-01-03 NOTE — TELEPHONE ENCOUNTER
Patient called in to inform the office that he is no longer coming to our office to see Dr. Gonzalez, and that he is going to an office in [place] now. He requested any appts or testing to be canceled.

## 2025-01-11 ENCOUNTER — HOSPITAL ENCOUNTER (OUTPATIENT)
Dept: CT IMAGING | Age: 80
Discharge: HOME | End: 2025-01-11
Payer: MEDICARE

## 2025-01-11 DIAGNOSIS — I26.90 SEPTIC PULMONARY EMBOLISM WITHOUT ACUTE COR PULMONALE, UNSPECIFIED CHRONICITY (HCC): ICD-10-CM

## 2025-01-11 DIAGNOSIS — R06.02 SHORTNESS OF BREATH: ICD-10-CM

## 2025-01-11 LAB
EGFR, POC: 52 ML/MIN/1.73M2
POC CREATININE: 1.4 MG/DL (ref 0.5–1.2)

## 2025-01-11 PROCEDURE — 71275 CT ANGIOGRAPHY CHEST: CPT

## 2025-01-11 PROCEDURE — 82565 ASSAY OF CREATININE: CPT

## 2025-01-11 PROCEDURE — 6360000004 HC RX CONTRAST MEDICATION: Performed by: NURSE PRACTITIONER

## 2025-01-11 RX ORDER — SODIUM CHLORIDE 0.9 % (FLUSH) 0.9 %
20 SYRINGE (ML) INJECTION
Status: DISCONTINUED | OUTPATIENT
Start: 2025-01-11 | End: 2025-01-12 | Stop reason: HOSPADM

## 2025-01-11 RX ORDER — IOPAMIDOL 755 MG/ML
100 INJECTION, SOLUTION INTRAVASCULAR
Status: COMPLETED | OUTPATIENT
Start: 2025-01-11 | End: 2025-01-11

## 2025-01-11 RX ADMIN — IOPAMIDOL 85 ML: 755 INJECTION, SOLUTION INTRAVENOUS at 10:58

## 2025-01-17 ENCOUNTER — CARE COORDINATION (OUTPATIENT)
Dept: CARE COORDINATION | Age: 80
End: 2025-01-17

## 2025-01-17 NOTE — CARE COORDINATION
Ambulatory Care Coordination Note     1/17/2025 2:05 PM     ACM outreach attempt by this ACM today to perform care management follow up . ACM was unable to reach the patient by telephone today;   left voice message requesting a return phone call to this ACM.     ACM: Nancy Umanzor RN     Care Summary Note: ACM outreach to patient for Care Management.     PCP/Specialist follow up:   Future Appointments         Provider Specialty Dept Phone    6/23/2025 9:20 AM Brad Aldridge MD Cardiology [phone]            Follow Up:   Plan for next ACM outreach in approximately 2 weeks to complete:  - disease specific assessments  - goal progression  - education .

## 2025-01-23 ENCOUNTER — APPOINTMENT (OUTPATIENT)
Dept: CT IMAGING | Age: 80
End: 2025-01-23
Payer: MEDICARE

## 2025-01-23 ENCOUNTER — APPOINTMENT (OUTPATIENT)
Dept: NON INVASIVE DIAGNOSTICS | Age: 80
End: 2025-01-23
Attending: EMERGENCY MEDICINE
Payer: MEDICARE

## 2025-01-23 ENCOUNTER — HOSPITAL ENCOUNTER (INPATIENT)
Age: 80
LOS: 8 days | Discharge: HOME | End: 2025-01-31
Attending: EMERGENCY MEDICINE | Admitting: HOSPITALIST
Payer: MEDICARE

## 2025-01-23 ENCOUNTER — APPOINTMENT (OUTPATIENT)
Dept: GENERAL RADIOLOGY | Age: 80
End: 2025-01-23
Payer: MEDICARE

## 2025-01-23 DIAGNOSIS — I50.9 CONGESTIVE HEART FAILURE OF UNKNOWN ETIOLOGY (HCC): ICD-10-CM

## 2025-01-23 DIAGNOSIS — J44.9 CHRONIC OBSTRUCTIVE PULMONARY DISEASE, UNSPECIFIED COPD TYPE (HCC): ICD-10-CM

## 2025-01-23 DIAGNOSIS — R09.02 HYPOXIA: ICD-10-CM

## 2025-01-23 DIAGNOSIS — J96.21 ACUTE ON CHRONIC RESPIRATORY FAILURE WITH HYPOXIA: Primary | ICD-10-CM

## 2025-01-23 DIAGNOSIS — I50.1 PULMONARY EDEMA CARDIAC CAUSE (HCC): ICD-10-CM

## 2025-01-23 LAB
ALBUMIN SERPL-MCNC: 3.1 G/DL (ref 3.4–5)
ALBUMIN/GLOB SERPL: 1.5 {RATIO} (ref 1.1–2.2)
ALP SERPL-CCNC: 70 U/L (ref 40–129)
ALT SERPL-CCNC: 17 U/L (ref 10–40)
ANION GAP SERPL CALCULATED.3IONS-SCNC: 12 MMOL/L (ref 9–17)
ARTERIAL PATENCY WRIST A: NO
AST SERPL-CCNC: 27 U/L (ref 15–37)
BASOPHILS # BLD: 0.02 K/UL
BASOPHILS NFR BLD: 0 % (ref 0–1)
BILIRUB SERPL-MCNC: 1.3 MG/DL (ref 0–1)
BNP SERPL-MCNC: 4829 PG/ML (ref 0–450)
BODY TEMPERATURE: 37
BUN SERPL-MCNC: 18 MG/DL (ref 7–20)
CALCIUM SERPL-MCNC: 8 MG/DL (ref 8.3–10.6)
CHLORIDE SERPL-SCNC: 105 MMOL/L (ref 99–110)
CO2 SERPL-SCNC: 24 MMOL/L (ref 21–32)
COHGB MFR BLD: 1.7 % (ref 0.5–1.5)
CREAT SERPL-MCNC: 1.2 MG/DL (ref 0.8–1.3)
ECHO EST RA PRESSURE: 3 MMHG
ECHO LV EDV A4C: 58 ML
ECHO LV EF PHYSICIAN: 50 %
ECHO LV EJECTION FRACTION A4C: 50 %
ECHO LV ESV A4C: 29 ML
ECHO LV FRACTIONAL SHORTENING: 29 % (ref 28–44)
ECHO LV INTERNAL DIMENSION DIASTOLIC: 4.1 CM (ref 4.2–5.9)
ECHO LV INTERNAL DIMENSION SYSTOLIC: 2.9 CM
ECHO LV IVSD: 1.2 CM (ref 0.6–1)
ECHO LV MASS 2D: 151.3 G (ref 88–224)
ECHO LV POSTERIOR WALL DIASTOLIC: 1 CM (ref 0.6–1)
ECHO LV RELATIVE WALL THICKNESS RATIO: 0.49
ECHO RIGHT VENTRICULAR SYSTOLIC PRESSURE (RVSP): 53 MMHG
ECHO RV MID DIMENSION: 4.1 CM
ECHO TV REGURGITANT MAX VELOCITY: 3.55 M/S
ECHO TV REGURGITANT PEAK GRADIENT: 50 MMHG
EKG ATRIAL RATE: 114 BPM
EKG DIAGNOSIS: NORMAL
EKG P AXIS: 65 DEGREES
EKG P-R INTERVAL: 144 MS
EKG Q-T INTERVAL: 392 MS
EKG QRS DURATION: 96 MS
EKG QTC CALCULATION (BAZETT): 540 MS
EKG R AXIS: -18 DEGREES
EKG T AXIS: 55 DEGREES
EKG VENTRICULAR RATE: 114 BPM
EOSINOPHIL # BLD: 0.15 K/UL
EOSINOPHILS RELATIVE PERCENT: 2 % (ref 0–3)
ERYTHROCYTE [DISTWIDTH] IN BLOOD BY AUTOMATED COUNT: 14.6 % (ref 11.7–14.9)
FERRITIN SERPL-MCNC: 152 NG/ML (ref 30–400)
GFR, ESTIMATED: 62 ML/MIN/1.73M2
GLUCOSE SERPL-MCNC: 141 MG/DL (ref 74–99)
HCO3 VENOUS: 25.5 MMOL/L (ref 22–29)
HCT VFR BLD AUTO: 37 % (ref 42–52)
HGB BLD-MCNC: 12 G/DL (ref 13.5–18)
IMM GRANULOCYTES # BLD AUTO: 0.05 K/UL
IMM GRANULOCYTES NFR BLD: 1 %
IRON SATN MFR SERPL: 11 % (ref 15–50)
IRON SERPL-MCNC: 24 UG/DL (ref 59–158)
LYMPHOCYTES NFR BLD: 1.08 K/UL
LYMPHOCYTES RELATIVE PERCENT: 12 % (ref 24–44)
MAGNESIUM SERPL-MCNC: 1.8 MG/DL (ref 1.8–2.4)
MCH RBC QN AUTO: 32.3 PG (ref 27–31)
MCHC RBC AUTO-ENTMCNC: 32.4 G/DL (ref 32–36)
MCV RBC AUTO: 99.5 FL (ref 78–100)
METHEMOGLOBIN: 0.1 % (ref 0.5–1.5)
MONOCYTES NFR BLD: 0.83 K/UL
MONOCYTES NFR BLD: 9 % (ref 0–4)
NEUTROPHILS NFR BLD: 77 % (ref 36–66)
NEUTS SEG NFR BLD: 7.25 K/UL
OXYHGB MFR BLD: 62.8 %
PCO2 VENOUS: 35.8 MM HG (ref 38–54)
PH VENOUS: 7.47 (ref 7.32–7.43)
PLATELET, FLUORESCENCE: 118 K/UL (ref 140–440)
PMV BLD AUTO: 9.7 FL (ref 7.5–11.1)
PO2 VENOUS: 34 MM HG (ref 23–48)
POSITIVE BASE EXCESS, VEN: 2.1 MMOL/L (ref 0–3)
POTASSIUM SERPL-SCNC: 3.2 MMOL/L (ref 3.5–5.1)
PROT SERPL-MCNC: 5.1 G/DL (ref 6.4–8.2)
RBC # BLD AUTO: 3.72 M/UL (ref 4.6–6.2)
SODIUM SERPL-SCNC: 141 MMOL/L (ref 136–145)
TIBC SERPL-MCNC: 224 UG/DL (ref 260–445)
TROPONIN I SERPL HS-MCNC: 34 NG/L (ref 0–22)
TROPONIN I SERPL HS-MCNC: 34 NG/L (ref 0–22)
TSH SERPL DL<=0.05 MIU/L-ACNC: 4.83 UIU/ML (ref 0.27–4.2)
UNSATURATED IRON BINDING CAPACITY: 200 UG/DL (ref 110–370)
WBC OTHER # BLD: 9.4 K/UL (ref 4–10.5)

## 2025-01-23 PROCEDURE — 83550 IRON BINDING TEST: CPT

## 2025-01-23 PROCEDURE — 83540 ASSAY OF IRON: CPT

## 2025-01-23 PROCEDURE — 82805 BLOOD GASES W/O2 SATURATION: CPT

## 2025-01-23 PROCEDURE — 70450 CT HEAD/BRAIN W/O DYE: CPT

## 2025-01-23 PROCEDURE — 93321 DOPPLER ECHO F-UP/LMTD STD: CPT | Performed by: INTERNAL MEDICINE

## 2025-01-23 PROCEDURE — 93325 DOPPLER ECHO COLOR FLOW MAPG: CPT | Performed by: INTERNAL MEDICINE

## 2025-01-23 PROCEDURE — 6370000000 HC RX 637 (ALT 250 FOR IP): Performed by: HOSPITALIST

## 2025-01-23 PROCEDURE — 82728 ASSAY OF FERRITIN: CPT

## 2025-01-23 PROCEDURE — 94761 N-INVAS EAR/PLS OXIMETRY MLT: CPT

## 2025-01-23 PROCEDURE — 2700000000 HC OXYGEN THERAPY PER DAY

## 2025-01-23 PROCEDURE — 93010 ELECTROCARDIOGRAM REPORT: CPT | Performed by: INTERNAL MEDICINE

## 2025-01-23 PROCEDURE — 83735 ASSAY OF MAGNESIUM: CPT

## 2025-01-23 PROCEDURE — 5A0955A ASSISTANCE WITH RESPIRATORY VENTILATION, GREATER THAN 96 CONSECUTIVE HOURS, HIGH NASAL FLOW/VELOCITY: ICD-10-PCS | Performed by: HOSPITALIST

## 2025-01-23 PROCEDURE — 93308 TTE F-UP OR LMTD: CPT | Performed by: INTERNAL MEDICINE

## 2025-01-23 PROCEDURE — 99285 EMERGENCY DEPT VISIT HI MDM: CPT

## 2025-01-23 PROCEDURE — 93321 DOPPLER ECHO F-UP/LMTD STD: CPT

## 2025-01-23 PROCEDURE — 2500000003 HC RX 250 WO HCPCS: Performed by: HOSPITALIST

## 2025-01-23 PROCEDURE — 6360000002 HC RX W HCPCS: Performed by: PHYSICIAN ASSISTANT

## 2025-01-23 PROCEDURE — 96375 TX/PRO/DX INJ NEW DRUG ADDON: CPT

## 2025-01-23 PROCEDURE — 85025 COMPLETE CBC W/AUTO DIFF WBC: CPT

## 2025-01-23 PROCEDURE — 93005 ELECTROCARDIOGRAM TRACING: CPT | Performed by: EMERGENCY MEDICINE

## 2025-01-23 PROCEDURE — 2140000000 HC CCU INTERMEDIATE R&B

## 2025-01-23 PROCEDURE — 6360000002 HC RX W HCPCS: Performed by: HOSPITALIST

## 2025-01-23 PROCEDURE — 94640 AIRWAY INHALATION TREATMENT: CPT

## 2025-01-23 PROCEDURE — 71045 X-RAY EXAM CHEST 1 VIEW: CPT

## 2025-01-23 PROCEDURE — 6360000004 HC RX CONTRAST MEDICATION: Performed by: EMERGENCY MEDICINE

## 2025-01-23 PROCEDURE — 84439 ASSAY OF FREE THYROXINE: CPT

## 2025-01-23 PROCEDURE — 6360000002 HC RX W HCPCS: Performed by: EMERGENCY MEDICINE

## 2025-01-23 PROCEDURE — 80053 COMPREHEN METABOLIC PANEL: CPT

## 2025-01-23 PROCEDURE — 84484 ASSAY OF TROPONIN QUANT: CPT

## 2025-01-23 PROCEDURE — 83880 ASSAY OF NATRIURETIC PEPTIDE: CPT

## 2025-01-23 PROCEDURE — 96374 THER/PROPH/DIAG INJ IV PUSH: CPT

## 2025-01-23 PROCEDURE — 96376 TX/PRO/DX INJ SAME DRUG ADON: CPT

## 2025-01-23 PROCEDURE — 93308 TTE F-UP OR LMTD: CPT

## 2025-01-23 PROCEDURE — 84443 ASSAY THYROID STIM HORMONE: CPT

## 2025-01-23 PROCEDURE — 36415 COLL VENOUS BLD VENIPUNCTURE: CPT

## 2025-01-23 PROCEDURE — 71275 CT ANGIOGRAPHY CHEST: CPT

## 2025-01-23 RX ORDER — DILTIAZEM HYDROCHLORIDE 120 MG/1
120 CAPSULE, COATED, EXTENDED RELEASE ORAL DAILY
Status: DISCONTINUED | OUTPATIENT
Start: 2025-01-23 | End: 2025-01-26

## 2025-01-23 RX ORDER — IPRATROPIUM BROMIDE AND ALBUTEROL SULFATE 2.5; .5 MG/3ML; MG/3ML
1 SOLUTION RESPIRATORY (INHALATION)
Status: DISCONTINUED | OUTPATIENT
Start: 2025-01-23 | End: 2025-01-26

## 2025-01-23 RX ORDER — VITAMIN B COMPLEX
1 CAPSULE ORAL DAILY
Status: DISCONTINUED | OUTPATIENT
Start: 2025-01-23 | End: 2025-01-31 | Stop reason: HOSPADM

## 2025-01-23 RX ORDER — ESCITALOPRAM OXALATE 10 MG/1
20 TABLET ORAL DAILY
Status: DISCONTINUED | OUTPATIENT
Start: 2025-01-23 | End: 2025-01-31 | Stop reason: HOSPADM

## 2025-01-23 RX ORDER — LANOLIN ALCOHOL/MO/W.PET/CERES
1000 CREAM (GRAM) TOPICAL DAILY
Status: DISCONTINUED | OUTPATIENT
Start: 2025-01-23 | End: 2025-01-31 | Stop reason: HOSPADM

## 2025-01-23 RX ORDER — NITROGLYCERIN 0.4 MG/1
0.4 TABLET SUBLINGUAL EVERY 5 MIN PRN
Status: DISCONTINUED | OUTPATIENT
Start: 2025-01-23 | End: 2025-01-23 | Stop reason: ALTCHOICE

## 2025-01-23 RX ORDER — ONDANSETRON 2 MG/ML
4 INJECTION INTRAMUSCULAR; INTRAVENOUS EVERY 6 HOURS PRN
Status: DISCONTINUED | OUTPATIENT
Start: 2025-01-23 | End: 2025-01-31 | Stop reason: HOSPADM

## 2025-01-23 RX ORDER — POTASSIUM CHLORIDE 7.45 MG/ML
10 INJECTION INTRAVENOUS
Status: COMPLETED | OUTPATIENT
Start: 2025-01-23 | End: 2025-01-23

## 2025-01-23 RX ORDER — GABAPENTIN 300 MG/1
300 CAPSULE ORAL NIGHTLY
Status: DISCONTINUED | OUTPATIENT
Start: 2025-01-23 | End: 2025-01-31 | Stop reason: HOSPADM

## 2025-01-23 RX ORDER — FOLIC ACID 1 MG/1
500 TABLET ORAL DAILY
Status: DISCONTINUED | OUTPATIENT
Start: 2025-01-23 | End: 2025-01-31 | Stop reason: HOSPADM

## 2025-01-23 RX ORDER — MAGNESIUM SULFATE IN WATER 40 MG/ML
2000 INJECTION, SOLUTION INTRAVENOUS ONCE
Status: COMPLETED | OUTPATIENT
Start: 2025-01-23 | End: 2025-01-23

## 2025-01-23 RX ORDER — ATORVASTATIN CALCIUM 10 MG/1
10 TABLET, FILM COATED ORAL DAILY
Status: DISCONTINUED | OUTPATIENT
Start: 2025-01-23 | End: 2025-01-31 | Stop reason: HOSPADM

## 2025-01-23 RX ORDER — ACETAMINOPHEN 650 MG/1
650 SUPPOSITORY RECTAL EVERY 6 HOURS PRN
Status: DISCONTINUED | OUTPATIENT
Start: 2025-01-23 | End: 2025-01-31 | Stop reason: HOSPADM

## 2025-01-23 RX ORDER — MIRTAZAPINE 15 MG/1
7.5 TABLET, FILM COATED ORAL NIGHTLY
Status: DISCONTINUED | OUTPATIENT
Start: 2025-01-23 | End: 2025-01-31 | Stop reason: HOSPADM

## 2025-01-23 RX ORDER — ASPIRIN 81 MG/1
81 TABLET, CHEWABLE ORAL DAILY
Status: DISCONTINUED | OUTPATIENT
Start: 2025-01-23 | End: 2025-01-31 | Stop reason: HOSPADM

## 2025-01-23 RX ORDER — METOPROLOL SUCCINATE 25 MG/1
25 TABLET, EXTENDED RELEASE ORAL DAILY
Status: DISCONTINUED | OUTPATIENT
Start: 2025-01-23 | End: 2025-01-23

## 2025-01-23 RX ORDER — PANTOPRAZOLE SODIUM 40 MG/1
40 TABLET, DELAYED RELEASE ORAL
Status: DISCONTINUED | OUTPATIENT
Start: 2025-01-24 | End: 2025-01-31 | Stop reason: HOSPADM

## 2025-01-23 RX ORDER — POTASSIUM CHLORIDE 7.45 MG/ML
10 INJECTION INTRAVENOUS PRN
Status: DISCONTINUED | OUTPATIENT
Start: 2025-01-23 | End: 2025-01-24

## 2025-01-23 RX ORDER — POTASSIUM CHLORIDE 1500 MG/1
40 TABLET, EXTENDED RELEASE ORAL PRN
Status: DISCONTINUED | OUTPATIENT
Start: 2025-01-23 | End: 2025-01-24

## 2025-01-23 RX ORDER — CLOPIDOGREL BISULFATE 75 MG/1
75 TABLET ORAL DAILY
Status: DISCONTINUED | OUTPATIENT
Start: 2025-01-23 | End: 2025-01-23

## 2025-01-23 RX ORDER — MAGNESIUM SULFATE IN WATER 40 MG/ML
2000 INJECTION, SOLUTION INTRAVENOUS PRN
Status: DISCONTINUED | OUTPATIENT
Start: 2025-01-23 | End: 2025-01-24

## 2025-01-23 RX ORDER — IOPAMIDOL 755 MG/ML
75 INJECTION, SOLUTION INTRAVASCULAR
Status: COMPLETED | OUTPATIENT
Start: 2025-01-23 | End: 2025-01-23

## 2025-01-23 RX ORDER — FUROSEMIDE 10 MG/ML
40 INJECTION INTRAMUSCULAR; INTRAVENOUS 2 TIMES DAILY
Status: DISCONTINUED | OUTPATIENT
Start: 2025-01-23 | End: 2025-01-24

## 2025-01-23 RX ORDER — SODIUM CHLORIDE 0.9 % (FLUSH) 0.9 %
5-40 SYRINGE (ML) INJECTION EVERY 12 HOURS SCHEDULED
Status: DISCONTINUED | OUTPATIENT
Start: 2025-01-23 | End: 2025-01-31 | Stop reason: HOSPADM

## 2025-01-23 RX ORDER — MONTELUKAST SODIUM 10 MG/1
10 TABLET ORAL NIGHTLY
Status: DISCONTINUED | OUTPATIENT
Start: 2025-01-23 | End: 2025-01-23

## 2025-01-23 RX ORDER — ONDANSETRON 4 MG/1
4 TABLET, ORALLY DISINTEGRATING ORAL EVERY 8 HOURS PRN
Status: DISCONTINUED | OUTPATIENT
Start: 2025-01-23 | End: 2025-01-31 | Stop reason: HOSPADM

## 2025-01-23 RX ORDER — SODIUM CHLORIDE 9 MG/ML
INJECTION, SOLUTION INTRAVENOUS PRN
Status: DISCONTINUED | OUTPATIENT
Start: 2025-01-23 | End: 2025-01-31 | Stop reason: HOSPADM

## 2025-01-23 RX ORDER — ACETAMINOPHEN 325 MG/1
650 TABLET ORAL EVERY 6 HOURS PRN
Status: DISCONTINUED | OUTPATIENT
Start: 2025-01-23 | End: 2025-01-31 | Stop reason: HOSPADM

## 2025-01-23 RX ORDER — ALBUTEROL SULFATE 0.83 MG/ML
2.5 SOLUTION RESPIRATORY (INHALATION) EVERY 6 HOURS PRN
Status: DISCONTINUED | OUTPATIENT
Start: 2025-01-23 | End: 2025-01-31 | Stop reason: HOSPADM

## 2025-01-23 RX ORDER — SODIUM CHLORIDE 0.9 % (FLUSH) 0.9 %
5-40 SYRINGE (ML) INJECTION PRN
Status: DISCONTINUED | OUTPATIENT
Start: 2025-01-23 | End: 2025-01-31 | Stop reason: HOSPADM

## 2025-01-23 RX ORDER — BUDESONIDE AND FORMOTEROL FUMARATE DIHYDRATE 80; 4.5 UG/1; UG/1
2 AEROSOL RESPIRATORY (INHALATION)
Status: DISCONTINUED | OUTPATIENT
Start: 2025-01-23 | End: 2025-01-23

## 2025-01-23 RX ORDER — POLYETHYLENE GLYCOL 3350 17 G/17G
17 POWDER, FOR SOLUTION ORAL DAILY PRN
Status: DISCONTINUED | OUTPATIENT
Start: 2025-01-23 | End: 2025-01-31 | Stop reason: HOSPADM

## 2025-01-23 RX ORDER — FUROSEMIDE 10 MG/ML
40 INJECTION INTRAMUSCULAR; INTRAVENOUS ONCE
Status: COMPLETED | OUTPATIENT
Start: 2025-01-23 | End: 2025-01-23

## 2025-01-23 RX ADMIN — ESCITALOPRAM OXALATE 20 MG: 10 TABLET ORAL at 22:05

## 2025-01-23 RX ADMIN — FUROSEMIDE 40 MG: 10 INJECTION, SOLUTION INTRAMUSCULAR; INTRAVENOUS at 15:22

## 2025-01-23 RX ADMIN — APIXABAN 5 MG: 5 TABLET, FILM COATED ORAL at 22:03

## 2025-01-23 RX ADMIN — Medication 1 CAPSULE: at 22:30

## 2025-01-23 RX ADMIN — GABAPENTIN 300 MG: 300 CAPSULE ORAL at 22:05

## 2025-01-23 RX ADMIN — POTASSIUM CHLORIDE 10 MEQ: 7.46 INJECTION, SOLUTION INTRAVENOUS at 17:06

## 2025-01-23 RX ADMIN — FUROSEMIDE 40 MG: 10 INJECTION, SOLUTION INTRAMUSCULAR; INTRAVENOUS at 22:04

## 2025-01-23 RX ADMIN — IPRATROPIUM BROMIDE AND ALBUTEROL SULFATE 1 DOSE: .5; 2.5 SOLUTION RESPIRATORY (INHALATION) at 21:10

## 2025-01-23 RX ADMIN — DILTIAZEM HYDROCHLORIDE 120 MG: 120 CAPSULE, COATED, EXTENDED RELEASE ORAL at 22:03

## 2025-01-23 RX ADMIN — ASPIRIN 81 MG CHEWABLE TABLET 81 MG: 81 TABLET CHEWABLE at 22:03

## 2025-01-23 RX ADMIN — IOPAMIDOL 75 ML: 755 INJECTION, SOLUTION INTRAVENOUS at 16:18

## 2025-01-23 RX ADMIN — POTASSIUM CHLORIDE 10 MEQ: 7.46 INJECTION, SOLUTION INTRAVENOUS at 15:25

## 2025-01-23 RX ADMIN — BUDESONIDE AND FORMOTEROL FUMARATE DIHYDRATE 2 PUFF: 80; 4.5 AEROSOL RESPIRATORY (INHALATION) at 21:10

## 2025-01-23 RX ADMIN — MAGNESIUM SULFATE IN WATER 2000 MG: 40 INJECTION, SOLUTION INTRAVENOUS at 17:07

## 2025-01-23 RX ADMIN — FOLIC ACID 500 MCG: 1 TABLET ORAL at 22:03

## 2025-01-23 RX ADMIN — ATORVASTATIN CALCIUM 10 MG: 10 TABLET, FILM COATED ORAL at 22:03

## 2025-01-23 RX ADMIN — SODIUM CHLORIDE, PRESERVATIVE FREE 10 ML: 5 INJECTION INTRAVENOUS at 22:05

## 2025-01-23 RX ADMIN — CYANOCOBALAMIN TAB 1000 MCG 1000 MCG: 1000 TAB at 22:03

## 2025-01-23 NOTE — ED NOTES
Patient sating at 78% on 6L NC, patient placed on 15L Nonrebreather sating at 93%. Dr. Zee notified, Respiratory called for high flow.

## 2025-01-23 NOTE — ED PROVIDER NOTES
EMERGENCY DEPARTMENT ENCOUNTER        Pt Name: Johnnie Smith  MRN: [id number]  Birthdate 1945  Date of evaluation: 2025  Provider: SANJAY FRAGA  PCP: Claudio Umanzor PA     I have seen and evaluated this patient with my supervising physician Yayo, Pamela DIAZ DO.      Triage CHIEF COMPLAINT       Chief Complaint   Patient presents with    Shortness of Breath     Hx of COPD and pulmonary fibrosis- was in the 70's on EMS arrival          HISTORY OF PRESENT ILLNESS      Chief Complaint: Shortness of breath    Johnnie Smith is a 79 y.o.  male who presents to the ED with concerns of shortness of breath that is worsened over the last several weeks/months.  Has a longstanding history of acute on chronic respiratory issues secondary to pulmonary fibrosis.  Patient is on 8 L of oxygen per nasal cannula at home.  He is denying any active chest pain, no cough congestion fever or chills.    Nursing Notes were all reviewed and agreed with or any disagreements were addressed in the HPI.    REVIEW OF SYSTEMS     At least 10 systems reviewed and otherwise acutely negative except as in the [place].     PAST MEDICAL HISTORY     Past Medical History:   Diagnosis Date    Allergic rhinitis     Asthma     Atrial fibrillation (HCC)     Dr. Aldridge & Dr. Nix    CAD (coronary artery disease)     mild LAD - Dr. Aldridge    COPD (chronic obstructive pulmonary disease) ([facility])     COVID-19 2021    Depression     Diverticulosis of colon     Family history of early CAD     Father- MI-  age 51; a grandparent- age 52 of MI    Family history of valvular heart disease     Mother- Mitral valve disease    GERD (gastroesophageal reflux disease)     H/O 24 hour EKG monitoring 2001-Predominant rhythm is sinus rhythm. No signif ectopy noted.    H/O echocardiogram 2002, 2001-EF 60%.Normal LVSF. Mild MR. Mild TR-Dr Aldridge;    H/O echocardiogram 2019    EF 55-60%, Grade I  none    CRITICAL CARE   CRITICAL CARE NOTE:  CRITICAL CARE NOTE:  There was a high probability of clinically significant life-threatening deterioration of the patient's condition requiring my urgent intervention due to acute on chronic respiratory failure, new onset heart failure, hypokalemia.  IV potassium, IV diuretics, specialty consultations, frequent reevaluations was performed to address this.   Total critical care time is  30 minutes.    This includes vital sign monitoring, pulse oximetry monitoring, telemetry monitoring, clinical response to the IV medications, reviewing the nursing notes, consultation time, dictation/documentation time, and interpretation of the lab work. This time excludes time spent performing procedures and separately billable procedures and family discussion time.  N/A    CONSULTS:  None      EMERGENCY DEPARTMENT COURSE and MDM:   Vitals:    Vitals:    01/23/25 1450 01/23/25 1509 01/23/25 1522 01/23/25 1530   BP:   119/87 110/80   Pulse: (!) 106 (!) 129  (!) 125   Resp: 25   19   Temp:       SpO2: 93%   95%   Height:  1.702 m (5' 7\")         Patient was given thefollowing medications:  Medications   potassium chloride 10 mEq/100 mL IVPB (Peripheral Line) (10 mEq IntraVENous New Bag 1/23/25 1525)   magnesium sulfate 2000 mg in 50 mL IVPB premix (has no administration in time range)   furosemide (LASIX) injection 40 mg (40 mg IntraVENous Given 1/23/25 1522)   iopamidol (ISOVUE-370) 76 % injection 75 mL (75 mLs IntraVENous Given 1/23/25 1618)         Is this patient to be included in the SEP-1 Core Measure due to severe sepsis or septic shock?   No   Exclusion criteria - the patient is NOT to be included for SEP-1 Core Measure due to:  Alternative explanation for abnormal labs/vitals that do not relate to sepsis, see MDM for further explanation    MDM:    CC/HPI Summary, DDx, ED Course, and Reassessment:     Patient presents as above.  Emergent etiologies considered.  Patient seen and

## 2025-01-23 NOTE — ED PROVIDER NOTES
U/L    AST 24 15 - 37 U/L   CBC with Auto Differential   Result Value Ref Range    WBC 6.6 4.0 - 10.5 k/uL    RBC 3.42 (L) 4.60 - 6.20 m/uL    Hemoglobin 11.1 (L) 13.5 - 18.0 g/dL    Hematocrit 34.8 (L) 42.0 - 52.0 %    .8 (H) 78.0 - 100.0 fL    MCH 32.5 (H) 27.0 - 31.0 pg    MCHC 31.9 (L) 32.0 - 36.0 g/dL    RDW 14.2 11.7 - 14.9 %    Platelets 136 (L) 140 - 440 k/uL    MPV 10.1 7.5 - 11.1 fL    Neutrophils % 91 (H) 36 - 66 %    Lymphocytes % 6 (L) 24 - 44 %    Monocytes % 3 0 - 4 %    Eosinophils % 0 0.0 - 3.0 %    Basophils % 0 0 - 1 %    Immature Granulocytes % 1 (H) 0 %    Neutrophils Absolute 5.99 k/uL    Lymphocytes Absolute 0.42 k/uL    Monocytes Absolute 0.17 k/uL    Eosinophils Absolute 0.00 k/uL    Basophils Absolute 0.00 k/uL    Immature Granulocytes Absolute 0.03 k/uL   Procalcitonin   Result Value Ref Range    Procalcitonin 0.11 ng/mL   C-Reactive Protein   Result Value Ref Range    .0 (H) 0.0 - 5.0 mg/L   Troponin   Result Value Ref Range    Troponin, High Sensitivity 28 (H) 0 - 22 ng/L   Troponin   Result Value Ref Range    Troponin, High Sensitivity 25 (H) 0 - 22 ng/L   Basic Metabolic Panel   Result Value Ref Range    Sodium 134 (L) 136 - 145 mmol/L    Potassium 4.2 3.5 - 5.1 mmol/L    Chloride 97 (L) 99 - 110 mmol/L    CO2 20 (L) 21 - 32 mmol/L    Anion Gap 16 9 - 17 mmol/L    Glucose 187 (H) 74 - 99 mg/dL    BUN 40 (H) 7 - 20 mg/dL    Creatinine 1.6 (H) 0.8 - 1.3 mg/dL    Est, Glom Filt Rate 43 (L) >60 mL/min/1.73m2    Calcium 8.4 8.3 - 10.6 mg/dL   Magnesium   Result Value Ref Range    Magnesium 1.8 1.8 - 2.4 mg/dL   Basic Metabolic Panel   Result Value Ref Range    Sodium 136 136 - 145 mmol/L    Potassium 4.2 3.5 - 5.1 mmol/L    Chloride 98 (L) 99 - 110 mmol/L    CO2 24 21 - 32 mmol/L    Anion Gap 15 9 - 17 mmol/L    Glucose 167 (H) 74 - 99 mg/dL    BUN 49 (H) 7 - 20 mg/dL    Creatinine 1.7 (H) 0.8 - 1.3 mg/dL    Est, Glom Filt Rate 41 (L) >60 mL/min/1.73m2    Calcium 9.0 8.3 -  solution 0.5 mg (0.5 mg Nebulization Given 1/27/25 1938)   levalbuterol (XOPENEX) nebulization 0.63 mg (0.63 mg Nebulization Given 1/27/25 1938)   metoprolol succinate (TOPROL XL) extended release tablet 50 mg (50 mg Oral Given 1/27/25 2019)   melatonin tablet 6 mg (6 mg Oral Given 1/26/25 2123)   traZODone (DESYREL) tablet 50 mg (50 mg Oral Given 1/27/25 0116)   sennosides-docusate sodium (SENOKOT-S) 8.6-50 MG tablet 2 tablet (2 tablets Oral Given 1/27/25 1720)   furosemide (LASIX) injection 40 mg (40 mg IntraVENous Given 1/23/25 1522)   potassium chloride 10 mEq/100 mL IVPB (Peripheral Line) (10 mEq IntraVENous New Bag 1/23/25 1706)   magnesium sulfate 2000 mg in 50 mL IVPB premix (0 mg IntraVENous Stopped 1/23/25 2022)   iopamidol (ISOVUE-370) 76 % injection 75 mL (75 mLs IntraVENous Given 1/23/25 1618)   potassium chloride (KLOR-CON M) extended release tablet 40 mEq (40 mEq Oral Given 1/24/25 1115)   furosemide (LASIX) injection 20 mg (20 mg IntraVENous Given 1/24/25 0932)   dilTIAZem injection 10 mg (10 mg IntraVENous Given 1/26/25 0218)   digoxin (LANOXIN) tablet 500 mcg (500 mcg Oral Given 1/26/25 0521)   digoxin (LANOXIN) injection 250 mcg (250 mcg IntraVENous Given 1/26/25 1253)       EKG (if obtained): (All EKG's are interpreted by myself in the absence of a cardiologist) sinus tachycardia 114.  Frequent multifocal PVCs.  No ST elevation or depression.  Morphology similar to prior tracing    Chronic conditions affecting care: A-fib, CAD, COPD, depression, HTN, hyperlipidemia     Discussion with Other Profesionals : Consultant due to concern for new onset CHF, discussed with Dr. Aldridge.  He agrees with plan to obtain stat echo which is ordered in the ED.    Records Reviewed : Other prior outpatient visits with Dr. Gonzalez in the fall 2024 are reviewed as well as prior echo and cardiac cath.  Patient had angioplasty and stenting of the LAD with Dr. Aldridge in April 2024.  Echo completed just prior to that

## 2025-01-23 NOTE — H&P
grandparent- age 52 of MI    Family history of valvular heart disease     Mother- Mitral valve disease    GERD (gastroesophageal reflux disease)     H/O 24 hour EKG monitoring 2001-Predominant rhythm is sinus rhythm. No signif ectopy noted.    H/O echocardiogram 2002, 2001-EF 60%.Normal LVSF. Mild MR. Mild TR-Dr Aldridge;    H/O echocardiogram 2019    EF 55-60%, Grade I diastolic dysfunction, sclerotic but non stenotic aortic valve, Mild AR, Mild-Mod TR, Normal pulmonary artery pressure, no pericardial effusion     History of diverticulitis of colon     History of Holter monitoring 2018    48 hr holter - SR with PAF    History of repair of left rotator cuff 2015    Hx surgery  [facility], surgery L shoulder 2015 Dr Scott     [place] (hard of hearing)     [place] hearing aids    Hx of cardiac cath 2013    Mild LAD disease noted.    Hx of cardiovascular stress test 2018    EF 33% Pt in a fib with RVR. No infarct or ischemia. Decreased uptake inferiorly due to diaphragmatic artifact.     HX OTHER MEDICAL 2013    14 DAY EVENT: APC's, short runs of sinus tachycardia.     Hyperlipidemia     Hypertension     Hypoxemia 2018    Irregular heart rate 2018    Palpitations     Pneumonia     Post-COVID-19 condition 2022    Restless leg syndrome     S/P placement of cardiac pacemaker 2021    Shortness of breath 2018    Tricuspid valve regurgitation 2001    mild     PSHX:  has a past surgical history that includes Rotator cuff repair (, ); Bone Resection, Rib; Nerve Surgery; Shoulder arthroscopy (Right, 2010); Diagnostic Cardiac Cath Lab Procedure (2013); Colonoscopy (10/14/2014); Endoscopy, colon, diagnostic (10/14/2014); Rotator cuff repair (Left, 2015); Pacemaker insertion (Left, 2021); Cataract removal (Bilateral); cervical fusion (N/A, 2022); Cardiac procedure  (N/A, 2024); and Cardiac procedure (N/A, 2024).  Allergies:   Allergies   Allergen Reactions    Lyrica [Pregabalin] Hallucinations     Fam HX:  family history includes Coronary Art Dis in his father; Heart Attack (age of onset: 48) in his brother; Heart Attack (age of onset: 51) in his father; Heart Attack (age of onset: 53) in his paternal grandfather; Heart Attack (age of onset: 54) in his mother; Heart Disease in his father and mother.  Soc HX:   Social History     Socioeconomic History    Marital status:      Spouse name: None    Number of children: None    Years of education: None    Highest education level: None   Tobacco Use    Smoking status: Former     Current packs/day: 0.00     Average packs/day: 1 pack/day for 20.0 years (20.0 ttl pk-yrs)     Types: Cigarettes     Start date: 1973     Quit date: 1993     Years since quittin.5    Smokeless tobacco: Current     Types: Chew    Tobacco comments:     Reviewed    Vaping Use    Vaping status: Never Used   Substance and Sexual Activity    Alcohol use: No    Drug use: No    Sexual activity: Yes     Partners: Male     Comment:      Social Determinants of Health     Financial Resource Strain: Low Risk  (10/17/2024)    Overall Financial Resource Strain (CARDIA)     Difficulty of Paying Living Expenses: Not hard at all   Food Insecurity: No Food Insecurity (10/17/2024)    Hunger Vital Sign     Worried About Running Out of Food in the Last Year: Never true     Ran Out of Food in the Last Year: Never true   Transportation Needs: No Transportation Needs (10/17/2024)    PRAPARE - Transportation     Lack of Transportation (Medical): No     Lack of Transportation (Non-Medical): No   Physical Activity: Insufficiently Active (10/17/2024)    Exercise Vital Sign     Days of Exercise per Week: 3 days     Minutes of Exercise per Session: 40 min   Stress: Stress Concern Present (10/17/2024)    [employer] [place] [facility] [employer] [facility] -

## 2025-01-23 NOTE — PROGRESS NOTES
Patient transported to CT scan on 100% NRB mask. Once back in Trauma 1, patient placed back on heated high flow at 35L and 90% FI02. Sp02 >92% per MD orders. No distress noted.

## 2025-01-24 PROBLEM — J96.21 ACUTE ON CHRONIC RESPIRATORY FAILURE WITH HYPOXIA: Status: ACTIVE | Noted: 2021-12-30

## 2025-01-24 LAB
ANION GAP SERPL CALCULATED.3IONS-SCNC: 11 MMOL/L (ref 9–17)
B PARAP IS1001 DNA NPH QL NAA+NON-PROBE: NOT DETECTED
B PERT DNA SPEC QL NAA+PROBE: NOT DETECTED
BUN SERPL-MCNC: 19 MG/DL (ref 7–20)
C PNEUM DNA NPH QL NAA+NON-PROBE: NOT DETECTED
CALCIUM SERPL-MCNC: 7.9 MG/DL (ref 8.3–10.6)
CHLORIDE SERPL-SCNC: 101 MMOL/L (ref 99–110)
CHOLEST SERPL-MCNC: 121 MG/DL (ref 125–199)
CO2 SERPL-SCNC: 26 MMOL/L (ref 21–32)
CREAT SERPL-MCNC: 1.1 MG/DL (ref 0.8–1.3)
FLUAV RNA NPH QL NAA+NON-PROBE: NOT DETECTED
FLUBV RNA NPH QL NAA+NON-PROBE: NOT DETECTED
GFR, ESTIMATED: 62 ML/MIN/1.73M2
GLUCOSE SERPL-MCNC: 109 MG/DL (ref 74–99)
HADV DNA NPH QL NAA+NON-PROBE: NOT DETECTED
HCOV 229E RNA NPH QL NAA+NON-PROBE: NOT DETECTED
HCOV HKU1 RNA NPH QL NAA+NON-PROBE: NOT DETECTED
HCOV NL63 RNA NPH QL NAA+NON-PROBE: NOT DETECTED
HCOV OC43 RNA NPH QL NAA+NON-PROBE: NOT DETECTED
HDLC SERPL-MCNC: 50 MG/DL
HMPV RNA NPH QL NAA+NON-PROBE: NOT DETECTED
HPIV1 RNA NPH QL NAA+NON-PROBE: NOT DETECTED
HPIV2 RNA NPH QL NAA+NON-PROBE: NOT DETECTED
HPIV3 RNA NPH QL NAA+NON-PROBE: NOT DETECTED
HPIV4 RNA NPH QL NAA+NON-PROBE: NOT DETECTED
LDLC SERPL CALC-MCNC: 60 MG/DL
M PNEUMO DNA NPH QL NAA+NON-PROBE: NOT DETECTED
MAGNESIUM SERPL-MCNC: 1.9 MG/DL (ref 1.8–2.4)
POTASSIUM SERPL-SCNC: 3 MMOL/L (ref 3.5–5.1)
POTASSIUM SERPL-SCNC: 3.9 MMOL/L (ref 3.5–5.1)
RSV RNA NPH QL NAA+NON-PROBE: NOT DETECTED
RV+EV RNA NPH QL NAA+NON-PROBE: NOT DETECTED
SARS-COV-2 RNA NPH QL NAA+NON-PROBE: NOT DETECTED
SODIUM SERPL-SCNC: 138 MMOL/L (ref 136–145)
SPECIMEN DESCRIPTION: NORMAL
T4 FREE SERPL-MCNC: 0.7 NG/DL (ref 0.9–1.8)
TRIGL SERPL-MCNC: 51 MG/DL
TROPONIN I SERPL HS-MCNC: 29 NG/L (ref 0–22)
TROPONIN I SERPL HS-MCNC: 35 NG/L (ref 0–22)
TROPONIN I SERPL HS-MCNC: 36 NG/L (ref 0–22)
TROPONIN I SERPL HS-MCNC: 41 NG/L (ref 0–22)

## 2025-01-24 PROCEDURE — 80048 BASIC METABOLIC PNL TOTAL CA: CPT

## 2025-01-24 PROCEDURE — 83735 ASSAY OF MAGNESIUM: CPT

## 2025-01-24 PROCEDURE — 94640 AIRWAY INHALATION TREATMENT: CPT

## 2025-01-24 PROCEDURE — 87070 CULTURE OTHR SPECIMN AEROBIC: CPT

## 2025-01-24 PROCEDURE — APPNB15 APP NON BILLABLE TIME 0-15 MINS

## 2025-01-24 PROCEDURE — 6370000000 HC RX 637 (ALT 250 FOR IP): Performed by: INTERNAL MEDICINE

## 2025-01-24 PROCEDURE — 6370000000 HC RX 637 (ALT 250 FOR IP): Performed by: HOSPITALIST

## 2025-01-24 PROCEDURE — 84132 ASSAY OF SERUM POTASSIUM: CPT

## 2025-01-24 PROCEDURE — 99223 1ST HOSP IP/OBS HIGH 75: CPT | Performed by: INTERNAL MEDICINE

## 2025-01-24 PROCEDURE — 2140000000 HC CCU INTERMEDIATE R&B

## 2025-01-24 PROCEDURE — 2500000003 HC RX 250 WO HCPCS: Performed by: HOSPITALIST

## 2025-01-24 PROCEDURE — 36415 COLL VENOUS BLD VENIPUNCTURE: CPT

## 2025-01-24 PROCEDURE — 6360000002 HC RX W HCPCS: Performed by: HOSPITALIST

## 2025-01-24 PROCEDURE — 2500000003 HC RX 250 WO HCPCS: Performed by: INTERNAL MEDICINE

## 2025-01-24 PROCEDURE — 6360000002 HC RX W HCPCS: Performed by: INTERNAL MEDICINE

## 2025-01-24 PROCEDURE — 2580000003 HC RX 258: Performed by: HOSPITALIST

## 2025-01-24 PROCEDURE — 84484 ASSAY OF TROPONIN QUANT: CPT

## 2025-01-24 PROCEDURE — 80061 LIPID PANEL: CPT

## 2025-01-24 PROCEDURE — 2700000000 HC OXYGEN THERAPY PER DAY

## 2025-01-24 PROCEDURE — 87205 SMEAR GRAM STAIN: CPT

## 2025-01-24 PROCEDURE — 6370000000 HC RX 637 (ALT 250 FOR IP): Performed by: STUDENT IN AN ORGANIZED HEALTH CARE EDUCATION/TRAINING PROGRAM

## 2025-01-24 PROCEDURE — 94761 N-INVAS EAR/PLS OXIMETRY MLT: CPT

## 2025-01-24 PROCEDURE — 6360000002 HC RX W HCPCS

## 2025-01-24 PROCEDURE — 0202U NFCT DS 22 TRGT SARS-COV-2: CPT

## 2025-01-24 RX ORDER — DOXYCYCLINE HYCLATE 100 MG
100 TABLET ORAL EVERY 12 HOURS
Status: COMPLETED | OUTPATIENT
Start: 2025-01-24 | End: 2025-01-28

## 2025-01-24 RX ORDER — POTASSIUM CHLORIDE 7.45 MG/ML
10 INJECTION INTRAVENOUS PRN
Status: DISCONTINUED | OUTPATIENT
Start: 2025-01-24 | End: 2025-01-31 | Stop reason: HOSPADM

## 2025-01-24 RX ORDER — MAGNESIUM SULFATE IN WATER 40 MG/ML
2000 INJECTION, SOLUTION INTRAVENOUS PRN
Status: DISCONTINUED | OUTPATIENT
Start: 2025-01-24 | End: 2025-01-31 | Stop reason: HOSPADM

## 2025-01-24 RX ORDER — POTASSIUM CHLORIDE 1500 MG/1
40 TABLET, EXTENDED RELEASE ORAL EVERY 4 HOURS
Status: COMPLETED | OUTPATIENT
Start: 2025-01-24 | End: 2025-01-24

## 2025-01-24 RX ORDER — FUROSEMIDE 10 MG/ML
60 INJECTION INTRAMUSCULAR; INTRAVENOUS 2 TIMES DAILY
Status: DISCONTINUED | OUTPATIENT
Start: 2025-01-24 | End: 2025-01-29

## 2025-01-24 RX ORDER — FUROSEMIDE 10 MG/ML
20 INJECTION INTRAMUSCULAR; INTRAVENOUS ONCE
Status: COMPLETED | OUTPATIENT
Start: 2025-01-24 | End: 2025-01-24

## 2025-01-24 RX ORDER — POTASSIUM CHLORIDE 1500 MG/1
40 TABLET, EXTENDED RELEASE ORAL PRN
Status: DISCONTINUED | OUTPATIENT
Start: 2025-01-24 | End: 2025-01-31 | Stop reason: HOSPADM

## 2025-01-24 RX ADMIN — APIXABAN 5 MG: 5 TABLET, FILM COATED ORAL at 20:04

## 2025-01-24 RX ADMIN — IPRATROPIUM BROMIDE AND ALBUTEROL SULFATE 1 DOSE: .5; 2.5 SOLUTION RESPIRATORY (INHALATION) at 07:31

## 2025-01-24 RX ADMIN — WATER 40 MG: 1 INJECTION INTRAMUSCULAR; INTRAVENOUS; SUBCUTANEOUS at 20:04

## 2025-01-24 RX ADMIN — IPRATROPIUM BROMIDE AND ALBUTEROL SULFATE 1 DOSE: .5; 2.5 SOLUTION RESPIRATORY (INHALATION) at 20:31

## 2025-01-24 RX ADMIN — ATORVASTATIN CALCIUM 10 MG: 10 TABLET, FILM COATED ORAL at 07:59

## 2025-01-24 RX ADMIN — FUROSEMIDE 20 MG: 10 INJECTION, SOLUTION INTRAMUSCULAR; INTRAVENOUS at 09:32

## 2025-01-24 RX ADMIN — SODIUM CHLORIDE: 9 INJECTION, SOLUTION INTRAVENOUS at 03:47

## 2025-01-24 RX ADMIN — POTASSIUM CHLORIDE 10 MEQ: 7.46 INJECTION, SOLUTION INTRAVENOUS at 07:07

## 2025-01-24 RX ADMIN — DILTIAZEM HYDROCHLORIDE 120 MG: 120 CAPSULE, COATED, EXTENDED RELEASE ORAL at 07:58

## 2025-01-24 RX ADMIN — ESCITALOPRAM OXALATE 20 MG: 10 TABLET ORAL at 07:59

## 2025-01-24 RX ADMIN — POTASSIUM CHLORIDE 40 MEQ: 1500 TABLET, EXTENDED RELEASE ORAL at 08:06

## 2025-01-24 RX ADMIN — SODIUM CHLORIDE, PRESERVATIVE FREE 10 ML: 5 INJECTION INTRAVENOUS at 20:04

## 2025-01-24 RX ADMIN — WATER 40 MG: 1 INJECTION INTRAMUSCULAR; INTRAVENOUS; SUBCUTANEOUS at 12:11

## 2025-01-24 RX ADMIN — GABAPENTIN 300 MG: 300 CAPSULE ORAL at 20:04

## 2025-01-24 RX ADMIN — SODIUM CHLORIDE, PRESERVATIVE FREE 10 ML: 5 INJECTION INTRAVENOUS at 08:00

## 2025-01-24 RX ADMIN — IPRATROPIUM BROMIDE AND ALBUTEROL SULFATE 1 DOSE: .5; 2.5 SOLUTION RESPIRATORY (INHALATION) at 19:01

## 2025-01-24 RX ADMIN — IPRATROPIUM BROMIDE AND ALBUTEROL SULFATE 1 DOSE: .5; 2.5 SOLUTION RESPIRATORY (INHALATION) at 11:35

## 2025-01-24 RX ADMIN — POTASSIUM CHLORIDE 10 MEQ: 7.46 INJECTION, SOLUTION INTRAVENOUS at 03:47

## 2025-01-24 RX ADMIN — FUROSEMIDE 60 MG: 10 INJECTION, SOLUTION INTRAMUSCULAR; INTRAVENOUS at 17:52

## 2025-01-24 RX ADMIN — WATER 1000 MG: 1 INJECTION INTRAMUSCULAR; INTRAVENOUS; SUBCUTANEOUS at 12:12

## 2025-01-24 RX ADMIN — POTASSIUM CHLORIDE 10 MEQ: 7.46 INJECTION, SOLUTION INTRAVENOUS at 05:49

## 2025-01-24 RX ADMIN — POTASSIUM CHLORIDE 40 MEQ: 1500 TABLET, EXTENDED RELEASE ORAL at 11:15

## 2025-01-24 RX ADMIN — FOLIC ACID 500 MCG: 1 TABLET ORAL at 07:59

## 2025-01-24 RX ADMIN — POTASSIUM CHLORIDE 10 MEQ: 7.46 INJECTION, SOLUTION INTRAVENOUS at 04:46

## 2025-01-24 RX ADMIN — APIXABAN 5 MG: 5 TABLET, FILM COATED ORAL at 07:59

## 2025-01-24 RX ADMIN — Medication 1 CAPSULE: at 07:59

## 2025-01-24 RX ADMIN — ASPIRIN 81 MG CHEWABLE TABLET 81 MG: 81 TABLET CHEWABLE at 07:59

## 2025-01-24 RX ADMIN — CYANOCOBALAMIN TAB 1000 MCG 1000 MCG: 1000 TAB at 21:33

## 2025-01-24 RX ADMIN — FUROSEMIDE 40 MG: 10 INJECTION, SOLUTION INTRAMUSCULAR; INTRAVENOUS at 07:59

## 2025-01-24 RX ADMIN — PANTOPRAZOLE SODIUM 40 MG: 40 TABLET, DELAYED RELEASE ORAL at 05:50

## 2025-01-24 RX ADMIN — DOXYCYCLINE HYCLATE 100 MG: 100 TABLET, COATED ORAL at 12:25

## 2025-01-24 RX ADMIN — IPRATROPIUM BROMIDE AND ALBUTEROL SULFATE 1 DOSE: .5; 2.5 SOLUTION RESPIRATORY (INHALATION) at 15:19

## 2025-01-24 NOTE — CARE COORDINATION
Pt sleeping soundly at time of visit, pt did not wake to name being called.  Pt lives with his spouse.  Pt was independent prior to admission.  Pt is minimal assist is room.  Pt does not use DME in his home.  LSW will try again later to talk with pt about his discharge plans.

## 2025-01-24 NOTE — ED NOTES
ED TO INPATIENT SBAR HANDOFF    Patient Name: Johnnie Smith   :  1945  79 y.o.   Preferred Name  Tyrone   Family/Caregiver Present no   Restraints no   C-SSRS: Risk of Suicide: No Risk  Sitter no   Sepsis Risk Score        Situation  Chief Complaint   Patient presents with    Shortness of Breath     Hx of COPD and pulmonary fibrosis- was in the 70's on EMS arrival      Brief Description of Patient's Condition: Patient to the ED with complaints of SOB. Patient has hx of pulmonary fibrosis and COPD. Patient is currently on heated high flow nasal canula. Patient is A&O and lives at home and able to do own ADLs normally. VSS   Mental Status: oriented, alert, coherent, logical, thought processes intact, and able to concentrate and follow conversation  Arrived from: home    Imaging:   CTA PULMONARY W CONTRAST   Final Result      XR CHEST PORTABLE   Final Result        Abnormal labs:   Abnormal Labs Reviewed   COMPREHENSIVE METABOLIC PANEL - Abnormal; Notable for the following components:       Result Value    Potassium 3.2 (*)     Glucose 141 (*)     Calcium 8.0 (*)     Total Protein 5.1 (*)     Albumin 3.1 (*)     Total Bilirubin 1.3 (*)     All other components within normal limits   CBC WITH AUTO DIFFERENTIAL - Abnormal; Notable for the following components:    RBC 3.72 (*)     Hemoglobin 12.0 (*)     Hematocrit 37.0 (*)     MCH 32.3 (*)     Platelet, Fluorescence 118 (*)     Neutrophils % 77 (*)     Lymphocytes % 12 (*)     Monocytes % 9 (*)     Immature Granulocytes % 1 (*)     All other components within normal limits   TROPONIN - Abnormal; Notable for the following components:    Troponin, High Sensitivity 34 (*)     All other components within normal limits   TROPONIN - Abnormal; Notable for the following components:    Troponin, High Sensitivity 34 (*)     All other components within normal limits   BRAIN NATRIURETIC PEPTIDE - Abnormal; Notable for the following components:    NT Pro-BNP 4,829 (*)     All  other components within normal limits   BLOOD GAS, VENOUS - Abnormal; Notable for the following components:    pH, [place] 7.470 (*)     pCO2, [place] 35.8 (*)     Carboxyhemoglobin 1.7 (*)     Methemoglobin 0.1 (*)     All other components within normal limits        Background  History:   Past Medical History:   Diagnosis Date    Allergic rhinitis     Asthma     Atrial fibrillation ([facility])     Dr. Aldridge & Dr. Nix    CAD (coronary artery disease)     mild LAD - Dr. Aldridge    COPD (chronic obstructive pulmonary disease) ([facility])     COVID-19 2021    Depression     Diverticulosis of colon     Family history of early CAD     Father- MI-  age 51; a grandparent- age 52 of MI    Family history of valvular heart disease     Mother- Mitral valve disease    GERD (gastroesophageal reflux disease)     H/O 24 hour EKG monitoring 2001-Predominant rhythm is sinus rhythm. No signif ectopy noted.    H/O echocardiogram 2002, 2001-EF 60%.Normal LVSF. Mild MR. Mild TR-Dr Aldridge;    H/O echocardiogram 2019    EF 55-60%, Grade I diastolic dysfunction, sclerotic but non stenotic aortic valve, Mild AR, Mild-Mod TR, Normal pulmonary artery pressure, no pericardial effusion     History of diverticulitis of colon     History of Holter monitoring 2018    48 hr holter - SR with PAF    History of repair of left rotator cuff 2015    Hx surgery  [facility]to Victor, surgery L shoulder 2015 Dr Scott     [place] (hard of hearing)     [place] hearing aids    Hx of cardiac cath 2013    Mild LAD disease noted.    Hx of cardiovascular stress test 2018    EF 33% Pt in a fib with RVR. No infarct or ischemia. Decreased uptake inferiorly due to diaphragmatic artifact.     HX OTHER MEDICAL 2013    14 DAY EVENT: APC's, short runs of sinus tachycardia.     Hyperlipidemia     Hypertension     Hypoxemia 2018    Irregular heart rate 2018    Palpitations

## 2025-01-24 NOTE — PROGRESS NOTES
V2.[place]    [facility] [place]      Name:  Johnnie Smith /Age/Sex: 1945  (79 y.o. male)   MRN & CSN:  [id number] & [id number] Encounter Date/Time: 2025 9:52 AM EST   Location:  [facility]/3107 PCP: Claudio Umanzor PA     Attending:Darius Gonzalez MD       Hospital Day: 2    Assessment and Recommendations   Johnnie Smith is a 79 y.o. male who presents with Congestive heart failure of unknown etiology (HCC)      Plan:     Acute on chronic hypoxic respiratory failure  -Suspect exacerbation of chronic diastolic heart failure as etiology.  -Troponins are mildly elevated.  Cardiology consulted.  Lasix increased to 60 mg twice daily.  -As per cardiology will need ischemia workup given history of CAD and now with worsening heart failure.  -Wean supplemental oxygen to maintain O2 sat between 88 and 92%.    Elevated troponin.  History of CAD status post complex PCI  -As above suspect from demand however will need ischemia workup when respiratory status is more stable.  -Continue aspirin, statin for now.    Hypokalemia  -Replenished with aggressive oral and IV supplementation while on IV Lasix.    Recent fall  -Head CT negative.    Atrial fibrillation  -Continue Eliquis, diltiazem.    Interstitial lung disease  -Do not suspect exacerbation at this time for this process.  Suspect worsening oxygen requirement likely from decompensated heart failure.    Hypertension  Hyperlipidemia    Diet ADULT DIET; Regular; Low Fat/Low Chol/High Fiber/2 gm Na; Low Sodium (2 gm)   DVT Prophylaxis [] Lovenox, []  Heparin, [] SCDs, [] Ambulation,  [x] Eliquis, [] Xarelto  [] Coumadin   Code Status Full Code   Disposition From: Home  Expected Disposition: Home health versus SNF  Estimated Date of Discharge: 3 days  Patient requires continued admission due to decompensated heart failure, needs ischemia workup   Surrogate Decision Maker/ Maisha Diaz (Spouse)        Personally reviewed Lab Studies and Imaging         Imaging that  low-attenuation pleural effusions, slightly more pronounced on the right side. AXILLAE AND CHEST WALL: Pacemaker in the left anterior chest wall. BONES: Mild degenerative changes in the thoracic spine. No evidence of a fracture. Postoperative and degenerative changes in the cervical spine. No evidence of a fracture. UPPER ABDOMEN: Unremarkable. IMPRESSION: 1.  Development of mixed increased interstitial lung markings and airspace disease involving the majority of the right lung and to a lesser extent the left  lower lobe. These findings are favored to reflect interstitial and pulmonary edema in the setting of cardiomegaly, a pacemaker, and trace bilateral pleural effusions. These findings are most compatible with acute congestive heart failure. Pneumonia is felt to be less likely. Correlate clinically. 2.  No evidence of pulmonary emboli. 3.  Mild mediastinal lymphadenopathy.  Dictated and Electronically Signed By: Mihir Powell DO 1/23/2025 16:38        Echo (TTE) limited (PRN contrast/bubble/strain/3D)    Result Date: 1/23/2025    Image quality is adequate.   Left Ventricle: Diffcult to determine ejection fractin and wall motion due to arrhythmia. Low normal left ventricular systolic function with a visually estimated EF of 50 - 55%. Left ventricle size is normal. Mildly increased wall thickness. Septal wall segments appear slightly hypokinetic.   Aortic Valve: Sclerosis of the aortic valve cusps. Mild regurgitation.   Tricuspid Valve: Moderate to severe regurgitation.   Right Ventricle: Right ventricle is severely dilated. Lead present in the right ventricle. Hypokinetic RV free wall; possible right heart strain with Farmer's sign noted.   Right Atrium: Right atrium is severely dilated.   Pericardium: No pericardial effusion.     XR CHEST PORTABLE    Result Date: 1/23/2025  PORTABLE CHEST X-RAY Date of Service: 1/23/2025 14:30 HISTORY: 79 years male with shortness of breath. COMPARISON:  High resolution

## 2025-01-24 NOTE — CONSULTS
INPATIENT CARDIOLOGY CONSULT NOTE         Reason for consultation: Shortness of breath    History of present illness:Johnnie is a 79 y.o.year old who is admitted with   Chief Complaint   Patient presents with    Shortness of Breath     Hx of COPD and pulmonary fibrosis- was in the 70's on EMS arrival         Patient is a 79-year-old gentleman who follows up with Dr. Aldridge as outpatient, presents to the hospital with chief complaint of shortness of breath/CHF exacerbation.  Cardiology consulted for above.    Patient has prior medical history significant for underlying COPD, former smoker, currently on home oxygen at night, has been unfortunately in the hospital twice in the past few months for shortness of breath.    He has noted lower extremity swelling shortness of breath, unable to lie flat PND and orthopnea.  A CT scan of the chest was performed to rule out pulm embolism given dilated RV however was negative.    He denies any chest pain denies any palpitation  He has been on diuretics since overnight and has started to feel better already.    CT Chest:     1.  Development of mixed increased interstitial lung markings and airspace   disease involving the majority of the right lung and to a lesser extent the left   lower lobe. These findings are favored to reflect interstitial and pulmonary   edema in the setting of cardiomegaly, a pacemaker, and trace bilateral pleural   effusions. These findings are most compatible with acute congestive heart   failure. Pneumonia is felt to be less likely. Correlate clinically.  2.  No evidence of pulmonary emboli.  3.  Mild mediastinal lymphadenopathy.          ECHO       Image quality is adequate.    Left Ventricle: Diffcult to determine ejection fractin and wall motion due to arrhythmia. Low normal left ventricular systolic function with a visually estimated EF of 50 - 55%. Left ventricle size is normal. Mildly increased wall thickness. Septal wall segments appear slightly  packet 17 g, Daily PRN  acetaminophen (TYLENOL) tablet 650 mg, Q6H PRN   Or  acetaminophen (TYLENOL) suppository 650 mg, Q6H PRN  ipratropium 0.5 mg-albuterol 2.5 mg (DUONEB) nebulizer solution 1 Dose, Q4H WA RT      Current Facility-Administered Medications   Medication Dose Route Frequency Provider Last Rate Last Admin    furosemide (LASIX) injection 60 mg  60 mg IntraVENous BID Arya Chery PA-C        potassium chloride (KLOR-CON M) extended release tablet 40 mEq  40 mEq Oral PRN Arya Chery PA-C        Or    potassium bicarb-citric acid (EFFER-K) effervescent tablet 40 mEq  40 mEq Oral PRN Arya Chery PA-C        Or    potassium chloride 10 mEq/100 mL IVPB (Peripheral Line)  10 mEq IntraVENous PRN Arya Chery PA-C        magnesium sulfate 2000 mg in 50 mL IVPB premix  2,000 mg IntraVENous PRN Arya Chery PA-C        aspirin chewable tablet 81 mg  81 mg Oral Daily Gina Dia MD   81 mg at 01/24/25 0759    albuterol (PROVENTIL) (2.5 MG/3ML) 0.083% nebulizer solution 2.5 mg  2.5 mg Nebulization Q6H PRN Gina Dia MD        apixaban (ELIQUIS) tablet 5 mg  5 mg Oral BID Gina Dia MD   5 mg at 01/24/25 0759    gabapentin (NEURONTIN) capsule 300 mg  300 mg Oral Nightly Gina Dia MD   300 mg at 01/23/25 2205    [Held by provider] escitalopram (LEXAPRO) tablet 20 mg  20 mg Oral Daily Gina Dia MD   20 mg at 01/24/25 0759    [Held by provider] mirtazapine (REMERON) tablet 7.5 mg  7.5 mg Oral Nightly Gina Dia MD        atorvastatin (LIPITOR) tablet 10 mg  10 mg Oral Daily Gina Dia MD   10 mg at 01/24/25 0759    dilTIAZem (CARDIZEM CD) extended release capsule 120 mg  120 mg Oral Daily Gina Dia MD   120 mg at 01/24/25 0758    vitamin B-12 (CYANOCOBALAMIN) tablet 1,000 mcg  1,000 mcg Oral Daily Gina Dia MD   1,000 mcg at 01/23/25 2204    folic acid (FOLVITE) tablet 500 mcg  500 mcg Oral Daily Gina Dia MD   500 mcg at 01/24/25 0757

## 2025-01-24 NOTE — PROGRESS NOTES
Patient seen and examined by Dr. Payne and I.    Cardiology consulted for pulm edema    Patient with past medical history significant for  CAD, PAF, ILD    Plan:  Acute on chronic HFpEF, predominately right sided heart failure  COPD  Interstitial lung disease  CAD s/p complex PCI of LAD 04/2024  PAF  Hypokalemia    No chest pain. SOB on vapotherm. Peripheral edema improving    Consider ischemic workup upon improvement of respiratory status. Likely monday    Increase lasix to 60 mg twice daily    Cardizem 120 Lipitor 10    Eliquis 5, aspirin    Ful note to follow    Arya Chery PA-C 01/24/25 8:57 AM

## 2025-01-24 NOTE — CONSULTS
[address] CENTER [place] [id number]                              CONSULTATION      PATIENT NAME: NICKOLAS DAVIS               : 1945  MED REC NO: [id number]                      ROOM: 3107  ACCOUNT NO: [id number]                       ADMIT DATE: 2025  PROVIDER: Manolo Tellez MD      CONSULT DATE: 2025    HISTORY OF PRESENT ILLNESS:  The patient is a 79-year-old gentleman with multiple medical problems including hypertension; depression; COPD; bronchial asthma; interstitial lung disease, on 8 L nasal cannula at baseline, being followed by pulmonologist in [place]; who came to the emergency room with complaints of increasing shortness of breath, dizziness, and lightheadedness.  He was complaining of some cough.  He denied any fever or chills.  He denied any nausea or vomiting.  The patient was on prednisone and he stopped about 3 weeks ago.  His pulmonologist according to the patient was planning to start him possibly on Dupixent for his asthma.    PAST MEDICAL HISTORY:  Significant for COPD, bronchial asthma, allergic rhinitis, depression, hypertension, and hyperlipidemia.    PAST SURGICAL HISTORY:  Noncontributory.    FAMILY HISTORY:  Reveals that his mother had coronary artery disease.  Father had coronary artery disease.    SOCIAL HISTORY:  Reveals that he quit smoking, but has history of significant smoking in the past.  No history of alcohol or drug abuse.    MEDICATIONS:  Reviewed.    ALLERGIES:  HE IS ALLERGIC TO PREGABALIN.      REVIEW OF SYSTEMS:  10- to 14-point review of systems were reviewed and are negative except for what is mentioned in the history of present illness.    PHYSICAL EXAMINATION:  GENERAL:  The patient is alert and oriented x3, in no acute respiratory distress.  VITAL SIGNS:  His blood pressure is 102/69 mmHg, pulse of 105 per minute, and respiratory rate of 22 per minute.  He is afebrile.

## 2025-01-24 NOTE — PLAN OF CARE
Problem: Chronic Conditions and Co-morbidities  Goal: Patient's chronic conditions and co-morbidity symptoms are monitored and maintained or improved  1/24/2025 1007 by Tahir Francois RN  Outcome: Progressing  1/23/2025 2347 by Sergio Thapa RN  Outcome: Progressing     Problem: Discharge Planning  Goal: Discharge to home or other facility with appropriate resources  1/24/2025 1007 by Tahir Francois RN  Outcome: Progressing  1/23/2025 2347 by Sergio Thapa RN  Outcome: Progressing     Problem: Safety - Adult  Goal: Free from fall injury  1/24/2025 1007 by Tahir Francois RN  Outcome: Progressing  1/23/2025 2347 by Sergio Thapa RN  Outcome: Progressing

## 2025-01-24 NOTE — PROGRESS NOTES
01/23/25 1911   Oxygen Therapy/Pulse Ox   O2 Therapy Oxygen humidified   $Oxygen $Daily Charge   O2 Device Heated high flow cannula   O2 Flow Rate (L/min) 35 L/min   FiO2  85 %   Pulse 96   Respirations 25   SpO2 95 %   Pulse Oximeter Device Mode Continuous   Pulse Oximeter Device Location Finger   $Pulse Oximeter $Spot check (multiple/continuous)

## 2025-01-24 NOTE — PROGRESS NOTES
4 Eyes Skin Assessment     NAME:  Johnnie Smith  YOB: 1945  MEDICAL RECORD NUMBER:  [id number]    The patient is being assessed for  Admission    I agree that at least one RN has performed a thorough Head to Toe Skin Assessment on the patient. ALL assessment sites listed below have been assessed.      Areas assessed by both nurses:    Head, Face, Ears, Shoulders, Back, Chest, Arms, Elbows, Hands, Sacrum. Buttock, Coccyx, Ischium, Legs. Feet and Heels, and Under Medical Devices         Does the Patient have a Wound? No noted wound(s)       [place] Prevention initiated by RN: Yes  Wound Care Orders initiated by RN: No    Pressure Injury (Stage 3,4, Unstageable, DTI, NWPT, and Complex wounds) if present, place Wound referral order by RN under : No    New Ostomies, if present place, Ostomy referral order under : No     Nurse 1 eSignature: Electronically signed by Vargas Madden RN on 1/24/25 at 7:28 AM EST    **SHARE this note so that the co-signing nurse can place an eSignature**    Nurse 2 eSignature: Electronically signed by Sergio Thapa RN on 1/24/25 at 7:37 AM EST

## 2025-01-25 LAB
ALBUMIN SERPL-MCNC: 2.9 G/DL (ref 3.4–5)
ALBUMIN/GLOB SERPL: 1 {RATIO} (ref 1.1–2.2)
ALP SERPL-CCNC: 70 U/L (ref 40–129)
ALT SERPL-CCNC: 15 U/L (ref 10–40)
ANION GAP SERPL CALCULATED.3IONS-SCNC: 12 MMOL/L (ref 9–17)
AST SERPL-CCNC: 24 U/L (ref 15–37)
BASOPHILS # BLD: 0 K/UL
BASOPHILS NFR BLD: 0 % (ref 0–1)
BILIRUB SERPL-MCNC: 0.9 MG/DL (ref 0–1)
BNP SERPL-MCNC: 2277 PG/ML (ref 0–450)
BUN SERPL-MCNC: 23 MG/DL (ref 7–20)
CALCIUM SERPL-MCNC: 8.3 MG/DL (ref 8.3–10.6)
CHLORIDE SERPL-SCNC: 102 MMOL/L (ref 99–110)
CO2 SERPL-SCNC: 24 MMOL/L (ref 21–32)
CREAT SERPL-MCNC: 1.1 MG/DL (ref 0.8–1.3)
CRP SERPL HS-MCNC: 116 MG/L (ref 0–5)
EOSINOPHIL # BLD: 0 K/UL
EOSINOPHILS RELATIVE PERCENT: 0 % (ref 0–3)
ERYTHROCYTE [DISTWIDTH] IN BLOOD BY AUTOMATED COUNT: 14.2 % (ref 11.7–14.9)
GFR, ESTIMATED: 66 ML/MIN/1.73M2
GLUCOSE SERPL-MCNC: 162 MG/DL (ref 74–99)
HCT VFR BLD AUTO: 34.8 % (ref 42–52)
HGB BLD-MCNC: 11.1 G/DL (ref 13.5–18)
IMM GRANULOCYTES # BLD AUTO: 0.03 K/UL
IMM GRANULOCYTES NFR BLD: 1 %
LYMPHOCYTES NFR BLD: 0.42 K/UL
LYMPHOCYTES RELATIVE PERCENT: 6 % (ref 24–44)
MAGNESIUM SERPL-MCNC: 2 MG/DL (ref 1.8–2.4)
MCH RBC QN AUTO: 32.5 PG (ref 27–31)
MCHC RBC AUTO-ENTMCNC: 31.9 G/DL (ref 32–36)
MCV RBC AUTO: 101.8 FL (ref 78–100)
MONOCYTES NFR BLD: 0.17 K/UL
MONOCYTES NFR BLD: 3 % (ref 0–4)
NEUTROPHILS NFR BLD: 91 % (ref 36–66)
NEUTS SEG NFR BLD: 5.99 K/UL
PHOSPHATE SERPL-MCNC: 3.4 MG/DL (ref 2.5–4.9)
PLATELET # BLD AUTO: 136 K/UL (ref 140–440)
PMV BLD AUTO: 10.1 FL (ref 7.5–11.1)
POTASSIUM SERPL-SCNC: 4.3 MMOL/L (ref 3.5–5.1)
PROCALCITONIN SERPL-MCNC: 0.11 NG/ML
PROT SERPL-MCNC: 5.9 G/DL (ref 6.4–8.2)
RBC # BLD AUTO: 3.42 M/UL (ref 4.6–6.2)
SODIUM SERPL-SCNC: 138 MMOL/L (ref 136–145)
TROPONIN I SERPL HS-MCNC: 22 NG/L (ref 0–22)
TROPONIN I SERPL HS-MCNC: 25 NG/L (ref 0–22)
TROPONIN I SERPL HS-MCNC: 28 NG/L (ref 0–22)
WBC OTHER # BLD: 6.6 K/UL (ref 4–10.5)

## 2025-01-25 PROCEDURE — 83880 ASSAY OF NATRIURETIC PEPTIDE: CPT

## 2025-01-25 PROCEDURE — 6360000002 HC RX W HCPCS

## 2025-01-25 PROCEDURE — 84145 PROCALCITONIN (PCT): CPT

## 2025-01-25 PROCEDURE — 2500000003 HC RX 250 WO HCPCS: Performed by: INTERNAL MEDICINE

## 2025-01-25 PROCEDURE — 6370000000 HC RX 637 (ALT 250 FOR IP): Performed by: HOSPITALIST

## 2025-01-25 PROCEDURE — 86140 C-REACTIVE PROTEIN: CPT

## 2025-01-25 PROCEDURE — APPNB30 APP NON BILLABLE TIME 0-30 MINS: Performed by: NURSE PRACTITIONER

## 2025-01-25 PROCEDURE — 84484 ASSAY OF TROPONIN QUANT: CPT

## 2025-01-25 PROCEDURE — 2140000000 HC CCU INTERMEDIATE R&B

## 2025-01-25 PROCEDURE — 83735 ASSAY OF MAGNESIUM: CPT

## 2025-01-25 PROCEDURE — 80053 COMPREHEN METABOLIC PANEL: CPT

## 2025-01-25 PROCEDURE — 2500000003 HC RX 250 WO HCPCS: Performed by: HOSPITALIST

## 2025-01-25 PROCEDURE — 99233 SBSQ HOSP IP/OBS HIGH 50: CPT | Performed by: INTERNAL MEDICINE

## 2025-01-25 PROCEDURE — 2700000000 HC OXYGEN THERAPY PER DAY

## 2025-01-25 PROCEDURE — 36415 COLL VENOUS BLD VENIPUNCTURE: CPT

## 2025-01-25 PROCEDURE — 94640 AIRWAY INHALATION TREATMENT: CPT

## 2025-01-25 PROCEDURE — 6370000000 HC RX 637 (ALT 250 FOR IP): Performed by: INTERNAL MEDICINE

## 2025-01-25 PROCEDURE — 6360000002 HC RX W HCPCS: Performed by: INTERNAL MEDICINE

## 2025-01-25 PROCEDURE — 85025 COMPLETE CBC W/AUTO DIFF WBC: CPT

## 2025-01-25 PROCEDURE — 84100 ASSAY OF PHOSPHORUS: CPT

## 2025-01-25 RX ADMIN — PANTOPRAZOLE SODIUM 40 MG: 40 TABLET, DELAYED RELEASE ORAL at 05:12

## 2025-01-25 RX ADMIN — Medication 1 CAPSULE: at 08:20

## 2025-01-25 RX ADMIN — ASPIRIN 81 MG CHEWABLE TABLET 81 MG: 81 TABLET CHEWABLE at 08:19

## 2025-01-25 RX ADMIN — WATER 1000 MG: 1 INJECTION INTRAMUSCULAR; INTRAVENOUS; SUBCUTANEOUS at 12:05

## 2025-01-25 RX ADMIN — WATER 40 MG: 1 INJECTION INTRAMUSCULAR; INTRAVENOUS; SUBCUTANEOUS at 12:04

## 2025-01-25 RX ADMIN — SODIUM CHLORIDE, PRESERVATIVE FREE 10 ML: 5 INJECTION INTRAVENOUS at 22:27

## 2025-01-25 RX ADMIN — IPRATROPIUM BROMIDE AND ALBUTEROL SULFATE 1 DOSE: .5; 2.5 SOLUTION RESPIRATORY (INHALATION) at 20:19

## 2025-01-25 RX ADMIN — IPRATROPIUM BROMIDE AND ALBUTEROL SULFATE 1 DOSE: .5; 2.5 SOLUTION RESPIRATORY (INHALATION) at 10:58

## 2025-01-25 RX ADMIN — ATORVASTATIN CALCIUM 10 MG: 10 TABLET, FILM COATED ORAL at 08:20

## 2025-01-25 RX ADMIN — APIXABAN 5 MG: 5 TABLET, FILM COATED ORAL at 08:24

## 2025-01-25 RX ADMIN — WATER 40 MG: 1 INJECTION INTRAMUSCULAR; INTRAVENOUS; SUBCUTANEOUS at 05:12

## 2025-01-25 RX ADMIN — DOXYCYCLINE HYCLATE 100 MG: 100 TABLET, COATED ORAL at 12:05

## 2025-01-25 RX ADMIN — APIXABAN 5 MG: 5 TABLET, FILM COATED ORAL at 22:27

## 2025-01-25 RX ADMIN — FUROSEMIDE 60 MG: 10 INJECTION, SOLUTION INTRAMUSCULAR; INTRAVENOUS at 17:08

## 2025-01-25 RX ADMIN — WATER 40 MG: 1 INJECTION INTRAMUSCULAR; INTRAVENOUS; SUBCUTANEOUS at 22:27

## 2025-01-25 RX ADMIN — FUROSEMIDE 60 MG: 10 INJECTION, SOLUTION INTRAMUSCULAR; INTRAVENOUS at 09:01

## 2025-01-25 RX ADMIN — IPRATROPIUM BROMIDE AND ALBUTEROL SULFATE 1 DOSE: .5; 2.5 SOLUTION RESPIRATORY (INHALATION) at 15:02

## 2025-01-25 RX ADMIN — FOLIC ACID 500 MCG: 1 TABLET ORAL at 08:20

## 2025-01-25 RX ADMIN — DILTIAZEM HYDROCHLORIDE 120 MG: 120 CAPSULE, COATED, EXTENDED RELEASE ORAL at 08:19

## 2025-01-25 RX ADMIN — DOXYCYCLINE HYCLATE 100 MG: 100 TABLET, COATED ORAL at 00:33

## 2025-01-25 RX ADMIN — POLYETHYLENE GLYCOL (3350) 17 G: 17 POWDER, FOR SOLUTION ORAL at 12:08

## 2025-01-25 RX ADMIN — IPRATROPIUM BROMIDE AND ALBUTEROL SULFATE 1 DOSE: .5; 2.5 SOLUTION RESPIRATORY (INHALATION) at 07:11

## 2025-01-25 RX ADMIN — SODIUM CHLORIDE, PRESERVATIVE FREE 10 ML: 5 INJECTION INTRAVENOUS at 09:38

## 2025-01-25 RX ADMIN — CYANOCOBALAMIN TAB 1000 MCG 1000 MCG: 1000 TAB at 22:24

## 2025-01-25 NOTE — PLAN OF CARE
Problem: Chronic Conditions and Co-morbidities  Goal: Patient's chronic conditions and co-morbidity symptoms are monitored and maintained or improved  1/25/2025 1014 by Frandy Aldana RN  Outcome: Progressing  1/25/2025 0039 by Sergio Thapa RN  Outcome: Progressing     Problem: Discharge Planning  Goal: Discharge to home or other facility with appropriate resources  1/25/2025 1014 by Frandy Aldana RN  Outcome: Progressing  1/25/2025 0039 by Sergio Thapa RN  Outcome: Progressing     Problem: Safety - Adult  Goal: Free from fall injury  1/25/2025 1014 by Frandy Aldana RN  Outcome: Progressing  1/25/2025 0039 by Sergio Thapa RN  Outcome: Progressing

## 2025-01-25 NOTE — PROGRESS NOTES
V2.0    [facility] [place]      Name:  Johnnie Smith /Age/Sex: 1945  (79 y.o. male)   MRN & CSN:  [id number] & [id number] Encounter Date/Time: 2025 9:52 AM EST   Location:  [facility]/3107- PCP: Claudio Umanzor PA     Attending:Darius Gonzalez MD       Hospital Day: 3    Assessment and Recommendations   Johnnie Smith is a 79 y.o. male who presents with Congestive heart failure of unknown etiology (HCC)      Plan:     Acute on chronic hypoxic respiratory failure  -Suspect exacerbation of chronic diastolic heart failure as etiology.  -Troponins are mildly elevated.  Cardiology consulted.  Lasix increased to 60 mg twice daily.  Having good urine output.  Continue monitoring volume status and adjusting diuretics as needed.  Strict ins and outs.  Daily weights.  -As per cardiology will need ischemia workup given history of CAD and now with worsening heart failure.  -Wean supplemental oxygen to maintain O2 sat between 88 and 92%.    Elevated troponin.  History of CAD status post complex PCI  -As above suspect from demand however will need ischemia workup when respiratory status is more stable.  -Continue aspirin, statin for now.    Hypokalemia  -Replenished with aggressive oral and IV supplementation while on IV Lasix.    Recent fall  -Head CT negative.    Atrial fibrillation  -Continue Eliquis, diltiazem.    Interstitial lung disease  - Suspect worsening oxygen requirement likely from decompensated heart failure.  Pulmonology saw this patient and started on steroids given concern for bronchospasm.    Hypertension  Hyperlipidemia    Diet ADULT DIET; Regular; Low Fat/Low Chol/High Fiber/2 gm Na; Low Sodium (2 gm)   DVT Prophylaxis [] Lovenox, []  Heparin, [] SCDs, [] Ambulation,  [x] Eliquis, [] Xarelto  [] Coumadin   Code Status Full Code   Disposition From: Home  Expected Disposition: Home health versus SNF  Estimated Date of Discharge: 3 days  Patient requires continued admission due to decompensated

## 2025-01-25 NOTE — PROGRESS NOTES
results found for: \"CULTRESP\"    Radiology Review:  Pertinent images / reports were reviewed as a part of this visit.    Assessment:     Patient Active Problem List   Diagnosis    Depression, major, in remission (HCC)    Obesity (BMI 30-39.9)    Restless leg syndrome    HTN (hypertension)    Hyperlipidemia    Eczema    Tobacco use    COPD with hypoxia (HCC)    Dyspnea    Diverticulosis    Nystagmus    Oxygen desaturation    Chronic systolic congestive heart failure (HCC)    PAF (paroxysmal atrial fibrillation) ([facility])    Other insomnia    Other headache syndrome    Sick sinus syndrome (HCC)    Acute on chronic respiratory failure with hypoxia    Visit for monitoring Tikosyn therapy    Cervical spondylosis with myelopathy    Hypercoagulable state due to persistent atrial fibrillation ([facility])    Ex-cigarette smoker    NSIP (nonspecific interstitial pneumonia) ([facility])    Hypersomnia    SOBOE (shortness of breath on exertion)    Hypoxia    Anemia    A-fib ([facility])    CAD (coronary artery disease)    Atherosclerotic heart disease of native coronary artery with unspecified angina pectoris    Hemoptysis    UIP (usual interstitial pneumonitis) ([facility])    Congestive heart failure of unknown etiology ([facility])       Plan:   1. Overall the patient has slightly improved.  2. Wean FiO2.      Manolo Tellez MD   1/25/2025  12:51 PM

## 2025-01-25 NOTE — PROGRESS NOTES
Cardiology follow up     Consulted for CHF    Subjective: Patient is on vapotherm. He is able to carry on long conversations with frequent gasping breaths while on vapotherm and an oxygenation of > 88% during conversation. He is wanting to get well enough to get back to work and get a few more years out of his life.     Plan:   CHF: EF50-55% on 1/23/2024. Continue Lasix 60 mg BID IVPContinue strict intake out put, daily weights,  low sodium diet, and fluid restriction.     CAD: Hx of PCI of LAD. Last LHC 4/26/2024. Last stress test 2018. Patient is a former smoker, and has significant family hx of early CAD.          Atrial fibrillation: Paroxysmal. Last DCCV 4/4/2018. He has not had an Ablation. Goal is rhythm control. Failed tikosyn. Continue Eliquis Recommend keeping potassium 4-4.5 and magnesium 2-2.2 in patients with atrial fibrillation.           HTN: goal BP < 130/80. Continue Toprol 25 mg Daily , start low dose cardizem 120 mg daily       Changes today:  Continue diuresis Lasix 60 mg BID IVP         Electronically signed by JANI De Leon CNP on 1/25/2025 at 1:45 PM        Echo 1/23/2024    Image quality is adequate.    Left Ventricle: Diffcult to determine ejection fractin and wall motion due to arrhythmia. Low normal left ventricular systolic function with a visually estimated EF of 50 - 55%. Left ventricle size is normal. Mildly increased wall thickness. Septal wall segments appear slightly hypokinetic.    Aortic Valve: Sclerosis of the aortic valve cusps. Mild regurgitation.    Tricuspid Valve: Moderate to severe regurgitation.    Right Ventricle: Right ventricle is severely dilated. Lead present in the right ventricle. Hypokinetic RV free wall; possible right heart strain with Farmer's sign noted.    Right Atrium: Right atrium is severely dilated.    Pericardium: No pericardial effusion.

## 2025-01-26 LAB
ANION GAP SERPL CALCULATED.3IONS-SCNC: 16 MMOL/L (ref 9–17)
BUN SERPL-MCNC: 40 MG/DL (ref 7–20)
CALCIUM SERPL-MCNC: 8.4 MG/DL (ref 8.3–10.6)
CHLORIDE SERPL-SCNC: 97 MMOL/L (ref 99–110)
CO2 SERPL-SCNC: 20 MMOL/L (ref 21–32)
CREAT SERPL-MCNC: 1.6 MG/DL (ref 0.8–1.3)
EKG ATRIAL RATE: 159 BPM
EKG DIAGNOSIS: NORMAL
EKG Q-T INTERVAL: 252 MS
EKG QRS DURATION: 98 MS
EKG QTC CALCULATION (BAZETT): 399 MS
EKG R AXIS: -4 DEGREES
EKG T AXIS: 99 DEGREES
EKG VENTRICULAR RATE: 151 BPM
GFR, ESTIMATED: 43 ML/MIN/1.73M2
GLUCOSE BLD-MCNC: 173 MG/DL (ref 74–99)
GLUCOSE SERPL-MCNC: 187 MG/DL (ref 74–99)
MAGNESIUM SERPL-MCNC: 1.8 MG/DL (ref 1.8–2.4)
MICROORGANISM SPEC CULT: ABNORMAL
MICROORGANISM/AGENT SPEC: ABNORMAL
POTASSIUM SERPL-SCNC: 4.2 MMOL/L (ref 3.5–5.1)
SODIUM SERPL-SCNC: 134 MMOL/L (ref 136–145)
SPECIMEN DESCRIPTION: ABNORMAL

## 2025-01-26 PROCEDURE — 2500000003 HC RX 250 WO HCPCS: Performed by: NURSE PRACTITIONER

## 2025-01-26 PROCEDURE — 6360000002 HC RX W HCPCS: Performed by: INTERNAL MEDICINE

## 2025-01-26 PROCEDURE — 6360000002 HC RX W HCPCS

## 2025-01-26 PROCEDURE — 6370000000 HC RX 637 (ALT 250 FOR IP): Performed by: NURSE PRACTITIONER

## 2025-01-26 PROCEDURE — 2500000003 HC RX 250 WO HCPCS: Performed by: HOSPITALIST

## 2025-01-26 PROCEDURE — 6360000002 HC RX W HCPCS: Performed by: NURSE PRACTITIONER

## 2025-01-26 PROCEDURE — 6370000000 HC RX 637 (ALT 250 FOR IP): Performed by: INTERNAL MEDICINE

## 2025-01-26 PROCEDURE — 93005 ELECTROCARDIOGRAM TRACING: CPT | Performed by: FAMILY MEDICINE

## 2025-01-26 PROCEDURE — 6370000000 HC RX 637 (ALT 250 FOR IP): Performed by: STUDENT IN AN ORGANIZED HEALTH CARE EDUCATION/TRAINING PROGRAM

## 2025-01-26 PROCEDURE — 94640 AIRWAY INHALATION TREATMENT: CPT

## 2025-01-26 PROCEDURE — 2580000003 HC RX 258: Performed by: NURSE PRACTITIONER

## 2025-01-26 PROCEDURE — 94761 N-INVAS EAR/PLS OXIMETRY MLT: CPT

## 2025-01-26 PROCEDURE — 2500000003 HC RX 250 WO HCPCS: Performed by: INTERNAL MEDICINE

## 2025-01-26 PROCEDURE — 93010 ELECTROCARDIOGRAM REPORT: CPT | Performed by: INTERNAL MEDICINE

## 2025-01-26 PROCEDURE — 2500000003 HC RX 250 WO HCPCS: Performed by: FAMILY MEDICINE

## 2025-01-26 PROCEDURE — 2140000000 HC CCU INTERMEDIATE R&B

## 2025-01-26 PROCEDURE — 6370000000 HC RX 637 (ALT 250 FOR IP): Performed by: HOSPITALIST

## 2025-01-26 PROCEDURE — 99233 SBSQ HOSP IP/OBS HIGH 50: CPT | Performed by: INTERNAL MEDICINE

## 2025-01-26 PROCEDURE — 2700000000 HC OXYGEN THERAPY PER DAY

## 2025-01-26 RX ORDER — METOPROLOL SUCCINATE 50 MG/1
50 TABLET, EXTENDED RELEASE ORAL 2 TIMES DAILY
Status: DISCONTINUED | OUTPATIENT
Start: 2025-01-26 | End: 2025-01-31 | Stop reason: HOSPADM

## 2025-01-26 RX ORDER — LEVALBUTEROL INHALATION SOLUTION 0.63 MG/3ML
1.25 SOLUTION RESPIRATORY (INHALATION)
Status: DISCONTINUED | OUTPATIENT
Start: 2025-01-26 | End: 2025-01-26

## 2025-01-26 RX ORDER — DIGOXIN 0.25 MG/ML
250 INJECTION INTRAMUSCULAR; INTRAVENOUS EVERY 4 HOURS
Status: COMPLETED | OUTPATIENT
Start: 2025-01-26 | End: 2025-01-26

## 2025-01-26 RX ORDER — BUDESONIDE AND FORMOTEROL FUMARATE DIHYDRATE 80; 4.5 UG/1; UG/1
2 AEROSOL RESPIRATORY (INHALATION)
Status: DISCONTINUED | OUTPATIENT
Start: 2025-01-26 | End: 2025-01-31 | Stop reason: HOSPADM

## 2025-01-26 RX ORDER — DIGOXIN 250 MCG
500 TABLET ORAL ONCE
Status: COMPLETED | OUTPATIENT
Start: 2025-01-26 | End: 2025-01-26

## 2025-01-26 RX ORDER — IPRATROPIUM BROMIDE AND ALBUTEROL SULFATE 2.5; .5 MG/3ML; MG/3ML
1 SOLUTION RESPIRATORY (INHALATION) EVERY 4 HOURS PRN
Status: DISCONTINUED | OUTPATIENT
Start: 2025-01-26 | End: 2025-01-31 | Stop reason: HOSPADM

## 2025-01-26 RX ORDER — LEVALBUTEROL INHALATION SOLUTION 0.63 MG/3ML
0.63 SOLUTION RESPIRATORY (INHALATION)
Status: DISCONTINUED | OUTPATIENT
Start: 2025-01-26 | End: 2025-01-31 | Stop reason: HOSPADM

## 2025-01-26 RX ORDER — DILTIAZEM HYDROCHLORIDE 5 MG/ML
10 INJECTION INTRAVENOUS ONCE
Status: COMPLETED | OUTPATIENT
Start: 2025-01-26 | End: 2025-01-26

## 2025-01-26 RX ADMIN — METOPROLOL SUCCINATE 50 MG: 50 TABLET, FILM COATED, EXTENDED RELEASE ORAL at 21:24

## 2025-01-26 RX ADMIN — GABAPENTIN 300 MG: 300 CAPSULE ORAL at 21:23

## 2025-01-26 RX ADMIN — FUROSEMIDE 60 MG: 10 INJECTION, SOLUTION INTRAMUSCULAR; INTRAVENOUS at 17:32

## 2025-01-26 RX ADMIN — APIXABAN 5 MG: 5 TABLET, FILM COATED ORAL at 08:02

## 2025-01-26 RX ADMIN — DILTIAZEM HYDROCHLORIDE 10 MG: 5 INJECTION, SOLUTION INTRAVENOUS at 02:18

## 2025-01-26 RX ADMIN — MELATONIN TAB 3 MG 6 MG: 3 TAB at 21:23

## 2025-01-26 RX ADMIN — DOXYCYCLINE HYCLATE 100 MG: 100 TABLET, COATED ORAL at 01:16

## 2025-01-26 RX ADMIN — WATER 40 MG: 1 INJECTION INTRAMUSCULAR; INTRAVENOUS; SUBCUTANEOUS at 21:24

## 2025-01-26 RX ADMIN — ATORVASTATIN CALCIUM 10 MG: 10 TABLET, FILM COATED ORAL at 08:02

## 2025-01-26 RX ADMIN — LEVALBUTEROL HYDROCHLORIDE 0.63 MG: 0.63 SOLUTION RESPIRATORY (INHALATION) at 20:10

## 2025-01-26 RX ADMIN — LEVALBUTEROL HYDROCHLORIDE 0.63 MG: 0.63 SOLUTION RESPIRATORY (INHALATION) at 11:53

## 2025-01-26 RX ADMIN — DOXYCYCLINE HYCLATE 100 MG: 100 TABLET, COATED ORAL at 12:53

## 2025-01-26 RX ADMIN — WATER 40 MG: 1 INJECTION INTRAMUSCULAR; INTRAVENOUS; SUBCUTANEOUS at 05:21

## 2025-01-26 RX ADMIN — WATER 1000 MG: 1 INJECTION INTRAMUSCULAR; INTRAVENOUS; SUBCUTANEOUS at 12:52

## 2025-01-26 RX ADMIN — BUDESONIDE AND FORMOTEROL FUMARATE DIHYDRATE 2 PUFF: 80; 4.5 AEROSOL RESPIRATORY (INHALATION) at 20:09

## 2025-01-26 RX ADMIN — CYANOCOBALAMIN TAB 1000 MCG 1000 MCG: 1000 TAB at 21:24

## 2025-01-26 RX ADMIN — IPRATROPIUM BROMIDE 0.5 MG: 0.5 SOLUTION RESPIRATORY (INHALATION) at 16:08

## 2025-01-26 RX ADMIN — BUDESONIDE AND FORMOTEROL FUMARATE DIHYDRATE 2 PUFF: 80; 4.5 AEROSOL RESPIRATORY (INHALATION) at 08:24

## 2025-01-26 RX ADMIN — IPRATROPIUM BROMIDE 0.5 MG: 0.5 SOLUTION RESPIRATORY (INHALATION) at 11:53

## 2025-01-26 RX ADMIN — SODIUM CHLORIDE, PRESERVATIVE FREE 10 ML: 5 INJECTION INTRAVENOUS at 08:02

## 2025-01-26 RX ADMIN — DIGOXIN 250 MCG: 0.25 INJECTION INTRAMUSCULAR; INTRAVENOUS at 12:53

## 2025-01-26 RX ADMIN — SODIUM CHLORIDE, PRESERVATIVE FREE 10 ML: 5 INJECTION INTRAVENOUS at 21:24

## 2025-01-26 RX ADMIN — ASPIRIN 81 MG CHEWABLE TABLET 81 MG: 81 TABLET CHEWABLE at 08:02

## 2025-01-26 RX ADMIN — DIGOXIN 500 MCG: 250 TABLET ORAL at 05:21

## 2025-01-26 RX ADMIN — SODIUM CHLORIDE 2.5 MG/HR: 900 INJECTION, SOLUTION INTRAVENOUS at 08:10

## 2025-01-26 RX ADMIN — SODIUM CHLORIDE 12.5 MG/HR: 900 INJECTION, SOLUTION INTRAVENOUS at 18:14

## 2025-01-26 RX ADMIN — APIXABAN 5 MG: 5 TABLET, FILM COATED ORAL at 21:24

## 2025-01-26 RX ADMIN — IPRATROPIUM BROMIDE 0.5 MG: 0.5 SOLUTION RESPIRATORY (INHALATION) at 20:10

## 2025-01-26 RX ADMIN — DIGOXIN 250 MCG: 0.25 INJECTION INTRAMUSCULAR; INTRAVENOUS at 09:17

## 2025-01-26 RX ADMIN — WATER 40 MG: 1 INJECTION INTRAMUSCULAR; INTRAVENOUS; SUBCUTANEOUS at 12:52

## 2025-01-26 RX ADMIN — FUROSEMIDE 60 MG: 10 INJECTION, SOLUTION INTRAMUSCULAR; INTRAVENOUS at 08:01

## 2025-01-26 RX ADMIN — PANTOPRAZOLE SODIUM 40 MG: 40 TABLET, DELAYED RELEASE ORAL at 05:21

## 2025-01-26 RX ADMIN — FOLIC ACID 500 MCG: 1 TABLET ORAL at 08:02

## 2025-01-26 RX ADMIN — Medication 1 CAPSULE: at 09:18

## 2025-01-26 NOTE — PLAN OF CARE
Problem: Chronic Conditions and Co-morbidities  Goal: Patient's chronic conditions and co-morbidity symptoms are monitored and maintained or improved  1/26/2025 1233 by Frandy Aldana RN  Outcome: Progressing  1/25/2025 2301 by Jorge L Howell RN  Outcome: Progressing     Problem: Discharge Planning  Goal: Discharge to home or other facility with appropriate resources  1/26/2025 1233 by Frandy Aldana RN  Outcome: Progressing  1/25/2025 2301 by Jorge L Howell RN  Outcome: Progressing     Problem: Safety - Adult  Goal: Free from fall injury  1/26/2025 1233 by Frandy Aldana RN  Outcome: Progressing  1/25/2025 2301 by Jorge L Howell RN  Outcome: Progressing

## 2025-01-26 NOTE — PLAN OF CARE
Problem: Chronic Conditions and Co-morbidities  Goal: Patient's chronic conditions and co-morbidity symptoms are monitored and maintained or improved  1/25/2025 2301 by Jorge L Howell RN  Outcome: Progressing  1/25/2025 1014 by Frandy Aldana RN  Outcome: Progressing     Problem: Discharge Planning  Goal: Discharge to home or other facility with appropriate resources  1/25/2025 2301 by Jorge L Howell RN  Outcome: Progressing  1/25/2025 1014 by Frandy Aldana RN  Outcome: Progressing     Problem: Safety - Adult  Goal: Free from fall injury  1/25/2025 2301 by Jorge L Howell RN  Outcome: Progressing  1/25/2025 1014 by Frandy Aldana RN  Outcome: Progressing

## 2025-01-26 NOTE — PLAN OF CARE
Notified by nursing staff due to Afib w/ RVR. Patient seen at bedside, co complaints, currently on HFNC. Given hx of ILD will avoid amio due to pulmonary toxicity and trial Digoxin as BP borderline low with no relief of IV push of dilt.      Barrington Nelson MD  Night Physician

## 2025-01-26 NOTE — FLOWSHEET NOTE
Rapid Resource RN rounded on pt for DI score of 67.  Pt sitting up in chair getting breathing tx talking to Resp Therapy.  Pt has no c/o pain.  Pt HR in the 140's and 150's afib.  Nurse  hung Cardizem at 0810 at 2.5mg/hr. Can titrate every 30min per order.  PT has no needs at this time.

## 2025-01-26 NOTE — PROGRESS NOTES
Cardiology Progress Note     Admit Date:  1/23/2025    Consult reason/ Seen today for :       Subjective and  Overnight Events : Still requiring significant amount of oxygen on Vapotherm      Chief complain on admission : 79 y.o.year old who is admitted for  Chief Complaint   Patient presents with    Shortness of Breath     Hx of COPD and pulmonary fibrosis- was in the 70's on EMS arrival       Assessment / Plan:  Heart failure with preserved EF greater 55% on Lasix 60 twice daily continue to monitor closely for electrolyte balance Daily weight  Coronary artery disease s/p PCI to LAD last cardiac cath in April 2024 reviewed and interpreted continue risk factor  Will get echo dilated right side on CT scan repeat  Atrial fibrillation cardioverted in 2018 failed Tikosyn continue Eliquis 5 mg twice daily keep electrolytes within normal range continue rate control strategy  HTN: stable, continue To titrate up medication as needed, reviewed continue Toprol-XL and Cardizem 120 mg daily for rate  DVT Prophylaxis if no contraindication  Discussed with primary team, hospitalist service, bedside nursing staff and family  Past medical history:    has a past medical history of Allergic rhinitis, Asthma, Atrial fibrillation (HCC), CAD (coronary artery disease), COPD (chronic obstructive pulmonary disease) (HCC), COVID-19, Depression, Diverticulosis of colon, Family history of early CAD, Family history of valvular heart disease, GERD (gastroesophageal reflux disease), H/O 24 hour EKG monitoring, H/O echocardiogram, H/O echocardiogram, History of diverticulitis of colon, History of Holter monitoring, History of repair of left rotator cuff, [place] (hard of hearing), Hx of cardiac cath, Hx of cardiovascular stress test, HX OTHER MEDICAL, Hyperlipidemia, Hypertension, Hypoxemia, Irregular heart rate, Palpitations, Pneumonia, Post-COVID-19 condition, Restless

## 2025-01-26 NOTE — PROGRESS NOTES
V2.0    [facility] Progress Note      Name:  Johnnie Smith /Age/Sex: 1945  (79 y.o. male)   MRN & CSN:  [id number] & [id number] Encounter Date/Time: 2025 9:52 AM EST   Location:  3107/3107-A PCP: Claudio Umanzor PA     Attending:Darius Gonzalez MD       Hospital Day: 4    Assessment and Recommendations   Johnnie Smith is a 79 y.o. male who presents with Congestive heart failure of unknown etiology (HCC)      Plan:     Acute on chronic hypoxic respiratory failure  -Suspect exacerbation of chronic diastolic heart failure as etiology.  -Troponins are mildly elevated.  Cardiology consulted.  Lasix increased to 60 mg twice daily.  Having good urine output.  Continue monitoring volume status and adjusting diuretics as needed.  Strict ins and outs.  Daily weights.  -As per cardiology will need ischemia workup given history of CAD and now with worsening heart failure.  -Wean supplemental oxygen to maintain O2 sat between 88 and 92%.    Atrial fibrillation with rapid ventricular response  -Has a history of paroxysmal A-fib.  -Rate uncontrolled overnight.  Suspect in the setting of worsening respiratory function.  Given digoxin IV push.  Started on diltiazem drip this morning and plan for further digoxin loading.  -Continue Eliquis    Elevated troponin.  History of CAD status post complex PCI  -As above suspect from demand however will need ischemia workup when respiratory status is more stable.  -Continue aspirin, statin for now.    Hypokalemia  -Replenished with aggressive oral and IV supplementation while on IV Lasix.    Recent fall  -Head CT negative.    Atrial fibrillation  -Continue Eliquis, diltiazem.    Interstitial lung disease  - Suspect worsening oxygen requirement likely from decompensated heart failure.  Pulmonology saw this patient and started on steroids given concern for bronchospasm.    Hypertension  Hyperlipidemia    Diet ADULT DIET; Regular; Low Fat/Low Chol/High Fiber/2 gm Na; Low Sodium (2    Recent Labs     01/23/25  1338 01/25/25  0431   AST 27 24   ALT 17 15   BILITOT 1.3* 0.9   ALKPHOS 70 70     Lipids:   Lab Results   Component Value Date/Time    CHOL 121 01/24/2025 02:41 AM    HDL 50 01/24/2025 02:41 AM    TRIG 51 01/24/2025 02:41 AM     Hemoglobin A1C:   Lab Results   Component Value Date/Time    LABA1C 5.2 11/02/2022 10:15 AM     TSH:   Lab Results   Component Value Date/Time    TSH 4.83 01/23/2025 07:58 PM     Troponin:   Lab Results   Component Value Date/Time    TROPONINT <0.010 12/31/2021 10:30 AM    TROPONINT <0.010 12/30/2021 04:02 PM    TROPONINT <0.010 10/17/2020 02:46 PM     BNP:   Recent Labs     01/23/25  1338 01/25/25  0431   PROBNP 4,829* 2,277*     UA:  Lab Results   Component Value Date/Time    NITRU NEGATIVE 06/27/2024 03:31 PM    COLORU YELLOW 06/27/2024 03:31 PM    PHUR 6.5 06/27/2024 03:31 PM    WBCUA <1 10/17/2020 05:59 PM    RBCUA NONE SEEN 10/17/2020 05:59 PM    TRICHOMONAS NONE SEEN 10/17/2020 05:59 PM    BACTERIA NEGATIVE 10/17/2020 05:59 PM    CLARITYU CLEAR 06/27/2024 03:31 PM    LEUKOCYTESUR NEGATIVE 06/27/2024 03:31 PM    UROBILINOGEN 1.0 06/27/2024 03:31 PM    BILIRUBINUR NEGATIVE 06/27/2024 03:31 PM    BLOODU NEGATIVE 06/27/2024 03:31 PM    GLUCOSEU NEGATIVE 06/27/2024 03:31 PM    KETUA NEGATIVE 06/27/2024 03:31 PM         Electronically signed by Darius Gonzalez MD on 1/26/2025 at 9:27 AM

## 2025-01-26 NOTE — PROGRESS NOTES
Pulmonary and Critical Care  Progress Note    Subjective:   The patient has slightly improved.Requiring less O2.On 50%.Had tachycardia,Switched to Xopenex.  Shortness of breath has improved  Chest pain none.  Addressing respiratory complaints Patient is negative for  hemoptysis and cyanosis  CONSTITUTIONAL:  negative for fevers and chills      Past Medical History:     has a past medical history of Allergic rhinitis, Asthma, Atrial fibrillation (HCC), CAD (coronary artery disease), COPD (chronic obstructive pulmonary disease) (HCC), COVID-19, Depression, Diverticulosis of colon, Family history of early CAD, Family history of valvular heart disease, GERD (gastroesophageal reflux disease), H/O 24 hour EKG monitoring, H/O echocardiogram, H/O echocardiogram, History of diverticulitis of colon, History of Holter monitoring, History of repair of left rotator cuff, [place] (hard of hearing), Hx of cardiac cath, Hx of cardiovascular stress test, HX OTHER MEDICAL, Hyperlipidemia, Hypertension, Hypoxemia, Irregular heart rate, Palpitations, Pneumonia, Post-COVID-19 condition, Restless leg syndrome, S/P placement of cardiac pacemaker, Shortness of breath, and Tricuspid valve regurgitation.   has a past surgical history that includes Rotator cuff repair (2007, 2006); Bone Resection, Rib; Nerve Surgery; Shoulder arthroscopy (Right, 12/01/2010); Diagnostic Cardiac Cath Lab Procedure (07/08/2013); Colonoscopy (10/14/2014); Endoscopy, colon, diagnostic (10/14/2014); Rotator cuff repair (Left, 11/18/2015); Pacemaker insertion (Left, 04/01/2021); Cataract removal (Bilateral); cervical fusion (N/A, 11/18/2022); Cardiac procedure (N/A, 4/26/2024); and Cardiac procedure (N/A, 4/26/2024).   reports that he quit smoking about 31 years ago. His smoking use included cigarettes. He started smoking about 51 years ago. He has a 20 pack-year smoking history. His smokeless tobacco use includes chew. He reports that he does not drink alcohol and does

## 2025-01-26 NOTE — PROGRESS NOTES
Cardiology Progress Note     Admit Date:  1/23/2025    Consult reason/ Seen today for :       Subjective and  Overnight Events : He is in A-fib with heart rate up to 110 120 on minimal movement or while eating   still requiring significant amount of oxygen on Vapotherm      Chief complain on admission : 79 y.o.year old who is admitted for  Chief Complaint   Patient presents with    Shortness of Breath     Hx of COPD and pulmonary fibrosis- was in the 70's on EMS arrival       Assessment / Plan:  Heart failure with preserved EF greater 55% on Lasix 60 twice daily continue to monitor closely for electrolyte balance Daily weight  Start Toprol-XL 50 mg twice daily with holding parameters  Converted to A-fib hence started on a Cardizem drip  Coronary artery disease s/p PCI to LAD last cardiac cath in April 2024 reviewed and interpreted continue risk factor  Will get echo dilated right side on CT scan repeat  Atrial fibrillation cardioverted in 2018 failed Tikosyn continue Eliquis 5 mg twice daily keep electrolytes within normal range continue rate control strategy  HTN: stable, continue To titrate up medication as needed, reviewed continue Toprol-XL and Cardizem 120 mg daily for rate  DVT Prophylaxis if no contraindication  Discussed with primary team, hospitalist service, bedside nursing staff and family  Past medical history:    has a past medical history of Allergic rhinitis, Asthma, Atrial fibrillation (HCC), CAD (coronary artery disease), COPD (chronic obstructive pulmonary disease) (HCC), COVID-19, Depression, Diverticulosis of colon, Family history of early CAD, Family history of valvular heart disease, GERD (gastroesophageal reflux disease), H/O 24 hour EKG monitoring, H/O echocardiogram, H/O echocardiogram, History of diverticulitis of colon, History of Holter monitoring, History of repair of left rotator cuff, [place] (hard of hearing),

## 2025-01-26 NOTE — PROGRESS NOTES
Cardizem gtt started at this time at 2.5mg/hr. Current BP 91/72, HR 150s-160s. Instructed telemetry to notify nurse if SBP drops below 90. Educated patient to notify nurse right away if IV is beeping. Patient voiced understanding.

## 2025-01-27 ENCOUNTER — APPOINTMENT (OUTPATIENT)
Dept: GENERAL RADIOLOGY | Age: 80
End: 2025-01-27
Payer: MEDICARE

## 2025-01-27 LAB
ANION GAP SERPL CALCULATED.3IONS-SCNC: 15 MMOL/L (ref 9–17)
BNP SERPL-MCNC: 2661 PG/ML (ref 0–450)
BUN SERPL-MCNC: 49 MG/DL (ref 7–20)
CALCIUM SERPL-MCNC: 9 MG/DL (ref 8.3–10.6)
CHLORIDE SERPL-SCNC: 98 MMOL/L (ref 99–110)
CO2 SERPL-SCNC: 24 MMOL/L (ref 21–32)
CREAT SERPL-MCNC: 1.7 MG/DL (ref 0.8–1.3)
GFR, ESTIMATED: 41 ML/MIN/1.73M2
GLUCOSE SERPL-MCNC: 167 MG/DL (ref 74–99)
MAGNESIUM SERPL-MCNC: 2.1 MG/DL (ref 1.8–2.4)
POTASSIUM SERPL-SCNC: 4.2 MMOL/L (ref 3.5–5.1)
SODIUM SERPL-SCNC: 136 MMOL/L (ref 136–145)

## 2025-01-27 PROCEDURE — 2500000003 HC RX 250 WO HCPCS: Performed by: INTERNAL MEDICINE

## 2025-01-27 PROCEDURE — 80048 BASIC METABOLIC PNL TOTAL CA: CPT

## 2025-01-27 PROCEDURE — 94761 N-INVAS EAR/PLS OXIMETRY MLT: CPT

## 2025-01-27 PROCEDURE — 6360000002 HC RX W HCPCS: Performed by: INTERNAL MEDICINE

## 2025-01-27 PROCEDURE — 83880 ASSAY OF NATRIURETIC PEPTIDE: CPT

## 2025-01-27 PROCEDURE — 36415 COLL VENOUS BLD VENIPUNCTURE: CPT

## 2025-01-27 PROCEDURE — 6360000002 HC RX W HCPCS

## 2025-01-27 PROCEDURE — 2500000003 HC RX 250 WO HCPCS: Performed by: HOSPITALIST

## 2025-01-27 PROCEDURE — 2700000000 HC OXYGEN THERAPY PER DAY

## 2025-01-27 PROCEDURE — 6370000000 HC RX 637 (ALT 250 FOR IP): Performed by: INTERNAL MEDICINE

## 2025-01-27 PROCEDURE — 2500000003 HC RX 250 WO HCPCS: Performed by: NURSE PRACTITIONER

## 2025-01-27 PROCEDURE — 71045 X-RAY EXAM CHEST 1 VIEW: CPT

## 2025-01-27 PROCEDURE — 99233 SBSQ HOSP IP/OBS HIGH 50: CPT | Performed by: INTERNAL MEDICINE

## 2025-01-27 PROCEDURE — 94640 AIRWAY INHALATION TREATMENT: CPT

## 2025-01-27 PROCEDURE — 2580000003 HC RX 258: Performed by: NURSE PRACTITIONER

## 2025-01-27 PROCEDURE — 83735 ASSAY OF MAGNESIUM: CPT

## 2025-01-27 PROCEDURE — 2140000000 HC CCU INTERMEDIATE R&B

## 2025-01-27 PROCEDURE — 94010 BREATHING CAPACITY TEST: CPT

## 2025-01-27 PROCEDURE — 6370000000 HC RX 637 (ALT 250 FOR IP): Performed by: HOSPITALIST

## 2025-01-27 PROCEDURE — 6370000000 HC RX 637 (ALT 250 FOR IP): Performed by: NURSE PRACTITIONER

## 2025-01-27 RX ORDER — TRAZODONE HYDROCHLORIDE 50 MG/1
50 TABLET, FILM COATED ORAL NIGHTLY PRN
Status: DISCONTINUED | OUTPATIENT
Start: 2025-01-27 | End: 2025-01-31 | Stop reason: HOSPADM

## 2025-01-27 RX ORDER — SENNA AND DOCUSATE SODIUM 50; 8.6 MG/1; MG/1
2 TABLET, FILM COATED ORAL 2 TIMES DAILY PRN
Status: DISCONTINUED | OUTPATIENT
Start: 2025-01-27 | End: 2025-01-31 | Stop reason: HOSPADM

## 2025-01-27 RX ADMIN — IPRATROPIUM BROMIDE 0.5 MG: 0.5 SOLUTION RESPIRATORY (INHALATION) at 08:11

## 2025-01-27 RX ADMIN — ATORVASTATIN CALCIUM 10 MG: 10 TABLET, FILM COATED ORAL at 08:40

## 2025-01-27 RX ADMIN — DOXYCYCLINE HYCLATE 100 MG: 100 TABLET, COATED ORAL at 12:34

## 2025-01-27 RX ADMIN — FOLIC ACID 500 MCG: 1 TABLET ORAL at 08:40

## 2025-01-27 RX ADMIN — IPRATROPIUM BROMIDE 0.5 MG: 0.5 SOLUTION RESPIRATORY (INHALATION) at 16:03

## 2025-01-27 RX ADMIN — TRAZODONE HYDROCHLORIDE 50 MG: 50 TABLET ORAL at 23:17

## 2025-01-27 RX ADMIN — METOPROLOL SUCCINATE 50 MG: 50 TABLET, FILM COATED, EXTENDED RELEASE ORAL at 20:19

## 2025-01-27 RX ADMIN — METOPROLOL SUCCINATE 50 MG: 50 TABLET, FILM COATED, EXTENDED RELEASE ORAL at 08:40

## 2025-01-27 RX ADMIN — LEVALBUTEROL HYDROCHLORIDE 0.63 MG: 0.63 SOLUTION RESPIRATORY (INHALATION) at 19:38

## 2025-01-27 RX ADMIN — DOXYCYCLINE HYCLATE 100 MG: 100 TABLET, COATED ORAL at 01:07

## 2025-01-27 RX ADMIN — LEVALBUTEROL HYDROCHLORIDE 0.63 MG: 0.63 SOLUTION RESPIRATORY (INHALATION) at 16:03

## 2025-01-27 RX ADMIN — BUDESONIDE AND FORMOTEROL FUMARATE DIHYDRATE 2 PUFF: 80; 4.5 AEROSOL RESPIRATORY (INHALATION) at 19:39

## 2025-01-27 RX ADMIN — SODIUM CHLORIDE 12.5 MG/HR: 900 INJECTION, SOLUTION INTRAVENOUS at 01:06

## 2025-01-27 RX ADMIN — APIXABAN 5 MG: 5 TABLET, FILM COATED ORAL at 20:19

## 2025-01-27 RX ADMIN — FUROSEMIDE 60 MG: 10 INJECTION, SOLUTION INTRAMUSCULAR; INTRAVENOUS at 08:40

## 2025-01-27 RX ADMIN — MELATONIN TAB 3 MG 6 MG: 3 TAB at 23:17

## 2025-01-27 RX ADMIN — SENNOSIDES AND DOCUSATE SODIUM 2 TABLET: 50; 8.6 TABLET ORAL at 17:20

## 2025-01-27 RX ADMIN — TRAZODONE HYDROCHLORIDE 50 MG: 50 TABLET ORAL at 01:16

## 2025-01-27 RX ADMIN — LEVALBUTEROL HYDROCHLORIDE 0.63 MG: 0.63 SOLUTION RESPIRATORY (INHALATION) at 08:11

## 2025-01-27 RX ADMIN — PANTOPRAZOLE SODIUM 40 MG: 40 TABLET, DELAYED RELEASE ORAL at 06:28

## 2025-01-27 RX ADMIN — GABAPENTIN 300 MG: 300 CAPSULE ORAL at 20:19

## 2025-01-27 RX ADMIN — WATER 1000 MG: 1 INJECTION INTRAMUSCULAR; INTRAVENOUS; SUBCUTANEOUS at 12:34

## 2025-01-27 RX ADMIN — WATER 40 MG: 1 INJECTION INTRAMUSCULAR; INTRAVENOUS; SUBCUTANEOUS at 06:29

## 2025-01-27 RX ADMIN — WATER 40 MG: 1 INJECTION INTRAMUSCULAR; INTRAVENOUS; SUBCUTANEOUS at 20:16

## 2025-01-27 RX ADMIN — IPRATROPIUM BROMIDE 0.5 MG: 0.5 SOLUTION RESPIRATORY (INHALATION) at 12:00

## 2025-01-27 RX ADMIN — IPRATROPIUM BROMIDE 0.5 MG: 0.5 SOLUTION RESPIRATORY (INHALATION) at 19:38

## 2025-01-27 RX ADMIN — DOXYCYCLINE HYCLATE 100 MG: 100 TABLET, COATED ORAL at 23:17

## 2025-01-27 RX ADMIN — APIXABAN 5 MG: 5 TABLET, FILM COATED ORAL at 08:40

## 2025-01-27 RX ADMIN — BUDESONIDE AND FORMOTEROL FUMARATE DIHYDRATE 2 PUFF: 80; 4.5 AEROSOL RESPIRATORY (INHALATION) at 08:16

## 2025-01-27 RX ADMIN — SODIUM CHLORIDE, PRESERVATIVE FREE 10 ML: 5 INJECTION INTRAVENOUS at 08:40

## 2025-01-27 RX ADMIN — LEVALBUTEROL HYDROCHLORIDE 0.63 MG: 0.63 SOLUTION RESPIRATORY (INHALATION) at 11:59

## 2025-01-27 RX ADMIN — Medication 1 CAPSULE: at 10:07

## 2025-01-27 RX ADMIN — ASPIRIN 81 MG CHEWABLE TABLET 81 MG: 81 TABLET CHEWABLE at 08:40

## 2025-01-27 RX ADMIN — CYANOCOBALAMIN TAB 1000 MCG 1000 MCG: 1000 TAB at 20:19

## 2025-01-27 RX ADMIN — WATER 40 MG: 1 INJECTION INTRAMUSCULAR; INTRAVENOUS; SUBCUTANEOUS at 12:34

## 2025-01-27 ASSESSMENT — COPD QUESTIONNAIRES
QUESTION8_ENERGYLEVEL: 3
CAT_TOTALSCORE: 18
QUESTION5_HOMEACTIVITIES: 5
TOTAL_EXACERBATIONS_PASTYEAR: 1
QUESTION3_CHESTTIGHTNESS: 0
QUESTION6_LEAVINGHOUSE: 0
QUESTION2_CHESTPHLEGM: 0
QUESTION1_COUGHFREQUENCY: 0
QUESTION7_SLEEPQUALITY: 5
QUESTION4_WALKINCLINE: 5
GOLD_GROUP: GROUP B

## 2025-01-27 ASSESSMENT — PAIN SCALES - GENERAL
PAINLEVEL_OUTOF10: 0

## 2025-01-27 ASSESSMENT — PULMONARY FUNCTION TESTS
POST BRONCHODILATOR FEV1/FVC: 75
PIF_VALUE: 120
FEV1 (%PREDICTED): 57

## 2025-01-27 NOTE — PROGRESS NOTES
this visit.    Assessment:     Patient Active Problem List   Diagnosis    Depression, major, in remission ([facility])    Obesity (BMI 30-39.9)    Restless leg syndrome    HTN (hypertension)    Hyperlipidemia    Eczema    Tobacco use    COPD with hypoxia ([facility])    Dyspnea    Diverticulosis    Nystagmus    Oxygen desaturation    Chronic systolic congestive heart failure ([facility])    PAF (paroxysmal atrial fibrillation) ([facility])    Other insomnia    Other headache syndrome    Sick sinus syndrome ([facility])    Acute on chronic respiratory failure with hypoxia    Visit for monitoring Tikosyn therapy    Cervical spondylosis with myelopathy    Hypercoagulable state due to persistent atrial fibrillation ([facility])    Ex-cigarette smoker    NSIP (nonspecific interstitial pneumonia) ([facility])    Hypersomnia    SOBOE (shortness of breath on exertion)    Hypoxia    Anemia    A-fib ([facility])    CAD (coronary artery disease)    Atherosclerotic heart disease of native coronary artery with unspecified angina pectoris    Hemoptysis    UIP (usual interstitial pneumonitis) ([facility])    Congestive heart failure of unknown etiology ([facility])       Plan:   1. Overall the patient has improved.  2. Wean FiO2.  3. Inc activity.    Manolo Tellez MD   1/27/2025  10:46 AM

## 2025-01-27 NOTE — PROGRESS NOTES
Current GOLD classification       GOLD Stage:    Group:  B  Recorded domestic exacerbations past 12 months:  1  Current recorded COPD Assessment Tool (CAT) score of  18  Current eosinophil count: 0.4     Inhaler Device   Acceptable for Use   Respimat  Not Breath Actuated Yes   MDI  Not Breath Actuated Yes           DPI  Observed PIF   using  In-Check Meter   Optimal PIF   Acceptable for Use   HANDIHALER 120 >30 Y   Pressair 120 >45 Y   NEOHALER 120 >50 Y   Diskus 120 >60 Y   ELLIPTA 120 >60 Y     Records show this patient was using TRELEGY / ALBUTEROL maintenance therapy prior to admission.          LONG-ACTING (LABA)   Arformoterol (Brovana) NEBULIZER   Indacaterol (Arcapta) NEOHALER   Olodaterol (Striverdi) Respimat   Salmeterol (Serevent) MDI, DISKUS   LONG-ACTING (LAMA)   Aclidinium bromide (Tudorza) PRESSAIR   Glycopyrronium bromide (Seebri) NEOHALER   Tiotropium (Spiriva) Respimat, HANDIHALER   Umeclidinium (Incruse) ELLIPTA   (LABA/LAMA)   Formoterol/glycopyrronium (Bevespi) MDI   Indacaterol/glycopyrronium (Utibron) NEOHALER   Vilanterol/umeclidinium (Anoro) ELLIPTA   Olodaterol/tiotropium (Stiolto) Respimat   (LABA/ICS)   Formoterol/budesomide (Symbicort) MDI   Formoterol/mometasone (Dulera) MDI   Salmeterol/fluticasone (Advair) MDI, DISKUS   Vilanterol/fluticasone (Breo) ELLIPTA   (LABA/LAMA/ICS)   Fluticasone/umeclidinium/vilanterol (Trelegy) ELLIPTA   Budesonide/glycopyrrolate/formoterol fumarate (Beztri) aerosphere      01/27/25 9702   Spirometry Assessment   FEV1 (%PRED) 57   Post Bronchodilator FEV/FVC 75   COPD Exacerbations in last year 1    L/min   COPD Assessment (CAT Score)   Cough Assessment 0   Phlegm Assessment 0   Chest tightness 0   Walking on an incline 5   Home Activities 5   Confident Leaving The Home 0   Sleeping Soundly 5   Have Energy 3   Assessment Score 18   $RT COPD Assessment Yes   GOLD Staging   Group Group B

## 2025-01-27 NOTE — CARE COORDINATION
.CM met with pt for d/c planning.  Introduced self, updated white board and explained role of CM.  Pt lives with his wife in a 1 story home and is independent with ADL's.  Pt drives, has a PCP, has insurance, and is able to afford his medication. Pt still works 3 days a week.  He states that he has an O2 concentrator and is on 9L O2 at night.  He has a portable O2 tank that he uses during the day prn.  He has a rollator walker and a standard walker that he does not use. Discussed the possible need for LTAC with pt d/t he is on vapotherm and he adamantly refused.  He states \"No, I am not going there, I am going home.\"   [facility] offered and pt declined.  He states that he has good support from his family and friends.  Pt denies any d/c needs at this time.   D/c plan is home with spouse, no needs.  Notify CM if any d/c needs arise.  TE       01/27/25 0179   Service Assessment   Patient Orientation Alert and Oriented   Cognition Alert   History Provided By Patient   Primary Caregiver Self   Support Systems Spouse/Significant Other;Family Members;Friends/Neighbors   Patient's Healthcare Decision Maker is: Legal Next of Kin  (SELF)   PCP Verified by CM Yes   Last Visit to PCP Within last 3 months   Prior Functional Level Independent in ADLs/IADLs   Current Functional Level Independent in ADLs/IADLs   Can patient return to prior living arrangement Yes   Ability to make needs known: Good   Family able to assist with home care needs: Yes   Financial Resources Medicare   Community Resources None   Social/Functional History   Lives With Spouse   Type of Home House   Home Layout One level   Bathroom Shower/Tub Walk-in shower   Bathroom Toilet Standard   Bathroom Equipment None   Bathroom Accessibility Accessible   Home Equipment Rollator;Oxygen;Walker - Standard   Receives Help From Family   Prior Level of Assist for ADLs Independent   Prior Level of Assist for Homemaking Independent   Ambulation Assistance Independent   Prior

## 2025-01-27 NOTE — PLAN OF CARE
Problem: Chronic Conditions and Co-morbidities  Goal: Patient's chronic conditions and co-morbidity symptoms are monitored and maintained or improved  1/26/2025 2323 by Tasha Pena RN  Outcome: Progressing  1/26/2025 1233 by Frandy Aldana RN  Outcome: Progressing     Problem: Discharge Planning  Goal: Discharge to home or other facility with appropriate resources  1/26/2025 2323 by Tasha Pena RN  Outcome: Progressing  1/26/2025 1233 by Frandy Aldana RN  Outcome: Progressing     Problem: Safety - Adult  Goal: Free from fall injury  1/26/2025 2323 by Tasha Pena RN  Outcome: Progressing  1/26/2025 1233 by Frandy Aldana RN  Outcome: Progressing

## 2025-01-27 NOTE — PROGRESS NOTES
Cardiology Progress Note     Admit Date:  1/23/2025    Consult reason/ Seen today for :       Subjective and  Overnight Events : He is in A-fib with heart rate up to 110 120 on minimal movement or while eating but then settles down to 60s while he is resting   still requiring significant amount of oxygen on Vapotherm      Chief complain on admission : 79 y.o.year old who is admitted for  Chief Complaint   Patient presents with    Shortness of Breath     Hx of COPD and pulmonary fibrosis- was in the 70's on EMS arrival       Assessment / Plan:  Heart failure with preserved EF greater 55% on Lasix 60 twice daily continue to monitor closely for electrolyte balance Daily weight  Start Toprol-XL 50 mg twice daily with holding parameters  Converted to A-fib hence started on a Cardizem drip  Coronary artery disease s/p PCI to LAD last cardiac cath in April 2024 reviewed and interpreted continue risk factor  Will get echo dilated right side on CT scan repeat  Atrial fibrillation cardioverted in 2018 failed Tikosyn continue Eliquis 5 mg twice daily keep electrolytes within normal range continue rate control strategy  HTN: stable, continue To titrate up medication as needed, reviewed continue Toprol-XL and Cardizem 120 mg daily for rate  DVT Prophylaxis if no contraindication  Discussed with primary team, hospitalist service, bedside nursing staff and family  Past medical history:    has a past medical history of Allergic rhinitis, Asthma, Atrial fibrillation (HCC), CAD (coronary artery disease), COPD (chronic obstructive pulmonary disease) (HCC), COVID-19, Depression, Diverticulosis of colon, Family history of early CAD, Family history of valvular heart disease, GERD (gastroesophageal reflux disease), H/O 24 hour EKG monitoring, H/O echocardiogram, H/O echocardiogram, History of diverticulitis of colon, History of Holter monitoring, History of    Intake 840 ml   Output 800 ml   Net 40 ml     Physical Exam:  Constitutional:  Well developed, Well nourished, No acute distress, Non-toxic appearance.   HENT:  Normocephalic, Atraumatic, Bilateral external ears normal, Oropharynx moist, No oral exudates, Nose normal. Neck-  Supple, No stridor.   Eyes:  PERRL, EOMI, Conjunctiva normal, No discharge.   Respiratory:  Normal breath sounds, No respiratory distress, No wheezing, No chest tenderness.   Cardiovascular:  Normal heart rate, Normal rhythm, No murmurs, No rubs, No gallops, JVP not elevated  Abdomen/GI:  Bowel sounds normal, Soft, No tenderness, No masses, No pulsatile masses.     Musculoskeletal:  No edema, No tenderness, No cyanosis, No clubbing. Good range of motion in all major joints. No tenderness to palpation   Integument:  Warm, Dry, No erythema, No rash.   Lymphatic:  No lymphadenopathy noted.   Neurologic:  Alert & oriented x 3, Normal motor function, Normal sensory function, No focal deficits noted.   Psychiatric:  Affect  and  Mood :no change    Medications:    budesonide-formoterol  2 puff Inhalation BID RT    ipratropium  0.5 mg Nebulization 4x Daily RT    levalbuterol  0.63 mg Nebulization Q4H WA RT    metoprolol succinate  50 mg Oral BID    [Held by provider] furosemide  60 mg IntraVENous BID    methylPREDNISolone  40 mg IntraVENous Q8H    cefTRIAXone (ROCEPHIN) IV  1,000 mg IntraVENous Q24H    doxycycline hyclate  100 mg Oral Q12H    aspirin  81 mg Oral Daily    apixaban  5 mg Oral BID    gabapentin  300 mg Oral Nightly    [Held by provider] escitalopram  20 mg Oral Daily    [Held by provider] mirtazapine  7.5 mg Oral Nightly    atorvastatin  10 mg Oral Daily    vitamin B-12  1,000 mcg Oral Daily    folic acid  500 mcg Oral Daily    pantoprazole  40 mg Oral QAM AC    b complex vitamins  1 capsule Oral Daily    sodium chloride flush  5-40 mL IntraVENous 2 times per day      sodium chloride 20 mL/hr at 01/24/25 0347     traZODone, ipratropium

## 2025-01-27 NOTE — PROGRESS NOTES
V2.0    [facility] Progress Note      Name:  Johnnie Smith /Age/Sex: 1945  (79 y.o. male)   MRN & CSN:  [id number] & [id number] Encounter Date/Time: 2025 9:52 AM EST   Location:  [facility] PCP: Claudio Umanzor PA     Attending:Bert Peñaloza MD       Hospital Day: 5    Assessment and Recommendations   Johnnie Smith is a 79 y.o. male who presents with Congestive heart failure of unknown etiology (HCC)      Plan:     Acute on chronic hypoxic respiratory failure  Pulmonary edema  Right heart failure  Interstitial lung disease  Presented for worsening shortness of breath.  Reports he uses 8 to 10 L via nasal cannula at baseline.  Used to see local pulmonologist started patient on steroid and pirfenidone.  Patient reports distrust to local pulmonologist and started to see pulmonology at [facility] started patient on dupilumab.  At presentation chest x-ray showed interval development of diffuse increased interstitial lung markings throughout the right lung field suspected to be in the setting of pulmonary edema.  Patient is also known to have right heart failure-severe right ventricle dilatation on recent echo.  -Patient was started on IV Lasix 60 Mg twice daily-repeat x-ray done 2025 shows improvement-no concerning edema on lower extremity-bilateral lung still has crackles-creatinine increased to 1.7- HOLD Lasix  -Pulmonology team on board-continued on steroid-methylprednisolone every 8 hours  -Still requiring 30 L at 45%  -Strict ins and outs.  Daily weights.  -As per cardiology will need ischemia workup given history of CAD and now with worsening heart failure.  -Wean supplemental oxygen to maintain O2 sat between 88 and 92%.    ALBERT  -Creatinine 1.1 at presentation-increased to 1.7  -Likely in the setting of diuresis  -Hold Lasix for now  -Consult nephrology if needed    Atrial fibrillation with rapid ventricular response, rate controlled  Has a history of A-fib.  Failed Tikosyn.  -Rate  04:02 PM    TROPONINT <0.010 10/17/2020 02:46 PM     BNP:   Recent Labs     01/25/25  0431 01/27/25  0816   PROBNP 2,277* 2,661*     UA:  Lab Results   Component Value Date/Time    NITRU NEGATIVE 06/27/2024 03:31 PM    COLORU YELLOW 06/27/2024 03:31 PM    PHUR 6.5 06/27/2024 03:31 PM    WBCUA <1 10/17/2020 05:59 PM    RBCUA NONE SEEN 10/17/2020 05:59 PM    TRICHOMONAS NONE SEEN 10/17/2020 05:59 PM    BACTERIA NEGATIVE 10/17/2020 05:59 PM    CLARITYU CLEAR 06/27/2024 03:31 PM    LEUKOCYTESUR NEGATIVE 06/27/2024 03:31 PM    UROBILINOGEN 1.0 06/27/2024 03:31 PM    BILIRUBINUR NEGATIVE 06/27/2024 03:31 PM    BLOODU NEGATIVE 06/27/2024 03:31 PM    GLUCOSEU NEGATIVE 06/27/2024 03:31 PM    KETUA NEGATIVE 06/27/2024 03:31 PM         Electronically signed by Bert Peñaloza MD on 1/27/2025 at 2:19 PM

## 2025-01-27 NOTE — PROGRESS NOTES
At 1710 - removed Heated HFNC  from pt and placed on green HFNC at 9 lpm.    1725 - Decreased to 8 lpm    O2 sats are 93% at this time.  Sats fluctuate when pt talking.

## 2025-01-28 LAB
ANION GAP SERPL CALCULATED.3IONS-SCNC: 11 MMOL/L (ref 9–17)
BILIRUB UR QL STRIP: NEGATIVE
BUN SERPL-MCNC: 55 MG/DL (ref 7–20)
CALCIUM SERPL-MCNC: 8.7 MG/DL (ref 8.3–10.6)
CHLORIDE SERPL-SCNC: 97 MMOL/L (ref 99–110)
CHLORIDE UR-SCNC: <20 MMOL/L (ref 110–250)
CLARITY UR: CLEAR
CO2 SERPL-SCNC: 26 MMOL/L (ref 21–32)
COLOR UR: YELLOW
COMMENT: NORMAL
CREAT SERPL-MCNC: 2 MG/DL (ref 0.8–1.3)
CREAT UR-MCNC: 100 MG/DL (ref 39–259)
GFR, ESTIMATED: 32 ML/MIN/1.73M2
GLUCOSE SERPL-MCNC: 158 MG/DL (ref 74–99)
GLUCOSE UR STRIP-MCNC: NEGATIVE MG/DL
HGB UR QL STRIP.AUTO: NEGATIVE
KETONES UR STRIP-MCNC: NEGATIVE MG/DL
LEUKOCYTE ESTERASE UR QL STRIP: NEGATIVE
MAGNESIUM SERPL-MCNC: 2.2 MG/DL (ref 1.8–2.4)
NITRITE UR QL STRIP: NEGATIVE
PH UR STRIP: 5.5 [PH] (ref 5–8)
POTASSIUM SERPL-SCNC: 4.8 MMOL/L (ref 3.5–5.1)
POTASSIUM, UR: 51.1 MMOL/L (ref 25–150)
PROT UR STRIP-MCNC: NEGATIVE MG/DL
SODIUM SERPL-SCNC: 135 MMOL/L (ref 136–145)
SODIUM UR-SCNC: <20 MMOL/L (ref 40–220)
SP GR UR STRIP: 1.02 (ref 1–1.03)
TOTAL PROTEIN, URINE: 9 MG/DL
URINE TOTAL PROTEIN CREATININE RATIO: 0.09 (ref 0–0.2)
UROBILINOGEN UR STRIP-ACNC: 0.2 EU/DL (ref 0–1)

## 2025-01-28 PROCEDURE — 2500000003 HC RX 250 WO HCPCS: Performed by: INTERNAL MEDICINE

## 2025-01-28 PROCEDURE — 83735 ASSAY OF MAGNESIUM: CPT

## 2025-01-28 PROCEDURE — 6370000000 HC RX 637 (ALT 250 FOR IP): Performed by: INTERNAL MEDICINE

## 2025-01-28 PROCEDURE — 36415 COLL VENOUS BLD VENIPUNCTURE: CPT

## 2025-01-28 PROCEDURE — 80048 BASIC METABOLIC PNL TOTAL CA: CPT

## 2025-01-28 PROCEDURE — 84300 ASSAY OF URINE SODIUM: CPT

## 2025-01-28 PROCEDURE — 6370000000 HC RX 637 (ALT 250 FOR IP): Performed by: NURSE PRACTITIONER

## 2025-01-28 PROCEDURE — 6370000000 HC RX 637 (ALT 250 FOR IP): Performed by: HOSPITALIST

## 2025-01-28 PROCEDURE — 82570 ASSAY OF URINE CREATININE: CPT

## 2025-01-28 PROCEDURE — 6360000002 HC RX W HCPCS: Performed by: INTERNAL MEDICINE

## 2025-01-28 PROCEDURE — 2140000000 HC CCU INTERMEDIATE R&B

## 2025-01-28 PROCEDURE — 94640 AIRWAY INHALATION TREATMENT: CPT

## 2025-01-28 PROCEDURE — 82436 ASSAY OF URINE CHLORIDE: CPT

## 2025-01-28 PROCEDURE — 2580000003 HC RX 258: Performed by: INTERNAL MEDICINE

## 2025-01-28 PROCEDURE — 99233 SBSQ HOSP IP/OBS HIGH 50: CPT | Performed by: INTERNAL MEDICINE

## 2025-01-28 PROCEDURE — 84156 ASSAY OF PROTEIN URINE: CPT

## 2025-01-28 PROCEDURE — 94761 N-INVAS EAR/PLS OXIMETRY MLT: CPT

## 2025-01-28 PROCEDURE — 94669 MECHANICAL CHEST WALL OSCILL: CPT

## 2025-01-28 PROCEDURE — 2500000003 HC RX 250 WO HCPCS: Performed by: HOSPITALIST

## 2025-01-28 PROCEDURE — 84133 ASSAY OF URINE POTASSIUM: CPT

## 2025-01-28 PROCEDURE — 81003 URINALYSIS AUTO W/O SCOPE: CPT

## 2025-01-28 RX ORDER — SODIUM CHLORIDE 9 MG/ML
INJECTION, SOLUTION INTRAVENOUS CONTINUOUS
Status: DISPENSED | OUTPATIENT
Start: 2025-01-28 | End: 2025-01-28

## 2025-01-28 RX ADMIN — METOPROLOL SUCCINATE 50 MG: 50 TABLET, FILM COATED, EXTENDED RELEASE ORAL at 09:57

## 2025-01-28 RX ADMIN — LEVALBUTEROL HYDROCHLORIDE 0.63 MG: 0.63 SOLUTION RESPIRATORY (INHALATION) at 15:38

## 2025-01-28 RX ADMIN — LEVALBUTEROL HYDROCHLORIDE 0.63 MG: 0.63 SOLUTION RESPIRATORY (INHALATION) at 19:22

## 2025-01-28 RX ADMIN — APIXABAN 5 MG: 5 TABLET, FILM COATED ORAL at 22:15

## 2025-01-28 RX ADMIN — TRAZODONE HYDROCHLORIDE 50 MG: 50 TABLET ORAL at 23:05

## 2025-01-28 RX ADMIN — MELATONIN TAB 3 MG 6 MG: 3 TAB at 23:05

## 2025-01-28 RX ADMIN — DOXYCYCLINE HYCLATE 100 MG: 100 TABLET, COATED ORAL at 23:05

## 2025-01-28 RX ADMIN — APIXABAN 5 MG: 5 TABLET, FILM COATED ORAL at 09:56

## 2025-01-28 RX ADMIN — WATER 1000 MG: 1 INJECTION INTRAMUSCULAR; INTRAVENOUS; SUBCUTANEOUS at 12:43

## 2025-01-28 RX ADMIN — Medication 1 CAPSULE: at 09:57

## 2025-01-28 RX ADMIN — LEVALBUTEROL HYDROCHLORIDE 0.63 MG: 0.63 SOLUTION RESPIRATORY (INHALATION) at 11:53

## 2025-01-28 RX ADMIN — SODIUM CHLORIDE: 9 INJECTION, SOLUTION INTRAVENOUS at 11:08

## 2025-01-28 RX ADMIN — WATER 40 MG: 1 INJECTION INTRAMUSCULAR; INTRAVENOUS; SUBCUTANEOUS at 05:01

## 2025-01-28 RX ADMIN — IPRATROPIUM BROMIDE 0.5 MG: 0.5 SOLUTION RESPIRATORY (INHALATION) at 08:10

## 2025-01-28 RX ADMIN — ASPIRIN 81 MG CHEWABLE TABLET 81 MG: 81 TABLET CHEWABLE at 09:56

## 2025-01-28 RX ADMIN — CYANOCOBALAMIN TAB 1000 MCG 1000 MCG: 1000 TAB at 22:15

## 2025-01-28 RX ADMIN — GABAPENTIN 300 MG: 300 CAPSULE ORAL at 22:15

## 2025-01-28 RX ADMIN — METOPROLOL SUCCINATE 50 MG: 50 TABLET, FILM COATED, EXTENDED RELEASE ORAL at 22:15

## 2025-01-28 RX ADMIN — IPRATROPIUM BROMIDE 0.5 MG: 0.5 SOLUTION RESPIRATORY (INHALATION) at 11:54

## 2025-01-28 RX ADMIN — WATER 40 MG: 1 INJECTION INTRAMUSCULAR; INTRAVENOUS; SUBCUTANEOUS at 22:14

## 2025-01-28 RX ADMIN — SODIUM CHLORIDE, PRESERVATIVE FREE 10 ML: 5 INJECTION INTRAVENOUS at 09:57

## 2025-01-28 RX ADMIN — ATORVASTATIN CALCIUM 10 MG: 10 TABLET, FILM COATED ORAL at 09:56

## 2025-01-28 RX ADMIN — IPRATROPIUM BROMIDE 0.5 MG: 0.5 SOLUTION RESPIRATORY (INHALATION) at 15:38

## 2025-01-28 RX ADMIN — DOXYCYCLINE HYCLATE 100 MG: 100 TABLET, COATED ORAL at 12:43

## 2025-01-28 RX ADMIN — BUDESONIDE AND FORMOTEROL FUMARATE DIHYDRATE 2 PUFF: 80; 4.5 AEROSOL RESPIRATORY (INHALATION) at 19:24

## 2025-01-28 RX ADMIN — LEVALBUTEROL HYDROCHLORIDE 0.63 MG: 0.63 SOLUTION RESPIRATORY (INHALATION) at 08:10

## 2025-01-28 RX ADMIN — WATER 40 MG: 1 INJECTION INTRAMUSCULAR; INTRAVENOUS; SUBCUTANEOUS at 12:43

## 2025-01-28 RX ADMIN — BUDESONIDE AND FORMOTEROL FUMARATE DIHYDRATE 2 PUFF: 80; 4.5 AEROSOL RESPIRATORY (INHALATION) at 08:13

## 2025-01-28 RX ADMIN — FOLIC ACID 500 MCG: 1 TABLET ORAL at 09:56

## 2025-01-28 RX ADMIN — PANTOPRAZOLE SODIUM 40 MG: 40 TABLET, DELAYED RELEASE ORAL at 07:25

## 2025-01-28 RX ADMIN — IPRATROPIUM BROMIDE 0.5 MG: 0.5 SOLUTION RESPIRATORY (INHALATION) at 19:23

## 2025-01-28 ASSESSMENT — PAIN SCALES - GENERAL
PAINLEVEL_OUTOF10: 0

## 2025-01-28 NOTE — PROGRESS NOTES
Pt is observed with chewing tobacco at bedside during patient hand off. Pt HR in 120s, but did recently ambulate to  with assist. Pt provided 1:1 education of effects of tobacco on cardiac health and HR. Offered cessation meds and patient declines. Pt advised of [facility] policy of tobacco free campus.

## 2025-01-28 NOTE — PROGRESS NOTES
Cardiology Progress Note     Admit Date:  1/23/2025    Consult reason/ Seen today for :       Subjective and  Overnight Events : He is in A-fib with heart rate up to 100 range at baseline goes up 120 on minimal movement or while eating but then settles down to 60s while he is resting   still requiring significant amount of oxygen on Vapotherm      Chief complain on admission : 79 y.o.year old who is admitted for  Chief Complaint   Patient presents with    Shortness of Breath     Hx of COPD and pulmonary fibrosis- was in the 70's on EMS arrival       Assessment / Plan:  Heart failure with preserved EF greater 55% on Lasix 60 twice daily continue to monitor closely for electrolyte balance Daily weight  Increase  Toprol- mg twice daily with holding parameters  Converted to A-fib hence started on a Cardizem drip  Coronary artery disease s/p PCI to LAD last cardiac cath in April 2024 reviewed and interpreted continue risk factor  Will get echo dilated right side on CT scan repeat  Atrial fibrillation cardioverted in 2018 failed Tikosyn continue Eliquis 5 mg twice daily keep electrolytes within normal range continue rate control strategy  HTN: stable, continue To titrate up medication as needed, reviewed continue Toprol-XL and Cardizem 120 mg daily for rate  DVT Prophylaxis if no contraindication  Discussed with primary team, hospitalist service, bedside nursing staff and family  Past medical history:    has a past medical history of Allergic rhinitis, Asthma, Atrial fibrillation (HCC), CAD (coronary artery disease), COPD (chronic obstructive pulmonary disease) (HCC), COVID-19, Depression, Diverticulosis of colon, Family history of early CAD, Family history of valvular heart disease, GERD (gastroesophageal reflux disease), H/O 24 hour EKG monitoring, H/O echocardiogram, H/O echocardiogram, History of diverticulitis of colon, History of  1/28/2025 0504  Gross per 24 hour   Intake 240 ml   Output 1075 ml   Net -835 ml     Physical Exam:  Constitutional:  Well developed, Well nourished, No acute distress, Non-toxic appearance.   HENT:  Normocephalic, Atraumatic, Bilateral external ears normal, Oropharynx moist, No oral exudates, Nose normal. Neck-  Supple, No stridor.   Eyes:  PERRL, EOMI, Conjunctiva normal, No discharge.   Respiratory:  Normal breath sounds, No respiratory distress, No wheezing, No chest tenderness.   Cardiovascular:  Normal heart rate, Normal rhythm, No murmurs, No rubs, No gallops, JVP not elevated  Abdomen/GI:  Bowel sounds normal, Soft, No tenderness, No masses, No pulsatile masses.     Musculoskeletal:  No edema, No tenderness, No cyanosis, No clubbing. Good range of motion in all major joints. No tenderness to palpation   Integument:  Warm, Dry, No erythema, No rash.   Lymphatic:  No lymphadenopathy noted.   Neurologic:  Alert & oriented x 3, Normal motor function, Normal sensory function, No focal deficits noted.   Psychiatric:  Affect  and  Mood :no change    Medications:    budesonide-formoterol  2 puff Inhalation BID RT    ipratropium  0.5 mg Nebulization 4x Daily RT    levalbuterol  0.63 mg Nebulization Q4H WA RT    metoprolol succinate  50 mg Oral BID    [Held by provider] furosemide  60 mg IntraVENous BID    methylPREDNISolone  40 mg IntraVENous Q8H    cefTRIAXone (ROCEPHIN) IV  1,000 mg IntraVENous Q24H    doxycycline hyclate  100 mg Oral Q12H    aspirin  81 mg Oral Daily    apixaban  5 mg Oral BID    gabapentin  300 mg Oral Nightly    [Held by provider] escitalopram  20 mg Oral Daily    [Held by provider] mirtazapine  7.5 mg Oral Nightly    atorvastatin  10 mg Oral Daily    vitamin B-12  1,000 mcg Oral Daily    folic acid  500 mcg Oral Daily    pantoprazole  40 mg Oral QAM AC    b complex vitamins  1 capsule Oral Daily    sodium chloride flush  5-40 mL IntraVENous 2 times per day      sodium chloride 100 mL/hr at

## 2025-01-28 NOTE — PROGRESS NOTES
V2.0    [facility] [place]      Name:  Johnnie Smith /Age/Sex: 1945  (79 y.o. male)   MRN & CSN:  [id number] & [id number] Encounter Date/Time: 2025 9:52 AM EST   Location:  [facility] PCP: Claudio Umanzor PA     Attending:Bert Peñaloza MD       Hospital Day: 6    Assessment and Recommendations   Johnnie Smith is a 79 y.o. male who presents with Congestive heart failure of unknown etiology (HCC)      Plan:     Acute on chronic hypoxic respiratory failure  Pulmonary edema  Right heart failure  Interstitial lung disease  Presented for worsening shortness of breath.  Reports he uses 8 to 10 L via nasal cannula at baseline.  Used to see local pulmonologist started patient on steroid and pirfenidone.  Patient reports distrust to local pulmonologist and started to see pulmonology at [facility] started patient on dupilumab.  At presentation chest x-ray showed interval development of diffuse increased interstitial lung markings throughout the right lung field suspected to be in the setting of pulmonary edema.  Patient is also known to have right heart failure-severe right ventricle dilatation on recent echo.  -Patient was started on IV Lasix 60 Mg twice daily-repeat x-ray done 2025 shows improvement-no concerning edema on lower extremity-bilateral lung still has crackles-creatinine increased to 2.0- HOLD Lasix  -Pulmonology team on board-continued on steroid-methylprednisolone every 8 hours-plan for slow wean when discharged  -Discussed with respiratory team who reported patient is getting 45% high flow-but was actually receiving 9 L at home which would be around 56% FiO2-trialed him on nasal cannula-currently on 7 L this morning  -Strict ins and outs.  Daily weights.  -Cardiology team on board  -Nephrology team on board      ALBERT  -Creatinine 1.1 at presentation-increased to 1.7->2.0  -Likely in the setting of diuresis, contrast exposure  -Continue to hold Lasix for now  -Left surgery team

## 2025-01-28 NOTE — PROGRESS NOTES
Pulmonary and Critical Care  Progress Note    Subjective:   The patient has improved.Requiring less O2.On 6 L N/C.  Shortness of breath has improved  Chest pain none.  Addressing respiratory complaints Patient is negative for  hemoptysis and cyanosis  CONSTITUTIONAL:  negative for fevers and chills      Past Medical History:     has a past medical history of Allergic rhinitis, Asthma, Atrial fibrillation (HCC), CAD (coronary artery disease), COPD (chronic obstructive pulmonary disease) (HCC), COVID-19, Depression, Diverticulosis of colon, Family history of early CAD, Family history of valvular heart disease, GERD (gastroesophageal reflux disease), H/O 24 hour EKG monitoring, H/O echocardiogram, H/O echocardiogram, History of diverticulitis of colon, History of Holter monitoring, History of repair of left rotator cuff, [place] (hard of hearing), Hx of cardiac cath, Hx of cardiovascular stress test, HX OTHER MEDICAL, Hyperlipidemia, Hypertension, Hypoxemia, Irregular heart rate, Palpitations, Pneumonia, Post-COVID-19 condition, Restless leg syndrome, S/P placement of cardiac pacemaker, Shortness of breath, and Tricuspid valve regurgitation.   has a past surgical history that includes Rotator cuff repair (2007, 2006); Bone Resection, Rib; Nerve Surgery; Shoulder arthroscopy (Right, 12/01/2010); Diagnostic Cardiac Cath Lab Procedure (07/08/2013); Colonoscopy (10/14/2014); Endoscopy, colon, diagnostic (10/14/2014); Rotator cuff repair (Left, 11/18/2015); Pacemaker insertion (Left, 04/01/2021); Cataract removal (Bilateral); cervical fusion (N/A, 11/18/2022); Cardiac procedure (N/A, 4/26/2024); and Cardiac procedure (N/A, 4/26/2024).   reports that he quit smoking about 31 years ago. His smoking use included cigarettes. He started smoking about 51 years ago. He has a 20 pack-year smoking history. His smokeless tobacco use includes chew. He reports that he does not drink alcohol and does not use drugs.  Family history:  family

## 2025-01-28 NOTE — PROGRESS NOTES
Physician Progress Note      PATIENT:               NICKOLAS DAVIS  [facility] #:                  [id number]  :                       1945  ADMIT DATE:       2025 1:04 PM  DISCH DATE:  RESPONDING  PROVIDER #:        Gina Dia MD          QUERY TEXT:    Pt admitted with SOB.  Noted documentation of acute on chronic diastolic CHF   in Cardiology consultant note on .  If possible, please document in   progress notes and discharge summary:    The medical record reflects the following:  Risk Factors: 79 y.o male with pMHx of HTN, pAF, CAD, and COPD  Clinical Indicators:  - IM H+P - Pulmonary edema-CT chest shows increased   interstitial lung markings and airspace disease concerning for interstitial   pulmonary edema.  Placed on Lasix.  Daily weights and I's and O's.  Echo shows   EF 50-55%.  Cardiology consult.  BNP 4.8K.  - Cards - Acute on chronic   HFpEF, predominately right sided heart failure. Increase lasix to 60 mg twice   daily  Treatment: Cardiology consult, imaging, BNP, serial troponins, EKG, tleemetry  Options provided:  -- The diagnosis of acute pulmonary edema is due to acute on chronic diastolic   heart failure which was confirmed as present on admission.  -- I defer to Cardiology consultant regarding documentation of acute on   chronic diastolic CHF.  -- Other - I will add my own diagnosis  -- Disagree - Not applicable / Not valid  -- Disagree - Clinically unable to determine / Unknown  -- Refer to Clinical Documentation Reviewer    PROVIDER RESPONSE TEXT:    I defer to Cardiology consultant regarding documentation of acute on chronic   diastolic CHF    Query created by: Brody Bateman on 2025 9:15 AM      Electronically signed by:  Gina Dia MD 2025 11:53 PM

## 2025-01-28 NOTE — CONSULTS
Nephrology Service Consultation    Patient:  Johnnie Smith  MRN: [id number]  Consulting physician:  Bert Peñaloza MD  Reason for Consult: Acute renal failure on CKD?    History Obtained From:  patient, electronic medical record  PCP: Claudio Umanzor PA    HISTORY OF PRESENT ILLNESS:   The patient is a 79 y.o. male who presents with weakness fatigue shortness of breath has underlying history of hypertension COPD depression atrial fibrillation initially presents with increased shortness of breath.  Last year with coronary disease had a heart catheterization with stent placement.  Has underlying COPD and is increased his oxygen requirements up to 8 L over the last few weeks was a smoker in the past but not currently.  Denies any flulike symptoms but over the last 3 to 4 days to be more lightheaded dizzy to the point he came to the hospital for evaluation.  He also was given prednisone recently but stopped using a few weeks ago due to increased swelling.  After he got lightheaded dizzy and fell down hit his head he came to the hospital to be evaluated.  An echo done this admission shows EF about 50 to 55% with mild regurgitation the tricuspid valve is severe regurgitation and initial respiratory panel was negative and currently weight holding about 216 pounds, saw cardiology in December weight was about 222 pounds and when he recently saw cardiology there was concern of pulmonary fibrosis for which she sees pulmonology in [place] and some issues with blood pressure getting low for which had her midodrine and he still requiring about 6 L nasal cannula today which is probably closer to baseline and above setting with attempted diuresis renal function elevated prior to this admission creatinine was running on 1.1-1.2 and outs up to 2.0 with no significant proteinuria         Image quality is adequate.    Left Ventricle: Diffcult to determine ejection fractin and wall motion due to arrhythmia. Low normal left  apixaban  5 mg Oral BID    gabapentin  300 mg Oral Nightly    [Held by provider] escitalopram  20 mg Oral Daily    [Held by provider] mirtazapine  7.5 mg Oral Nightly    atorvastatin  10 mg Oral Daily    vitamin B-12  1,000 mcg Oral Daily    folic acid  500 mcg Oral Daily    pantoprazole  40 mg Oral QAM AC    b complex vitamins  1 capsule Oral Daily    sodium chloride flush  5-40 mL IntraVENous 2 times per day     Continuous Infusions:   sodium chloride 20 mL/hr at 01/24/25 0347     PRN Meds:.traZODone, sennosides-docusate sodium, ipratropium 0.5 mg-albuterol 2.5 mg, melatonin, potassium chloride **OR** potassium alternative oral replacement **OR** potassium chloride, magnesium sulfate, albuterol, sodium chloride flush, sodium chloride, [Held by provider] ondansetron **OR** [Held by provider] ondansetron, polyethylene glycol, acetaminophen **OR** acetaminophen    Allergies:  Lyrica [pregabalin]    Social History:   TOBACCO:   reports that he quit smoking about 31 years ago. His smoking use included cigarettes. He started smoking about 51 years ago. He has a 20 pack-year smoking history. His smokeless tobacco use includes chew.  ETOH:   reports no history of alcohol use.  OCCUPATION:      Family History:       Problem Relation Age of Onset    Heart Disease Mother         Mitral valve disease    Heart Attack Mother 54    Coronary Art Dis Father         MI    Heart Disease Father     Heart Attack Father 51    Heart Attack Brother 48    Heart Attack Paternal Grandfather 53       REVIEW OF SYSTEMS:  Negative except for weak tired fatigue short of breath anxious.    Physical Exam:    I/O: 01/27 0701 - 01/28 0700  In: 720 [P.O.:720]  Out: 1525 [Urine:1525]    Vitals: /86   Pulse 95   Temp 97.7 °F (36.5 °C) (Oral)   Resp 15   Ht 1.702 m (5' 7\")   Wt 98 kg (216 lb 0.8 oz)   SpO2 93%   BMI 33.84 kg/m²   Blood pressure 122/86, pulse 95, temperature 97.7 °F (36.5 °C), temperature source Oral, resp. rate 15, height

## 2025-01-28 NOTE — PLAN OF CARE
Problem: Chronic Conditions and Co-morbidities  Goal: Patient's chronic conditions and co-morbidity symptoms are monitored and maintained or improved  1/27/2025 2359 by Shahrzad Salter RN  Outcome: Progressing  1/27/2025 1603 by Lacy Pearson RN  Outcome: Progressing     Problem: Discharge Planning  Goal: Discharge to home or other facility with appropriate resources  1/27/2025 2359 by Shahrzad Salter RN  Outcome: Progressing  1/27/2025 1603 by Lacy Pearson RN  Outcome: Progressing     Problem: Safety - Adult  Goal: Free from fall injury  1/27/2025 2359 by Shahrzad Salter RN  Outcome: Progressing  1/27/2025 1603 by Lacy Pearson RN  Outcome: Progressing     Problem: Pain  Goal: Verbalizes/displays adequate comfort level or baseline comfort level  1/27/2025 2359 by Shahrzad Salter RN  Outcome: Progressing  1/27/2025 1603 by Lacy Pearson RN  Outcome: Progressing

## 2025-01-29 ENCOUNTER — APPOINTMENT (OUTPATIENT)
Dept: GENERAL RADIOLOGY | Age: 80
End: 2025-01-29
Payer: MEDICARE

## 2025-01-29 LAB
ALBUMIN: 3.1 G/DL (ref 3.4–5)
ANION GAP SERPL CALCULATED.3IONS-SCNC: 10 MMOL/L (ref 9–17)
BNP SERPL-MCNC: 3402 PG/ML (ref 0–450)
BUN SERPL-MCNC: 51 MG/DL (ref 7–20)
CALCIUM SERPL-MCNC: 8.6 MG/DL (ref 8.3–10.6)
CHLORIDE SERPL-SCNC: 102 MMOL/L (ref 99–110)
CO2 SERPL-SCNC: 24 MMOL/L (ref 21–32)
CREAT SERPL-MCNC: 1.6 MG/DL (ref 0.8–1.3)
GFR, ESTIMATED: 43 ML/MIN/1.73M2
GLUCOSE SERPL-MCNC: 155 MG/DL (ref 74–99)
PHOSPHATE SERPL-MCNC: 4.4 MG/DL (ref 2.5–4.9)
POTASSIUM SERPL-SCNC: 4.5 MMOL/L (ref 3.5–5.1)
SODIUM SERPL-SCNC: 136 MMOL/L (ref 136–145)

## 2025-01-29 PROCEDURE — 2700000000 HC OXYGEN THERAPY PER DAY

## 2025-01-29 PROCEDURE — 6370000000 HC RX 637 (ALT 250 FOR IP): Performed by: INTERNAL MEDICINE

## 2025-01-29 PROCEDURE — 6370000000 HC RX 637 (ALT 250 FOR IP): Performed by: NURSE PRACTITIONER

## 2025-01-29 PROCEDURE — 2500000003 HC RX 250 WO HCPCS: Performed by: INTERNAL MEDICINE

## 2025-01-29 PROCEDURE — 6370000000 HC RX 637 (ALT 250 FOR IP): Performed by: HOSPITALIST

## 2025-01-29 PROCEDURE — 94761 N-INVAS EAR/PLS OXIMETRY MLT: CPT

## 2025-01-29 PROCEDURE — 94669 MECHANICAL CHEST WALL OSCILL: CPT

## 2025-01-29 PROCEDURE — 2140000000 HC CCU INTERMEDIATE R&B

## 2025-01-29 PROCEDURE — 97530 THERAPEUTIC ACTIVITIES: CPT

## 2025-01-29 PROCEDURE — 94640 AIRWAY INHALATION TREATMENT: CPT

## 2025-01-29 PROCEDURE — 2500000003 HC RX 250 WO HCPCS: Performed by: HOSPITALIST

## 2025-01-29 PROCEDURE — 97116 GAIT TRAINING THERAPY: CPT

## 2025-01-29 PROCEDURE — 83880 ASSAY OF NATRIURETIC PEPTIDE: CPT

## 2025-01-29 PROCEDURE — 6360000002 HC RX W HCPCS: Performed by: INTERNAL MEDICINE

## 2025-01-29 PROCEDURE — 36415 COLL VENOUS BLD VENIPUNCTURE: CPT

## 2025-01-29 PROCEDURE — 97162 PT EVAL MOD COMPLEX 30 MIN: CPT

## 2025-01-29 PROCEDURE — 71045 X-RAY EXAM CHEST 1 VIEW: CPT

## 2025-01-29 PROCEDURE — 6360000002 HC RX W HCPCS: Performed by: HOSPITALIST

## 2025-01-29 PROCEDURE — 80069 RENAL FUNCTION PANEL: CPT

## 2025-01-29 PROCEDURE — APPNB15 APP NON BILLABLE TIME 0-15 MINS

## 2025-01-29 PROCEDURE — 97166 OT EVAL MOD COMPLEX 45 MIN: CPT

## 2025-01-29 PROCEDURE — 99233 SBSQ HOSP IP/OBS HIGH 50: CPT | Performed by: INTERNAL MEDICINE

## 2025-01-29 RX ORDER — FUROSEMIDE 20 MG/1
20 TABLET ORAL DAILY
Status: DISCONTINUED | OUTPATIENT
Start: 2025-01-29 | End: 2025-01-31 | Stop reason: HOSPADM

## 2025-01-29 RX ORDER — DOXYCYCLINE HYCLATE 100 MG
100 TABLET ORAL EVERY 12 HOURS
Status: DISCONTINUED | OUTPATIENT
Start: 2025-01-29 | End: 2025-01-31 | Stop reason: HOSPADM

## 2025-01-29 RX ADMIN — IPRATROPIUM BROMIDE 0.5 MG: 0.5 SOLUTION RESPIRATORY (INHALATION) at 11:19

## 2025-01-29 RX ADMIN — WATER 40 MG: 1 INJECTION INTRAMUSCULAR; INTRAVENOUS; SUBCUTANEOUS at 04:23

## 2025-01-29 RX ADMIN — SODIUM CHLORIDE, PRESERVATIVE FREE 10 ML: 5 INJECTION INTRAVENOUS at 08:43

## 2025-01-29 RX ADMIN — WATER 40 MG: 1 INJECTION INTRAMUSCULAR; INTRAVENOUS; SUBCUTANEOUS at 20:38

## 2025-01-29 RX ADMIN — ASPIRIN 81 MG CHEWABLE TABLET 81 MG: 81 TABLET CHEWABLE at 08:42

## 2025-01-29 RX ADMIN — BUDESONIDE AND FORMOTEROL FUMARATE DIHYDRATE 2 PUFF: 80; 4.5 AEROSOL RESPIRATORY (INHALATION) at 07:46

## 2025-01-29 RX ADMIN — APIXABAN 5 MG: 5 TABLET, FILM COATED ORAL at 20:37

## 2025-01-29 RX ADMIN — WATER 40 MG: 1 INJECTION INTRAMUSCULAR; INTRAVENOUS; SUBCUTANEOUS at 12:23

## 2025-01-29 RX ADMIN — FUROSEMIDE 20 MG: 20 TABLET ORAL at 14:33

## 2025-01-29 RX ADMIN — IPRATROPIUM BROMIDE 0.5 MG: 0.5 SOLUTION RESPIRATORY (INHALATION) at 15:33

## 2025-01-29 RX ADMIN — APIXABAN 5 MG: 5 TABLET, FILM COATED ORAL at 08:43

## 2025-01-29 RX ADMIN — IPRATROPIUM BROMIDE 0.5 MG: 0.5 SOLUTION RESPIRATORY (INHALATION) at 07:47

## 2025-01-29 RX ADMIN — GABAPENTIN 300 MG: 300 CAPSULE ORAL at 20:37

## 2025-01-29 RX ADMIN — CYANOCOBALAMIN TAB 1000 MCG 1000 MCG: 1000 TAB at 22:00

## 2025-01-29 RX ADMIN — METOPROLOL SUCCINATE 50 MG: 50 TABLET, FILM COATED, EXTENDED RELEASE ORAL at 20:38

## 2025-01-29 RX ADMIN — SODIUM CHLORIDE, PRESERVATIVE FREE 10 ML: 5 INJECTION INTRAVENOUS at 20:39

## 2025-01-29 RX ADMIN — LEVALBUTEROL HYDROCHLORIDE 0.63 MG: 0.63 SOLUTION RESPIRATORY (INHALATION) at 15:32

## 2025-01-29 RX ADMIN — METOPROLOL SUCCINATE 50 MG: 50 TABLET, FILM COATED, EXTENDED RELEASE ORAL at 08:42

## 2025-01-29 RX ADMIN — LEVALBUTEROL HYDROCHLORIDE 0.63 MG: 0.63 SOLUTION RESPIRATORY (INHALATION) at 07:46

## 2025-01-29 RX ADMIN — Medication 1 CAPSULE: at 08:42

## 2025-01-29 RX ADMIN — FOLIC ACID 500 MCG: 1 TABLET ORAL at 08:42

## 2025-01-29 RX ADMIN — DOXYCYCLINE HYCLATE 100 MG: 100 TABLET, COATED ORAL at 12:23

## 2025-01-29 RX ADMIN — ATORVASTATIN CALCIUM 10 MG: 10 TABLET, FILM COATED ORAL at 08:43

## 2025-01-29 RX ADMIN — BUDESONIDE AND FORMOTEROL FUMARATE DIHYDRATE 2 PUFF: 80; 4.5 AEROSOL RESPIRATORY (INHALATION) at 19:19

## 2025-01-29 RX ADMIN — LEVALBUTEROL HYDROCHLORIDE 0.63 MG: 0.63 SOLUTION RESPIRATORY (INHALATION) at 19:17

## 2025-01-29 RX ADMIN — DOXYCYCLINE HYCLATE 100 MG: 100 TABLET, COATED ORAL at 23:29

## 2025-01-29 RX ADMIN — SODIUM CHLORIDE, PRESERVATIVE FREE 10 ML: 5 INJECTION INTRAVENOUS at 20:38

## 2025-01-29 RX ADMIN — IPRATROPIUM BROMIDE 0.5 MG: 0.5 SOLUTION RESPIRATORY (INHALATION) at 19:18

## 2025-01-29 RX ADMIN — PANTOPRAZOLE SODIUM 40 MG: 40 TABLET, DELAYED RELEASE ORAL at 07:03

## 2025-01-29 RX ADMIN — LEVALBUTEROL HYDROCHLORIDE 0.63 MG: 0.63 SOLUTION RESPIRATORY (INHALATION) at 11:19

## 2025-01-29 RX ADMIN — MELATONIN TAB 3 MG 6 MG: 3 TAB at 20:38

## 2025-01-29 ASSESSMENT — PAIN SCALES - GENERAL: PAINLEVEL_OUTOF10: 0

## 2025-01-29 NOTE — PROGRESS NOTES
Met with patient and spouse. Introduced myself as the Heart failure education R.N.     Admitting diagnosis- Congestive Heart failure     Cardiologist- Luis Carlos  Heart Failure Education Nurse consulted- yes   Ejection fraction 50-55 % as of 1/23/2025  Pro BNP- 3,402 on 1/29/25  Hospital follow up appt-  2/6 at the [facility]  Patient informed of appointment and appointment added to AVS  Cardiac rehabilitation referral- N/A   ICD information- N/A   Patient informed of Heart Failure Clinic at the [facility]  Smoking Cessation information and referral- N/A   Pneumonia vaccine- up to date   PCP- Noé   Patient has a digital scale? Yes   Transportation- has transportation     Able to obtain medications without difficulty?- Yes- Patient denies difficulty in obtaining or taking medications. Advised not to stop taking a medication without notifying provider.    Heart failure specific Medications- Cardizem, Metoprolol, Lasix, Potassium, Eliquis      Reviewed Heart failure patient education book with patient. Heart failure education included: Type of heart failure, How it is diagnosed, causes, symptoms, medications, diet, daily weights, exercise and fluid control.   Questions answered.      Patient's heart failure medications reviewed and information given on each.     Reviewed the Stop Light Handout with patient and instructed when to call his provider.   Patient was engaged and attentive during education session.    The following handouts were reviewed with patient and patient was given a copy of the following : Heart Failure education booklet, the 'Stop Light' Handout,  a link to the [employer]'s Healthier Living with Heart Failure Interactive workbook and a list of heart failure related education available on the hospital TV and how to access it.         1 hour of education provided.

## 2025-01-29 NOTE — PROGRESS NOTES
Pulmonary and Critical Care  Progress Note    Subjective:   The patient has improved.On 6 L N/C.  Shortness of breath has improved  Chest pain none.  Addressing respiratory complaints Patient is negative for  hemoptysis and cyanosis  CONSTITUTIONAL:  negative for fevers and chills      Past Medical History:     has a past medical history of Allergic rhinitis, Asthma, Atrial fibrillation (HCC), CAD (coronary artery disease), COPD (chronic obstructive pulmonary disease) (HCC), COVID-19, Depression, Diverticulosis of colon, Family history of early CAD, Family history of valvular heart disease, GERD (gastroesophageal reflux disease), H/O 24 hour EKG monitoring, H/O echocardiogram, H/O echocardiogram, History of diverticulitis of colon, History of Holter monitoring, History of repair of left rotator cuff, [place] (hard of hearing), Hx of cardiac cath, Hx of cardiovascular stress test, HX OTHER MEDICAL, Hyperlipidemia, Hypertension, Hypoxemia, Irregular heart rate, Palpitations, Pneumonia, Post-COVID-19 condition, Restless leg syndrome, S/P placement of cardiac pacemaker, Shortness of breath, and Tricuspid valve regurgitation.   has a past surgical history that includes Rotator cuff repair (2007, 2006); Bone Resection, Rib; Nerve Surgery; Shoulder arthroscopy (Right, 12/01/2010); Diagnostic Cardiac Cath Lab Procedure (07/08/2013); Colonoscopy (10/14/2014); Endoscopy, colon, diagnostic (10/14/2014); Rotator cuff repair (Left, 11/18/2015); Pacemaker insertion (Left, 04/01/2021); Cataract removal (Bilateral); cervical fusion (N/A, 11/18/2022); Cardiac procedure (N/A, 4/26/2024); and Cardiac procedure (N/A, 4/26/2024).   reports that he quit smoking about 31 years ago. His smoking use included cigarettes. He started smoking about 51 years ago. He has a 20 pack-year smoking history. His smokeless tobacco use includes chew. He reports that he does not drink alcohol and does not use drugs.  Family history:  family history includes

## 2025-01-29 NOTE — CARE COORDINATION
D/c plan remains the same.  Home with wife, denies any d/c needs.  Continues to decline HHC.  Notify CM if any d/c needs arise.  TE

## 2025-01-29 NOTE — PLAN OF CARE
Problem: Safety - Adult  Goal: Free from fall injury  1/29/2025 0024 by Shahrzad Salter RN  Outcome: Progressing  1/28/2025 1850 by Lacy Pearson RN  Outcome: Progressing     Problem: Pain  Goal: Verbalizes/displays adequate comfort level or baseline comfort level  1/29/2025 0024 by Shharzad Salter RN  Outcome: Progressing  1/28/2025 1850 by Lacy Pearson RN  Outcome: Progressing     Problem: Chronic Conditions and Co-morbidities  Goal: Patient's chronic conditions and co-morbidity symptoms are monitored and maintained or improved  1/29/2025 0024 by Shahrzad Salter RN  Outcome: Not Progressing  1/28/2025 1850 by Lacy Pearson RN  Outcome: Progressing     Problem: Discharge Planning  Goal: Discharge to home or other facility with appropriate resources  1/29/2025 0024 by Shahrzad Salter RN  Outcome: Not Progressing  1/28/2025 1850 by Lacy Pearson RN  Outcome: Progressing

## 2025-01-29 NOTE — CONSULTS
[facility] ACUTE CARE OCCUPATIONAL THERAPY EVALUATION  Johnnie Smith, 1945, 3107/3107-A, 1/29/2025    Discharge Recommendation: [facility]    History  [place]:  The primary encounter diagnosis was Acute on chronic respiratory failure with hypoxia. A diagnosis of Pulmonary edema cardiac cause (HCC) was also pertinent to this visit.  Patient  has a past medical history of Allergic rhinitis, Asthma, Atrial fibrillation (HCC), CAD (coronary artery disease), COPD (chronic obstructive pulmonary disease) (HCC), COVID-19, Depression, Diverticulosis of colon, Family history of early CAD, Family history of valvular heart disease, GERD (gastroesophageal reflux disease), H/O 24 hour EKG monitoring, H/O echocardiogram, H/O echocardiogram, History of diverticulitis of colon, History of Holter monitoring, History of repair of left rotator cuff, [place] (hard of hearing), Hx of cardiac cath, Hx of cardiovascular stress test, HX OTHER MEDICAL, Hyperlipidemia, Hypertension, Hypoxemia, Irregular heart rate, Palpitations, Pneumonia, Post-COVID-19 condition, Restless leg syndrome, S/P placement of cardiac pacemaker, Shortness of breath, and Tricuspid valve regurgitation.  Patient  has a past surgical history that includes Rotator cuff repair (2007, 2006); Bone Resection, Rib; Nerve Surgery; Shoulder arthroscopy (Right, 12/01/2010); Diagnostic Cardiac Cath Lab Procedure (07/08/2013); Colonoscopy (10/14/2014); Endoscopy, colon, diagnostic (10/14/2014); Rotator cuff repair (Left, 11/18/2015); Pacemaker insertion (Left, 04/01/2021); Cataract removal (Bilateral); cervical fusion (N/A, 11/18/2022); Cardiac procedure (N/A, 4/26/2024); and Cardiac procedure (N/A, 4/26/2024).    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    Communication: RN, CM, co-eval with PT  Restrictions: general precautions, fall risk, O2    Home Setup/Prior level

## 2025-01-29 NOTE — PLAN OF CARE
Problem: Chronic Conditions and Co-morbidities  Goal: Patient's chronic conditions and co-morbidity symptoms are monitored and maintained or improved  1/29/2025 1048 by Frandy Aldana RN  Outcome: Progressing  1/29/2025 0024 by Shahrzad Salter RN  Outcome: Not Progressing     Problem: Safety - Adult  Goal: Free from fall injury  1/29/2025 1048 by Frandy Aldana RN  Outcome: Progressing  1/29/2025 0024 by Shahrzad Salter RN  Outcome: Progressing        Problem: Discharge Planning  Goal: Discharge to home or other facility with appropriate resources  1/29/2025 1048 by Frandy Aldana RN  Outcome: Progressing  1/29/2025 0024 by Shahrzad Salter RN  Outcome: Not Progressing

## 2025-01-29 NOTE — CONSULTS
[facility] ACUTE CARE PHYSICAL THERAPY EVALUATION  Johnnie Smith, 1945, 3107/3107-A, 1/29/2025    History  [place]:  The primary encounter diagnosis was Acute on chronic respiratory failure with hypoxia. A diagnosis of Pulmonary edema cardiac cause (HCC) was also pertinent to this visit.  Patient  has a past medical history of Allergic rhinitis, Asthma, Atrial fibrillation (HCC), CAD (coronary artery disease), COPD (chronic obstructive pulmonary disease) (HCC), COVID-19, Depression, Diverticulosis of colon, Family history of early CAD, Family history of valvular heart disease, GERD (gastroesophageal reflux disease), H/O 24 hour EKG monitoring, H/O echocardiogram, H/O echocardiogram, History of diverticulitis of colon, History of Holter monitoring, History of repair of left rotator cuff, [place] (hard of hearing), Hx of cardiac cath, Hx of cardiovascular stress test, HX OTHER MEDICAL, Hyperlipidemia, Hypertension, Hypoxemia, Irregular heart rate, Palpitations, Pneumonia, Post-COVID-19 condition, Restless leg syndrome, S/P placement of cardiac pacemaker, Shortness of breath, and Tricuspid valve regurgitation.  Patient  has a past surgical history that includes Rotator cuff repair (2007, 2006); Bone Resection, Rib; Nerve Surgery; Shoulder arthroscopy (Right, 12/01/2010); Diagnostic Cardiac Cath Lab Procedure (07/08/2013); Colonoscopy (10/14/2014); Endoscopy, colon, diagnostic (10/14/2014); Rotator cuff repair (Left, 11/18/2015); Pacemaker insertion (Left, 04/01/2021); Cataract removal (Bilateral); cervical fusion (N/A, 11/18/2022); Cardiac procedure (N/A, 4/26/2024); and Cardiac procedure (N/A, 4/26/2024).    Discharge Recommendation: HH    Equipment: none    Subjective:    Patient states:  \"I haven't walked in a while so I might be wobbly.\"      Pain:  denies pain.      Communication with other providers:  Handoff to RN, OT    Restrictions: general precautions, fall risk, O2    Home Setup/Prior level of

## 2025-01-29 NOTE — PROGRESS NOTES
Cardiology Progress Note    Subjective/Overnight Events:  Patient shortness of breath has continued to improve.  Now wearing 6 L of oxygen.  No significant lower extremity edema today.    Meeting with CHF nurse coordinator    Assessment/Plan:  Acute on chronic heart failure with preserved ejection fraction  Right heart failure  -Volume overload significantly improved  -Severe right atrial and ventricular dilation  -Toprol-XL 50 mg twice daily  -On oral Lasix 20 mg daily   Coronary artery disease s/p PCI LAD 04/2024  -Chest pain-free at this time.  -Echo showing preserved EF 50-55% with septal hypokinesis  -Can consider outpatient stress test due to slight hypokinesis of septal wall  -Continue aspirin and Lipitor 10 mg daily  Persistent atrial fibrillation  -Rate now controlled, currently in the 90s.  -Previously failed Tikosyn  -Rate control strategy alone.  -Eliquis 5 mg twice daily  Acute on chronic respiratory failure  Interstitial lung disease  -Pulmonology following.  On IV steroids  Acute kidney injury  -Nephrology following.  Will defer diuresis      Cardiology will sign off, please call with any questions.  Patient to follow-up in clinic    Arya Chery PA-C 01/29/25 12:52 PM

## 2025-01-29 NOTE — PROGRESS NOTES
Nephrology Progress Note  1/29/2025 9:04 AM  Subjective:     Interval History: Johnnie Smith is a 79 y.o. male with doing better more awake interactive resting in chair        Data:   Scheduled Meds:   budesonide-formoterol  2 puff Inhalation BID RT    ipratropium  0.5 mg Nebulization 4x Daily RT    levalbuterol  0.63 mg Nebulization Q4H WA RT    metoprolol succinate  50 mg Oral BID    [Held by provider] furosemide  60 mg IntraVENous BID    methylPREDNISolone  40 mg IntraVENous Q8H    aspirin  81 mg Oral Daily    apixaban  5 mg Oral BID    gabapentin  300 mg Oral Nightly    [Held by provider] escitalopram  20 mg Oral Daily    [Held by provider] mirtazapine  7.5 mg Oral Nightly    atorvastatin  10 mg Oral Daily    vitamin B-12  1,000 mcg Oral Daily    folic acid  500 mcg Oral Daily    pantoprazole  40 mg Oral QAM AC    b complex vitamins  1 capsule Oral Daily    sodium chloride flush  5-40 mL IntraVENous 2 times per day     Continuous Infusions:   sodium chloride 20 mL/hr at 01/24/25 0347         CBC No results for input(s): \"WBC\", \"HGB\", \"HCT\", \"PLT\" in the last 72 hours.   BMP   Recent Labs     01/27/25  0816 01/28/25  0440 01/29/25  0409    135* 136   K 4.2 4.8 4.5   CL 98* 97* 102   CO2 24 26 24   PHOS  --   --  4.4   BUN 49* 55* 51*   CREATININE 1.7* 2.0* 1.6*     Hepatic: No results for input(s): \"AST\", \"ALT\", \"BILITOT\", \"ALKPHOS\" in the last 72 hours.    Invalid input(s): \"ALB\"  Troponin: No results for input(s): \"TROPONINI\" in the last 72 hours.  BNP: No results for input(s): \"BNP\" in the last 72 hours.  Lipids: No results for input(s): \"CHOL\", \"HDL\" in the last 72 hours.    Invalid input(s): \"LDLCALCU\"  ABGs: No results found for: \"PHART\", \"PO2ART\", \"LWJ6AZS\"  INR: No results for input(s): \"INR\" in the last 72 hours.  Renal Labs  Albumin:  No results found for: \"LABALBU\"  Calcium:    Lab Results   Component Value Date/Time    CALCIUM 8.6 01/29/2025 04:09 AM     Phosphorus:    Lab Results   Component

## 2025-01-29 NOTE — PROGRESS NOTES
Cardiology Progress Note     Admit Date:  1/23/2025    Consult reason/ Seen today for :       Subjective and  Overnight Events : Overall heart rate is much improved   he is in A-fib with heart rate up to 100 range at baseline goes up 120 on minimal movement or while eating but then settles down to 60s while he is resting   still requiring significant amount of oxygen on Vapotherm  Echo suggestive of cor pulmonale dilated right heart      Chief complain on admission : 79 y.o.year old who is admitted for  Chief Complaint   Patient presents with    Shortness of Breath     Hx of COPD and pulmonary fibrosis- was in the 70's on EMS arrival       Assessment / Plan:  Heart failure with preserved EF greater 55% on Lasix 60 twice daily continue to monitor closely for electrolyte balance Daily weight  Increase  Toprol-XL 50 mg twice daily with holding parameters  Converted to A-fib taken off Cardizem drip uptitrate medication for rate control strategy  Diuretics being adjusted by nephrology  Coronary artery disease s/p PCI to LAD last cardiac cath in April 2024 reviewed and interpreted continue risk factor  Echo shows preserved left ventricle but severely dilated and hypokinetic right heart  Atrial fibrillation cardioverted in 2018 failed Tikosyn continue Eliquis 5 mg twice daily keep electrolytes within normal range continue rate control strategy  HTN: stable, continue To titrate up medication as needed, reviewed continue Toprol-XL and Cardizem 120 mg daily for rate  DVT Prophylaxis if no contraindication  Call with questions we will sign off  Discussed with primary team, hospitalist service, bedside nursing staff and family  Past medical history:    has a past medical history of Allergic rhinitis, Asthma, Atrial fibrillation ([facility]), CAD (coronary artery disease), COPD (chronic obstructive pulmonary disease) ([facility]), COVID-19, Depression,

## 2025-01-29 NOTE — PROGRESS NOTES
Pt on 8L HF oxygen as per baseline at home. Pt desats at sleep. Pt declines CPAP or Bipap use. Pt states he does not want to be disturbed while trying to sleep and that he is aware that his sats drop and states \"it is not a problem\".

## 2025-01-29 NOTE — PROGRESS NOTES
lung field. This could represent asymmetric interstitial edema or interstitial lung disease. 2. Atelectasis versus increased interstitial lung markings in the left lung base, grossly similar to the prior exam. 3. Chronic emphysema.  Dictated and Electronically Signed By: Mihir Powell DO 1/23/2025 13:56          CBC:   No results for input(s): \"WBC\", \"HGB\", \"PLT\" in the last 72 hours.    BMP:    Recent Labs     01/27/25  0816 01/28/25  0440 01/29/25  0409    135* 136   K 4.2 4.8 4.5   CL 98* 97* 102   CO2 24 26 24   BUN 49* 55* 51*   CREATININE 1.7* 2.0* 1.6*   GLUCOSE 167* 158* 155*     Hepatic:   No results for input(s): \"AST\", \"ALT\", \"BILITOT\", \"ALKPHOS\" in the last 72 hours.    Invalid input(s): \"ALB\"    Lipids:   Lab Results   Component Value Date/Time    CHOL 121 01/24/2025 02:41 AM    HDL 50 01/24/2025 02:41 AM    TRIG 51 01/24/2025 02:41 AM     Hemoglobin A1C:   Lab Results   Component Value Date/Time    LABA1C 5.2 11/02/2022 10:15 AM     TSH:   Lab Results   Component Value Date/Time    TSH 4.83 01/23/2025 07:58 PM     Troponin:   Lab Results   Component Value Date/Time    TROPONINT <0.010 12/31/2021 10:30 AM    TROPONINT <0.010 12/30/2021 04:02 PM    TROPONINT <0.010 10/17/2020 02:46 PM     BNP:   Recent Labs     01/27/25  0816 01/29/25  0409   PROBNP 2,661* 3,402*     UA:  Lab Results   Component Value Date/Time    NITRU NEGATIVE 01/28/2025 03:23 PM    COLORU Yellow 01/28/2025 03:23 PM    PHUR 5.5 01/28/2025 03:23 PM    WBCUA <1 10/17/2020 05:59 PM    RBCUA NONE SEEN 10/17/2020 05:59 PM    TRICHOMONAS NONE SEEN 10/17/2020 05:59 PM    BACTERIA NEGATIVE 10/17/2020 05:59 PM    CLARITYU CLEAR 06/27/2024 03:31 PM    LEUKOCYTESUR NEGATIVE 01/28/2025 03:23 PM    UROBILINOGEN 0.2 01/28/2025 03:23 PM    BILIRUBINUR NEGATIVE 01/28/2025 03:23 PM    BLOODU NEGATIVE 06/27/2024 03:31 PM    GLUCOSEU NEGATIVE 01/28/2025 03:23 PM    KETUA NEGATIVE 01/28/2025 03:23 PM         Electronically signed by Bert Peñaloza,  MD on 1/29/2025 at 12:14 PM

## 2025-01-30 LAB
ALBUMIN: 2.6 G/DL (ref 3.4–5)
ANION GAP SERPL CALCULATED.3IONS-SCNC: 9 MMOL/L (ref 9–17)
BASOPHILS # BLD: 0.02 K/UL
BASOPHILS NFR BLD: 0 % (ref 0–1)
BUN SERPL-MCNC: 46 MG/DL (ref 7–20)
CALCIUM SERPL-MCNC: 8.2 MG/DL (ref 8.3–10.6)
CHLORIDE SERPL-SCNC: 104 MMOL/L (ref 99–110)
CO2 SERPL-SCNC: 23 MMOL/L (ref 21–32)
CREAT SERPL-MCNC: 1.4 MG/DL (ref 0.8–1.3)
EOSINOPHIL # BLD: 0 K/UL
EOSINOPHILS RELATIVE PERCENT: 0 % (ref 0–3)
ERYTHROCYTE [DISTWIDTH] IN BLOOD BY AUTOMATED COUNT: 14 % (ref 11.7–14.9)
GFR, ESTIMATED: 51 ML/MIN/1.73M2
GLUCOSE SERPL-MCNC: 164 MG/DL (ref 74–99)
HCT VFR BLD AUTO: 33.5 % (ref 42–52)
HGB BLD-MCNC: 10.8 G/DL (ref 13.5–18)
IMM GRANULOCYTES # BLD AUTO: 0.24 K/UL
IMM GRANULOCYTES NFR BLD: 2 %
LYMPHOCYTES NFR BLD: 0.35 K/UL
LYMPHOCYTES RELATIVE PERCENT: 3 % (ref 24–44)
MCH RBC QN AUTO: 32.6 PG (ref 27–31)
MCHC RBC AUTO-ENTMCNC: 32.2 G/DL (ref 32–36)
MCV RBC AUTO: 101.2 FL (ref 78–100)
MONOCYTES NFR BLD: 0.49 K/UL
MONOCYTES NFR BLD: 5 % (ref 0–4)
NEUTROPHILS NFR BLD: 89 % (ref 36–66)
NEUTS SEG NFR BLD: 9.13 K/UL
PHOSPHATE SERPL-MCNC: 4.2 MG/DL (ref 2.5–4.9)
PLATELET # BLD AUTO: 201 K/UL (ref 140–440)
PMV BLD AUTO: 10.5 FL (ref 7.5–11.1)
POTASSIUM SERPL-SCNC: 4.3 MMOL/L (ref 3.5–5.1)
RBC # BLD AUTO: 3.31 M/UL (ref 4.6–6.2)
SODIUM SERPL-SCNC: 137 MMOL/L (ref 136–145)
WBC OTHER # BLD: 10.2 K/UL (ref 4–10.5)

## 2025-01-30 PROCEDURE — 6370000000 HC RX 637 (ALT 250 FOR IP): Performed by: HOSPITALIST

## 2025-01-30 PROCEDURE — 2500000003 HC RX 250 WO HCPCS: Performed by: INTERNAL MEDICINE

## 2025-01-30 PROCEDURE — 85025 COMPLETE CBC W/AUTO DIFF WBC: CPT

## 2025-01-30 PROCEDURE — 6370000000 HC RX 637 (ALT 250 FOR IP): Performed by: INTERNAL MEDICINE

## 2025-01-30 PROCEDURE — 94640 AIRWAY INHALATION TREATMENT: CPT

## 2025-01-30 PROCEDURE — 94761 N-INVAS EAR/PLS OXIMETRY MLT: CPT

## 2025-01-30 PROCEDURE — 2700000000 HC OXYGEN THERAPY PER DAY

## 2025-01-30 PROCEDURE — 80069 RENAL FUNCTION PANEL: CPT

## 2025-01-30 PROCEDURE — 2140000000 HC CCU INTERMEDIATE R&B

## 2025-01-30 PROCEDURE — 6360000002 HC RX W HCPCS: Performed by: INTERNAL MEDICINE

## 2025-01-30 PROCEDURE — 2500000003 HC RX 250 WO HCPCS: Performed by: HOSPITALIST

## 2025-01-30 PROCEDURE — 94669 MECHANICAL CHEST WALL OSCILL: CPT

## 2025-01-30 PROCEDURE — 36415 COLL VENOUS BLD VENIPUNCTURE: CPT

## 2025-01-30 PROCEDURE — 6370000000 HC RX 637 (ALT 250 FOR IP): Performed by: NURSE PRACTITIONER

## 2025-01-30 RX ADMIN — TRAZODONE HYDROCHLORIDE 50 MG: 50 TABLET ORAL at 20:32

## 2025-01-30 RX ADMIN — WATER 40 MG: 1 INJECTION INTRAMUSCULAR; INTRAVENOUS; SUBCUTANEOUS at 04:33

## 2025-01-30 RX ADMIN — WATER 40 MG: 1 INJECTION INTRAMUSCULAR; INTRAVENOUS; SUBCUTANEOUS at 20:33

## 2025-01-30 RX ADMIN — DOXYCYCLINE HYCLATE 100 MG: 100 TABLET, COATED ORAL at 09:09

## 2025-01-30 RX ADMIN — SODIUM CHLORIDE, PRESERVATIVE FREE 10 ML: 5 INJECTION INTRAVENOUS at 09:09

## 2025-01-30 RX ADMIN — FUROSEMIDE 20 MG: 20 TABLET ORAL at 09:09

## 2025-01-30 RX ADMIN — ATORVASTATIN CALCIUM 10 MG: 10 TABLET, FILM COATED ORAL at 09:09

## 2025-01-30 RX ADMIN — APIXABAN 5 MG: 5 TABLET, FILM COATED ORAL at 20:32

## 2025-01-30 RX ADMIN — SODIUM CHLORIDE, PRESERVATIVE FREE 10 ML: 5 INJECTION INTRAVENOUS at 20:33

## 2025-01-30 RX ADMIN — GABAPENTIN 300 MG: 300 CAPSULE ORAL at 20:32

## 2025-01-30 RX ADMIN — IPRATROPIUM BROMIDE 0.5 MG: 0.5 SOLUTION RESPIRATORY (INHALATION) at 11:00

## 2025-01-30 RX ADMIN — DOXYCYCLINE HYCLATE 100 MG: 100 TABLET, COATED ORAL at 23:21

## 2025-01-30 RX ADMIN — MELATONIN TAB 3 MG 6 MG: 3 TAB at 20:33

## 2025-01-30 RX ADMIN — BUDESONIDE AND FORMOTEROL FUMARATE DIHYDRATE 2 PUFF: 80; 4.5 AEROSOL RESPIRATORY (INHALATION) at 07:16

## 2025-01-30 RX ADMIN — Medication 1 CAPSULE: at 09:11

## 2025-01-30 RX ADMIN — LEVALBUTEROL HYDROCHLORIDE 0.63 MG: 0.63 SOLUTION RESPIRATORY (INHALATION) at 15:03

## 2025-01-30 RX ADMIN — WATER 40 MG: 1 INJECTION INTRAMUSCULAR; INTRAVENOUS; SUBCUTANEOUS at 12:32

## 2025-01-30 RX ADMIN — IPRATROPIUM BROMIDE 0.5 MG: 0.5 SOLUTION RESPIRATORY (INHALATION) at 15:03

## 2025-01-30 RX ADMIN — FOLIC ACID 500 MCG: 1 TABLET ORAL at 09:09

## 2025-01-30 RX ADMIN — METOPROLOL SUCCINATE 50 MG: 50 TABLET, FILM COATED, EXTENDED RELEASE ORAL at 20:32

## 2025-01-30 RX ADMIN — ASPIRIN 81 MG CHEWABLE TABLET 81 MG: 81 TABLET CHEWABLE at 09:09

## 2025-01-30 RX ADMIN — LEVALBUTEROL HYDROCHLORIDE 0.63 MG: 0.63 SOLUTION RESPIRATORY (INHALATION) at 11:00

## 2025-01-30 RX ADMIN — PANTOPRAZOLE SODIUM 40 MG: 40 TABLET, DELAYED RELEASE ORAL at 06:01

## 2025-01-30 RX ADMIN — CYANOCOBALAMIN TAB 1000 MCG 1000 MCG: 1000 TAB at 20:32

## 2025-01-30 RX ADMIN — METOPROLOL SUCCINATE 50 MG: 50 TABLET, FILM COATED, EXTENDED RELEASE ORAL at 09:09

## 2025-01-30 RX ADMIN — APIXABAN 5 MG: 5 TABLET, FILM COATED ORAL at 09:09

## 2025-01-30 ASSESSMENT — PAIN SCALES - GENERAL: PAINLEVEL_OUTOF10: 0

## 2025-01-30 NOTE — PLAN OF CARE
Problem: Chronic Conditions and Co-morbidities  Goal: Patient's chronic conditions and co-morbidity symptoms are monitored and maintained or improved  1/30/2025 1142 by Jaquelin Prieto RN  Outcome: Progressing  1/30/2025 0153 by Mary Martínez RN  Outcome: Progressing     Problem: Discharge Planning  Goal: Discharge to home or other facility with appropriate resources  1/30/2025 1142 by Jaquelin Prieto RN  Outcome: Progressing  1/30/2025 0153 by Mary Martínez RN  Outcome: Progressing     Problem: Safety - Adult  Goal: Free from fall injury  1/30/2025 1142 by Jaquelin Prieto RN  Outcome: Progressing  1/30/2025 0153 by Mary Martínez RN  Outcome: Progressing     Problem: Pain  Goal: Verbalizes/displays adequate comfort level or baseline comfort level  1/30/2025 1142 by Jaquelin Prieto RN  Outcome: Progressing  1/30/2025 0153 by Mary Martínez RN  Outcome: Progressing

## 2025-01-30 NOTE — PROGRESS NOTES
Pulmonary and Critical Care  Progress Note    Subjective:   The patient is better.On 5 L N/C.  Shortness of breath has improved  Chest pain none.  Addressing respiratory complaints Patient is negative for  hemoptysis and cyanosis  CONSTITUTIONAL:  negative for fevers and chills      Past Medical History:     has a past medical history of Allergic rhinitis, Asthma, Atrial fibrillation (HCC), CAD (coronary artery disease), COPD (chronic obstructive pulmonary disease) (HCC), COVID-19, Depression, Diverticulosis of colon, Family history of early CAD, Family history of valvular heart disease, GERD (gastroesophageal reflux disease), H/O 24 hour EKG monitoring, H/O echocardiogram, H/O echocardiogram, History of diverticulitis of colon, History of Holter monitoring, History of repair of left rotator cuff, [place] (hard of hearing), Hx of cardiac cath, Hx of cardiovascular stress test, HX OTHER MEDICAL, Hyperlipidemia, Hypertension, Hypoxemia, Irregular heart rate, Palpitations, Pneumonia, Post-COVID-19 condition, Restless leg syndrome, S/P placement of cardiac pacemaker, Shortness of breath, and Tricuspid valve regurgitation.   has a past surgical history that includes Rotator cuff repair (2007, 2006); Bone Resection, Rib; Nerve Surgery; Shoulder arthroscopy (Right, 12/01/2010); Diagnostic Cardiac Cath Lab Procedure (07/08/2013); Colonoscopy (10/14/2014); Endoscopy, colon, diagnostic (10/14/2014); Rotator cuff repair (Left, 11/18/2015); Pacemaker insertion (Left, 04/01/2021); Cataract removal (Bilateral); cervical fusion (N/A, 11/18/2022); Cardiac procedure (N/A, 4/26/2024); and Cardiac procedure (N/A, 4/26/2024).   reports that he quit smoking about 31 years ago. His smoking use included cigarettes. He started smoking about 51 years ago. He has a 20 pack-year smoking history. His smokeless tobacco use includes chew. He reports that he does not drink alcohol and does not use drugs.  Family history:  family history includes

## 2025-01-30 NOTE — PROGRESS NOTES
Nephrology Progress Note  1/30/2025 8:32 AM  Subjective:     Interval History: Johnnie Smith is a 79 y.o. male appears doing okay overall lying flat in bed with nasal cannula O2 no acute distress      Data:   Scheduled Meds:   doxycycline hyclate  100 mg Oral Q12H    furosemide  20 mg Oral Daily    budesonide-formoterol  2 puff Inhalation BID RT    ipratropium  0.5 mg Nebulization 4x Daily RT    levalbuterol  0.63 mg Nebulization Q4H WA RT    metoprolol succinate  50 mg Oral BID    methylPREDNISolone  40 mg IntraVENous Q8H    aspirin  81 mg Oral Daily    apixaban  5 mg Oral BID    gabapentin  300 mg Oral Nightly    [Held by provider] escitalopram  20 mg Oral Daily    [Held by provider] mirtazapine  7.5 mg Oral Nightly    atorvastatin  10 mg Oral Daily    vitamin B-12  1,000 mcg Oral Daily    folic acid  500 mcg Oral Daily    pantoprazole  40 mg Oral QAM AC    b complex vitamins  1 capsule Oral Daily    sodium chloride flush  5-40 mL IntraVENous 2 times per day     Continuous Infusions:   sodium chloride 20 mL/hr at 01/24/25 0347         CBC No results for input(s): \"WBC\", \"HGB\", \"HCT\", \"PLT\" in the last 72 hours.   BMP   Recent Labs     01/28/25  0440 01/29/25  0409 01/30/25  0405   * 136 137   K 4.8 4.5 4.3   CL 97* 102 104   CO2 26 24 23   PHOS  --  4.4 4.2   BUN 55* 51* 46*   CREATININE 2.0* 1.6* 1.4*     Hepatic: No results for input(s): \"AST\", \"ALT\", \"BILITOT\", \"ALKPHOS\" in the last 72 hours.    Invalid input(s): \"ALB\"  Troponin: No results for input(s): \"TROPONINI\" in the last 72 hours.  BNP: No results for input(s): \"BNP\" in the last 72 hours.  Lipids: No results for input(s): \"CHOL\", \"HDL\" in the last 72 hours.    Invalid input(s): \"LDLCALCU\"  ABGs: No results found for: \"PHART\", \"PO2ART\", \"RQA9MQH\"  INR: No results for input(s): \"INR\" in the last 72 hours.  Renal Labs  Albumin:  No results found for: \"LABALBU\"  Calcium:    Lab Results   Component Value Date/Time    CALCIUM 8.2 01/30/2025 04:05 AM

## 2025-01-31 VITALS
TEMPERATURE: 97.6 F | DIASTOLIC BLOOD PRESSURE: 86 MMHG | BODY MASS INDEX: 35.33 KG/M2 | HEART RATE: 100 BPM | OXYGEN SATURATION: 92 % | HEIGHT: 67 IN | WEIGHT: 225.09 LBS | SYSTOLIC BLOOD PRESSURE: 120 MMHG | RESPIRATION RATE: 15 BRPM

## 2025-01-31 LAB
ALBUMIN: 2.8 G/DL (ref 3.4–5)
ANION GAP SERPL CALCULATED.3IONS-SCNC: 9 MMOL/L (ref 9–17)
BUN SERPL-MCNC: 48 MG/DL (ref 7–20)
CALCIUM SERPL-MCNC: 8.3 MG/DL (ref 8.3–10.6)
CHLORIDE SERPL-SCNC: 103 MMOL/L (ref 99–110)
CO2 SERPL-SCNC: 25 MMOL/L (ref 21–32)
CREAT SERPL-MCNC: 1.5 MG/DL (ref 0.8–1.3)
GFR, ESTIMATED: 46 ML/MIN/1.73M2
GLUCOSE SERPL-MCNC: 158 MG/DL (ref 74–99)
PHOSPHATE SERPL-MCNC: 3.9 MG/DL (ref 2.5–4.9)
POTASSIUM SERPL-SCNC: 4.2 MMOL/L (ref 3.5–5.1)
SODIUM SERPL-SCNC: 136 MMOL/L (ref 136–145)

## 2025-01-31 PROCEDURE — 97530 THERAPEUTIC ACTIVITIES: CPT

## 2025-01-31 PROCEDURE — 36415 COLL VENOUS BLD VENIPUNCTURE: CPT

## 2025-01-31 PROCEDURE — 6370000000 HC RX 637 (ALT 250 FOR IP): Performed by: HOSPITALIST

## 2025-01-31 PROCEDURE — 6360000002 HC RX W HCPCS: Performed by: INTERNAL MEDICINE

## 2025-01-31 PROCEDURE — 6370000000 HC RX 637 (ALT 250 FOR IP): Performed by: INTERNAL MEDICINE

## 2025-01-31 PROCEDURE — 2500000003 HC RX 250 WO HCPCS: Performed by: INTERNAL MEDICINE

## 2025-01-31 PROCEDURE — 94761 N-INVAS EAR/PLS OXIMETRY MLT: CPT

## 2025-01-31 PROCEDURE — 2500000003 HC RX 250 WO HCPCS: Performed by: HOSPITALIST

## 2025-01-31 PROCEDURE — 80069 RENAL FUNCTION PANEL: CPT

## 2025-01-31 PROCEDURE — 94640 AIRWAY INHALATION TREATMENT: CPT

## 2025-01-31 PROCEDURE — 2700000000 HC OXYGEN THERAPY PER DAY

## 2025-01-31 PROCEDURE — 97116 GAIT TRAINING THERAPY: CPT

## 2025-01-31 PROCEDURE — 94669 MECHANICAL CHEST WALL OSCILL: CPT

## 2025-01-31 RX ORDER — PREDNISONE 10 MG/1
TABLET ORAL
Qty: 50 TABLET | Refills: 0 | Status: SHIPPED | OUTPATIENT
Start: 2025-01-31 | End: 2025-02-20

## 2025-01-31 RX ORDER — ALBUTEROL SULFATE 90 UG/1
2 INHALANT RESPIRATORY (INHALATION) 4 TIMES DAILY PRN
Qty: 18 EACH | Refills: 5 | Status: SHIPPED | OUTPATIENT
Start: 2025-01-31

## 2025-01-31 RX ORDER — DOXYCYCLINE HYCLATE 100 MG
100 TABLET ORAL EVERY 12 HOURS
Qty: 6 TABLET | Refills: 0 | Status: SHIPPED | OUTPATIENT
Start: 2025-01-31 | End: 2025-02-03

## 2025-01-31 RX ORDER — METOPROLOL SUCCINATE 50 MG/1
50 TABLET, EXTENDED RELEASE ORAL 2 TIMES DAILY
Qty: 30 TABLET | Refills: 3 | Status: SHIPPED | OUTPATIENT
Start: 2025-01-31

## 2025-01-31 RX ORDER — FUROSEMIDE 20 MG/1
20 TABLET ORAL DAILY
Qty: 60 TABLET | Refills: 0 | Status: SHIPPED | OUTPATIENT
Start: 2025-02-01

## 2025-01-31 RX ORDER — FLUTICASONE FUROATE, UMECLIDINIUM BROMIDE AND VILANTEROL TRIFENATATE 100; 62.5; 25 UG/1; UG/1; UG/1
1 POWDER RESPIRATORY (INHALATION) DAILY
Qty: 3 EACH | Refills: 3 | Status: SHIPPED | OUTPATIENT
Start: 2025-01-31 | End: 2026-01-26

## 2025-01-31 RX ADMIN — FUROSEMIDE 20 MG: 20 TABLET ORAL at 09:13

## 2025-01-31 RX ADMIN — APIXABAN 5 MG: 5 TABLET, FILM COATED ORAL at 09:13

## 2025-01-31 RX ADMIN — SODIUM CHLORIDE, PRESERVATIVE FREE 10 ML: 5 INJECTION INTRAVENOUS at 09:13

## 2025-01-31 RX ADMIN — ATORVASTATIN CALCIUM 10 MG: 10 TABLET, FILM COATED ORAL at 09:13

## 2025-01-31 RX ADMIN — Medication 1 CAPSULE: at 09:13

## 2025-01-31 RX ADMIN — IPRATROPIUM BROMIDE 0.5 MG: 0.5 SOLUTION RESPIRATORY (INHALATION) at 10:53

## 2025-01-31 RX ADMIN — DOXYCYCLINE HYCLATE 100 MG: 100 TABLET, COATED ORAL at 12:28

## 2025-01-31 RX ADMIN — FOLIC ACID 500 MCG: 1 TABLET ORAL at 09:13

## 2025-01-31 RX ADMIN — PANTOPRAZOLE SODIUM 40 MG: 40 TABLET, DELAYED RELEASE ORAL at 06:14

## 2025-01-31 RX ADMIN — ASPIRIN 81 MG CHEWABLE TABLET 81 MG: 81 TABLET CHEWABLE at 09:13

## 2025-01-31 RX ADMIN — BUDESONIDE AND FORMOTEROL FUMARATE DIHYDRATE 2 PUFF: 80; 4.5 AEROSOL RESPIRATORY (INHALATION) at 07:06

## 2025-01-31 RX ADMIN — LEVALBUTEROL HYDROCHLORIDE 0.63 MG: 0.63 SOLUTION RESPIRATORY (INHALATION) at 10:53

## 2025-01-31 RX ADMIN — METOPROLOL SUCCINATE 50 MG: 50 TABLET, FILM COATED, EXTENDED RELEASE ORAL at 09:13

## 2025-01-31 RX ADMIN — IPRATROPIUM BROMIDE 0.5 MG: 0.5 SOLUTION RESPIRATORY (INHALATION) at 07:06

## 2025-01-31 RX ADMIN — LEVALBUTEROL HYDROCHLORIDE 0.63 MG: 0.63 SOLUTION RESPIRATORY (INHALATION) at 07:06

## 2025-01-31 RX ADMIN — WATER 40 MG: 1 INJECTION INTRAMUSCULAR; INTRAVENOUS; SUBCUTANEOUS at 04:21

## 2025-01-31 ASSESSMENT — PAIN SCALES - GENERAL: PAINLEVEL_OUTOF10: 0

## 2025-01-31 NOTE — PROGRESS NOTES
V2.0    [facility] Progress Note      Name:  Johnnie Smith /Age/Sex: 1945  (79 y.o. male)   MRN & CSN:  [id number] & [id number] Encounter Date/Time: 2025 9:52 AM EST   Location:  [facility] PCP: Claudio Umanzor PA     Attending:Amelia Sky MD       Hospital Day: 8    Assessment and Recommendations   Johnnie Smith is a 79 y.o. male who presents with Congestive heart failure of unknown etiology (HCC)      Plan:     Acute on chronic hypoxic respiratory failure  Pulmonary edema  Right heart failure  Interstitial lung disease  Presented for worsening shortness of breath.  Reports he uses 8 to 10 L via nasal cannula at baseline.  Used to see local pulmonologist started patient on steroid and pirfenidone.  Patient reports distrust to local pulmonologist and started to see pulmonology at [facility] started patient on dupilumab.  At presentation chest x-ray showed interval development of diffuse increased interstitial lung markings throughout the right lung field suspected to be in the setting of pulmonary edema.  Patient is also known to have right heart failure-severe right ventricle dilatation on recent echo. Patient was started on IV Lasix 60 Mg twice daily-repeat x-ray done 2025 shows improvement-no concerning edema on lower extremity-bilateral lung continues to have crackles. Discussed with respiratory team who reported patient was getting 45% high flow-but was actually receiving 9 L at home which would be around 56% FiO2-trialed him on nasal cannula -tolerated.  -creatinine increased to 2.0- HELD Lasix -> decreased to 1.6 ->discussed with Dr. Pascual to RESTART tomorrow at 20 mg oral   -Pulmonology team on board-continued on steroid-methylprednisolone every 8 hours-plan for slow wean when discharged  -currently on 7-8 L this morning  -Strict ins and outs.  Daily weights.  -Cardiology team on board  -Nephrology team on board    >> patient reports that he uses 9-10L via NC at baseline

## 2025-01-31 NOTE — PLAN OF CARE
Problem: Chronic Conditions and Co-morbidities  Goal: Patient's chronic conditions and co-morbidity symptoms are monitored and maintained or improved  1/30/2025 2217 by Mary Martínez RN  Outcome: Progressing  1/30/2025 1142 by Jaquelin Prieto RN  Outcome: Progressing     Problem: Discharge Planning  Goal: Discharge to home or other facility with appropriate resources  1/30/2025 2217 by Mary Martínez RN  Outcome: Progressing  1/30/2025 1142 by Jaquelin Prieto RN  Outcome: Progressing     Problem: Safety - Adult  Goal: Free from fall injury  1/30/2025 2217 by Mary Martínez RN  Outcome: Progressing  1/30/2025 1142 by Jaquelin Prieto RN  Outcome: Progressing     Problem: Pain  Goal: Verbalizes/displays adequate comfort level or baseline comfort level  1/30/2025 2217 by Mary Martínez RN  Outcome: Progressing  1/30/2025 1142 by Jaquelin Prieto RN  Outcome: Progressing

## 2025-01-31 NOTE — PROGRESS NOTES
Outpatient Pharmacy Progress Note for Meds-to-Beds    Total number of Prescriptions Filled: 5    Additional Documentation:  Patient picked-up the medication(s) in the OP Pharmacy      Thank you for letting us serve your patients.  [address] [address]    Phone: [phone]    Fax: [phone]

## 2025-01-31 NOTE — PLAN OF CARE
Problem: Chronic Conditions and Co-morbidities  Goal: Patient's chronic conditions and co-morbidity symptoms are monitored and maintained or improved  1/31/2025 0918 by Anna Daley RN  Outcome: Progressing  1/30/2025 2217 by Mary Martínez RN  Outcome: Progressing     Problem: Discharge Planning  Goal: Discharge to home or other facility with appropriate resources  1/31/2025 0918 by Anna Daley RN  Outcome: Progressing  1/30/2025 2217 by Mary Martínez RN  Outcome: Progressing     Problem: Safety - Adult  Goal: Free from fall injury  1/31/2025 0918 by Anna Daley RN  Outcome: Progressing  1/30/2025 2217 by Mary Martínez RN  Outcome: Progressing     Problem: Pain  Goal: Verbalizes/displays adequate comfort level or baseline comfort level  1/31/2025 0918 by Anna Daley RN  Outcome: Progressing  1/30/2025 2217 by Mary Martínez RN  Outcome: Progressing

## 2025-01-31 NOTE — CARE COORDINATION
Chart reviewed. Discharge plan remain home with wife. No new needs identified. CM if available if needs arise.

## 2025-01-31 NOTE — PROGRESS NOTES
Physical Therapy Treatment Note  Name: Johnnie Smith MRN: [id number] :   1945   Date:  2025   Admission Date: 2025 Room:  [address] [place] [address]   Restrictions/Precautions: general precautions, fall risk, O2   Communication with other providers:  Pt okay to see for therapy per RN   Subjective:  Patient states:  \"I retired from [employer]\"   Pain:   Location, Type, Intensity (0/10 to 10/10):  Denies   Objective:    Observation:  Pt sitting up EOB upon PTA arrival.   Objective Measures:  Tele, O2 83-93% decreasing with ambulation.   Treatment, including education/measures:    Therapeutic Activity Training:   Therapeutic activity training was instructed today.  Cues were given for safety, sequence, UE/LE placement, awareness, and balance. Activities performed today included STS    Mobility:  STS: From recliner with CGA x2 reps but Max A for return to recliner following second stand dt period of impaired responsiveness dt light headedness, from chair with CGA.     Gait:  Gait training conducted for ~10ft + 20ft with no AD. CGA for first bout progressing to Max A as pt reports dizziness and stops responding to PTA questions, pt leaning anteriorly with near LOB with Max A to correct, rapid descent to recliner. Pt ambulated additional  20ft with close chair follow, CGA, and no AD. PTA provided max cues for safety, communication, and PLB. Pt demos decreased step length, decreased gait speed, decreased foot clearance, slightly increased BHARATH.     Increased time for recovery in between bouts of ambulation dt near LOB and light headedness.     Safety:   Pt returned safely to recliner with chair alarm activated, call light in reach, all needs met.   Assessment / Impression:    Pt tolerated OOB activities this date but does present with a bout of significant dizziness requiring rapid return to recliner.   Patient's tolerance of treatment:  Fair  Adverse Reaction: none  Significant change in status and

## 2025-01-31 NOTE — PROGRESS NOTES
Pulmonary and Critical Care  Progress Note    Subjective:   The patient has improved.On 6 L N/C.  Shortness of breath better.  Chest pain none.  Addressing respiratory complaints Patient is negative for  hemoptysis and cyanosis  CONSTITUTIONAL:  negative for fevers and chills      Past Medical History:     has a past medical history of Allergic rhinitis, Asthma, Atrial fibrillation (HCC), CAD (coronary artery disease), COPD (chronic obstructive pulmonary disease) (HCC), COVID-19, Depression, Diverticulosis of colon, Family history of early CAD, Family history of valvular heart disease, GERD (gastroesophageal reflux disease), H/O 24 hour EKG monitoring, H/O echocardiogram, H/O echocardiogram, History of diverticulitis of colon, History of Holter monitoring, History of repair of left rotator cuff, [place] (hard of hearing), Hx of cardiac cath, Hx of cardiovascular stress test, HX OTHER MEDICAL, Hyperlipidemia, Hypertension, Hypoxemia, Irregular heart rate, Palpitations, Pneumonia, Post-COVID-19 condition, Restless leg syndrome, S/P placement of cardiac pacemaker, Shortness of breath, and Tricuspid valve regurgitation.   has a past surgical history that includes Rotator cuff repair (2007, 2006); Bone Resection, Rib; Nerve Surgery; Shoulder arthroscopy (Right, 12/01/2010); Diagnostic Cardiac Cath Lab Procedure (07/08/2013); Colonoscopy (10/14/2014); Endoscopy, colon, diagnostic (10/14/2014); Rotator cuff repair (Left, 11/18/2015); Pacemaker insertion (Left, 04/01/2021); Cataract removal (Bilateral); cervical fusion (N/A, 11/18/2022); Cardiac procedure (N/A, 4/26/2024); and Cardiac procedure (N/A, 4/26/2024).   reports that he quit smoking about 31 years ago. His smoking use included cigarettes. He started smoking about 51 years ago. He has a 20 pack-year smoking history. His smokeless tobacco use includes chew. He reports that he does not drink alcohol and does not use drugs.  Family history:  family history includes

## 2025-01-31 NOTE — PROGRESS NOTES
Nephrology Progress Note  1/31/2025 9:38 AM  Subjective:     Interval History: Johnnie Smith is a 79 y.o. male  overall doing well less requirement of oxygen to baseline supportive care on diuretic therapy renal function stable      Data:   Scheduled Meds:   doxycycline hyclate  100 mg Oral Q12H    furosemide  20 mg Oral Daily    budesonide-formoterol  2 puff Inhalation BID RT    ipratropium  0.5 mg Nebulization 4x Daily RT    levalbuterol  0.63 mg Nebulization Q4H WA RT    metoprolol succinate  50 mg Oral BID    methylPREDNISolone  40 mg IntraVENous Q8H    aspirin  81 mg Oral Daily    apixaban  5 mg Oral BID    gabapentin  300 mg Oral Nightly    [Held by provider] escitalopram  20 mg Oral Daily    [Held by provider] mirtazapine  7.5 mg Oral Nightly    atorvastatin  10 mg Oral Daily    vitamin B-12  1,000 mcg Oral Daily    folic acid  500 mcg Oral Daily    pantoprazole  40 mg Oral QAM AC    b complex vitamins  1 capsule Oral Daily    sodium chloride flush  5-40 mL IntraVENous 2 times per day     Continuous Infusions:   sodium chloride 20 mL/hr at 01/24/25 0347         CBC   Recent Labs     01/30/25  0405   WBC 10.2   HGB 10.8*   HCT 33.5*         BMP   Recent Labs     01/29/25  0409 01/30/25  0405 01/31/25  0139    137 136   K 4.5 4.3 4.2    104 103   CO2 24 23 25   PHOS 4.4 4.2 3.9   BUN 51* 46* 48*   CREATININE 1.6* 1.4* 1.5*     Hepatic: No results for input(s): \"AST\", \"ALT\", \"BILITOT\", \"ALKPHOS\" in the last 72 hours.    Invalid input(s): \"ALB\"  Troponin: No results for input(s): \"TROPONINI\" in the last 72 hours.  BNP: No results for input(s): \"BNP\" in the last 72 hours.  Lipids: No results for input(s): \"CHOL\", \"HDL\" in the last 72 hours.    Invalid input(s): \"LDLCALCU\"  ABGs: No results found for: \"PHART\", \"PO2ART\", \"BYD7ARZ\"  INR: No results for input(s): \"INR\" in the last 72 hours.  Renal Labs  Albumin:  No results found for: \"LABALBU\"  Calcium:    Lab Results   Component Value Date/Time     CALCIUM 8.3 01/31/2025 01:39 AM     Phosphorus:    Lab Results   Component Value Date/Time    PHOS 3.9 01/31/2025 01:39 AM     U/A:    Lab Results   Component Value Date/Time    NITRU NEGATIVE 01/28/2025 03:23 PM    COLORU Yellow 01/28/2025 03:23 PM    PHUR 5.5 01/28/2025 03:23 PM    WBCUA <1 10/17/2020 05:59 PM    RBCUA NONE SEEN 10/17/2020 05:59 PM    TRICHOMONAS NONE SEEN 10/17/2020 05:59 PM    BACTERIA NEGATIVE 10/17/2020 05:59 PM    CLARITYU CLEAR 06/27/2024 03:31 PM    UROBILINOGEN 0.2 01/28/2025 03:23 PM    BILIRUBINUR NEGATIVE 01/28/2025 03:23 PM    BLOODU NEGATIVE 06/27/2024 03:31 PM    GLUCOSEU NEGATIVE 01/28/2025 03:23 PM    KETUA NEGATIVE 01/28/2025 03:23 PM     ABG:  No results found for: \"PHART\", \"AGL8CXB\", \"PO2ART\", \"AUG9LHM\", \"BEART\", \"THGBART\", \"NYK3FNW\", \"T2EDVPKA\"  HgBA1c:    Lab Results   Component Value Date/Time    LABA1C 5.2 11/02/2022 10:15 AM     Microalbumen/Creatinine ratio:  No components found for: \"RUCREAT\"  TSH:    Lab Results   Component Value Date/Time    TSH 4.83 01/23/2025 07:58 PM     IRON:    Lab Results   Component Value Date/Time    IRON 24 01/23/2025 07:58 PM     Iron Saturation:  No components found for: \"PERCENTFE\"  TIBC:    Lab Results   Component Value Date/Time    TIBC 224 01/23/2025 07:58 PM     FERRITIN:    Lab Results   Component Value Date/Time    FERRITIN 152 01/23/2025 07:58 PM     RPR:  No results found for: \"RPR\"  [place]:    Lab Results   Component Value Date/Time    [place] None Detected 09/06/2024 09:36 AM     24 Hour Urine for Creatinine Clearance:  No components found for: \"CREAT4\", \"UHRS10\", \"UTV10\"      Objective:   I/O: 01/30 0701 - 01/31 0700  In: 880 [P.O.:880]  Out: 1000 [Urine:1000]  I/O last 3 completed shifts:  In: 1235 [P.O.:1230; I.V.:5]  Out: 1000 [Urine:1000]  No intake/output data recorded.  Vitals: /86   Pulse 100   Temp 97.6 °F (36.4 °C) (Oral)   Resp 15   Ht 1.702 m (5' 7\")   Wt 102.1 kg (225 lb 1.4 oz)   SpO2 92%   BMI 35.25 kg/m²

## 2025-02-03 ENCOUNTER — CARE COORDINATION (OUTPATIENT)
Dept: CARE COORDINATION | Age: 80
End: 2025-02-03

## 2025-02-03 NOTE — CARE COORDINATION
Ambulatory Care Coordination Note     2/3/2025 8:21 AM     ACM outreach attempt by this ACM today to perform care management follow up . ACM was unable to reach the patient by telephone today;   left voice message requesting a return phone call to this ACM.     ACM: Nancy Umanzor RN     Care Summary Note: ACM outreach to patient for Care Management.     PCP/Specialist follow up:   Future Appointments         Provider Specialty Dept Phone    2/6/2025 1:00 PM Shawanda Mendez APRN - CNP Cardiology [phone]    6/23/2025 9:20 AM Brad Aldridge MD Cardiology [phone]            Follow Up:   Plan for next ACM outreach in approximately 1-2 days  to complete:  - hospital follow up.

## 2025-02-04 ENCOUNTER — CARE COORDINATION (OUTPATIENT)
Dept: CARE COORDINATION | Age: 80
End: 2025-02-04

## 2025-02-04 ENCOUNTER — TELEPHONE (OUTPATIENT)
Dept: FAMILY MEDICINE CLINIC | Age: 80
End: 2025-02-04

## 2025-02-04 NOTE — TELEPHONE ENCOUNTER
Received a call from Jose at [facility] regarding patient passing away on 25 at 16:51.  Jose asking if you would sign Death Certificate.  He stated the  called and spoke to someone regarding patients death and was told Dr Lacy will sign the death certificate.  Patient is a patient of Claudio Umanzor.  At the time of the  call patient was not jeevan  in chart and no documentation.  I called [facility] office to get more information and was told I would get a call back. [facility] office called back and all they know is patient passed away at his home on 25 at 16:51. Patient was in the hospital with congestive heart failure and was discharged on 25.

## 2025-02-04 NOTE — CARE COORDINATION
Ambulatory Care Coordination Note     2/4/2025 11:02 AM     ACM outreach attempt by this ACM today to perform care management follow up . ACM was unable to reach the patient by telephone today;   hospital follow up call within 2 business days of discharge: Yes     ACM: Nancy Umanzor RN     Care Summary Note: ACM outreach to patient for follow up call from hospital stay for Care Management.     PCP/Specialist follow up:   Future Appointments         Provider Specialty Dept Phone    2/6/2025 1:00 PM Shawanad Mendez APRN - CNP Cardiology [phone]    6/23/2025 9:20 AM Brad Aldridge MD Cardiology [phone]            Follow Up:   Plan for next ACM outreach in approximately 1 week to complete:  - goal progression  - education   - hospital follow up.

## 2025-02-04 NOTE — TELEPHONE ENCOUNTER
Notified [facility]  home Dr Lacy will sign Death Certificate. Jose will drop Death Certificate off on 25

## 2025-02-05 NOTE — DISCHARGE SUMMARY
20 MG tablet  Commonly known as: DELTASONE     sulfamethoxazole-trimethoprim 800-160 MG per tablet  Commonly known as: BACTRIM DS;SEPTRA DS     Trelegy Ellipta 100-62.5-25 MCG/ACT Aepb inhaler  Generic drug: fluticasone-umeclidin-vilant     UNABLE TO FIND     UNABLE TO FIND     vitamin B complex Tabs            ASK your doctor about these medications      doxycycline hyclate 100 MG tablet  Commonly known as: VIBRA-TABS  Take 1 tablet by mouth in the morning and 1 tablet in the evening. Do all this for 6 doses.  Ask about: Should I take this medication?               Where to Get Your Medications        These medications were sent to [address] - P [phone] - F [phone]  [address] [place] [id number]      Phone: [phone]   doxycycline hyclate 100 MG tablet        Objective Findings at Discharge:   /86   Pulse 100   Temp 97.6 °F (36.4 °C) (Oral)   Resp 15   Ht 1.702 m (5' 7\")   Wt 102.1 kg (225 lb 1.4 oz)   SpO2 92%   BMI 35.25 kg/m²       Physical Exam:     General: NAD  Eyes: EOMI  ENT: neck supple  Cardiovascular: Regular rate.  Respiratory: No respiratory distress, good air movement, no wheezing/rhonchi  Gastrointestinal: Soft, non tender  Genitourinary: no suprapubic tenderness  Musculoskeletal: No edema  Skin: warm, dry  Neuro: Alert.  Psych: Mood appropriate.         Labs and Imaging   No results found.    CBC: No results for input(s): \"WBC\", \"HGB\", \"PLT\" in the last 72 hours.  BMP:  No results for input(s): \"NA\", \"K\", \"CL\", \"CO2\", \"BUN\", \"CREATININE\", \"GLUCOSE\" in the last 72 hours.  Hepatic: No results for input(s): \"AST\", \"ALT\", \"BILITOT\", \"ALKPHOS\" in the last 72 hours.    Invalid input(s): \"ALB\"  Lipids:   Lab Results   Component Value Date/Time    CHOL 121 01/24/2025 02:41 AM    HDL 50 01/24/2025 02:41 AM    TRIG 51 01/24/2025 02:41 AM     Hemoglobin A1C:   Lab Results   Component Value Date/Time

## 2025-02-10 ENCOUNTER — CARE COORDINATION (OUTPATIENT)
Dept: CARE COORDINATION | Age: 80
End: 2025-02-10

## (undated) DEVICE — GLOVE ORANGE PI 8   MSG9080

## (undated) DEVICE — SUTURE ABSORBABLE BRAIDED 2-0 CT-1 27 IN UD VICRYL J259H

## (undated) DEVICE — Device

## (undated) DEVICE — GLOVE SURG SZ 8 CRM LTX FREE POLYISOPRENE POLYMER BEAD ANTI

## (undated) DEVICE — SPONGE LAP W18XL18IN WHT COT 4 PLY FLD STRUNG RADPQ DISP ST

## (undated) DEVICE — C-ARMOR C-ARM EQUIPMENT COVERS CLEAR STERILE UNIVERSAL FIT 12 PER CASE: Brand: C-ARMOR

## (undated) DEVICE — ANGIO-SEAL VIP VASCULAR CLOSURE DEVICE: Brand: ANGIO-SEAL

## (undated) DEVICE — ELECTRODE ES L4IN PTFE INSUL BLDE W/ SFTY SL DISP EDGE

## (undated) DEVICE — SPONGE,PEANUT,XRAY,ST,SM,3/8",5/CARD: Brand: MEDLINE INDUSTRIES, INC.

## (undated) DEVICE — GUIDE CATHETER: Brand: RUNWAY™

## (undated) DEVICE — RADIFOCUS OPTITORQUE ANGIOGRAPHIC CATHETER: Brand: OPTITORQUE

## (undated) DEVICE — SYRINGE MED 10ML SLIP TIP BLNT FILL AND LUERLOCK DISP

## (undated) DEVICE — GOWN,ECLIPSE,POLYRNF,BRTHSLV,XL,30/CS: Brand: MEDLINE

## (undated) DEVICE — DRAPE,UTILITY,XL,4/PK,STERILE: Brand: MEDLINE

## (undated) DEVICE — AGENT HEMOSTATIC SURGIFLOW MATRIX KIT W/THROMBIN

## (undated) DEVICE — DRAPE,U/ SHT,SPLIT,PLAS,STERIL: Brand: MEDLINE

## (undated) DEVICE — WIRE .035 3MM J TIP 260CM

## (undated) DEVICE — BANDAGE TRANSPARENT LIQ 2/3 CC MASTISOL

## (undated) DEVICE — SUTURE MCRYL SZ 3-0 L27IN ABSRB UD L24MM PS-1 3/8 CIR PRIM Y936H

## (undated) DEVICE — CARBIDE MATCH HEAD

## (undated) DEVICE — SYRINGE IRRIG 60ML SFT PLIABLE BLB EZ TO GRP 1 HND USE W/

## (undated) DEVICE — GLOVE SURG SZ 7 L12IN FNGR THK79MIL GRN LTX FREE

## (undated) DEVICE — GLOVE SURG SZ 6 CRM LTX FREE POLYISOPRENE POLYMER BEAD ANTI

## (undated) DEVICE — ANGIOGRAPHY KIT CUST MANIFOLD

## (undated) DEVICE — TUBING, SUCTION, 9/32" X 10', STRAIGHT: Brand: MEDLINE

## (undated) DEVICE — GAUZE,SPONGE,4"X4",16PLY,XRAY,STRL,LF: Brand: MEDLINE

## (undated) DEVICE — PACK,BASIC,SIRUS,V: Brand: MEDLINE

## (undated) DEVICE — C-ARM: Brand: UNBRANDED

## (undated) DEVICE — INTRODUCER SHTH THN WALLED 5 FRX10 CM 22 GA ANGLED RAIN SHTH

## (undated) DEVICE — GLIDESHEATH SLENDER ACCESS KIT: Brand: GLIDESHEATH SLENDER

## (undated) DEVICE — CATHETER 4FR CORDIS JL4 100CM

## (undated) DEVICE — TUBING SUCT 12FR MAL ALUM SHFT FN CAP VENT UNIV CONN W/ OBT

## (undated) DEVICE — UNDERGLOVE SURG SZ 8.5 FNGR THK0.21MIL GRN LTX BEAD CUF

## (undated) DEVICE — CATH BLLN ANGIO 3.25X27MM NC EUPHORIA RX

## (undated) DEVICE — 3M™ STERI-DRAPE™ INSTRUMENT POUCH 1018: Brand: STERI-DRAPE™

## (undated) DEVICE — ADHESIVE SKIN CLSR 0.7ML TOP DERMBND ADV

## (undated) DEVICE — BLADE CLIPPER GEN PURP NS

## (undated) DEVICE — 3M™ IOBAN™ 2 ANTIMICROBIAL INCISE DRAPE 6650EZ: Brand: IOBAN™ 2

## (undated) DEVICE — CATHETER DIAG AD 4FR L110CM 145DEG COR RED HYDRPHLC NYL

## (undated) DEVICE — CATH BLLN ANGIO 3X27MM NC EUPHORIA RX

## (undated) DEVICE — GLOVE SURG SZ 65 CRM LTX FREE POLYISOPRENE POLYMER BEAD ANTI

## (undated) DEVICE — WIRE .035 KIT J TIP 3MM 150CM

## (undated) DEVICE — SHEET,T,THYROID,STERILE: Brand: MEDLINE

## (undated) DEVICE — CATH BLLN ANGIO 2.50X20MM SC EUPHORA RX

## (undated) DEVICE — SHEET,DRAPE,53X77,STERILE: Brand: MEDLINE

## (undated) DEVICE — PINNACLE INTRODUCER SHEATH: Brand: PINNACLE

## (undated) DEVICE — SPONGE GZ W4XL8IN COT WVN 12 PLY

## (undated) DEVICE — TOTAL TRAY, DB, 100% SILI FOLEY, 16FR 10: Brand: MEDLINE

## (undated) DEVICE — YANKAUER,FLEXIBLE HANDLE,REGLR CAPACITY: Brand: MEDLINE INDUSTRIES, INC.

## (undated) DEVICE — GUIDEWIRE VASC L182CM DIA0.014IN 3CM RADIOPACITY J TIP MOD

## (undated) DEVICE — DISTRACTOR 875-087 PINS STER 12MM: Brand: MEDTRONIC REUSABLE INSTRUMENT

## (undated) DEVICE — BLADE SCALPEL NO 15 STERILE

## (undated) DEVICE — ADHESIVE LIQ 2OZ ADJUNCT FOR DSG MASTISOL

## (undated) DEVICE — SWAN-GANZ TRUE SIZE THERMODULTION CATHETER, 5F: Brand: SWAN-GANZ TRUE SIZE

## (undated) DEVICE — CATHETER DIAG AD 4FR L100CM STD NYL JUDKINS R 4 TRULUMEN

## (undated) DEVICE — PINNACLE R/O II INTRODUCER SHEATH WITH RADIOPAQUE MARKER: Brand: PINNACLE

## (undated) DEVICE — GLOVE SURG SZ 7 CRM LTX FREE POLYISOPRENE POLYMER BEAD ANTI

## (undated) DEVICE — GLOVE SURG SZ 6 L12IN FNGR THK79MIL GRN LTX FREE

## (undated) DEVICE — TOWEL,OR,DSP,ST,BLUE,STD,6/PK,12PK/CS: Brand: MEDLINE

## (undated) DEVICE — MARKER SURG SKIN UTIL REGULAR/FINE 2 TIP W/ RUL AND 9 LBL

## (undated) DEVICE — GLOVE SURG SZ 65 L12IN FNGR THK79MIL GRN LTX FREE

## (undated) DEVICE — PAD,NON-ADHERENT,3X8,STERILE,LF,1/PK: Brand: MEDLINE

## (undated) DEVICE — PAD DRESSING TELFA OUCHLESS NONADH 3X8IN

## (undated) DEVICE — ELECTRODE ES L2.75IN S STL INSUL BLDE W/ SL EDGE

## (undated) DEVICE — COVER,TABLE,44X90,STERILE: Brand: MEDLINE

## (undated) DEVICE — MANIFOLD 4 PRTS RT SIDE 360/ON

## (undated) DEVICE — DRESSING TRNSPAR W2XL2.75IN FLM SHT SEMIPERMEABLE WIND

## (undated) DEVICE — PENCIL ES CRD L10FT HND SWCHING ROCK SWCH W/ EDGE COAT BLDE

## (undated) DEVICE — SPONGE,NEURO,0.5"X0.5",XR,STRL,10/PK: Brand: MEDLINE

## (undated) DEVICE — KIT MICROINTRODUCER 4FR ECHOGENIC NDL L7CM 21GA STIFF COAX

## (undated) DEVICE — GOWN,ECLIPSE,POLYRNF,BRTHSLV,L,30/CS: Brand: MEDLINE

## (undated) DEVICE — MARKER SKIN 2 TIP UTIL W/ RULER REG LF

## (undated) DEVICE — COUNTER NDL 30 COUNT FOAM STRP SGL MAG

## (undated) DEVICE — GLOVE SURG SZ 75 CRM LTX FREE POLYISOPRENE POLYMER BEAD ANTI